# Patient Record
Sex: FEMALE | Race: WHITE | NOT HISPANIC OR LATINO | Employment: OTHER | URBAN - METROPOLITAN AREA
[De-identification: names, ages, dates, MRNs, and addresses within clinical notes are randomized per-mention and may not be internally consistent; named-entity substitution may affect disease eponyms.]

---

## 2017-01-11 ENCOUNTER — TELEPHONE (OUTPATIENT)
Dept: SLEEP MEDICINE | Facility: CLINIC | Age: 52
End: 2017-01-11

## 2017-01-12 ENCOUNTER — OFFICE VISIT (OUTPATIENT)
Dept: SLEEP MEDICINE | Facility: CLINIC | Age: 52
End: 2017-01-12
Payer: COMMERCIAL

## 2017-01-12 VITALS
WEIGHT: 135.38 LBS | DIASTOLIC BLOOD PRESSURE: 60 MMHG | HEIGHT: 69 IN | BODY MASS INDEX: 20.05 KG/M2 | OXYGEN SATURATION: 97 % | SYSTOLIC BLOOD PRESSURE: 102 MMHG | HEART RATE: 73 BPM

## 2017-01-12 DIAGNOSIS — G43.719 CHRONIC MIGRAINE WITHOUT AURA, WITH INTRACTABLE MIGRAINE, SO STATED, WITHOUT MENTION OF STATUS MIGRAINOSUS: ICD-10-CM

## 2017-01-12 DIAGNOSIS — G43.009 MIGRAINE WITHOUT AURA AND WITHOUT STATUS MIGRAINOSUS, NOT INTRACTABLE: ICD-10-CM

## 2017-01-12 PROCEDURE — 1159F MED LIST DOCD IN RCRD: CPT | Mod: S$GLB,,, | Performed by: NURSE PRACTITIONER

## 2017-01-12 PROCEDURE — 99213 OFFICE O/P EST LOW 20 MIN: CPT | Mod: S$GLB,,, | Performed by: NURSE PRACTITIONER

## 2017-01-12 PROCEDURE — 99999 PR PBB SHADOW E&M-EST. PATIENT-LVL III: CPT | Mod: PBBFAC,,, | Performed by: NURSE PRACTITIONER

## 2017-01-12 RX ORDER — SUMATRIPTAN SUCCINATE 100 MG/1
100 TABLET ORAL
Qty: 9 TABLET | Refills: 3 | Status: SHIPPED | OUTPATIENT
Start: 2017-01-12 | End: 2017-02-11

## 2017-01-12 RX ORDER — SUMATRIPTAN AND NAPROXEN SODIUM 85; 500 MG/1; MG/1
TABLET, FILM COATED ORAL
Qty: 9 TABLET | Refills: 5 | Status: SHIPPED | OUTPATIENT
Start: 2017-01-12 | End: 2018-12-04

## 2017-01-12 RX ORDER — BUTALBITAL, ACETAMINOPHEN AND CAFFEINE 300; 40; 50 MG/1; MG/1; MG/1
CAPSULE ORAL
Refills: 4 | COMMUNITY
Start: 2017-01-04 | End: 2017-01-12 | Stop reason: SDUPTHER

## 2017-01-12 RX ORDER — PROMETHAZINE HYDROCHLORIDE 12.5 MG/1
12.5-25 TABLET ORAL EVERY 4 HOURS PRN
Qty: 30 TABLET | Refills: 3 | Status: ON HOLD | OUTPATIENT
Start: 2017-01-12 | End: 2017-07-06 | Stop reason: HOSPADM

## 2017-01-12 NOTE — MR AVS SNAPSHOT
[facility]  [address]  [facility] [address]  Phone: [phone]                  Carolin Lind   2017 10:00 AM   Office Visit    Description:  Female : 1965   Provider:  Sabra Carpenter NP   Department:  [facility]           Reason for Visit     Headache           Diagnoses this Visit        Comments    Migraine without aura and without status migrainosus, not intractable         Chronic migraine without aura, with intractable migraine, so stated, without mention of status migrainosus                To Do List           Future Appointments        Provider Department Dept Phone    2017 10:00 AM Danette Alvares MD [facility] [address] [phone]      Goals (5 Years of Data)     None      Follow-Up and Disposition     Return in about 6 months (around 2017).       These Medications        Disp Refills Start End    sumatriptan-naproxen (TREXIMET)  mg Tab 9 tablet 5 2017     TAKE 1 TABLET BY MOUTH EVERY 2 HOURS AS NEEDED    Pharmacy: [address] Ph #: [phone]       promethazine (PHENERGAN) 12.5 MG Tab 30 tablet 3 2017     Take 1-2 tablets (12.5-25 mg total) by mouth every 4 (four) hours as needed. 1 - 2 Tablet Oral Every 4-6 hours - Oral    Pharmacy: [id number] - [facility] [address] AT [facility] Ph #: [phone]       sumatriptan succinate (ZEMBRACE SYMTOUCH) 3 mg/0.5 mL PnIj 12 Syringe 3 2017     Inject 3 mg into the skin every 2 (two) hours as needed. - Subcutaneous    Pharmacy: [facility] - [facility]  [address] Ph #: [phone]       sumatriptan (IMITREX) 100 MG tablet 9 tablet 3 2017    Take 1 tablet (100 mg total) by mouth every 2 (two) hours as needed for Migraine. - Oral    Pharmacy: [facility] -  [facility] - [place] Ph #: [phone]         [facility] On Call     [facility] On Call Nurse [facility] Line - 24/7 Assistance  Registered nurses in the [facility] On Call Center provide clinical advisement, health education, appointment booking, and other advisory services.  Call for this free service at [phone].             Medications           Message regarding Medications     Verify the changes and/or additions to your medication regime listed below are the same as discussed with your clinician today.  If any of these changes or additions are incorrect, please notify your healthcare provider.        START taking these NEW medications        Refills    sumatriptan succinate (ZEMBRACE SYMTOUCH) 3 mg/0.5 mL PnIj 3    Sig: Inject 3 mg into the skin every 2 (two) hours as needed.    Class: Normal    Route: Subcutaneous    sumatriptan (IMITREX) 100 MG tablet 3    Sig: Take 1 tablet (100 mg total) by mouth every 2 (two) hours as needed for Migraine.    Class: Normal    Route: Oral      CHANGE how you are taking these medications     Start Taking Instead of    sumatriptan-naproxen (TREXIMET)  mg Tab TREXIMET  mg Tab    Dosage:  TAKE 1 TABLET BY MOUTH EVERY 2 HOURS AS NEEDED Dosage:  TAKE 1 TABLET BY MOUTH EVERY 2 HOURS AS NEEDED    Reason for Change:  Reorder       STOP taking these medications     butalbital-acetaminophen-caff -40 mg Cap TK ONE C PO  Q 4 TO 6 H PRF HA           Verify that the below list of medications is an accurate representation of the medications you are currently taking.  If none reported, the list may be blank. If incorrect, please contact your healthcare provider. Carry this list with you in case of emergency.           Current Medications     butalbital-acetaminophen-caffeine -40 mg (FIORICET, ESGIC) -40 mg per tablet     clonazepam (KLONOPIN) 1 MG tablet Take 1 mg by mouth every evening.    fluoxetine (PROZAC) 20 MG capsule Take 3 capsules (60 mg total)  "by mouth once daily.    promethazine (PHENERGAN) 12.5 MG Tab Take 1-2 tablets (12.5-25 mg total) by mouth every 4 (four) hours as needed. 1 - 2 Tablet Oral Every 4-6 hours    propranolol (INDERAL LA) 80 MG 24 hr capsule Take 80 mg by mouth once daily. 1 Capsule,Extended Release 24 hr Oral Every day    propranolol (INDERAL LA) 80 MG 24 hr capsule TAKE 2 CAPSULES BY MOUTH DAILY    sumatriptan succinate (SUMAVEL DOSEPRO) 6 mg/0.5 mL NfIj Inject 6 mg into the skin every 2 (two) hours as needed. 1 Needle-Free Injector Subcutaneous Every 2 hours    venlafaxine (EFFEXOR-XR) 75 MG 24 hr capsule TAKE ONE CAPSULE BY MOUTH ONCE DAILY    sumatriptan (IMITREX) 100 MG tablet Take 1 tablet (100 mg total) by mouth every 2 (two) hours as needed for Migraine.    sumatriptan succinate (ZEMBRACE SYMTOUCH) 3 mg/0.5 mL PnIj Inject 3 mg into the skin every 2 (two) hours as needed.    sumatriptan-naproxen (TREXIMET)  mg Tab TAKE 1 TABLET BY MOUTH EVERY 2 HOURS AS NEEDED    TREXIMET  mg Tab            Clinical Reference Information           Vital Signs - Last Recorded  Most recent update: 1/12/2017 10:30 AM by Zane Angel MA    BP Pulse Ht Wt SpO2 BMI    102/60 73 5' 9" (1.753 m) 61.4 kg (135 lb 5.8 oz) 97% 19.99 kg/m2      Blood Pressure          Most Recent Value    BP  102/60      Allergies as of 1/12/2017     No Known Allergies      Immunizations Administered on Date of Encounter - 1/12/2017     None      [facility] Sign-Up     Activating your [facility] account is as easy as 1-2-3!     1) Visit [email or web address], select Sign Up Now, enter this activation code and your date of birth, then select Next.  [email or web address]  Expires: 2/26/2017 10:56 AM      2) Create a username and password to use when you visit [facility] in the future and select a security question in case you lose your password and select Next.    3) Enter your e-mail address and click Sign Up!    Additional Information  If you have questions, please " e-mail [email or web address] or call [phone] to talk to our [facility] staff. Remember, [facility] is NOT to be used for urgent needs. For medical emergencies, dial 911.

## 2017-01-12 NOTE — PROGRESS NOTES
"Since last visit 6/5/15,  she reports her headaches continue to occur daily ,mild mainly, occasional exacerbations. Sumavel (uses 1x month) and Treximet remain effective. Sometimes aleve or excedrin migraine. Takes Aleve for menses headaches,always begins ~ 4d prior to cycle then lessens when starts. She has been out of Treximet for a long time. Lot of stress/dad and brother dying 2016/mom dementia [place]/traveling back and forth. She continues to see psychiatrist. HIT=6 score= 63. Headaches remains same location/nature. HA's remain bilateral R>L retro-orbital which may dissipate on its own with distraction, associated with photo/phonophobia and nausea.  May originate vertex/posterior gel, mushy, sore sensation to front area. She remains on Prozac and effexor for mood. Stress and weather changes are two biggest known triggers. She remains on Inderal LA 80mg qd for migraine prophylaxis, without sedation, faintness, dizziness. Did not trial 2 tabs. Phenergan helps nausea/out of also.      Hx trialed botox once here which was not helpful (has needle phobia) and once by Dr. Gill ([place]) which helped (HA became less intense) but caused increased bruising. Helpful is Fioricet.  Sumavel injection causes sleepiness. She feels tired and  fatigued in mornings.        Tried unsuccussfully in past (Effexor, Inderal, Topamax, Zoloft, Lexapro, relpax, maxalt, zomig ns, frova likes imitrex best/treximet)    ROS: +snoring, 5# wgt gain, stress. Otherwise a balance review of 10-systems is negative.       PHYSICAL EXAM:   General: W/D, W/N     Visit Vitals    /60    Pulse 73    Ht 5' 9" (1.753 m)    Wt 61.4 kg (135 lb 5.8 oz)    SpO2 97%    BMI 19.99 kg/m2         IMPRESSION:   Chronic migraine without aura, intractable 346.73   Mood disturbance, sees psychiatry      PLAN:   Preventative therapy:Continue Inderal LA 80mg qd, trial 2 tabs in am for more therapeutic, if intolerable consider 120mg LA dose    Continue " Prozac 60mg qd for mood/HA prophylaxis and SNRI mood/HA, recommend continued f/u appt with psychiatry for expert mgt of stress/mood.     Continue Aleve 220mg 1-2 tabs for menses prophylaxis +- mild HA, or sparing use Excedrin migraine  Continue Magnesium 250mg bid and vitamin B2 100mg bid     Abortive therapy: Continue Sumavel dose pro 6mg/0.5ml SC q2hr prn, max 2/24hr and/or Trial Zembrace 3mg q2hr prn ([facility]care)    Continue Treximet 85/500mg 1tab q2hr prn, max 2tab/24hr (procare), otherwise consider resuming imitrex 100mg (ritecare)    Continue Phenergan 12.5-25 mg PO or 25mg CO q4-6h prn for HA/nausea     Encouaged her to continue to regulate sleep, mealtimes, try to reduce stress and to avoid known headache triggers/aggravating factors, stress. Discuss with pcp re: ASA given family hx heart disease    Encouraged to limit acute/abortive medications to not more than 2-3 days/week to prevent further progression of headache. Do not delay treatment.     RTC 6-mos, sooner if needed

## 2017-01-16 RX ORDER — VENLAFAXINE HYDROCHLORIDE 75 MG/1
CAPSULE, EXTENDED RELEASE ORAL
Qty: 30 CAPSULE | Refills: 0 | Status: SHIPPED | OUTPATIENT
Start: 2017-01-16 | End: 2017-02-21 | Stop reason: SDUPTHER

## 2017-01-30 ENCOUNTER — OFFICE VISIT (OUTPATIENT)
Dept: OBSTETRICS AND GYNECOLOGY | Facility: CLINIC | Age: 52
End: 2017-01-30
Payer: COMMERCIAL

## 2017-01-30 ENCOUNTER — LAB VISIT (OUTPATIENT)
Dept: LAB | Facility: OTHER | Age: 52
End: 2017-01-30
Attending: OBSTETRICS & GYNECOLOGY
Payer: COMMERCIAL

## 2017-01-30 VITALS
SYSTOLIC BLOOD PRESSURE: 110 MMHG | HEIGHT: 69 IN | WEIGHT: 133.19 LBS | DIASTOLIC BLOOD PRESSURE: 70 MMHG | BODY MASS INDEX: 19.73 KG/M2

## 2017-01-30 DIAGNOSIS — Z01.419 ENCOUNTER FOR GYNECOLOGICAL EXAMINATION WITHOUT ABNORMAL FINDING: Primary | ICD-10-CM

## 2017-01-30 DIAGNOSIS — Z12.39 BREAST CANCER SCREENING: ICD-10-CM

## 2017-01-30 DIAGNOSIS — N95.1 PERIMENOPAUSAL: ICD-10-CM

## 2017-01-30 LAB — FSH SERPL-ACNC: 65 MIU/ML

## 2017-01-30 PROCEDURE — 36415 COLL VENOUS BLD VENIPUNCTURE: CPT

## 2017-01-30 PROCEDURE — 87624 HPV HI-RISK TYP POOLED RSLT: CPT

## 2017-01-30 PROCEDURE — 99999 PR PBB SHADOW E&M-EST. PATIENT-LVL III: CPT | Mod: PBBFAC,,, | Performed by: OBSTETRICS & GYNECOLOGY

## 2017-01-30 PROCEDURE — 83001 ASSAY OF GONADOTROPIN (FSH): CPT

## 2017-01-30 PROCEDURE — 88142 CYTOPATH C/V THIN LAYER: CPT | Performed by: PATHOLOGY

## 2017-01-30 PROCEDURE — 88141 CYTOPATH C/V INTERPRET: CPT | Mod: ,,, | Performed by: PATHOLOGY

## 2017-01-30 PROCEDURE — 99396 PREV VISIT EST AGE 40-64: CPT | Mod: S$GLB,,, | Performed by: OBSTETRICS & GYNECOLOGY

## 2017-01-30 RX ORDER — BUTALBITAL, ACETAMINOPHEN, CAFFEINE AND CODEINE PHOSPHATE 50; 325; 40; 30 MG/1; MG/1; MG/1; MG/1
CAPSULE ORAL
Refills: 0 | Status: ON HOLD | COMMUNITY
Start: 2017-01-13 | End: 2017-07-06 | Stop reason: HOSPADM

## 2017-01-30 RX ORDER — BUTALBITAL, ACETAMINOPHEN AND CAFFEINE 300; 40; 50 MG/1; MG/1; MG/1
CAPSULE ORAL
Refills: 0 | Status: ON HOLD | COMMUNITY
Start: 2017-01-24 | End: 2017-07-06 | Stop reason: HOSPADM

## 2017-01-30 NOTE — MR AVS SNAPSHOT
[facility] [address]  [address]  [facility] [address]  Phone: [phone]  Fax: [phone]                  Carolin Lind   2017 9:15 AM   Office Visit    Description:  Female : 1965   Provider:  Danette Alvares MD   Department:  [facility] [address]           Reason for Visit     Well Woman                To Do List           Goals (5 Years of Data)     None      [facility] On Call     [facility] On Call Nurse [facility] Line -  Assistance  Registered nurses in the [facility] On Call Center provide clinical advisement, health education, appointment booking, and other advisory services.  Call for this free service at [phone].             Medications           Message regarding Medications     Verify the changes and/or additions to your medication regime listed below are the same as discussed with your clinician today.  If any of these changes or additions are incorrect, please notify your healthcare provider.             Verify that the below list of medications is an accurate representation of the medications you are currently taking.  If none reported, the list may be blank. If incorrect, please contact your healthcare provider. Carry this list with you in case of emergency.           Current Medications     butalbital-acetaminop-caf-cod -26-30 mg Cap take 2 capsules by mouth twice a day if needed    butalbital-acetaminophen-caff -40 mg Cap TK ONE C PO  Q 4 TO 6 H PRF HA    butalbital-acetaminophen-caffeine -40 mg (FIORICET, ESGIC) -40 mg per tablet     clonazepam (KLONOPIN) 1 MG tablet Take 1 mg by mouth every evening.    fluoxetine (PROZAC) 20 MG capsule Take 3 capsules (60 mg total) by mouth once daily.    promethazine (PHENERGAN) 12.5 MG Tab Take 1-2 tablets (12.5-25 mg total) by mouth every 4 (four) hours as needed. 1 - 2 Tablet Oral Every 4-6 hours    propranolol (INDERAL LA) 80 MG 24 hr capsule Take 80 mg by mouth once daily. 1  "Capsule,Extended Release 24 hr Oral Every day    propranolol (INDERAL LA) 80 MG 24 hr capsule TAKE 2 CAPSULES BY MOUTH DAILY    sumatriptan (IMITREX) 100 MG tablet Take 1 tablet (100 mg total) by mouth every 2 (two) hours as needed for Migraine.    sumatriptan succinate (SUMAVEL DOSEPRO) 6 mg/0.5 mL NfIj Inject 6 mg into the skin every 2 (two) hours as needed. 1 Needle-Free Injector Subcutaneous Every 2 hours    sumatriptan succinate (ZEMBRACE SYMTOUCH) 3 mg/0.5 mL PnIj Inject 3 mg into the skin every 2 (two) hours as needed.    sumatriptan-naproxen (TREXIMET)  mg Tab TAKE 1 TABLET BY MOUTH EVERY 2 HOURS AS NEEDED    TREXIMET  mg Tab     venlafaxine (EFFEXOR-XR) 75 MG 24 hr capsule TAKE ONE CAPSULE BY MOUTH ONCE DAILY    venlafaxine (EFFEXOR-XR) 75 MG 24 hr capsule TAKE 1 CAPSULE(75 MG) BY MOUTH EVERY DAY           Clinical Reference Information           Vital Signs - Last Recorded  Most recent update: 1/30/2017  9:30 AM by Jef Lim MA    BP Ht Wt LMP BMI    110/70 5' 9" (1.753 m) 60.4 kg (133 lb 2.5 oz) 01/03/2017 (Exact Date) 19.66 kg/m2      Blood Pressure          Most Recent Value    BP  110/70      Allergies as of 1/30/2017     No Known Allergies      Immunizations Administered on Date of Encounter - 1/30/2017     None      [facility] Sign-Up     Activating your [facility] account is as easy as 1-2-3!     1) Visit [email or web address], select Sign Up Now, enter this activation code and your date of birth, then select Next.  [email or web address]  Expires: 2/26/2017 10:56 AM      2) Create a username and password to use when you visit [facility] in the future and select a security question in case you lose your password and select Next.    3) Enter your e-mail address and click Sign Up!    Additional Information  If you have questions, please e-mail [email or web address] or call [phone] to talk to our [facility] staff. Remember, [facility] is NOT to be used for urgent needs. For medical emergencies, " dial 911.

## 2017-01-30 NOTE — PROGRESS NOTES
"CC: Well woman exam    Carolin Lind is a 51 y.o. female  presents for a well woman exam.  LMP: Patient's last menstrual period was 2017 (exact date)..  She has   Cycles but has elevated FSH.  Wants to recheck      Past Medical History   Diagnosis Date    Anxiety     Depression     Heart murmur     History of migraine headaches     Menarche      Age of onset 13     Past Surgical History   Procedure Laterality Date    Breast surgery       Social History     Social History    Marital status:      Spouse name: N/A    Number of children: N/A    Years of education: N/A     Social History Main Topics    Smoking status: Never Smoker    Smokeless tobacco: Never Used    Alcohol use No    Drug use: No    Sexual activity: Yes     Partners: Male     Birth control/ protection: None     Other Topics Concern    None     Social History Narrative     Family History   Problem Relation Age of Onset    Cancer Mother     Migraines Daughter     Migraines Maternal Aunt     Breast cancer Neg Hx     Colon cancer Neg Hx     Ovarian cancer Neg Hx      OB History      Para Term  AB TAB SAB Ectopic Multiple Living    2 1   1               Visit Vitals    /70    Ht 5' 9" (1.753 m)    Wt 60.4 kg (133 lb 2.5 oz)    LMP 2017 (Exact Date)    BMI 19.66 kg/m2         ROS:    ROS:  GENERAL: Denies weight gain or weight loss. Feeling well overall.   SKIN: Denies rash or lesions.   HEAD: Denies head injury or headache.   NODES: Denies enlarged lymph nodes.   CHEST: Denies chest pain or shortness of breath.   CARDIOVASCULAR: Denies palpitations or left sided chest pain.   ABDOMEN: No abdominal pain, constipation, diarrhea, nausea, vomiting or rectal bleeding.   URINARY: No frequency, dysuria, hematuria, or burning on urination.  REPRODUCTIVE: See HPI.   BREASTS: The patient performs breast self-examination and denies pain, lumps, or nipple discharge.   HEMATOLOGIC: No easy " bruisability or excessive bleeding.   MUSCULOSKELETAL: Denies joint pain or swelling.   NEUROLOGIC: Denies syncope or weakness.   PSYCHIATRIC: Denies depression, anxiety or mood swings.    PHYSICAL EXAM:    APPEARANCE: Well nourished, well developed, in no acute distress.  AFFECT: WNL, alert and oriented x 3  SKIN: No acne or hirsutism  NECK: Neck symmetric without masses or thyromegaly  NODES: No inguinal, cervical, axillary, or femoral lymph node enlargement  CHEST: Good respiratory effect  ABDOMEN: Soft.  No tenderness or masses.  No hepatosplenomegaly.  No hernias.  BREASTS: Symmetrical, no skin changes or visible lesions.  No palpable masses, nipple discharge bilaterally.  PELVIC: Normal external genitalia without lesions.  Normal hair distribution.  Adequate perineal body, normal urethral meatus.  Vagina moist and well rugated without lesions or discharge.  Cervix pink, without lesions, discharge or tenderness.  No significant cystocele or rectocele.  Bimanual exam shows uterus to be normal size, regular, mobile and nontender.  Adnexa without masses or tenderness.    RECTAL: Rectovaginal exam confirms above with normal sphincter tone, no masses.  EXTREMITIES: No edema.    Diagnosis    ICD-10-CM ICD-9-CM    1. Encounter for gynecological examination without abnormal finding Z01.419 V72.31 Liquid-based pap smear, screening   2. Perimenopausal N95.1 627.2 Follicle stimulating hormone   3. Breast cancer screening Z12.39 V76.10 Mammo Digital Screening Bilat with Tomosynthesis_CAD         Patient was counseled today on A.C.S. Pap guidelines and recommendations for yearly pelvic exams, mammograms and monthly self breast exams; to see her PCP for other health maintenance.     F/U Annually

## 2017-01-31 DIAGNOSIS — G43.919 INTRACTABLE MIGRAINE WITHOUT STATUS MIGRAINOSUS, UNSPECIFIED MIGRAINE TYPE: ICD-10-CM

## 2017-01-31 RX ORDER — PROPRANOLOL HYDROCHLORIDE 80 MG/1
CAPSULE, EXTENDED RELEASE ORAL
Qty: 60 CAPSULE | Refills: 3 | Status: SHIPPED | OUTPATIENT
Start: 2017-01-31 | End: 2017-06-08 | Stop reason: SDUPTHER

## 2017-02-02 ENCOUNTER — HOSPITAL ENCOUNTER (OUTPATIENT)
Dept: RADIOLOGY | Facility: OTHER | Age: 52
Discharge: HOME | End: 2017-02-02
Attending: OBSTETRICS & GYNECOLOGY
Payer: COMMERCIAL

## 2017-02-02 DIAGNOSIS — Z12.31 VISIT FOR SCREENING MAMMOGRAM: ICD-10-CM

## 2017-02-02 DIAGNOSIS — Z12.39 BREAST CANCER SCREENING: ICD-10-CM

## 2017-02-02 PROCEDURE — 77067 SCR MAMMO BI INCL CAD: CPT | Mod: 26,,, | Performed by: RADIOLOGY

## 2017-02-02 PROCEDURE — 77067 SCR MAMMO BI INCL CAD: CPT | Mod: TC

## 2017-02-02 PROCEDURE — 77063 BREAST TOMOSYNTHESIS BI: CPT | Mod: 26,,, | Performed by: RADIOLOGY

## 2017-02-07 RX ORDER — FLUOXETINE HYDROCHLORIDE 20 MG/1
CAPSULE ORAL
Qty: 270 CAPSULE | Refills: 0 | Status: CANCELLED | OUTPATIENT
Start: 2017-02-07

## 2017-02-08 LAB — HUMAN PAPILLOMAVIRUS (HPV): DETECTED

## 2017-02-09 ENCOUNTER — TELEPHONE (OUTPATIENT)
Dept: PAIN MEDICINE | Facility: CLINIC | Age: 52
End: 2017-02-09

## 2017-02-09 RX ORDER — FLUOXETINE HYDROCHLORIDE 20 MG/1
60 CAPSULE ORAL DAILY
Qty: 270 CAPSULE | Refills: 1 | Status: SHIPPED | OUTPATIENT
Start: 2017-02-09 | End: 2017-08-17 | Stop reason: SDUPTHER

## 2017-02-15 ENCOUNTER — TELEPHONE (OUTPATIENT)
Dept: OBSTETRICS AND GYNECOLOGY | Facility: CLINIC | Age: 52
End: 2017-02-15

## 2017-02-15 NOTE — TELEPHONE ENCOUNTER
Atypical pap with + HPV.  Please schedule colpo in the office to visualize the cervix and take biopsies if needed                 Spoke with pt. Results given. Pt verbalized understanding. Pt offered/declined appt for tomorrow. Prefers a Monday. Appt offered/accepted for 2/20. Pt aware time/location will mail slip- AWARE ALL COLPO ARE PERFORMED AT [facility]

## 2017-02-20 ENCOUNTER — OFFICE VISIT (OUTPATIENT)
Dept: PAIN MEDICINE | Facility: CLINIC | Age: 52
End: 2017-02-20
Payer: COMMERCIAL

## 2017-02-20 VITALS
WEIGHT: 135.56 LBS | HEART RATE: 59 BPM | SYSTOLIC BLOOD PRESSURE: 124 MMHG | TEMPERATURE: 98 F | RESPIRATION RATE: 18 BRPM | HEIGHT: 69 IN | DIASTOLIC BLOOD PRESSURE: 78 MMHG | BODY MASS INDEX: 20.08 KG/M2

## 2017-02-20 DIAGNOSIS — F41.1 GAD (GENERALIZED ANXIETY DISORDER): Primary | ICD-10-CM

## 2017-02-20 PROCEDURE — 1160F RVW MEDS BY RX/DR IN RCRD: CPT | Mod: S$GLB,,, | Performed by: PSYCHIATRY & NEUROLOGY

## 2017-02-20 PROCEDURE — 99214 OFFICE O/P EST MOD 30 MIN: CPT | Mod: S$GLB,,, | Performed by: PSYCHIATRY & NEUROLOGY

## 2017-02-20 PROCEDURE — 99999 PR PBB SHADOW E&M-EST. PATIENT-LVL III: CPT | Mod: PBBFAC,,, | Performed by: PSYCHIATRY & NEUROLOGY

## 2017-02-20 NOTE — PROGRESS NOTES
"Outpatient Psychiatry Follow-Up Visit (MD/NP)    2/20/2017    Clinical Status of Patient:  Outpatient (Ambulatory)    Chief Complaint:  Carolin Lind is a 51 y.o. female who presents today for follow-up of depression and anxiety.  Met with patient.      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 That she has been feeling depressed with low motivation and energy and still very ambivalent about what to do to help herself with her family situation. That she is not able to get much support from her own family because it is shameful in her culture for a woman to be / . That her daughter is doing "OK", she trying to make her daughter more independent and self sufficient. Talked about coping skills and the benefits of individual psychotherapy.She has been complant with her medications.    Psychotherapy:  · Target symptoms: depression, anxiety   · Why chosen therapy is appropriate versus another modality: relevant to diagnosis, patient responds to this modality  · Outcome monitoring methods: self-report, observation  · Therapeutic intervention type: behavior modifying psychotherapy, supportive psychotherapy  · Topics discussed/themes: relationships difficulties, work stress, parenting issues, difficulty managing affect in interpersonal relationships, building skills sets for symptom management, symptom recognition  · The patient's response to the intervention is guarded. The patient's progress toward treatment goals is not progressing.   · Duration of intervention: 30 minutes.    Review of Systems   · PSYCHIATRIC: Pertinant items are noted in the narrative.    Past Medical, Family and Social History: The patient's past medical, family and social history have been reviewed and updated as appropriate within the electronic medical record - see encounter notes.    Compliance: " yes    Side effects: None    Risk Parameters:  Patient reports no suicidal ideation  Patient reports no homicidal ideation  Patient reports no self-injurious behavior  Patient reports no violent behavior    Exam (detailed: at least 9 elements; comprehensive: all 15 elements)   Constitutional  Vitals:  Most recent vital signs, dated less than 90 days prior to this appointment, were reviewed.   There were no vitals filed for this visit.     General:  age appropriate, casually dressed, neatly groomed     Musculoskeletal  Muscle Strength/Tone:  no tremor, no tic   Gait & Station:  non-ataxic     Psychiatric  Speech:  no latency; no press   Mood & Affect:  anxious  congruent and appropriate   Thought Process:  normal and logical, goal-directed   Associations:  intact   Thought Content:  normal, no suicidality, no homicidality, delusions, or paranoia   Insight:  intact   Judgement: behavior is adequate to circumstances   Orientation:  grossly intact   Memory: intact for content of interview   Language: grossly intact   Attention Span & Concentration:  able to focus   Fund of Knowledge:  intact and appropriate to age and level of education     Assessment and Diagnosis   Status/Progress: Based on the examination today, the patient's problem(s) is/are inadequately controlled.  New problems have been presented today.   Co-morbidities are complicating management of the primary condition.  There are no active rule-out diagnoses for this patient at this time.         Impression: Pt is a 52 Y/O woman with PMHx sig for Migraine Headaches with   Major Depressive Disorder, mod      Strengths and Liabilities: Strength: Patient accepts guidance/feedback, Strength: Patient is expressive/articulate., Strength: Patient is intelligent., Strength: Patient is motivated for change., Strength: Patient has reasonable judgment.           Treatment Goals: Specify outcomes written in observable, behavioral terms:   Anxiety: acquiring relapse  prevention skills, reducing negative automatic thoughts, reducing physical symptoms of anxiety and reducing time spent worrying (<30 minutes/day)   Depression: acquiring relapse prevention skills, increasing energy, increasing interest in usual activities, increasing motivation, increasing self-reward for positive behaviors (one/day), increasing self-reward for positive thoughts (one/day), increasing social contacts (three/week) and reducing fatigue       Treatment Plan/Recommendations:   · Medication Management: Continue current medications. The risks and benefits of medication were discussed with the patient.  · The treatment plan and follow up plan were reviewed with the patient.  Will continue the prozac 60 mg daily.   Will cont effexor XR 75 mg daily.   Pt encouraged to make an appointment for individual psychotherapy.   Discussed coping skills.   Provided supportive psychotherapy.    Will help pt make an appointment with a therapist.        Return to Clinic: 3 months

## 2017-02-20 NOTE — MR AVS SNAPSHOT
[facility]  [address]  [place] [address]  Phone: [phone]  Fax: [phone]                  Carolin Lind   2017 9:40 AM   Office Visit    Description:  Female : 1965   Provider:  Rhiannon Pandya MD   Department:  [facility]           Reason for Visit     Follow-up                To Do List           Future Appointments        Provider Department Dept Phone    3/20/2017 9:15 AM Danette Alvares MD [facility] [address] [phone]    2017 9:20 AM Rhiannon Pandya MD [facility] [phone]      Goals (5 Years of Data)     None      [facility]     [facility] On Call Nurse [facility] Line -  Assistance  Registered nurses in the [facility] On Call Center provide clinical advisement, health education, appointment booking, and other advisory services.  Call for this free service at [phone].             Medications           Message regarding Medications     Verify the changes and/or additions to your medication regime listed below are the same as discussed with your clinician today.  If any of these changes or additions are incorrect, please notify your healthcare provider.             Verify that the below list of medications is an accurate representation of the medications you are currently taking.  If none reported, the list may be blank. If incorrect, please contact your healthcare provider. Carry this list with you in case of emergency.           Current Medications     butalbital-acetaminop-caf-cod -33-30 mg Cap take 2 capsules by mouth twice a day if needed    butalbital-acetaminophen-caff -40 mg Cap TK ONE C PO  Q 4 TO 6 H PRF HA    butalbital-acetaminophen-caffeine -40 mg (FIORICET, ESGIC) -40 mg per tablet     clonazepam (KLONOPIN) 1 MG tablet Take 1 mg by mouth every evening.    fluoxetine (PROZAC) 20 MG capsule Take 3 capsules (60 mg total) by mouth once daily.    promethazine  "(PHENERGAN) 12.5 MG Tab Take 1-2 tablets (12.5-25 mg total) by mouth every 4 (four) hours as needed. 1 - 2 Tablet Oral Every 4-6 hours    propranolol (INDERAL LA) 80 MG 24 hr capsule Take 80 mg by mouth once daily. 1 Capsule,Extended Release 24 hr Oral Every day    propranolol (INDERAL LA) 80 MG 24 hr capsule TAKE 2 CAPSULES BY MOUTH DAILY    sumatriptan succinate (SUMAVEL DOSEPRO) 6 mg/0.5 mL NfIj Inject 6 mg into the skin every 2 (two) hours as needed. 1 Needle-Free Injector Subcutaneous Every 2 hours    sumatriptan succinate (ZEMBRACE SYMTOUCH) 3 mg/0.5 mL PnIj Inject 3 mg into the skin every 2 (two) hours as needed.    sumatriptan-naproxen (TREXIMET)  mg Tab TAKE 1 TABLET BY MOUTH EVERY 2 HOURS AS NEEDED    TREXIMET  mg Tab     venlafaxine (EFFEXOR-XR) 75 MG 24 hr capsule TAKE ONE CAPSULE BY MOUTH ONCE DAILY    venlafaxine (EFFEXOR-XR) 75 MG 24 hr capsule TAKE 1 CAPSULE(75 MG) BY MOUTH EVERY DAY           Clinical Reference Information           Your Vitals Were     BP Pulse Temp Resp Height Weight    124/78 59 98.2 °F (36.8 °C) (Oral) 18 5' 9" (1.753 m) 61.5 kg (135 lb 9.3 oz)    Last Period BMI             01/03/2017 (Exact Date) 20.02 kg/m2         Blood Pressure          Most Recent Value    BP  124/78      Allergies as of 2/20/2017     No Known Allergies      Immunizations Administered on Date of Encounter - 2/20/2017     None      [facility] Sign-Up     Activating your [facility] account is as easy as 1-2-3!     1) Visit [email or web address], select Sign Up Now, enter this activation code and your date of birth, then select Next.  [email or web address]  Expires: 2/26/2017 10:56 AM      2) Create a username and password to use when you visit [facility] in the future and select a security question in case you lose your password and select Next.    3) Enter your e-mail address and click Sign Up!    Additional Information  If you have questions, please e-mail [email or web address] or call [phone] to talk " to our [facility] staff. Remember, [facility] is NOT to be used for urgent needs. For medical emergencies, dial 911.         Language Assistance Services     ATTENTION: Language assistance services are available, free of charge. Please call [phone].      ATENCIÓN: Si habla niyah, tiene a barksdale disposición servicios gratuitos de asistencia lingüística. Llame al [phone].     CHÚ Ý: N?u b?n nói Ti?ng Vi?t, có các d?ch v? h? tr? ngôn ng? mi?n phí dành cho b?n. G?i s? [phone].         [facility] complies with applicable Federal civil rights laws and does not discriminate on the basis of race, color, national origin, age, disability, or sex.

## 2017-02-21 RX ORDER — VENLAFAXINE HYDROCHLORIDE 75 MG/1
CAPSULE, EXTENDED RELEASE ORAL
Qty: 30 CAPSULE | Refills: 2 | Status: SHIPPED | OUTPATIENT
Start: 2017-02-21 | End: 2017-05-21 | Stop reason: SDUPTHER

## 2017-03-20 ENCOUNTER — PROCEDURE VISIT (OUTPATIENT)
Dept: OBSTETRICS AND GYNECOLOGY | Facility: CLINIC | Age: 52
End: 2017-03-20
Payer: COMMERCIAL

## 2017-03-20 VITALS
SYSTOLIC BLOOD PRESSURE: 110 MMHG | HEIGHT: 69 IN | DIASTOLIC BLOOD PRESSURE: 70 MMHG | WEIGHT: 130.94 LBS | BODY MASS INDEX: 19.39 KG/M2

## 2017-03-20 DIAGNOSIS — R87.810 ATYPICAL SQUAMOUS CELL CHANGES OF UNDETERMINED SIGNIFICANCE (ASCUS) ON CERVICAL CYTOLOGY WITH POSITIVE HIGH RISK HUMAN PAPILLOMA VIRUS (HPV): Primary | ICD-10-CM

## 2017-03-20 DIAGNOSIS — R87.610 ATYPICAL SQUAMOUS CELL CHANGES OF UNDETERMINED SIGNIFICANCE (ASCUS) ON CERVICAL CYTOLOGY WITH POSITIVE HIGH RISK HUMAN PAPILLOMA VIRUS (HPV): Primary | ICD-10-CM

## 2017-03-20 LAB
B-HCG UR QL: NEGATIVE
CTP QC/QA: YES

## 2017-03-20 PROCEDURE — 81025 URINE PREGNANCY TEST: CPT | Mod: S$GLB,,, | Performed by: OBSTETRICS & GYNECOLOGY

## 2017-03-20 PROCEDURE — 88305 TISSUE EXAM BY PATHOLOGIST: CPT

## 2017-03-20 PROCEDURE — 57454 BX/CURETT OF CERVIX W/SCOPE: CPT | Mod: S$GLB,,, | Performed by: OBSTETRICS & GYNECOLOGY

## 2017-03-20 PROCEDURE — 88305 TISSUE EXAM BY PATHOLOGIST: CPT | Mod: 26,,,

## 2017-03-20 NOTE — MR AVS SNAPSHOT
[facility] [address]  [address]  [facility] [address]  Phone: [phone]  Fax: [phone]                  Carolin Lind   3/20/2017 9:15 AM   Procedure visit    Description:  Female : 1965   Provider:  Danette Alvares MD   Department:  [facility] [address]           Reason for Visit     Colposcopy                To Do List           Future Appointments        Provider Department Dept Phone    2017 9:20 AM Rhiannon Pandya MD [facility] [phone]      Goals (5 Years of Data)     None      [facility]     [facility] On Call Nurse Care Line -  Assistance  Registered nurses in the [facility] On Call Center provide clinical advisement, health education, appointment booking, and other advisory services.  Call for this free service at [phone].             Medications           Message regarding Medications     Verify the changes and/or additions to your medication regime listed below are the same as discussed with your clinician today.  If any of these changes or additions are incorrect, please notify your healthcare provider.             Verify that the below list of medications is an accurate representation of the medications you are currently taking.  If none reported, the list may be blank. If incorrect, please contact your healthcare provider. Carry this list with you in case of emergency.           Current Medications     butalbital-acetaminop-caf-cod -38-30 mg Cap take 2 capsules by mouth twice a day if needed    butalbital-acetaminophen-caff -40 mg Cap TK ONE C PO  Q 4 TO 6 H PRF HA    butalbital-acetaminophen-caffeine -40 mg (FIORICET, ESGIC) -40 mg per tablet     clonazepam (KLONOPIN) 1 MG tablet Take 1 mg by mouth every evening.    fluoxetine (PROZAC) 20 MG capsule Take 3 capsules (60 mg total) by mouth once daily.    promethazine (PHENERGAN) 12.5 MG Tab Take 1-2 tablets (12.5-25 mg total) by mouth  "every 4 (four) hours as needed. 1 - 2 Tablet Oral Every 4-6 hours    propranolol (INDERAL LA) 80 MG 24 hr capsule Take 80 mg by mouth once daily. 1 Capsule,Extended Release 24 hr Oral Every day    propranolol (INDERAL LA) 80 MG 24 hr capsule TAKE 2 CAPSULES BY MOUTH DAILY    sumatriptan succinate (SUMAVEL DOSEPRO) 6 mg/0.5 mL NfIj Inject 6 mg into the skin every 2 (two) hours as needed. 1 Needle-Free Injector Subcutaneous Every 2 hours    sumatriptan succinate (ZEMBRACE SYMTOUCH) 3 mg/0.5 mL PnIj Inject 3 mg into the skin every 2 (two) hours as needed.    sumatriptan-naproxen (TREXIMET)  mg Tab TAKE 1 TABLET BY MOUTH EVERY 2 HOURS AS NEEDED    TREXIMET  mg Tab     venlafaxine (EFFEXOR-XR) 75 MG 24 hr capsule TAKE ONE CAPSULE BY MOUTH ONCE DAILY    venlafaxine (EFFEXOR-XR) 75 MG 24 hr capsule TAKE 1 CAPSULE(75 MG) BY MOUTH EVERY DAY           Clinical Reference Information           Your Vitals Were     BP Height Weight Last Period BMI    110/70 5' 9" (1.753 m) 59.4 kg (130 lb 15.3 oz) 03/17/2017 19.34 kg/m2      Blood Pressure          Most Recent Value    BP  110/70      Allergies as of 3/20/2017     No Known Allergies      Immunizations Administered on Date of Encounter - 3/20/2017     None      [facility] Sign-Up     Activating your [facility] account is as easy as 1-2-3!     1) Visit [email or web address], select Sign Up Now, enter this activation code and your date of birth, then select Next.  [email or web address]  Expires: 5/4/2017  9:24 AM      2) Create a username and password to use when you visit [facility] in the future and select a security question in case you lose your password and select Next.    3) Enter your e-mail address and click Sign Up!    Additional Information  If you have questions, please e-mail [email or web address] or call [phone] to talk to our [facility] staff. Remember, [facility] is NOT to be used for urgent needs. For medical emergencies, dial 911.         Language Assistance " Services     ATTENTION: Language assistance services are available, free of charge. Please call [phone].      ATENCIÓN: Si habla niyah, tiene a barksdale disposición servicios gratuitos de asistencia lingüística. Llame al [phone].     CHÚ Ý: N?u b?n nói Ti?ng Vi?t, có các d?ch v? h? tr? ngôn ng? mi?n phí dành cho b?n. G?i s? [phone].         [facility] [address] complies with applicable Federal civil rights laws and does not discriminate on the basis of race, color, national origin, age, disability, or sex.

## 2017-03-20 NOTE — PROCEDURES
Colposcopy W/BIOPSY AND ECC- Today  Date/Time: 3/20/2017 9:58 AM  Performed by: VONNIE SCHMID  Authorized by: VONNIE SCHMID   Preparation: Patient was prepped and draped in the usual sterile fashion.  Local anesthesia used: no    Anesthesia:  Local anesthesia used: no  Sedation:  Patient sedated: no        COLPOSCOPY:    Carolin Lind is a 52 y.o. female   presents for colposcopy.  Patient's last menstrual period was 2017..  Her most recent pap smear shows ASCUS with POSITIVE high risk HPV.      The abnormal test findings were discussed, as well as HPV infection, need for colposcopy and possible biopsies to determine the plan of care, treatments available, the minimal risk of bleeding and infection with colposcopy, and alternatives to colposcopy and she agrees to proceed.      UPT is negative    COLPOSCOPY EXAM:   TIME OUT PERFORMED.     acetowhite lesion(s) noted at 7/11 o'clock    Biopsy was taken at 7/ 11  o'clock.  ECC was performed    Hemostasis was adequate with application of Monsel's solution.  The speculum was removed.  The patient did tolerate the procedure well.    All collected specimens sent to pathology for histologic analysis.    Post-colposcopy counseling:  The patient was instructed to manage post-colposcopy cramping with NSAIDs or Tylenol, or with a prescription per the medication card.  Avoid intercourse, douching, or tampons in the vagina for at least 2-3 days.  Expect a clumpy blackish discharge due to Monsel's solution application for several days.  Report heavy bleeding, worsening pain or pain that does not respond to above medications, or foul-smelling vaginal discharge. HPV vaccine recommended according to FDA age guidelines.  Importance of follow-up stressed.      Follow up based on colposcopy results.

## 2017-05-23 ENCOUNTER — TELEPHONE (OUTPATIENT)
Dept: PAIN MEDICINE | Facility: CLINIC | Age: 52
End: 2017-05-23

## 2017-05-23 RX ORDER — VENLAFAXINE HYDROCHLORIDE 75 MG/1
CAPSULE, EXTENDED RELEASE ORAL
Qty: 30 CAPSULE | Refills: 0 | Status: SHIPPED | OUTPATIENT
Start: 2017-05-23 | End: 2017-07-12 | Stop reason: SDUPTHER

## 2017-05-23 NOTE — TELEPHONE ENCOUNTER
----- Message from Martinez Duarte sent at 5/23/2017  2:18 PM CDT -----  Contact: LAURA FAIR [[id number]]  X_  1st Request  _  2nd Request  _  3rd Request        Who: LAURA FAIR [[id number]]    Why: Patient calling in regards to medication. Please call back to follow up.    What Number to Call Back: [phone]    When to Expect a call back: (Before the end of the day)   -- if the call is after 12:00, the call back will be tomorrow.

## 2017-06-08 DIAGNOSIS — G43.919 INTRACTABLE MIGRAINE WITHOUT STATUS MIGRAINOSUS, UNSPECIFIED MIGRAINE TYPE: ICD-10-CM

## 2017-06-08 RX ORDER — PROPRANOLOL HYDROCHLORIDE 80 MG/1
CAPSULE, EXTENDED RELEASE ORAL
Qty: 60 CAPSULE | Refills: 0 | Status: SHIPPED | OUTPATIENT
Start: 2017-06-08 | End: 2018-04-02

## 2017-07-03 ENCOUNTER — HOSPITAL ENCOUNTER (EMERGENCY)
Facility: HOSPITAL | Age: 52
End: 2017-07-04
Attending: EMERGENCY MEDICINE
Payer: COMMERCIAL

## 2017-07-03 DIAGNOSIS — T14.8XXA FRACTURE: ICD-10-CM

## 2017-07-03 DIAGNOSIS — Z01.810 PREOP CARDIOVASCULAR EXAM: ICD-10-CM
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
## 2017-07-03 DIAGNOSIS — S82.892A CLOSED LEFT ANKLE FRACTURE, INITIAL ENCOUNTER: Primary | ICD-10-CM

## 2017-07-03 PROCEDURE — 99285 EMERGENCY DEPT VISIT HI MDM: CPT | Mod: 25

## 2017-07-03 PROCEDURE — 96376 TX/PRO/DX INJ SAME DRUG ADON: CPT

## 2017-07-03 PROCEDURE — 96361 HYDRATE IV INFUSION ADD-ON: CPT

## 2017-07-03 PROCEDURE — 96375 TX/PRO/DX INJ NEW DRUG ADDON: CPT

## 2017-07-03 PROCEDURE — 96374 THER/PROPH/DIAG INJ IV PUSH: CPT

## 2017-07-04 ENCOUNTER — ANESTHESIA (OUTPATIENT)
Dept: ANESTHESIOLOGY | Facility: HOSPITAL | Age: 52
End: 2017-07-04
Payer: COMMERCIAL

## 2017-07-04 ENCOUNTER — ANESTHESIA EVENT (OUTPATIENT)
Dept: ANESTHESIOLOGY | Facility: HOSPITAL | Age: 52
End: 2017-07-04
Payer: COMMERCIAL

## 2017-07-04 VITALS
RESPIRATION RATE: 16 BRPM | DIASTOLIC BLOOD PRESSURE: 58 MMHG | OXYGEN SATURATION: 99 % | TEMPERATURE: 99 F | WEIGHT: 125 LBS | HEART RATE: 70 BPM | BODY MASS INDEX: 18.51 KG/M2 | SYSTOLIC BLOOD PRESSURE: 127 MMHG | HEIGHT: 69 IN

## 2017-07-04 LAB
ABO + RH BLD: NORMAL
ALBUMIN SERPL BCP-MCNC: 3.5 G/DL
ALP SERPL-CCNC: 67 U/L
ALT SERPL W/O P-5'-P-CCNC: 24 U/L
ANION GAP SERPL CALC-SCNC: 8 MMOL/L
APTT BLDCRRT: 24.5 SEC
AST SERPL-CCNC: 21 U/L
BASOPHILS # BLD AUTO: 0.01 K/UL
BASOPHILS NFR BLD: 0.2 %
BILIRUB SERPL-MCNC: 0.2 MG/DL
BLD GP AB SCN CELLS X3 SERPL QL: NORMAL
BUN SERPL-MCNC: 20 MG/DL
CALCIUM SERPL-MCNC: 9 MG/DL
CHLORIDE SERPL-SCNC: 102 MMOL/L
CO2 SERPL-SCNC: 28 MMOL/L
CREAT SERPL-MCNC: 0.9 MG/DL
DIFFERENTIAL METHOD: ABNORMAL
EOSINOPHIL # BLD AUTO: 0.2 K/UL
EOSINOPHIL NFR BLD: 2.5 %
ERYTHROCYTE [DISTWIDTH] IN BLOOD BY AUTOMATED COUNT: 13.6 %
EST. GFR  (AFRICAN AMERICAN): >60 ML/MIN/1.73 M^2
EST. GFR  (NON AFRICAN AMERICAN): >60 ML/MIN/1.73 M^2
GLUCOSE SERPL-MCNC: 104 MG/DL
HCT VFR BLD AUTO: 38.6 %
HGB BLD-MCNC: 13.3 G/DL
INR PPP: 1
LYMPHOCYTES # BLD AUTO: 1.5 K/UL
LYMPHOCYTES NFR BLD: 24.6 %
MCH RBC QN AUTO: 32 PG
MCHC RBC AUTO-ENTMCNC: 34.5 %
MCV RBC AUTO: 93 FL
MONOCYTES # BLD AUTO: 0.6 K/UL
MONOCYTES NFR BLD: 9.3 %
NEUTROPHILS # BLD AUTO: 3.8 K/UL
NEUTROPHILS NFR BLD: 63.4 %
PLATELET # BLD AUTO: 246 K/UL
PMV BLD AUTO: 9.7 FL
POTASSIUM SERPL-SCNC: 3.7 MMOL/L
PROT SERPL-MCNC: 6.6 G/DL
PROTHROMBIN TIME: 10.4 SEC
RBC # BLD AUTO: 4.16 M/UL
RH BLD: NORMAL
SODIUM SERPL-SCNC: 138 MMOL/L
WBC # BLD AUTO: 5.93 K/UL

## 2017-07-04 PROCEDURE — 37000008 HC ANESTHESIA 1ST 15 MINUTES

## 2017-07-04 PROCEDURE — 86900 BLOOD TYPING SEROLOGIC ABO: CPT

## 2017-07-04 PROCEDURE — 37000009 HC ANESTHESIA EA ADD 15 MINS

## 2017-07-04 PROCEDURE — 80053 COMPREHEN METABOLIC PANEL: CPT

## 2017-07-04 PROCEDURE — 25000003 PHARM REV CODE 250: Performed by: EMERGENCY MEDICINE

## 2017-07-04 PROCEDURE — 93005 ELECTROCARDIOGRAM TRACING: CPT

## 2017-07-04 PROCEDURE — 85730 THROMBOPLASTIN TIME PARTIAL: CPT

## 2017-07-04 PROCEDURE — 25605 CLTX DST RDL FX/EPHYS SEP W/: CPT

## 2017-07-04 PROCEDURE — 86850 RBC ANTIBODY SCREEN: CPT

## 2017-07-04 PROCEDURE — D9220A PRA ANESTHESIA: Mod: ,,, | Performed by: ANESTHESIOLOGY

## 2017-07-04 PROCEDURE — 85025 COMPLETE CBC W/AUTO DIFF WBC: CPT

## 2017-07-04 PROCEDURE — 86901 BLOOD TYPING SEROLOGIC RH(D): CPT | Mod: 91

## 2017-07-04 PROCEDURE — 63600175 PHARM REV CODE 636 W HCPCS: Performed by: EMERGENCY MEDICINE

## 2017-07-04 PROCEDURE — 25000003 PHARM REV CODE 250: Performed by: ANESTHESIOLOGY

## 2017-07-04 PROCEDURE — 63600175 PHARM REV CODE 636 W HCPCS: Performed by: ANESTHESIOLOGY

## 2017-07-04 PROCEDURE — 85610 PROTHROMBIN TIME: CPT

## 2017-07-04 RX ORDER — ONDANSETRON 2 MG/ML
4 INJECTION INTRAMUSCULAR; INTRAVENOUS
Status: COMPLETED | OUTPATIENT
Start: 2017-07-04 | End: 2017-07-04

## 2017-07-04 RX ORDER — HYDROCODONE BITARTRATE AND ACETAMINOPHEN 5; 325 MG/1; MG/1
2 TABLET ORAL
Status: COMPLETED | OUTPATIENT
Start: 2017-07-04 | End: 2017-07-04

## 2017-07-04 RX ORDER — PROPOFOL 10 MG/ML
VIAL (ML) INTRAVENOUS
Status: DISCONTINUED | OUTPATIENT
Start: 2017-07-04 | End: 2017-07-04

## 2017-07-04 RX ORDER — MORPHINE SULFATE 10 MG/ML
6 INJECTION INTRAMUSCULAR; INTRAVENOUS; SUBCUTANEOUS
Status: COMPLETED | OUTPATIENT
Start: 2017-07-04 | End: 2017-07-04

## 2017-07-04 RX ORDER — HYDROCODONE BITARTRATE AND ACETAMINOPHEN 5; 325 MG/1; MG/1
2 TABLET ORAL EVERY 6 HOURS PRN
Qty: 20 TABLET | Refills: 0 | Status: ON HOLD | OUTPATIENT
Start: 2017-07-04 | End: 2017-07-06 | Stop reason: HOSPADM

## 2017-07-04 RX ORDER — LIDOCAINE HCL/PF 100 MG/5ML
SYRINGE (ML) INTRAVENOUS
Status: DISCONTINUED | OUTPATIENT
Start: 2017-07-04 | End: 2017-07-04

## 2017-07-04 RX ORDER — DOCUSATE SODIUM 100 MG/1
100 CAPSULE, LIQUID FILLED ORAL 2 TIMES DAILY
Qty: 40 CAPSULE | Refills: 0 | Status: SHIPPED | OUTPATIENT
Start: 2017-07-04 | End: 2017-07-12 | Stop reason: SDUPTHER

## 2017-07-04 RX ADMIN — PROPOFOL 40 MG: 10 INJECTION, EMULSION INTRAVENOUS at 02:07

## 2017-07-04 RX ADMIN — LIDOCAINE HYDROCHLORIDE 5 ML: 20 INJECTION, SOLUTION INTRAVENOUS at 02:07

## 2017-07-04 RX ADMIN — SODIUM CHLORIDE 1000 ML: 0.9 INJECTION, SOLUTION INTRAVENOUS at 01:07

## 2017-07-04 RX ADMIN — ONDANSETRON 4 MG: 2 INJECTION INTRAMUSCULAR; INTRAVENOUS at 01:07

## 2017-07-04 RX ADMIN — PROPOFOL 50 MG: 10 INJECTION, EMULSION INTRAVENOUS at 02:07

## 2017-07-04 RX ADMIN — PROPOFOL 30 MG: 10 INJECTION, EMULSION INTRAVENOUS at 02:07

## 2017-07-04 RX ADMIN — PROPOFOL 20 MG: 10 INJECTION, EMULSION INTRAVENOUS at 02:07

## 2017-07-04 RX ADMIN — HYDROCODONE BITARTRATE AND ACETAMINOPHEN 2 TABLET: 5; 325 TABLET ORAL at 03:07

## 2017-07-04 RX ADMIN — MORPHINE SULFATE 6 MG: 10 INJECTION INTRAVENOUS at 12:07

## 2017-07-04 RX ADMIN — MORPHINE SULFATE 6 MG: 10 INJECTION INTRAVENOUS at 01:07

## 2017-07-04 RX ADMIN — PROPOFOL 100 MG: 10 INJECTION, EMULSION INTRAVENOUS at 02:07

## 2017-07-04 RX ADMIN — ONDANSETRON 4 MG: 2 INJECTION INTRAMUSCULAR; INTRAVENOUS at 12:07

## 2017-07-04 NOTE — TRANSFER OF CARE
"Anesthesia Transfer of Care Note    Patient: Carolin Lind    Procedure(s) Performed: Procedure(s) (LRB):  REDUCTION-CLOSED (Left)    Patient location: Emergency Department    Anesthesia Type: general    Transport from OR: Transported from OR on 2-3 L/min O2 by NC with adequate spontaneous ventilation    Post pain: adequate analgesia    Post assessment: no apparent anesthetic complications    Post vital signs: stable    Level of consciousness: awake, alert and oriented    Nausea/Vomiting: no nausea/vomiting    Complications: none    Transfer of care protocol was followedComments: Pt sedated in ED and once she was awake care was transferred back to the ED staff      Last vitals:   Visit Vitals  BP (!) 143/69   Pulse 70   Temp 36.9 °C (98.5 °F) (Oral)   Resp 18   Ht 5' 9" (1.753 m)   Wt 56.7 kg (125 lb)   SpO2 99%   BMI 18.46 kg/m²     "

## 2017-07-04 NOTE — ANESTHESIA PREPROCEDURE EVALUATION
07/04/2017  Carolin Lind is a 52 y.o., female.    Anesthesia Evaluation    I have reviewed the Patient Summary Reports.    I have reviewed the Nursing Notes.      Review of Systems  Anesthesia Hx:  Hx of Anesthetic complications (MARINA)    Social:  Non-Smoker    Cardiovascular:   Exercise tolerance: good Denies Pacemaker.  Denies Hypertension. Valvular problems/Murmurs, MVP  Denies MI.  Denies CAD.    Denies CABG/stent.  Denies Dysrhythmias.   Denies Angina.             denies PVD no hyperlipidemia        Pulmonary:  Pulmonary Normal    Renal/:  Renal/ Normal     Hepatic/GI:  Hepatic/GI Normal    Neurological:   Denies CVA. Headaches Denies Seizures.    Endocrine:  Endocrine Normal    Psych:   Psychiatric History          Physical Exam  General:  Well nourished    Airway/Jaw/Neck:  AIRWAY FINDINGS: Normal           Mental Status:  Mental Status Findings: Normal        Anesthesia Plan  Type of Anesthesia, risks & benefits discussed:  Anesthesia Type:  general  Patient's Preference:   Intra-op Monitoring Plan:   Intra-op Monitoring Plan Comments:   Post Op Pain Control Plan:   Post Op Pain Control Plan Comments:   Induction:   IV  Beta Blocker:  Patient is on a Beta-Blocker and has received one dose within the past 24 hours (No further documentation required).       Informed Consent: Patient understands risks and agrees with Anesthesia plan.  Questions answered. Anesthesia consent signed with patient.  ASA Score: 2  emergent   Day of Surgery Review of History & Physical:    H&P update referred to the provider.     Anesthesia Plan Notes: Pt last ate around 8pm        Ready For Surgery From Anesthesia Perspective.

## 2017-07-04 NOTE — ANESTHESIA POSTPROCEDURE EVALUATION
"Anesthesia Post Evaluation    Patient: Carolin Lind    Procedure(s) Performed: Procedure(s) (LRB):  REDUCTION-CLOSED (Left)    Final Anesthesia Type: general  Patient location during evaluation: ED  Patient participation: Yes- Able to Participate  Level of consciousness: awake and alert and oriented  Post-procedure vital signs: reviewed and stable  Pain management: adequate  Airway patency: patent  PONV status at discharge: No PONV  Anesthetic complications: no      Cardiovascular status: blood pressure returned to baseline and hemodynamically stable  Respiratory status: unassisted and room air  Hydration status: euvolemic  Follow-up not needed.        Visit Vitals  BP (!) 143/69   Pulse 70   Temp 36.9 °C (98.5 °F) (Oral)   Resp 18   Ht 5' 9" (1.753 m)   Wt 56.7 kg (125 lb)   SpO2 99%   BMI 18.46 kg/m²       Pain/Fabioal Score: Pain Rating Prior to Med Admin: 10 (7/4/2017  1:17 AM)      "

## 2017-07-04 NOTE — ED PROVIDER NOTES
Encounter Date: 7/3/2017       History     Chief Complaint   Patient presents with    Ankle Pain     EMS reports trip and fall PTA.  Reports obvious deformity to L ankle. EMS gave 25 mcg Fentanyl.      This is an emergent evaluation of a 52-year-old female who presents with acute severe nonradiating left ankle pain.  [place]  She had immediate onset of severe pain to her left ankle.  She has been unable to ambulate since that time.  She denies loss of consciousness.  She received fentanyl 25 µg in route by EMS with minimal relief.    Past medical history of anxiety, depression, migraines.  Takes daily fluoxetine (Prozac) and propranolol.  Takes Fioricet, sumatriptan as needed for headache.  Past surgical history of breast augmentation.          Review of patient's allergies indicates:  No Known Allergies  Past Medical History:   Diagnosis Date    Abnormal Pap smear of cervix 01/30/2017    Atypical pap with + HPV    Anxiety     Depression     Heart murmur     History of migraine headaches     Menarche     Age of onset 13     Past Surgical History:   Procedure Laterality Date    BREAST SURGERY       Family History   Problem Relation Age of Onset    Cancer Mother     Migraines Daughter     Migraines Maternal Aunt     Breast cancer Neg Hx     Colon cancer Neg Hx     Ovarian cancer Neg Hx      Social History   Substance Use Topics    Smoking status: Never Smoker    Smokeless tobacco: Never Used    Alcohol use No     Review of Systems   Constitutional: Negative for fever.   HENT: Negative for nosebleeds.    Eyes: Negative for visual disturbance.   Respiratory: Negative for shortness of breath.    Cardiovascular: Negative for chest pain.   Gastrointestinal: Negative for abdominal pain.   Genitourinary: Negative for dysuria and flank pain.   Musculoskeletal: Positive for arthralgias (left ankle, acute severe). Negative for back pain and neck stiffness.    Skin: Negative for rash.   Neurological: Negative for weakness.       Physical Exam     Initial Vitals [07/04/17 0002]   BP Pulse Resp Temp SpO2   (!) 140/90 70 18 98.5 °F (36.9 °C) 99 %      MAP       106.67         Physical Exam    Nursing note and vitals reviewed.  Constitutional: She appears well-developed and well-nourished. She is not diaphoretic.   Uncomfortable awake/alert adult female.   HENT:   Head: Normocephalic and atraumatic.   Mouth/Throat: Oropharynx is clear and moist.   Eyes: Conjunctivae and EOM are normal. Pupils are equal, round, and reactive to light.   Neck: Normal range of motion. Neck supple.   Cardiovascular: Normal rate, regular rhythm, normal heart sounds and intact distal pulses.   Pulmonary/Chest: Breath sounds normal. No respiratory distress. She has no wheezes. She has no rhonchi. She has no rales.   Abdominal: Soft. Bowel sounds are normal. She exhibits no distension. There is no tenderness. There is no rebound and no guarding.   Musculoskeletal: She exhibits tenderness. She exhibits no edema.   Patient arrives with EMS splint in place to left lower extremity.  Has obvious deformity to left inferior lower leg.  Foot is everted.  Limited range of motion in ankle.  Patient is able to move her toes.  Toes are warm.  Cap refill less than 3 seconds.  2+ DP pulse.  No distinct tenderness to left knee.   Lymphadenopathy:     She has no cervical adenopathy.   Neurological: She is alert and oriented to person, place, and time. She has normal strength.   Skin: Skin is warm and dry. Capillary refill takes less than 2 seconds.   Abrasions bilateral knees.   Psychiatric: She has a normal mood and affect.         ED Course   Pre-Assessment for Procedural Sedation  Date/Time: 7/4/2017 2:00 AM  Performed by: WARREN HANSON  Authorized by: WARREN HANSON   ASA Class: Class 2 - Mild Illness without functional impairment.   Mallampati Score: Class 1 - Visualization of the soft palate, fauces,  uvula, and anterior/posterior pillars.     Orthopedic Injury  Date/Time: 7/4/2017 2:00 AM  Authorized by: WARREN HANSON   Performed by: WARREN HANSON  Injury location: ankle  Location details: left ankle  Injury type: fracture-dislocation  Pre-procedure distal perfusion: normal  Pre-procedure neurological function: normal  Pre-procedure neurovascular assessment: neurovascularly intact  Pre-procedure range of motion: reduced  Local anesthesia used: no    Anesthesia:  Local anesthesia used: no    Sedation:  Patient sedated: yes  Sedation type: moderate (conscious) sedation  Sedatives: propofol and see MAR for details    Manipulation performed: yes  Reduction successful: yes  X-ray confirmed reduction: yes  Immobilization: splint  Splint type: short leg with stirrup.  Supplies used: Ortho-Glass  Complications: No  Post-procedure neurovascular assessment: post-procedure neurovascularly intact  Post-procedure distal perfusion: normal  Post-procedure neurological function: normal  Post-procedure range of motion: unchanged  Patient tolerance: Patient tolerated the procedure well with no immediate complications        Labs Reviewed   COMPREHENSIVE METABOLIC PANEL   CBC W/ AUTO DIFFERENTIAL   APTT   PROTIME-INR   TYPE & SCREEN     EKG Readings: (Independently Interpreted)   01:06: NSR, HR 68. Normal axis and intervals. No STEMI.       X-Rays:   Independently Interpreted Readings:   Other Readings:  L ankle: comminuted fx/dislocation of tibia and fibula    Medical Decision Making:   History:   Old Medical Records: I decided to obtain old medical records.  Old Records Summarized: records from previous admission(s).  Initial Assessment:   This is an emergent evaluation of a 52 y.o. Female s/p mechanical fall with acute severe L ankle pain and deformity.  Differential Diagnosis:   Ddx includes fracture, dislocation, vascular compromise, other.  Independently Interpreted Test(s):   I have ordered and independently  interpreted X-rays - see prior notes.  I have ordered and independently interpreted EKG Reading(s) - see prior notes  Clinical Tests:   Lab Tests: Ordered and Reviewed  Radiological Study: Ordered and Reviewed  Medical Tests: Ordered and Reviewed  ED Management:  Xray reveals comminuted unstable fx of L ankle. L knee without acute pathology.    Preop screening exam (EKG, CXR, labs) reassuring.    Due to patient's degree of pain (still significant s/p fentanyl and morphine), I discussed her case with Dr. Pinto of anesthesia who agreed to provide moderate sedation per hospital protocol.    Patient was given propofol by Dr. Pinto and I manipulated ankle. Post-reduction XRs show much improved alignment of bone fragments. Patient placed in short leg splint with posterior stirrup.    Discussed case with Dr. Bullock of ortho who offered patient option of d/c home with clinic f/u for scheduled OR (next week) versus admission for OR today. Patient stated she would prefer to go home with pain control and f/u in clinic. I have written her percocet for pain (advised not to drink EtOH, not to drive, to take limited quantity, to use with stool softener). She knows the importance of ortho f/u and states she will do so. She knows to use crutches and not put any weight on LLE.    Patient d/c'ed home in improved condition.  Other:   I have discussed this case with another health care provider.                   ED Course     Clinical Impression:   The primary encounter diagnosis was Closed left ankle fracture, initial encounter. Diagnoses of Fall, Preop cardiovascular exam, and Fracture were also pertinent to this visit.                           Danelle Espinosa MD  07/04/17 0753

## 2017-07-05 ENCOUNTER — HOSPITAL ENCOUNTER (OUTPATIENT)
Dept: RADIOLOGY | Facility: HOSPITAL | Age: 52
Discharge: HOME | End: 2017-07-05
Attending: ORTHOPAEDIC SURGERY
Payer: COMMERCIAL

## 2017-07-05 ENCOUNTER — ANESTHESIA EVENT (OUTPATIENT)
Dept: SURGERY | Facility: HOSPITAL | Age: 52
End: 2017-07-05
Payer: COMMERCIAL

## 2017-07-05 ENCOUNTER — OFFICE VISIT (OUTPATIENT)
Dept: ORTHOPEDICS | Facility: CLINIC | Age: 52
End: 2017-07-05
Payer: COMMERCIAL

## 2017-07-05 ENCOUNTER — TELEPHONE (OUTPATIENT)
Dept: ORTHOPEDICS | Facility: CLINIC | Age: 52
End: 2017-07-05

## 2017-07-05 ENCOUNTER — HOSPITAL ENCOUNTER (OUTPATIENT)
Facility: HOSPITAL | Age: 52
Discharge: HOME | End: 2017-07-08
Attending: EMERGENCY MEDICINE | Admitting: ORTHOPAEDIC SURGERY
Payer: COMMERCIAL

## 2017-07-05 DIAGNOSIS — S82.892A CLOSED LEFT ANKLE FRACTURE, INITIAL ENCOUNTER: Primary | ICD-10-CM

## 2017-07-05 DIAGNOSIS — T14.8XXA FRACTURE: ICD-10-CM

## 2017-07-05 DIAGNOSIS — S82.852P: ICD-10-CM

## 2017-07-05 DIAGNOSIS — M25.572 ACUTE LEFT ANKLE PAIN: ICD-10-CM

## 2017-07-05 DIAGNOSIS — M25.572 ACUTE LEFT ANKLE PAIN: Primary | ICD-10-CM

## 2017-07-05 DIAGNOSIS — S82.852A CLOSED DISPLACED TRIMALLEOLAR FRACTURE OF LEFT ANKLE, INITIAL ENCOUNTER: ICD-10-CM

## 2017-07-05 PROBLEM — S82.853A TRIMALLEOLAR FRACTURE: Status: ACTIVE | Noted: 2017-07-05

## 2017-07-05 LAB
BACTERIA #/AREA URNS AUTO: NORMAL /HPF
BILIRUB UR QL STRIP: NEGATIVE
CLARITY UR REFRACT.AUTO: ABNORMAL
COLOR UR AUTO: YELLOW
GLUCOSE UR QL STRIP: NEGATIVE
HGB UR QL STRIP: ABNORMAL
KETONES UR QL STRIP: NEGATIVE
LEUKOCYTE ESTERASE UR QL STRIP: NEGATIVE
MICROSCOPIC COMMENT: NORMAL
NITRITE UR QL STRIP: NEGATIVE
PH UR STRIP: 5 [PH] (ref 5–8)
PREALB SERPL-MCNC: 25 MG/DL
PROCALCITONIN SERPL IA-MCNC: 0.02 NG/ML
PROT UR QL STRIP: NEGATIVE
RBC #/AREA URNS AUTO: 2 /HPF (ref 0–4)
SP GR UR STRIP: 1.01 (ref 1–1.03)
SQUAMOUS #/AREA URNS AUTO: 12 /HPF
TRANSFERRIN SERPL-MCNC: 254 MG/DL
URN SPEC COLLECT METH UR: ABNORMAL
UROBILINOGEN UR STRIP-ACNC: NEGATIVE EU/DL
WBC #/AREA URNS AUTO: 1 /HPF (ref 0–5)

## 2017-07-05 PROCEDURE — 99499 UNLISTED E&M SERVICE: CPT | Mod: S$GLB,,, | Performed by: PHYSICIAN ASSISTANT

## 2017-07-05 PROCEDURE — 63600175 PHARM REV CODE 636 W HCPCS: Performed by: STUDENT IN AN ORGANIZED HEALTH CARE EDUCATION/TRAINING PROGRAM

## 2017-07-05 PROCEDURE — 99156 MOD SED OTH PHYS/QHP 5/>YRS: CPT | Mod: ,,, | Performed by: ORTHOPAEDIC SURGERY

## 2017-07-05 PROCEDURE — G0378 HOSPITAL OBSERVATION PER HR: HCPCS

## 2017-07-05 PROCEDURE — 96374 THER/PROPH/DIAG INJ IV PUSH: CPT

## 2017-07-05 PROCEDURE — 73610 X-RAY EXAM OF ANKLE: CPT | Mod: 26,LT,, | Performed by: RADIOLOGY

## 2017-07-05 PROCEDURE — 99152 MOD SED SAME PHYS/QHP 5/>YRS: CPT

## 2017-07-05 PROCEDURE — 81001 URINALYSIS AUTO W/SCOPE: CPT

## 2017-07-05 PROCEDURE — 99153 MOD SED SAME PHYS/QHP EA: CPT

## 2017-07-05 PROCEDURE — 87086 URINE CULTURE/COLONY COUNT: CPT

## 2017-07-05 PROCEDURE — 84134 ASSAY OF PREALBUMIN: CPT

## 2017-07-05 PROCEDURE — 99285 EMERGENCY DEPT VISIT HI MDM: CPT

## 2017-07-05 PROCEDURE — 84466 ASSAY OF TRANSFERRIN: CPT

## 2017-07-05 PROCEDURE — 99999 PR PBB SHADOW E&M-EST. PATIENT-LVL I: CPT | Mod: PBBFAC,,, | Performed by: PHYSICIAN ASSISTANT

## 2017-07-05 PROCEDURE — 84145 PROCALCITONIN (PCT): CPT

## 2017-07-05 PROCEDURE — 25000003 PHARM REV CODE 250: Performed by: STUDENT IN AN ORGANIZED HEALTH CARE EDUCATION/TRAINING PROGRAM

## 2017-07-05 PROCEDURE — 96376 TX/PRO/DX INJ SAME DRUG ADON: CPT

## 2017-07-05 PROCEDURE — 27818 TREATMENT OF ANKLE FRACTURE: CPT | Mod: LT

## 2017-07-05 PROCEDURE — 81025 URINE PREGNANCY TEST: CPT

## 2017-07-05 PROCEDURE — 99291 CRITICAL CARE FIRST HOUR: CPT | Mod: 25,,, | Performed by: EMERGENCY MEDICINE

## 2017-07-05 PROCEDURE — 73610 X-RAY EXAM OF ANKLE: CPT | Mod: TC,LT

## 2017-07-05 PROCEDURE — 96375 TX/PRO/DX INJ NEW DRUG ADDON: CPT

## 2017-07-05 PROCEDURE — 63600175 PHARM REV CODE 636 W HCPCS: Performed by: EMERGENCY MEDICINE

## 2017-07-05 RX ORDER — LIDOCAINE HYDROCHLORIDE 10 MG/ML
20 INJECTION INFILTRATION; PERINEURAL ONCE
Status: COMPLETED | OUTPATIENT
Start: 2017-07-05 | End: 2017-07-05

## 2017-07-05 RX ORDER — PROPOFOL 10 MG/ML
200 VIAL (ML) INTRAVENOUS
Status: COMPLETED | OUTPATIENT
Start: 2017-07-05 | End: 2017-07-05

## 2017-07-05 RX ORDER — ONDANSETRON 8 MG/1
8 TABLET, ORALLY DISINTEGRATING ORAL EVERY 8 HOURS PRN
Status: DISCONTINUED | OUTPATIENT
Start: 2017-07-05 | End: 2017-07-08 | Stop reason: HOSPADM

## 2017-07-05 RX ORDER — PROPOFOL 10 MG/ML
INJECTION, EMULSION INTRAVENOUS
Status: DISPENSED
Start: 2017-07-05 | End: 2017-07-06

## 2017-07-05 RX ORDER — OXYCODONE HYDROCHLORIDE 5 MG/1
10 TABLET ORAL EVERY 4 HOURS PRN
Status: DISCONTINUED | OUTPATIENT
Start: 2017-07-05 | End: 2017-07-08 | Stop reason: HOSPADM

## 2017-07-05 RX ORDER — HYDROMORPHONE HYDROCHLORIDE 1 MG/ML
1 INJECTION, SOLUTION INTRAMUSCULAR; INTRAVENOUS; SUBCUTANEOUS
Status: COMPLETED | OUTPATIENT
Start: 2017-07-05 | End: 2017-07-05

## 2017-07-05 RX ORDER — SODIUM CHLORIDE 9 MG/ML
INJECTION, SOLUTION INTRAVENOUS CONTINUOUS
Status: DISCONTINUED | OUTPATIENT
Start: 2017-07-05 | End: 2017-07-06

## 2017-07-05 RX ORDER — SODIUM CHLORIDE 9 MG/ML
INJECTION, SOLUTION INTRAVENOUS CONTINUOUS
Status: DISCONTINUED | OUTPATIENT
Start: 2017-07-06 | End: 2017-07-06

## 2017-07-05 RX ORDER — HYDROMORPHONE HYDROCHLORIDE 1 MG/ML
0.5 INJECTION, SOLUTION INTRAMUSCULAR; INTRAVENOUS; SUBCUTANEOUS
Status: COMPLETED | OUTPATIENT
Start: 2017-07-05 | End: 2017-07-05

## 2017-07-05 RX ORDER — PROMETHAZINE HYDROCHLORIDE 12.5 MG/1
25 TABLET ORAL EVERY 6 HOURS PRN
Status: DISCONTINUED | OUTPATIENT
Start: 2017-07-05 | End: 2017-07-08 | Stop reason: HOSPADM

## 2017-07-05 RX ORDER — DIPHENHYDRAMINE HYDROCHLORIDE 50 MG/ML
25 INJECTION INTRAMUSCULAR; INTRAVENOUS EVERY 4 HOURS PRN
Status: DISCONTINUED | OUTPATIENT
Start: 2017-07-05 | End: 2017-07-08 | Stop reason: HOSPADM

## 2017-07-05 RX ORDER — OXYCODONE HYDROCHLORIDE 5 MG/1
15 TABLET ORAL EVERY 4 HOURS PRN
Status: DISCONTINUED | OUTPATIENT
Start: 2017-07-05 | End: 2017-07-08 | Stop reason: HOSPADM

## 2017-07-05 RX ORDER — MORPHINE SULFATE 2 MG/ML
4 INJECTION, SOLUTION INTRAMUSCULAR; INTRAVENOUS
Status: COMPLETED | OUTPATIENT
Start: 2017-07-05 | End: 2017-07-05

## 2017-07-05 RX ORDER — OXYCODONE HYDROCHLORIDE 5 MG/1
5 TABLET ORAL EVERY 4 HOURS PRN
Status: DISCONTINUED | OUTPATIENT
Start: 2017-07-05 | End: 2017-07-08 | Stop reason: HOSPADM

## 2017-07-05 RX ORDER — MUPIROCIN 20 MG/G
1 OINTMENT TOPICAL
Status: DISCONTINUED | OUTPATIENT
Start: 2017-07-05 | End: 2017-07-06

## 2017-07-05 RX ORDER — PROPOFOL 10 MG/ML
VIAL (ML) INTRAVENOUS CODE/TRAUMA/SEDATION MEDICATION
Status: COMPLETED | OUTPATIENT
Start: 2017-07-05 | End: 2017-07-05

## 2017-07-05 RX ORDER — ONDANSETRON 2 MG/ML
4 INJECTION INTRAMUSCULAR; INTRAVENOUS ONCE
Status: COMPLETED | OUTPATIENT
Start: 2017-07-05 | End: 2017-07-05

## 2017-07-05 RX ORDER — ONDANSETRON 2 MG/ML
8 INJECTION INTRAMUSCULAR; INTRAVENOUS
Status: COMPLETED | OUTPATIENT
Start: 2017-07-05 | End: 2017-07-05

## 2017-07-05 RX ORDER — SODIUM CHLORIDE 0.9 % (FLUSH) 0.9 %
3 SYRINGE (ML) INJECTION EVERY 8 HOURS
Status: DISCONTINUED | OUTPATIENT
Start: 2017-07-05 | End: 2017-07-08 | Stop reason: HOSPADM

## 2017-07-05 RX ORDER — HYDROMORPHONE HYDROCHLORIDE 1 MG/ML
1 INJECTION, SOLUTION INTRAMUSCULAR; INTRAVENOUS; SUBCUTANEOUS EVERY 6 HOURS PRN
Status: DISCONTINUED | OUTPATIENT
Start: 2017-07-05 | End: 2017-07-07

## 2017-07-05 RX ORDER — ACETAMINOPHEN 10 MG/ML
1000 INJECTION, SOLUTION INTRAVENOUS EVERY 8 HOURS
Status: COMPLETED | OUTPATIENT
Start: 2017-07-05 | End: 2017-07-06

## 2017-07-05 RX ADMIN — ONDANSETRON 4 MG: 2 INJECTION INTRAMUSCULAR; INTRAVENOUS at 04:07

## 2017-07-05 RX ADMIN — PROPOFOL 10 MG: 10 INJECTION, EMULSION INTRAVENOUS at 06:07

## 2017-07-05 RX ADMIN — MORPHINE SULFATE 4 MG: 2 INJECTION, SOLUTION INTRAMUSCULAR; INTRAVENOUS at 04:07

## 2017-07-05 RX ADMIN — LIDOCAINE HYDROCHLORIDE 20 ML: 10 INJECTION, SOLUTION INFILTRATION; PERINEURAL at 04:07

## 2017-07-05 RX ADMIN — PROPOFOL 20 MG: 10 INJECTION, EMULSION INTRAVENOUS at 06:07

## 2017-07-05 RX ADMIN — HYDROMORPHONE HYDROCHLORIDE 1 MG: 1 INJECTION, SOLUTION INTRAMUSCULAR; INTRAVENOUS; SUBCUTANEOUS at 08:07

## 2017-07-05 RX ADMIN — HYDROMORPHONE HYDROCHLORIDE 1 MG: 1 INJECTION, SOLUTION INTRAMUSCULAR; INTRAVENOUS; SUBCUTANEOUS at 05:07

## 2017-07-05 RX ADMIN — PROPOFOL 200 MG: 10 INJECTION, EMULSION INTRAVENOUS at 03:07

## 2017-07-05 RX ADMIN — PROPOFOL 40 MG: 10 INJECTION, EMULSION INTRAVENOUS at 06:07

## 2017-07-05 RX ADMIN — OXYCODONE HYDROCHLORIDE 15 MG: 5 TABLET ORAL at 09:07

## 2017-07-05 RX ADMIN — PROPOFOL 30 MG: 10 INJECTION, EMULSION INTRAVENOUS at 06:07

## 2017-07-05 RX ADMIN — SODIUM CHLORIDE: 0.9 INJECTION, SOLUTION INTRAVENOUS at 11:07

## 2017-07-05 RX ADMIN — ONDANSETRON 8 MG: 2 INJECTION INTRAMUSCULAR; INTRAVENOUS at 05:07

## 2017-07-05 RX ADMIN — HYDROMORPHONE HYDROCHLORIDE 1 MG: 1 INJECTION, SOLUTION INTRAMUSCULAR; INTRAVENOUS; SUBCUTANEOUS at 10:07

## 2017-07-05 RX ADMIN — ACETAMINOPHEN 1000 MG: 10 INJECTION, SOLUTION INTRAVENOUS at 10:07

## 2017-07-05 RX ADMIN — HYDROMORPHONE HYDROCHLORIDE 0.5 MG: 1 INJECTION, SOLUTION INTRAMUSCULAR; INTRAVENOUS; SUBCUTANEOUS at 04:07

## 2017-07-05 RX ADMIN — ONDANSETRON 8 MG: 8 TABLET, ORALLY DISINTEGRATING ORAL at 10:07

## 2017-07-05 NOTE — ED TRIAGE NOTES
"Pt states she was walking and slipped on a manhole cover. Pt went to ortho clinic and was advised of a broken bone in the leg. Pt was told to come to the ED to have it "set". Pt denies any other symptoms.  "

## 2017-07-05 NOTE — ED NOTES
Patient identifiers verified and correct for Carolin Lind.   LOC: The patient is awake, alert and aware of environment with an appropriate affect, the patient is oriented x 3 and speaking appropriately.   APPEARANCE: Patient appears uncomfortable but in no acute distress, patient is clean and well groomed.  SKIN: The skin is warm and dry, color consistent with ethnicity, patient has normal skin turgor and moist mucus membranes, skin intact, no breakdown or bruising noted.   MUSCULOSKELETAL: Patient moving all extremities spontaneously, swelling noted to the left ankle.   RESPIRATORY: Airway is open and patent, respirations are spontaneous, patient has a normal effort and rate, no accessory muscle use noted, pt placed on continuous pulse ox with O2 sats noted at 97% on room air.  CARDIAC: Pt placed on cardiac monitor. Patient has a normal rate and regular rhythm, no edema noted, capillary refill < 3 seconds.   GASTRO: Soft and non tender to palpation, no distention noted, normoactive bowel sounds present in all four quadrants. Pt states bowel movements have been regular.  : Pt denies any pain or frequency with urination.  NEURO: Pt opens eyes spontaneously, behavior appropriate to situation, follows commands, facial expression symmetrical, bilateral hand grasp equal and even, purposeful motor response noted, normal sensation in all extremities when touched with a finger.

## 2017-07-05 NOTE — SUBJECTIVE & OBJECTIVE
Past Medical History:   Diagnosis Date    Abnormal Pap smear of cervix 01/30/2017    Atypical pap with + HPV    Anxiety     Depression     Heart murmur     History of migraine headaches     Menarche     Age of onset 13       Past Surgical History:   Procedure Laterality Date    BREAST SURGERY         Review of patient's allergies indicates:  No Known Allergies    Current Facility-Administered Medications   Medication    HYDROmorphone injection 1 mg    propofol (DIPRIVAN) 10 mg/mL IVP     Current Outpatient Prescriptions   Medication Sig    fluoxetine (PROZAC) 20 MG capsule Take 3 capsules (60 mg total) by mouth once daily.    propranolol (INDERAL LA) 80 MG 24 hr capsule TAKE 2 CAPSULES BY MOUTH DAILY    butalbital-acetaminop-caf-cod -37-30 mg Cap take 2 capsules by mouth twice a day if needed    butalbital-acetaminophen-caff -40 mg Cap TK ONE C PO  Q 4 TO 6 H PRF HA    butalbital-acetaminophen-caffeine -40 mg (FIORICET, ESGIC) -40 mg per tablet     clonazepam (KLONOPIN) 1 MG tablet Take 1 mg by mouth every evening.    docusate sodium (COLACE) 100 MG capsule Take 1 capsule (100 mg total) by mouth 2 (two) times daily. Use while taking hydrocodone to prevent constipation. Drink plenty of water with this medication.    hydrocodone-acetaminophen 5-325mg (NORCO) 5-325 mg per tablet Take 2 tablets by mouth every 6 (six) hours as needed for Pain. 1-2 tablets every 4-6 hours. Do not exceed 10 tablets in 24 hours.    promethazine (PHENERGAN) 12.5 MG Tab Take 1-2 tablets (12.5-25 mg total) by mouth every 4 (four) hours as needed. 1 - 2 Tablet Oral Every 4-6 hours    propranolol (INDERAL LA) 80 MG 24 hr capsule Take 80 mg by mouth once daily. 1 Capsule,Extended Release 24 hr Oral Every day    sumatriptan succinate (SUMAVEL DOSEPRO) 6 mg/0.5 mL NfIj Inject 6 mg into the skin every 2 (two) hours as needed. 1 Needle-Free Injector Subcutaneous Every 2 hours    sumatriptan succinate  "(ZEMBRACE SYMTOUCH) 3 mg/0.5 mL PnIj Inject 3 mg into the skin every 2 (two) hours as needed.    sumatriptan-naproxen (TREXIMET)  mg Tab TAKE 1 TABLET BY MOUTH EVERY 2 HOURS AS NEEDED    TREXIMET  mg Tab     venlafaxine (EFFEXOR-XR) 75 MG 24 hr capsule TAKE ONE CAPSULE BY MOUTH ONCE DAILY    venlafaxine (EFFEXOR-XR) 75 MG 24 hr capsule TAKE 1 CAPSULE(75 MG) BY MOUTH EVERY DAY     Family History     Problem Relation (Age of Onset)    Cancer Mother    Migraines Daughter, Maternal Aunt        Social History Main Topics    Smoking status: Never Smoker    Smokeless tobacco: Never Used    Alcohol use No    Drug use: No    Sexual activity: Yes     Partners: Male     Birth control/ protection: None     ROS   Constitutional: negative for fevers  Eyes: no visual changes  ENT: negative for hearing loss  Respiratory: negative for dyspnea  Cardiovascular: negative for chest pain  Gastrointestinal: negative for abdominal pain  Genitourinary: negative for dysuria  Neurological: negative for headaches  Behavioral/Psych: negative for hallucinations  Endocrine: negative for temperature intolerance    Objective:     Vital Signs (Most Recent):  Temp: 98.4 °F (36.9 °C) (07/05/17 1432)  Pulse: 69 (07/05/17 1432)  Resp: 16 (07/05/17 1432)  BP: 137/69 (07/05/17 1432)  SpO2: 98 % (07/05/17 1432) Vital Signs (24h Range):  Temp:  [98.4 °F (36.9 °C)] 98.4 °F (36.9 °C)  Pulse:  [69] 69  Resp:  [16] 16  SpO2:  [98 %] 98 %  BP: (137)/(69) 137/69     Weight: 59.9 kg (132 lb)  Height: 5' 9" (175.3 cm)  Body mass index is 19.49 kg/m².    No intake or output data in the 24 hours ending 07/05/17 1714    Ortho/SPM Exam   Vitals: Afebrile.  Vital signs stable.  General: No acute distress.  HEENT: Normocephalic. Atraumatic. Sclera anicteric. No tracheal deviation.  Cardio: Regular rate and rhythm.  Chest: No increased work of breathing.  Abdominal: Nondistended.  Extremities: No cyanosis.  No clubbing.  No edema.  Palpable " pulses.  Skin: No generalized rash.  Neuro: Awake. Alert. Oriented to person, place, time, and situation.  Psych: Normal appearance. Cooperative.  Appropriate mood.  Appropriate affect.      MSK:  LLE:   <1cm scab over medial mal  significant ecchymoses over medial and lateral ankle  Significant ankle swelling  TTP around ankle diffusely  Compartments soft and compressible  No ROM 2/2 pain  SILT and motor intact T/SP/DP  Brisk cap refill  Warm well perfused extremities  DP palpable      Significant Labs: All pertinent labs within the past 24 hours have been reviewed.    Significant Imaging: I have reviewed all pertinent imaging results/findings.     XR L ankle shows malreduction of L trimalleolar fx/dx.    Procedure note:  After time out was performed and patient ID, side, and site were verified, the patient was consciously sedated with the assistance of the ED staff.  After adequate sedation was achieved, the fracture was closed reduced under C-arm guidance.  Ankle was unstable.  Talus was escaping posterior.  A short leg splint was applied and then post-reduction films were obtained which showed malreduction  The patient tolerated the procedure well with no complications.

## 2017-07-05 NOTE — TELEPHONE ENCOUNTER
Spoke with pt.   Per Dr Winchester's instructions, scheduled pt for clinic today at 1pm with Ana Laura Greer PA-C.   Pt states in a lot of pain.  Pt reports taking half of Norco 5/325 mg every 2 hours.   Pt was prescribed Norco 5/325 mg 1-2 tablets every 6 hours.  Pt reports she was afraid she would run out of pain medication.   Advised pt to continue elevation of ankle and to take medication as prescribed to improve pain control.   Pt inquired about going to sx today.  Explained the process of swelling and how it effects sx.  Pt verbalized understanding.  Per Ana Laura Greer PA-C, pt not expected to go to OR today.   Pt verbalized understanding.

## 2017-07-05 NOTE — ED PROVIDER NOTES
"Encounter Date: 7/5/2017       History     Chief Complaint   Patient presents with    Leg Injury     Patient went to ortho clinic for broken leg, was sent down here to have it "reset"      52-year-old female who presents w/ recent Tib-Fib fracture. [place] She initially presented to [facility] 7/4 where she had the closed reduction and splinting w/ plans to move to the OR later in the week. The pt presented to the Ortho clinic today for further evaluation where the fracture was found to be in need of further reduction, and as such was sent to the ED for this reduction. Discussed w/ ortho resident who is on the way for evaluation now.            Review of patient's allergies indicates:  No Known Allergies  Past Medical History:   Diagnosis Date    Abnormal Pap smear of cervix 01/30/2017    Atypical pap with + HPV    Anxiety     Depression     Heart murmur     History of migraine headaches     Menarche     Age of onset 13     Past Surgical History:   Procedure Laterality Date    BREAST SURGERY       Family History   Problem Relation Age of Onset    Cancer Mother     Migraines Daughter     Migraines Maternal Aunt     Breast cancer Neg Hx     Colon cancer Neg Hx     Ovarian cancer Neg Hx      Social History   Substance Use Topics    Smoking status: Never Smoker    Smokeless tobacco: Never Used    Alcohol use No     Review of Systems   Constitutional: Negative for activity change, appetite change, fever and unexpected weight change.   HENT: Negative for congestion, dental problem and drooling.    Eyes: Negative for photophobia, pain and discharge.   Respiratory: Negative for cough, chest tightness and stridor.    Cardiovascular: Positive for leg swelling. Negative for chest pain.   Gastrointestinal: Negative for abdominal distention, abdominal pain, anal bleeding, blood in stool and constipation.   Endocrine: Negative for heat intolerance and polyuria. "   Genitourinary: Negative for difficulty urinating, flank pain and urgency.   Musculoskeletal: Positive for arthralgias, gait problem and joint swelling. Negative for back pain.   Allergic/Immunologic: Negative for environmental allergies.   Neurological: Positive for weakness. Negative for speech difficulty.   Psychiatric/Behavioral: Negative for confusion and dysphoric mood.       Physical Exam     Initial Vitals [07/05/17 1432]   BP Pulse Resp Temp SpO2   137/69 69 16 98.4 °F (36.9 °C) 98 %      MAP       91.67         Physical Exam    Constitutional: She appears well-developed and well-nourished. She is not diaphoretic. No distress.   HENT:   Head: Normocephalic and atraumatic.   Right Ear: External ear normal.   Left Ear: External ear normal.   Eyes: EOM are normal. Pupils are equal, round, and reactive to light. Right eye exhibits no discharge. Left eye exhibits no discharge.   Neck: Normal range of motion. Neck supple. No JVD present.   Cardiovascular: Normal rate, regular rhythm and normal heart sounds. Exam reveals no friction rub.    No murmur heard.  Pulmonary/Chest: Breath sounds normal. No stridor. No respiratory distress. She has no wheezes.   Abdominal: Soft. Bowel sounds are normal. There is no tenderness. There is no guarding.   Neurological: She is alert and oriented to person, place, and time. She has normal reflexes.   Psychiatric: She has a normal mood and affect. Her behavior is normal. Thought content normal.         ED Course   Procedural Sedation  Date/Time: 7/5/2017 8:15 PM  Performed by: GENO GUERRERO  Authorized by: ARON LEDESMA   Consent Done: Yes  Patient understanding: patient states understanding of the procedure being performed  Patient consent: the patient's understanding of the procedure matches consent given  Procedure consent: procedure consent matches procedure scheduled  Relevant documents: relevant documents present and verified  Test results: test results  "available and properly labeled  Site marked: the operative site was marked  Imaging studies: imaging studies available  Patient identity confirmed: , MRN, name and provided demographic data  Time out: Immediately prior to procedure a "time out" was called to verify the correct patient, procedure, equipment, support staff and site/side marked as required.  ASA Class: Class 1 - Heathy patient. No medical history.   Mallampati Score: Class 1 - Visualization of the soft palate, fauces, uvula, and anterior/posterior pillars.   Equipment: on cardiac monitor., suction available., on BP monitor., airway equipment available., on CO2 monitor. and on supplemental oxygen.   Sedatives: propofol  Sedation start date/time: 2017 6:14 PM  Sedation end date/time: 2017 6:39 PM  Vitals: Vital signs were monitored during sedation.  Complications: No complications.         Labs Reviewed   CULTURE, URINE   TRANSFERRIN   URINALYSIS   PROCALCITONIN   PREALBUMIN             Medical Decision Making:   Initial Assessment:   51yo F w/ recent trimalleolar fracture  s/p reduction and fixation. Presents today from ortho clinic in need of further reduction prior to scheduled OR procedure later this week.   Differential Diagnosis:   Trimalleolar fracture        APC / Resident Notes:   Discussed w/ ortho resident who is at bedside.   Decision made to perform conscious sedation at this time and proceed with reduction.   CINTHYA Smith MD  2017  4:18 PM    Pts ankle re-imaged following reduction. Appears that she could still benefit from further manipulation. Discussed w/ ortho. Pt will be admitted to ortho service as obs for further interventions.    CLAUDIA Smith MD  2017  8:26 PM           Attending Attestation:             Attending ED Notes:   Patient is a 52-year-old female who apparently sustained a trimalleolar ankle fracture on 2017 when [place] was seen at another institution and placed in a splint " she was seen in the orthopedic clinic and they felt that she needed to have a re-stabilization of her ankle and hopefully better reduction and was sent to the ED for conscious sedation and reduction.  Patient was evaluated in the ED x-rays were reviewed on procedural sedation was explained to the patient patient underwent this without any complications reduction of the ankle was performed and the patient placed in a splint however the images show that they're needed further reduction and the patient was subsequently placed in the observation to the orthopedic call service there may be surgery planned for this and must mention that this fracture was closed fracture and biliary1.          ED Course     Clinical Impression:   The primary encounter diagnosis was Closed left ankle fracture, initial encounter. A diagnosis of Trimalleolar fracture, left, closed, with malunion, subsequent encounter was also pertinent to this visit.    Disposition:   Disposition: Placed in Observation  Condition: Serious                        Priyank Sun MD  07/18/17 0866

## 2017-07-05 NOTE — HPI
52 YOF presents to ER from clinic with malreduced L ankle fracture.    Patient reports pronation ankle injury 7/3/17.  She was seen at [facility] ED and placed in a splint.  XR today in clinic showed malreduction of L ankle fx.  Patient in significant pain.  Denies numbness or tingling.

## 2017-07-05 NOTE — ASSESSMENT & PLAN NOTE
- Adequate reduction unable to be obtained, so patient will be admitted to observation for operative management tomorrow.  Will assess stability and proceed with definitive fixation if soft tissues amenable.  Otherwise will place ex fix.  I have explained the risks, benefits, and alternatives of the procedure in detail. The patient voices understanding and all questions have been answered. The patient agrees to proceed as planned. Pre-op workup and consents signed in ED.  - NWB to LLE  - ice and elevation

## 2017-07-06 ENCOUNTER — ANESTHESIA (OUTPATIENT)
Dept: SURGERY | Facility: HOSPITAL | Age: 52
End: 2017-07-06
Payer: COMMERCIAL

## 2017-07-06 DIAGNOSIS — S82.852D CLOSED LEFT TRIMALLEOLAR FRACTURE, WITH ROUTINE HEALING, SUBSEQUENT ENCOUNTER: Primary | ICD-10-CM

## 2017-07-06 LAB — B-HCG UR QL: NEGATIVE

## 2017-07-06 PROCEDURE — 37000009 HC ANESTHESIA EA ADD 15 MINS: Performed by: ORTHOPAEDIC SURGERY

## 2017-07-06 PROCEDURE — 25000003 PHARM REV CODE 250: Performed by: STUDENT IN AN ORGANIZED HEALTH CARE EDUCATION/TRAINING PROGRAM

## 2017-07-06 PROCEDURE — D9220A PRA ANESTHESIA: Mod: ANES,,, | Performed by: ANESTHESIOLOGY

## 2017-07-06 PROCEDURE — D9220A PRA ANESTHESIA: Mod: CRNA,,, | Performed by: NURSE ANESTHETIST, CERTIFIED REGISTERED

## 2017-07-06 PROCEDURE — 36000709 HC OR TIME LEV III EA ADD 15 MIN: Performed by: ORTHOPAEDIC SURGERY

## 2017-07-06 PROCEDURE — 63600175 PHARM REV CODE 636 W HCPCS: Performed by: ANESTHESIOLOGY

## 2017-07-06 PROCEDURE — 25000003 PHARM REV CODE 250: Performed by: NURSE ANESTHETIST, CERTIFIED REGISTERED

## 2017-07-06 PROCEDURE — S0028 INJECTION, FAMOTIDINE, 20 MG: HCPCS | Performed by: NURSE ANESTHETIST, CERTIFIED REGISTERED

## 2017-07-06 PROCEDURE — 27201423 OPTIME MED/SURG SUP & DEVICES STERILE SUPPLY: Performed by: ORTHOPAEDIC SURGERY

## 2017-07-06 PROCEDURE — 20690 APPL UNIPLN UNI EXT FIXJ SYS: CPT | Mod: LT,,, | Performed by: ORTHOPAEDIC SURGERY

## 2017-07-06 PROCEDURE — G0378 HOSPITAL OBSERVATION PER HR: HCPCS

## 2017-07-06 PROCEDURE — 27818 TREATMENT OF ANKLE FRACTURE: CPT | Mod: 51,LT,, | Performed by: ORTHOPAEDIC SURGERY

## 2017-07-06 PROCEDURE — 64445 NJX AA&/STRD SCIATIC NRV IMG: CPT | Mod: 59,LT,, | Performed by: ANESTHESIOLOGY

## 2017-07-06 PROCEDURE — 63600175 PHARM REV CODE 636 W HCPCS: Performed by: NURSE ANESTHETIST, CERTIFIED REGISTERED

## 2017-07-06 PROCEDURE — A4216 STERILE WATER/SALINE, 10 ML: HCPCS | Performed by: STUDENT IN AN ORGANIZED HEALTH CARE EDUCATION/TRAINING PROGRAM

## 2017-07-06 PROCEDURE — 71000039 HC RECOVERY, EACH ADD'L HOUR: Performed by: ORTHOPAEDIC SURGERY

## 2017-07-06 PROCEDURE — 76942 ECHO GUIDE FOR BIOPSY: CPT | Performed by: ANESTHESIOLOGY

## 2017-07-06 PROCEDURE — 36000708 HC OR TIME LEV III 1ST 15 MIN: Performed by: ORTHOPAEDIC SURGERY

## 2017-07-06 PROCEDURE — 63600175 PHARM REV CODE 636 W HCPCS: Performed by: STUDENT IN AN ORGANIZED HEALTH CARE EDUCATION/TRAINING PROGRAM

## 2017-07-06 PROCEDURE — 71000033 HC RECOVERY, INTIAL HOUR: Performed by: ORTHOPAEDIC SURGERY

## 2017-07-06 PROCEDURE — 64447 NJX AA&/STRD FEMORAL NRV IMG: CPT | Mod: 59,LT,, | Performed by: ANESTHESIOLOGY

## 2017-07-06 PROCEDURE — 27000221 HC OXYGEN, UP TO 24 HOURS

## 2017-07-06 PROCEDURE — C1713 ANCHOR/SCREW BN/BN,TIS/BN: HCPCS | Performed by: ORTHOPAEDIC SURGERY

## 2017-07-06 PROCEDURE — 37000008 HC ANESTHESIA 1ST 15 MINUTES: Performed by: ORTHOPAEDIC SURGERY

## 2017-07-06 DEVICE — ROD CARBON FIBER 11MM X 300MM: Type: IMPLANTABLE DEVICE | Site: LEG | Status: FUNCTIONAL

## 2017-07-06 DEVICE — CLAMP LG 4 POSITION MULTI-PIN: Type: IMPLANTABLE DEVICE | Site: LEG | Status: FUNCTIONAL

## 2017-07-06 DEVICE — IMPLANTABLE DEVICE: Type: IMPLANTABLE DEVICE | Site: LEG | Status: FUNCTIONAL

## 2017-07-06 DEVICE — PIN TRANFX EXFIX 6X225: Type: IMPLANTABLE DEVICE | Site: LEG | Status: FUNCTIONAL

## 2017-07-06 DEVICE — SCREW SCHANZ BONE 5X150 EX FIX: Type: IMPLANTABLE DEVICE | Site: LEG | Status: FUNCTIONAL

## 2017-07-06 RX ORDER — VENLAFAXINE HYDROCHLORIDE 75 MG/1
75 CAPSULE, EXTENDED RELEASE ORAL DAILY
Status: DISCONTINUED | OUTPATIENT
Start: 2017-07-06 | End: 2017-07-08 | Stop reason: HOSPADM

## 2017-07-06 RX ORDER — HYDROMORPHONE HYDROCHLORIDE 1 MG/ML
0.2 INJECTION, SOLUTION INTRAMUSCULAR; INTRAVENOUS; SUBCUTANEOUS EVERY 5 MIN PRN
Status: DISCONTINUED | OUTPATIENT
Start: 2017-07-06 | End: 2017-07-06 | Stop reason: HOSPADM

## 2017-07-06 RX ORDER — PROPOFOL 10 MG/ML
VIAL (ML) INTRAVENOUS
Status: DISCONTINUED | OUTPATIENT
Start: 2017-07-06 | End: 2017-07-06

## 2017-07-06 RX ORDER — ONDANSETRON 2 MG/ML
4 INJECTION INTRAMUSCULAR; INTRAVENOUS ONCE AS NEEDED
Status: DISCONTINUED | OUTPATIENT
Start: 2017-07-06 | End: 2017-07-06 | Stop reason: HOSPADM

## 2017-07-06 RX ORDER — FLUOXETINE HYDROCHLORIDE 20 MG/1
60 CAPSULE ORAL DAILY
Status: DISCONTINUED | OUTPATIENT
Start: 2017-07-06 | End: 2017-07-08 | Stop reason: HOSPADM

## 2017-07-06 RX ORDER — ONDANSETRON 8 MG/1
8 TABLET, ORALLY DISINTEGRATING ORAL EVERY 6 HOURS PRN
Qty: 30 TABLET | Refills: 0 | Status: ON HOLD | OUTPATIENT
Start: 2017-07-06 | End: 2017-07-13 | Stop reason: HOSPADM

## 2017-07-06 RX ORDER — FENTANYL CITRATE 50 UG/ML
25 INJECTION, SOLUTION INTRAMUSCULAR; INTRAVENOUS EVERY 5 MIN PRN
Status: DISCONTINUED | OUTPATIENT
Start: 2017-07-06 | End: 2017-07-06

## 2017-07-06 RX ORDER — FENTANYL CITRATE 50 UG/ML
INJECTION, SOLUTION INTRAMUSCULAR; INTRAVENOUS
Status: DISCONTINUED | OUTPATIENT
Start: 2017-07-06 | End: 2017-07-06

## 2017-07-06 RX ORDER — MIDAZOLAM HYDROCHLORIDE 1 MG/ML
4 INJECTION INTRAMUSCULAR; INTRAVENOUS ONCE
Status: COMPLETED | OUTPATIENT
Start: 2017-07-06 | End: 2017-07-06

## 2017-07-06 RX ORDER — ENOXAPARIN SODIUM 100 MG/ML
40 INJECTION SUBCUTANEOUS DAILY
Qty: 7 SYRINGE | Refills: 0 | Status: ON HOLD | OUTPATIENT
Start: 2017-07-06 | End: 2017-07-13 | Stop reason: HOSPADM

## 2017-07-06 RX ORDER — PROPRANOLOL HYDROCHLORIDE 80 MG/1
160 CAPSULE, EXTENDED RELEASE ORAL DAILY
Status: DISCONTINUED | OUTPATIENT
Start: 2017-07-06 | End: 2017-07-06

## 2017-07-06 RX ORDER — LIDOCAINE HCL/PF 100 MG/5ML
SYRINGE (ML) INTRAVENOUS
Status: DISCONTINUED | OUTPATIENT
Start: 2017-07-06 | End: 2017-07-06

## 2017-07-06 RX ORDER — DEXAMETHASONE SODIUM PHOSPHATE 4 MG/ML
INJECTION, SOLUTION INTRA-ARTICULAR; INTRALESIONAL; INTRAMUSCULAR; INTRAVENOUS; SOFT TISSUE
Status: DISCONTINUED | OUTPATIENT
Start: 2017-07-06 | End: 2017-07-06

## 2017-07-06 RX ORDER — DOCUSATE SODIUM 100 MG/1
100 CAPSULE, LIQUID FILLED ORAL 2 TIMES DAILY PRN
Qty: 60 CAPSULE | Refills: 1 | Status: ON HOLD | OUTPATIENT
Start: 2017-07-06 | End: 2017-07-13 | Stop reason: HOSPADM

## 2017-07-06 RX ORDER — SODIUM CHLORIDE 9 MG/ML
INJECTION, SOLUTION INTRAVENOUS CONTINUOUS
Status: ACTIVE | OUTPATIENT
Start: 2017-07-06 | End: 2017-07-06

## 2017-07-06 RX ORDER — OXYCODONE AND ACETAMINOPHEN 10; 325 MG/1; MG/1
1 TABLET ORAL EVERY 4 HOURS PRN
Qty: 60 TABLET | Refills: 0 | Status: ON HOLD | OUTPATIENT
Start: 2017-07-06 | End: 2017-07-13 | Stop reason: HOSPADM

## 2017-07-06 RX ORDER — FAMOTIDINE 10 MG/ML
INJECTION INTRAVENOUS
Status: DISCONTINUED | OUTPATIENT
Start: 2017-07-06 | End: 2017-07-06

## 2017-07-06 RX ORDER — CLONAZEPAM 1 MG/1
1 TABLET ORAL NIGHTLY PRN
Status: DISCONTINUED | OUTPATIENT
Start: 2017-07-06 | End: 2017-07-08 | Stop reason: HOSPADM

## 2017-07-06 RX ORDER — MIDAZOLAM HYDROCHLORIDE 1 MG/ML
INJECTION, SOLUTION INTRAMUSCULAR; INTRAVENOUS
Status: DISCONTINUED | OUTPATIENT
Start: 2017-07-06 | End: 2017-07-06

## 2017-07-06 RX ORDER — PROPRANOLOL HYDROCHLORIDE 80 MG/1
80 CAPSULE, EXTENDED RELEASE ORAL DAILY
Status: DISCONTINUED | OUTPATIENT
Start: 2017-07-06 | End: 2017-07-08 | Stop reason: HOSPADM

## 2017-07-06 RX ORDER — ONDANSETRON 2 MG/ML
INJECTION INTRAMUSCULAR; INTRAVENOUS
Status: DISCONTINUED | OUTPATIENT
Start: 2017-07-06 | End: 2017-07-06

## 2017-07-06 RX ORDER — KETAMINE HYDROCHLORIDE 100 MG/ML
INJECTION, SOLUTION INTRAMUSCULAR; INTRAVENOUS
Status: DISCONTINUED | OUTPATIENT
Start: 2017-07-06 | End: 2017-07-06

## 2017-07-06 RX ORDER — PROPOFOL 10 MG/ML
VIAL (ML) INTRAVENOUS CONTINUOUS PRN
Status: DISCONTINUED | OUTPATIENT
Start: 2017-07-06 | End: 2017-07-06

## 2017-07-06 RX ORDER — SODIUM CHLORIDE 0.9 % (FLUSH) 0.9 %
3 SYRINGE (ML) INJECTION
Status: DISCONTINUED | OUTPATIENT
Start: 2017-07-06 | End: 2017-07-06 | Stop reason: HOSPADM

## 2017-07-06 RX ADMIN — PROPOFOL 10 MG: 10 INJECTION, EMULSION INTRAVENOUS at 10:07

## 2017-07-06 RX ADMIN — PROMETHAZINE HYDROCHLORIDE 25 MG: 12.5 TABLET ORAL at 04:07

## 2017-07-06 RX ADMIN — OXYCODONE HYDROCHLORIDE 15 MG: 5 TABLET ORAL at 01:07

## 2017-07-06 RX ADMIN — FENTANYL CITRATE 100 MCG: 50 INJECTION, SOLUTION INTRAMUSCULAR; INTRAVENOUS at 10:07

## 2017-07-06 RX ADMIN — MIDAZOLAM HYDROCHLORIDE 4 MG: 1 INJECTION, SOLUTION INTRAMUSCULAR; INTRAVENOUS at 08:07

## 2017-07-06 RX ADMIN — PROPRANOLOL HYDROCHLORIDE 80 MG: 80 CAPSULE, EXTENDED RELEASE ORAL at 12:07

## 2017-07-06 RX ADMIN — OXYCODONE HYDROCHLORIDE 10 MG: 5 TABLET ORAL at 12:07

## 2017-07-06 RX ADMIN — DEXAMETHASONE SODIUM PHOSPHATE 8 MG: 4 INJECTION, SOLUTION INTRAMUSCULAR; INTRAVENOUS at 10:07

## 2017-07-06 RX ADMIN — VENLAFAXINE HYDROCHLORIDE 75 MG: 75 CAPSULE, EXTENDED RELEASE ORAL at 12:07

## 2017-07-06 RX ADMIN — FAMOTIDINE 20 MG: 10 INJECTION, SOLUTION INTRAVENOUS at 10:07

## 2017-07-06 RX ADMIN — Medication 2 G: at 10:07

## 2017-07-06 RX ADMIN — ONDANSETRON 4 MG: 2 INJECTION INTRAMUSCULAR; INTRAVENOUS at 10:07

## 2017-07-06 RX ADMIN — KETAMINE HYDROCHLORIDE 20 MG: 100 INJECTION, SOLUTION, CONCENTRATE INTRAMUSCULAR; INTRAVENOUS at 10:07

## 2017-07-06 RX ADMIN — ACETAMINOPHEN 1000 MG: 10 INJECTION, SOLUTION INTRAVENOUS at 02:07

## 2017-07-06 RX ADMIN — ACETAMINOPHEN 1000 MG: 10 INJECTION, SOLUTION INTRAVENOUS at 06:07

## 2017-07-06 RX ADMIN — PROPOFOL 150 MCG/KG/MIN: 10 INJECTION, EMULSION INTRAVENOUS at 10:07

## 2017-07-06 RX ADMIN — SODIUM CHLORIDE, PRESERVATIVE FREE 3 ML: 5 INJECTION INTRAVENOUS at 10:07

## 2017-07-06 RX ADMIN — FLUOXETINE HYDROCHLORIDE 60 MG: 20 CAPSULE ORAL at 12:07

## 2017-07-06 RX ADMIN — MIDAZOLAM HYDROCHLORIDE 2 MG: 1 INJECTION, SOLUTION INTRAMUSCULAR; INTRAVENOUS at 10:07

## 2017-07-06 RX ADMIN — LIDOCAINE HYDROCHLORIDE 50 MG: 20 INJECTION, SOLUTION INTRAVENOUS at 10:07

## 2017-07-06 RX ADMIN — SODIUM CHLORIDE: 0.9 INJECTION, SOLUTION INTRAVENOUS at 11:07

## 2017-07-06 RX ADMIN — SODIUM CHLORIDE: 0.9 INJECTION, SOLUTION INTRAVENOUS at 09:07

## 2017-07-06 NOTE — PLAN OF CARE
Problem: Patient Care Overview  Goal: Plan of Care Review  Outcome: Ongoing (interventions implemented as appropriate)  Safety maintained this shift. VSS on RA. No complaints of pain or discomfort. Pin site care provided. LLE remains elevated and iced. No needs at the present time. Bed in lowest locked position and call light within reach. Will continue to monitor.

## 2017-07-06 NOTE — PROGRESS NOTES
Pt back to floor from PACU. Denies pain or discomfort. Respirations even and unlabored. Hardware noted to the left leg with some bloody drainage.  Ice to site and leg elevated. VSS.

## 2017-07-06 NOTE — SUBJECTIVE & OBJECTIVE
"Principal Problem:<principal problem not specified>    Principal Orthopedic Problem: L trimalleolar ankle fx    Interval History: Patient seen and examined at bedside.  No acute events overnight.  Pain controlled.  Some nausea and a migraine this AM.  Does not wish for any treatment.    Review of patient's allergies indicates:  No Known Allergies    Current Facility-Administered Medications   Medication    0.9%  NaCl infusion    0.9%  NaCl infusion    acetaminophen (10 mg/mL) injection 1,000 mg    CEFAZOLIN 2G/20 ML SYRINGE (PYXIS) IV syringe 2 g 20 mL    diphenhydrAMINE injection 25 mg    HYDROmorphone injection 1 mg    mupirocin 2 % ointment 1 g    ondansetron disintegrating tablet 8 mg    oxycodone immediate release tablet 10 mg    oxycodone immediate release tablet 15 mg    oxycodone immediate release tablet 5 mg    promethazine tablet 25 mg    sodium chloride 0.9% flush 3 mL     Objective:     Vital Signs (Most Recent):  Temp: 98.5 °F (36.9 °C) (07/06/17 0400)  Pulse: 79 (07/06/17 0400)  Resp: 18 (07/06/17 0400)  BP: (!) 125/59 (07/06/17 0400)  SpO2: 95 % (07/06/17 0400) Vital Signs (24h Range):  Temp:  [98.2 °F (36.8 °C)-98.5 °F (36.9 °C)] 98.5 °F (36.9 °C)  Pulse:  [59-87] 79  Resp:  [10-27] 18  SpO2:  [87 %-100 %] 95 %  BP: (101-152)/(57-85) 125/59     Weight: 59.9 kg (132 lb)  Height: 5' 9" (175.3 cm)  Body mass index is 19.49 kg/m².    No intake or output data in the 24 hours ending 07/06/17 0537    Ortho/SPM Exam  AAOx4  NAD  RRR  No increased WOB  Splint c/d/i  SILT and motor intact T/SP/DP  WWP extremities      Significant Labs: All pertinent labs within the past 24 hours have been reviewed.    Significant Imaging: I have reviewed all pertinent imaging results/findings.  "

## 2017-07-06 NOTE — CONSULTS
"[facility]  Orthopedics  Consult Note    Patient Name: Carolin Lind  MRN: [id number]  Admission Date: 7/5/2017  Hospital Length of Stay: 0 days  Attending Provider: Priyank Sun, *  Primary Care Provider: Danette Alvares MD    Patient information was obtained from patient, relative(s), past medical records and ER records.     Consults  Subjective:     Principal Problem:<principal problem not specified>    Chief Complaint:   Chief Complaint   Patient presents with    Leg Injury     Patient went to ortho clinic for broken leg, was sent down here to have it "reset"        HPI: 52 YOF presents to ER from clinic with malreduced L ankle fracture.    Patient reports pronation ankle injury 7/3/17.  She was seen at [facility] ED and placed in a splint.  XR today in clinic showed malreduction of L ankle fx.  Patient in significant pain.  Denies numbness or tingling.    Past Medical History:   Diagnosis Date    Abnormal Pap smear of cervix 01/30/2017    Atypical pap with + HPV    Anxiety     Depression     Heart murmur     History of migraine headaches     Menarche     Age of onset 13       Past Surgical History:   Procedure Laterality Date    BREAST SURGERY         Review of patient's allergies indicates:  No Known Allergies    Current Facility-Administered Medications   Medication    HYDROmorphone injection 1 mg    propofol (DIPRIVAN) 10 mg/mL IVP     Current Outpatient Prescriptions   Medication Sig    fluoxetine (PROZAC) 20 MG capsule Take 3 capsules (60 mg total) by mouth once daily.    propranolol (INDERAL LA) 80 MG 24 hr capsule TAKE 2 CAPSULES BY MOUTH DAILY    butalbital-acetaminop-caf-cod -11-30 mg Cap take 2 capsules by mouth twice a day if needed    butalbital-acetaminophen-caff -40 mg Cap TK ONE C PO  Q 4 TO 6 H PRF HA    butalbital-acetaminophen-caffeine -40 mg (FIORICET, ESGIC) -40 mg per tablet     clonazepam (KLONOPIN) 1 MG tablet Take 1 mg " by mouth every evening.    docusate sodium (COLACE) 100 MG capsule Take 1 capsule (100 mg total) by mouth 2 (two) times daily. Use while taking hydrocodone to prevent constipation. Drink plenty of water with this medication.    hydrocodone-acetaminophen 5-325mg (NORCO) 5-325 mg per tablet Take 2 tablets by mouth every 6 (six) hours as needed for Pain. 1-2 tablets every 4-6 hours. Do not exceed 10 tablets in 24 hours.    promethazine (PHENERGAN) 12.5 MG Tab Take 1-2 tablets (12.5-25 mg total) by mouth every 4 (four) hours as needed. 1 - 2 Tablet Oral Every 4-6 hours    propranolol (INDERAL LA) 80 MG 24 hr capsule Take 80 mg by mouth once daily. 1 Capsule,Extended Release 24 hr Oral Every day    sumatriptan succinate (SUMAVEL DOSEPRO) 6 mg/0.5 mL NfIj Inject 6 mg into the skin every 2 (two) hours as needed. 1 Needle-Free Injector Subcutaneous Every 2 hours    sumatriptan succinate (ZEMBRACE SYMTOUCH) 3 mg/0.5 mL PnIj Inject 3 mg into the skin every 2 (two) hours as needed.    sumatriptan-naproxen (TREXIMET)  mg Tab TAKE 1 TABLET BY MOUTH EVERY 2 HOURS AS NEEDED    TREXIMET  mg Tab     venlafaxine (EFFEXOR-XR) 75 MG 24 hr capsule TAKE ONE CAPSULE BY MOUTH ONCE DAILY    venlafaxine (EFFEXOR-XR) 75 MG 24 hr capsule TAKE 1 CAPSULE(75 MG) BY MOUTH EVERY DAY     Family History     Problem Relation (Age of Onset)    Cancer Mother    Migraines Daughter, Maternal Aunt        Social History Main Topics    Smoking status: Never Smoker    Smokeless tobacco: Never Used    Alcohol use No    Drug use: No    Sexual activity: Yes     Partners: Male     Birth control/ protection: None     ROS   Constitutional: negative for fevers  Eyes: no visual changes  ENT: negative for hearing loss  Respiratory: negative for dyspnea  Cardiovascular: negative for chest pain  Gastrointestinal: negative for abdominal pain  Genitourinary: negative for dysuria  Neurological: negative for headaches  Behavioral/Psych: negative  "for hallucinations  Endocrine: negative for temperature intolerance    Objective:     Vital Signs (Most Recent):  Temp: 98.4 °F (36.9 °C) (07/05/17 1432)  Pulse: 69 (07/05/17 1432)  Resp: 16 (07/05/17 1432)  BP: 137/69 (07/05/17 1432)  SpO2: 98 % (07/05/17 1432) Vital Signs (24h Range):  Temp:  [98.4 °F (36.9 °C)] 98.4 °F (36.9 °C)  Pulse:  [69] 69  Resp:  [16] 16  SpO2:  [98 %] 98 %  BP: (137)/(69) 137/69     Weight: 59.9 kg (132 lb)  Height: 5' 9" (175.3 cm)  Body mass index is 19.49 kg/m².    No intake or output data in the 24 hours ending 07/05/17 1714    Ortho/SPM Exam   Vitals: Afebrile.  Vital signs stable.  General: No acute distress.  HEENT: Normocephalic. Atraumatic. Sclera anicteric. No tracheal deviation.  Cardio: Regular rate and rhythm.  Chest: No increased work of breathing.  Abdominal: Nondistended.  Extremities: No cyanosis.  No clubbing.  No edema.  Palpable pulses.  Skin: No generalized rash.  Neuro: Awake. Alert. Oriented to person, place, time, and situation.  Psych: Normal appearance. Cooperative.  Appropriate mood.  Appropriate affect.      MSK:  LLE:   <1cm scab over medial mal  significant ecchymoses over medial and lateral ankle  Significant ankle swelling  TTP around ankle diffusely  Compartments soft and compressible  No ROM 2/2 pain  SILT and motor intact T/SP/DP  Brisk cap refill  Warm well perfused extremities  DP palpable      Significant Labs: All pertinent labs within the past 24 hours have been reviewed.    Significant Imaging: I have reviewed all pertinent imaging results/findings.     XR L ankle shows malreduction of L trimalleolar fx/dx.    Procedure note:  After time out was performed and patient ID, side, and site were verified, the patient was consciously sedated with the assistance of the ED staff.  After adequate sedation was achieved, the fracture was closed reduced under C-arm guidance.  Ankle was unstable.  Talus was escaping posterior.  A short leg splint was applied " and then post-reduction films were obtained which showed malreduction  The patient tolerated the procedure well with no complications.       Assessment/Plan:     Closed left ankle fracture    - Adequate reduction unable to be obtained, so patient will be admitted to observation for operative management tomorrow.  Will assess stability and proceed with definitive fixation if soft tissues amenable.  Otherwise will place ex fix.  I have explained the risks, benefits, and alternatives of the procedure in detail. The patient voices understanding and all questions have been answered. The patient agrees to proceed as planned. Pre-op workup and consents signed in ED.  - NWB to LLE  - ice and elevation            Thank you for your consult. I will follow-up with patient. Please contact us if you have any additional questions.    Benedict Humphrey MD  Orthopedics  [facility]    Attg Note:  Patient seen and examined.  I agree with the above assessment and plan.  Ankle will not hold position.  Too swollen for definitive fixation.  To OR for spanning external fixation.  The risks, benefits and alternatives to surgery were discussed with the patient at great length.  These include bleeding, infection, vessel/nerve damage, pain, numbness, tingling, complex regional pain syndrome, hardware/surgical failure, need for further surgery, malunion, nonunion, pin tract infection,  DVT, PE, arthritis and death.  Patient states an understanding and wishes to proceed with surgery.   All questions were answered.  No guarantees were implied or stated.  Informed consent was obtained.      Jair Winchester MD

## 2017-07-06 NOTE — PROGRESS NOTES
Subjective:      Patient ID: Carolin Lind is a 52 y.o. female.    Chief Complaint: No chief complaint on file.    HPI    Patient is a 52 year old female who presents to clinic for follow up from the ED for closed left trimalleolar ankle fracture that occurred on 07/03/2017 secondary to falling from a standing height while at [facility]. Patient was seen in [facility] ED after initial injury and fracture was reduced and splinted. She presents to clinic today for evaluation and due to increased pain. She has taken 1/2 1 Norco 5 which has not relieved her pain. Patient reports constant lateral ankle pain and shooting pain.     Review of Systems   Constitution: Negative for chills and fever.   Cardiovascular: Negative for chest pain.   Respiratory: Negative for cough and shortness of breath.    Skin: Negative for color change, dry skin, itching, nail changes, poor wound healing and rash.   Musculoskeletal: Positive for falls.        Left ankle pain   Neurological: Negative for dizziness.   Psychiatric/Behavioral: Negative for altered mental status. The patient is not nervous/anxious.    All other systems reviewed and are negative.        Objective:            General    Constitutional: She is oriented to person, place, and time. She appears well-developed and well-nourished. No distress.   HENT:   Head: Atraumatic.   Eyes: Conjunctivae are normal.   Cardiovascular: Normal rate.    Pulmonary/Chest: Effort normal.   Neurological: She is alert and oriented to person, place, and time.   Psychiatric: She has a normal mood and affect. Her behavior is normal.         Left Ankle/Foot Exam     Comments:  Presents to clinic in wheelchair,   Splint in place.   Patient is visibly in pain, she does not want me to touch her ankle.         RADS: malreduction of L trimalleolar fx/dx.        Assessment:       Encounter Diagnoses   Name Primary?    Acute left ankle pain Yes    Fracture     Closed displaced trimalleolar fracture  of left ankle, initial encounter           Plan:       Discussed with patient that fracture was no longer reduced. Patient sent directly to ED for reduction vs. Fixation evaluation.

## 2017-07-06 NOTE — ANESTHESIA POSTPROCEDURE EVALUATION
"Anesthesia Post Evaluation    Patient: Carolin Lind    Procedure(s) Performed: Procedure(s) (LRB):  APPLICATION-EXTERNAL FIXATION DEVICE (Left)    Final Anesthesia Type: general  Patient location during evaluation: PACU  Patient participation: Yes- Able to Participate  Level of consciousness: awake and alert and oriented  Post-procedure vital signs: reviewed and stable  Pain management: adequate  Airway patency: patent  PONV status at discharge: No PONV  Anesthetic complications: no      Cardiovascular status: blood pressure returned to baseline and hemodynamically stable  Respiratory status: unassisted, spontaneous ventilation and room air  Hydration status: euvolemic  Follow-up not needed.        Visit Vitals  BP (!) 144/78   Pulse 72   Temp 36.7 °C (98.1 °F) (Oral)   Resp 18   Ht 5' 9" (1.753 m)   Wt 59.9 kg (132 lb)   LMP 07/05/2017   SpO2 97%   Breastfeeding? No   BMI 19.49 kg/m²       Pain/Fabiola Score: Pain Assessment Performed: Yes (7/6/2017 11:30 AM)  Presence of Pain: denies (7/6/2017 11:30 AM)  Pain Rating Prior to Med Admin: 4 (7/6/2017 12:09 PM)  Fabiola Score: 9 (7/6/2017 11:30 AM)      "

## 2017-07-06 NOTE — ANESTHESIA PREPROCEDURE EVALUATION
07/05/2017  Carolin Lind is a 52 y.o., female with PMH significant for migraine headaches, anxiety, depression, mitral valve prolapse (asymptomatic) and recent ankle injury is now scheduled for the below procedure.     Pre-operative evaluation for Procedure(s) (LRB):  OPEN REDUCTION INTERNAL FIXATION-ANKLE- left-synthes (Left)    LDAs:   Right AC 20 G PIV    Previous intubation: none on file     Carolin Lind is a 52 y.o. female     Patient Active Problem List   Diagnosis    Migraine without aura, without mention of intractable migraine without mention of status migrainosus    Closed left ankle fracture    Trimalleolar fracture       Review of patient's allergies indicates:  No Known Allergies    No current facility-administered medications on file prior to encounter.      Current Outpatient Prescriptions on File Prior to Encounter   Medication Sig Dispense Refill    butalbital-acetaminop-caf-cod -78-30 mg Cap take 2 capsules by mouth twice a day if needed  0    butalbital-acetaminophen-caff -40 mg Cap TK ONE C PO  Q 4 TO 6 H PRF HA  0    clonazepam (KLONOPIN) 1 MG tablet Take 1 mg by mouth every evening.  2    docusate sodium (COLACE) 100 MG capsule Take 1 capsule (100 mg total) by mouth 2 (two) times daily. Use while taking hydrocodone to prevent constipation. Drink plenty of water with this medication. 40 capsule 0    fluoxetine (PROZAC) 20 MG capsule Take 3 capsules (60 mg total) by mouth once daily. 270 capsule 1    hydrocodone-acetaminophen 5-325mg (NORCO) 5-325 mg per tablet Take 2 tablets by mouth every 6 (six) hours as needed for Pain. 1-2 tablets every 4-6 hours. Do not exceed 10 tablets in 24 hours. 20 tablet 0    promethazine (PHENERGAN) 12.5 MG Tab Take 1-2 tablets (12.5-25 mg total) by mouth every 4 (four) hours as needed. 1 - 2 Tablet Oral Every 4-6 hours 30 tablet 3     propranolol (INDERAL LA) 80 MG 24 hr capsule Take 80 mg by mouth once daily. 1 Capsule,Extended Release 24 hr Oral Every day      propranolol (INDERAL LA) 80 MG 24 hr capsule TAKE 2 CAPSULES BY MOUTH DAILY 60 capsule 0    sumatriptan succinate (SUMAVEL DOSEPRO) 6 mg/0.5 mL NfIj Inject 6 mg into the skin every 2 (two) hours as needed. 1 Needle-Free Injector Subcutaneous Every 2 hours 6 Device 3    sumatriptan succinate (ZEMBRACE SYMTOUCH) 3 mg/0.5 mL PnIj Inject 3 mg into the skin every 2 (two) hours as needed. 12 Syringe 3    sumatriptan-naproxen (TREXIMET)  mg Tab TAKE 1 TABLET BY MOUTH EVERY 2 HOURS AS NEEDED 9 tablet 5    TREXIMET  mg Tab   1    venlafaxine (EFFEXOR-XR) 75 MG 24 hr capsule TAKE ONE CAPSULE BY MOUTH ONCE DAILY 30 capsule 0    venlafaxine (EFFEXOR-XR) 75 MG 24 hr capsule TAKE 1 CAPSULE(75 MG) BY MOUTH EVERY DAY 30 capsule 0    butalbital-acetaminophen-caffeine -40 mg (FIORICET, ESGIC) -40 mg per tablet   2       Past Surgical History:   Procedure Laterality Date    BREAST SURGERY         Social History     Social History    Marital status:      Spouse name: N/A    Number of children: N/A    Years of education: N/A     Occupational History    Not on file.     Social History Main Topics    Smoking status: Never Smoker    Smokeless tobacco: Never Used    Alcohol use No    Drug use: No    Sexual activity: Yes     Partners: Male     Birth control/ protection: None     Other Topics Concern    Not on file     Social History Narrative    No narrative on file         Vital Signs Range (Last 24H):  Temp:  [36.8 °C (98.2 °F)-36.9 °C (98.4 °F)]   Pulse:  [59-87]   Resp:  [10-27]   BP: (101-152)/(57-85)   SpO2:  [87 %-100 %]       CBC:   Recent Labs      07/04/17   0055   WBC  5.93   RBC  4.16   HGB  13.3   HCT  38.6   PLT  246   MCV  93   MCH  32.0*   MCHC  34.5       CMP:   Recent Labs      07/04/17 0055   NA  138   K  3.7   CL  102   CO2  28   BUN  20    CREATININE  0.9   GLU  104   CALCIUM  9.0   ALBUMIN  3.5   PROT  6.6   ALKPHOS  67   ALT  24   AST  21   BILITOT  0.2       INR  Recent Labs      07/04/17   0055   INR  1.0   APTT  24.5           Diagnostic Studies:  EKG (7/4/2017):  Normal sinus rhythm  Normal ECG  When compared with ECG of 23-APR-1998 11:02,  Significant changes have occurred    2D Echo: none on file     Anesthesia Evaluation    I have reviewed the Patient Summary Reports.    I have reviewed the Nursing Notes.      Review of Systems  Anesthesia Hx:  Hx of Anesthetic complications (PONV)  History of prior surgery of interest to airway management or planning: Personal Hx of Anesthesia complications, Post-Operative Nausea/Vomiting (relieved with Zofran)   Social:  Non-Smoker    Cardiovascular:   Exercise tolerance: good Denies Pacemaker.  Denies Hypertension. Valvular problems/Murmurs, MVP  Denies MI.  Denies CAD.    Denies CABG/stent.  Denies Dysrhythmias.   Denies Angina.             denies PVD no hyperlipidemia        Pulmonary:  Pulmonary Normal    Renal/:  Renal/ Normal     Hepatic/GI:  Hepatic/GI Normal    Musculoskeletal:   Ankle injury   Neurological:   Denies CVA. Headaches Denies Seizures.    Endocrine:  Endocrine Normal    Psych:   Psychiatric History anxiety depression          Physical Exam  General:  Well nourished    Airway/Jaw/Neck:  Airway Findings: Mouth Opening: Normal Tongue: Normal  General Airway Assessment: Adult  Mallampati: I  Jaw/Neck Findings:  Neck ROM: Normal ROM  Neck Findings: Normal    Eyes/Ears/Nose:  EYES/EARS/NOSE FINDINGS: Normal   Dental:  Dental Findings: In tact    Chest/Lungs:  Chest/Lungs Findings: Clear to auscultation, Normal Respiratory Rate     Heart/Vascular:  Heart Findings: Rate: Normal  Rhythm: Regular Rhythm  Sounds: Normal        Mental Status:  Mental Status Findings:  Cooperative, Alert and Oriented         Anesthesia Plan  Type of Anesthesia, risks & benefits discussed:  Anesthesia Type:   general, regional, MAC  Patient's Preference:   Intra-op Monitoring Plan: standard ASA monitors  Intra-op Monitoring Plan Comments:   Post Op Pain Control Plan:   Post Op Pain Control Plan Comments:   Induction:   IV  Beta Blocker:  Patient is not currently on a Beta-Blocker (No further documentation required).       Informed Consent: Patient understands risks and agrees with Anesthesia plan.  Questions answered. Anesthesia consent signed with patient.  ASA Score: 2     Day of Surgery Review of History & Physical:            Ready For Surgery From Anesthesia Perspective.

## 2017-07-06 NOTE — PROGRESS NOTES
ORTHO PRE-OP NOTE:    Carolin Lind is a 52 y.o. female admitted on 7/5/2017  Hospital Day: 0      The patient was seen and examined this morning at the bedside. No acute issues overnight and adequate control of pain on current regimen.    NPO since midnight in preparation for OR today    PHYSICAL EXAM:  Awake/alert/oriented x 3  No acute distress  AFVSS  Good inspiratory effort with unlabored breathing; stable on RA    LLE : splint c/d/i. +swelling of toes. Grossly NVI distally    Vitals:    07/05/17 2001 07/05/17 2101 07/05/17 2226 07/06/17 0400   BP: (!) 152/70 (!) 142/69 131/70 (!) 125/59   BP Location:    Right arm   Patient Position:    Lying   BP Method:    Automatic   Pulse: 67 68 71 79   Resp: 10 12 16 18   Temp:   98.2 °F (36.8 °C) 98.5 °F (36.9 °C)   TempSrc:   Oral Oral   SpO2: (!) 93% (!) 93% (!) 92% 95%   Weight:       Height:         No intake/output data recorded.    Recent Labs  Lab 07/04/17  0055   CALCIUM 9.0   PROT 6.6      K 3.7   CO2 28      BUN 20   CREATININE 0.9       Recent Labs  Lab 07/04/17  0055   WBC 5.93   RBC 4.16   HGB 13.3   HCT 38.6          Recent Labs  Lab 07/04/17  0055   INR 1.0   APTT 24.5       A/P: 52 y.o. female with closed left trimalleolar ankle fracture  -- Pain control  -- NPO/IVF's  -- Pre-op assessment:       - H/H: 13.3/38.6 on 7/4       - Anticoagulation: held         - Other notable labs: n/a    -- Dispo: OR today for external fixation    Mike Casale, M.D. PGY-4  Department of Orthopedic Surgery

## 2017-07-06 NOTE — PROGRESS NOTES
VSS on RA. AAO x4. IV in RFA in place. Saline locked. Pt left unit in bed. With nurse from s. NAD noted.

## 2017-07-06 NOTE — NURSING TRANSFER
Nursing Transfer Note      7/6/2017     Transfer To: CT then to OBS08    Transfer via bed    Transfer with IVF    Transported by PCT    Medicines sent: phenergan PO     Chart send with patient: Yes    Notified: daughter    Spoke with CT, ok for patient to be transported to CT then bed assignment. OBS 08 Nurse Makenzie notified.

## 2017-07-06 NOTE — ANESTHESIA PROCEDURE NOTES
Popliteal Sciatic Single Shot Block    Patient location during procedure: pre-op   Block not for primary anesthetic.  Reason for block: at surgeon's request and post-op pain management   Post-op Pain Location: left ankle pain  Start time: 7/6/2017 8:00 AM  Timeout: 7/6/2017 7:59 AM   End time: 7/6/2017 8:14 AM  Staffing  Anesthesiologist: FAYE TOBIN  Resident/CRNA: DRE TOMLIN  Other anesthesia staff: AVTAR GARCÍA  Preanesthetic Checklist  Completed: patient identified, site marked, surgical consent, pre-op evaluation, timeout performed, IV checked, risks and benefits discussed and monitors and equipment checked  Peripheral Block  Patient position: supine  Prep: ChloraPrep  Patient monitoring: heart rate, cardiac monitor, continuous pulse ox, continuous capnometry and frequent blood pressure checks  Block type: popliteal  Laterality: left  Injection technique: single shot  Needle  Needle type: Stimuplex   Needle gauge: 21 G  Needle length: 4 in  Needle localization: anatomical landmarks and ultrasound guidance   -ultrasound image captured on disc.  Assessment  Injection assessment: negative aspiration, negative parasthesia and local visualized surrounding nerve  Paresthesia pain: none  Heart rate change: no  Slow fractionated injection: yes  Medications:  Bolus administered: 30 mL of 0.5 ropivacaine  Epinephrine added: 3.75 mcg/mL (1/300,000)  Additional Notes  VSS.  DOS[facility] RN monitoring vitals throughout procedure.  Patient tolerated procedure well.  1 mg of decadron and 40 mcg of clonidine added to local

## 2017-07-06 NOTE — PLAN OF CARE
Problem: Patient Care Overview  Goal: Plan of Care Review  Outcome: Ongoing (interventions implemented as appropriate)  Pt is AA&O x4. VSS. Pain managed with oral and IV pain medication. NPO status maintained after mn. Pt's L leg elevated. Safety maintained with bed in lowest position and call light within reach.

## 2017-07-06 NOTE — OP NOTE
OPERATIVE NOTE    DOS:  07/06/2017    Preop Dx: Left trimalleolar ankle fracture with recurrent dislocation    Postop Dx: Left trimalleolar ankle fracture with recurrent dislocation    Procedure: 1.  Spanning external fixation of left trimalleolar ankle fracture    2.  Closed treatment of left ankle fracture dislocation with manipulation under anesthesia    Surgeon: Jair Winchester M.D.    Asst:  Michael Casale, M.D    Anesthesia: MAC plus regional    EBL:  Minimal    IVF:  500cc crystalloid    Implants: synthes large ex fix    Specimens: None    Findings: Stable alignment    Dispo:  To PACU awake/stable    Indications for Procedure:    53 yo F twisted left ankle resulting in left trimalleolar ankle fracture dislocation, treated with splinting in an outside ED.  Patient came to clinic with subluxed/dislocated ankle.  Attempt was made in ED to reduce and splint ankle and it did not want to hold.  I admitted her for external fixation.    Procedure in Detail:    Patient was marked in the preoperative holding area.  Regional analgesia was perfromed.  Patient was wheeled into the operating room and placed on the OR table in supine position.  Monitored anesthesia care was induced and preoperative antibiotics were administered. The left lower extremity was prepped sterile.   A timeout was undertaken to confirm patient, side, site, surgery, surgeon and administration of preoperative antibiotics.  All agreed and we proceeded.    Through 2 small stab incisions, 2 drill holes were placed in the tibia proximal to the fractures.  2 half Andria pins were placed.  A small incision was made on the medial heel.  A centrally threaded Andria pin was placed in the calcaneus.  Position was confirmed under fluoroscopy.  The ankle joint/fractures were closed reduced under fluoro and a medial and lateral bar construct placed to form a Delta frame.  All clamps were tightened.  The pins were covered with Hibiclens soaked scrub sponges.       All instrument and sponge counts were reported correct at the end of the case.  The patient was awakened and taken to the recovery room in stable condition.  There were no complications.   I will take her back to the OR when her soft tissue swelling has improved for ORIF.    Jair Winchester MD

## 2017-07-06 NOTE — ANESTHESIA RELEASE NOTE
"Anesthesia Release from PACU Note    Patient: Carolin Lind    Procedure(s) Performed: Procedure(s) (LRB):  APPLICATION-EXTERNAL FIXATION DEVICE (Left)      Last Vitals:   Visit Vitals  BP (!) 144/78   Pulse 72   Temp 36.7 °C (98.1 °F) (Oral)   Resp 18   Ht 5' 9" (1.753 m)   Wt 59.9 kg (132 lb)   LMP 07/05/2017   SpO2 97%   Breastfeeding? No   BMI 19.49 kg/m²       Anesthesia type: general    Post pain: Adequate analgesia    Post assessment: no apparent anesthetic complications, tolerated procedure well and no evidence of recall    Post vital signs: stable    Level of consciousness: awake, alert  and oriented    Nausea/Vomiting: no nausea/no vomiting    Complications: none    Airway Patency: patent    Respiratory: unassisted, spontaneous ventilation, room air    Cardiovascular: stable and blood pressure at baseline    Hydration: euvolemic     "

## 2017-07-06 NOTE — NURSING
Received pt as a transfer from ED. Pt ambulated to bed from stretcher with crutches. VSS. Reports significant left leg/ankle pain. Dilaudid given. Pt reported nausea soon after prn medication. Zofran given. Tylenol given. Continuous NS @ 100 ml done. Pt verbalized understanding of NPO status post mn. Pt given pillow to elevate left leg. Pt reports inability to find a comfortable position. Oriented to room. Will continue to monitor.

## 2017-07-06 NOTE — PLAN OF CARE
VSS, patient reports pain as tolerable and denies nausea. Hardware noted to LLE, elevated and iced. Portable xray completed. Patient will be transported to CT then back to room assignment.

## 2017-07-06 NOTE — TRANSFER OF CARE
"Anesthesia Transfer of Care Note    Patient: Carolin Lind    Procedure(s) Performed: Procedure(s) (LRB):  APPLICATION-EXTERNAL FIXATION DEVICE (Left)    Patient location: PACU    Anesthesia Type: general    Transport from OR: Transported from OR on room air with adequate spontaneous ventilation    Post pain: adequate analgesia    Post assessment: no apparent anesthetic complications and tolerated procedure well    Post vital signs: stable    Level of consciousness: awake, alert and oriented    Complications: none    Transfer of care protocol was followed      Last vitals:   Visit Vitals  /62 (BP Location: Left arm, Patient Position: Lying, BP Method: Automatic)   Pulse 71   Temp 36.9 °C (98.4 °F) (Oral)   Resp 16   Ht 5' 9" (1.753 m)   Wt 59.9 kg (132 lb)   LMP 07/05/2017   SpO2 100%   Breastfeeding? No   BMI 19.49 kg/m²     "

## 2017-07-06 NOTE — PLAN OF CARE
[facility]    HOME HEALTH ORDERS  FACE TO FACE ENCOUNTER    Patient Name: Carolin Lind  YOB: 1965    PCP: Danette Alvares MD   PCP Address: [address] [place] [address] / [facility] [address]  PCP Phone Number: [phone]  PCP Fax: [phone]    Encounter Date: 07/06/2017    Admit to Home Health    Diagnoses:  Active Hospital Problems    Diagnosis  POA    *Closed left ankle fracture [S82.892A]  Yes    Trimalleolar fracture [S82.853A]  Yes      Resolved Hospital Problems    Diagnosis Date Resolved POA   No resolved problems to display.       Future Appointments  Date Time Provider Department Center   7/11/2017 8:15 AM Ana Laura Greer PA-C [facility]TH[facility]     Follow-up Information     Ana Laura Greer PA-C. Go on 7/18/2017.    Specialty:  Orthopedic Surgery  Why:  For wound re-check  Contact information:  [address]  [place] [id number] [phone]                     I have seen and examined this patient face to face today. My clinical findings that support the need for the home health skilled services and home bound status are the following:  Weakness/numbness causing balance and gait disturbance due to Fracture and Surgery making it taxing to leave home.  Requiring assistive device to leave home due to unsteady gait caused by Fracture and Surgery.    Allergies:Review of patient's allergies indicates:  No Known Allergies    Diet: regular diet    Activities: No weightbearing to operative extremity    Nursing:   SN to complete comprehensive assessment including routine vital signs. Instruct on disease process and s/s of complications to report to MD. Follow specific home health arthoplasty protocol. Review/verify medication list sent home with the patient at time of discharge  and instruct patient/caregiver as needed.   Notify MD if SBP > 160 or < 90; DBP > 90 or < 50; HR > 120 or < 50; Temp > 101    Home Medical Equipment:  Walker    WOUND CARE ORDERS  Daily pin  site care:  Clean pins with 50% peroxide / 50% sterile water solution.  Wrap pins with Kerlex gauze.      MISC:  Left leg to remain elevated and iced at all times      Medications: Review discharge medications with patient and family and provide education.      Current Discharge Medication List      START taking these medications    Details   !! docusate sodium (COLACE) 100 MG capsule Take 1 capsule (100 mg total) by mouth 2 (two) times daily as needed for Constipation.  Qty: 60 capsule, Refills: 1      enoxaparin (LOVENOX) 40 mg/0.4 mL Syrg Inject 0.4 mLs (40 mg total) into the skin once daily.  Qty: 7 Syringe, Refills: 0      ondansetron (ZOFRAN-ODT) 8 MG TbDL Take 1 tablet (8 mg total) by mouth every 6 (six) hours as needed.  Qty: 30 tablet, Refills: 0      oxycodone-acetaminophen (PERCOCET)  mg per tablet Take 1 tablet by mouth every 4 (four) hours as needed for Pain.  Qty: 60 tablet, Refills: 0       !! - Potential duplicate medications found. Please discuss with provider.      CONTINUE these medications which have NOT CHANGED    Details   clonazepam (KLONOPIN) 1 MG tablet Take 1 mg by mouth every evening.  Refills: 2      !! docusate sodium (COLACE) 100 MG capsule Take 1 capsule (100 mg total) by mouth 2 (two) times daily. Use while taking hydrocodone to prevent constipation. Drink plenty of water with this medication.  Qty: 40 capsule, Refills: 0      fluoxetine (PROZAC) 20 MG capsule Take 3 capsules (60 mg total) by mouth once daily.  Qty: 270 capsule, Refills: 1      !! propranolol (INDERAL LA) 80 MG 24 hr capsule Take 80 mg by mouth once daily. 1 Capsule,Extended Release 24 hr Oral Every day      !! propranolol (INDERAL LA) 80 MG 24 hr capsule TAKE 2 CAPSULES BY MOUTH DAILY  Qty: 60 capsule, Refills: 0    Associated Diagnoses: Intractable migraine without status migrainosus, unspecified migraine type      sumatriptan succinate (SUMAVEL DOSEPRO) 6 mg/0.5 mL NfIj Inject 6 mg into the skin every 2 (two)  hours as needed. 1 Needle-Free Injector Subcutaneous Every 2 hours  Qty: 6 Device, Refills: 3    Associated Diagnoses: Chronic migraine without aura, with intractable migraine, so stated, without mention of status migrainosus      sumatriptan succinate (ZEMBRACE SYMTOUCH) 3 mg/0.5 mL PnIj Inject 3 mg into the skin every 2 (two) hours as needed.  Qty: 12 Syringe, Refills: 3    Associated Diagnoses: Chronic migraine without aura, with intractable migraine, so stated, without mention of status migrainosus      !! sumatriptan-naproxen (TREXIMET)  mg Tab TAKE 1 TABLET BY MOUTH EVERY 2 HOURS AS NEEDED  Qty: 9 tablet, Refills: 5    Associated Diagnoses: Migraine without aura and without status migrainosus, not intractable      !! TREXIMET  mg Tab Refills: 1      !! venlafaxine (EFFEXOR-XR) 75 MG 24 hr capsule TAKE ONE CAPSULE BY MOUTH ONCE DAILY  Qty: 30 capsule, Refills: 0      !! venlafaxine (EFFEXOR-XR) 75 MG 24 hr capsule TAKE 1 CAPSULE(75 MG) BY MOUTH EVERY DAY  Qty: 30 capsule, Refills: 0       !! - Potential duplicate medications found. Please discuss with provider.      STOP taking these medications       butalbital-acetaminop-caf-cod -97-30 mg Cap Comments:   Reason for Stopping:         butalbital-acetaminophen-caff -40 mg Cap Comments:   Reason for Stopping:         hydrocodone-acetaminophen 5-325mg (NORCO) 5-325 mg per tablet Comments:   Reason for Stopping:         promethazine (PHENERGAN) 12.5 MG Tab Comments:   Reason for Stopping:         butalbital-acetaminophen-caffeine -40 mg (FIORICET, ESGIC) -40 mg per tablet Comments:   Reason for Stopping:               I certify that this patient is confined to her home and needs intermittent skilled nursing care.

## 2017-07-06 NOTE — ANESTHESIA PROCEDURE NOTES
Adductor Canal Single Shot Block    Patient location during procedure: pre-op   Block not for primary anesthetic.  Reason for block: at surgeon's request and post-op pain management   Post-op Pain Location: left ankle pain  Start time: 7/6/2017 8:15 AM  Timeout: 7/6/2017 7:59 AM   End time: 7/6/2017 8:18 AM  Staffing  Anesthesiologist: FAYE TOBIN  Resident/CRNA: DRE TOMLIN  Other anesthesia staff: AVTAR GARCÍA  Performed: resident/CRNA   Preanesthetic Checklist  Completed: patient identified, site marked, surgical consent, pre-op evaluation, timeout performed, IV checked, risks and benefits discussed and monitors and equipment checked  Peripheral Block  Patient position: supine  Prep: ChloraPrep  Patient monitoring: heart rate, cardiac monitor, continuous pulse ox, continuous capnometry and frequent blood pressure checks  Block type: adductor canal  Laterality: left  Injection technique: single shot  Needle  Needle type: Stimuplex   Needle gauge: 21 G  Needle length: 4 in  Needle localization: anatomical landmarks and ultrasound guidance   -ultrasound image captured on disc.  Assessment  Injection assessment: negative aspiration, negative parasthesia and local visualized surrounding nerve  Paresthesia pain: none  Heart rate change: no  Slow fractionated injection: yes  Medications:  Bolus administered: 10 mL of 0.5 ropivacaine  Epinephrine added: 3.75 mcg/mL (1/300,000)  Additional Notes  VSS.  [facility] RN monitoring vitals throughout procedure.  Patient tolerated procedure well.

## 2017-07-07 LAB — BACTERIA UR CULT: NO GROWTH

## 2017-07-07 PROCEDURE — 25000003 PHARM REV CODE 250: Performed by: STUDENT IN AN ORGANIZED HEALTH CARE EDUCATION/TRAINING PROGRAM

## 2017-07-07 PROCEDURE — 97530 THERAPEUTIC ACTIVITIES: CPT

## 2017-07-07 PROCEDURE — G8978 MOBILITY CURRENT STATUS: HCPCS | Mod: CI

## 2017-07-07 PROCEDURE — G8980 MOBILITY D/C STATUS: HCPCS | Mod: CI

## 2017-07-07 PROCEDURE — 97161 PT EVAL LOW COMPLEX 20 MIN: CPT

## 2017-07-07 PROCEDURE — 63600175 PHARM REV CODE 636 W HCPCS: Performed by: STUDENT IN AN ORGANIZED HEALTH CARE EDUCATION/TRAINING PROGRAM

## 2017-07-07 PROCEDURE — A4216 STERILE WATER/SALINE, 10 ML: HCPCS | Performed by: STUDENT IN AN ORGANIZED HEALTH CARE EDUCATION/TRAINING PROGRAM

## 2017-07-07 PROCEDURE — G8979 MOBILITY GOAL STATUS: HCPCS | Mod: CI

## 2017-07-07 PROCEDURE — 94761 N-INVAS EAR/PLS OXIMETRY MLT: CPT

## 2017-07-07 PROCEDURE — G0378 HOSPITAL OBSERVATION PER HR: HCPCS

## 2017-07-07 RX ORDER — HYDROMORPHONE HYDROCHLORIDE 1 MG/ML
1 INJECTION, SOLUTION INTRAMUSCULAR; INTRAVENOUS; SUBCUTANEOUS EVERY 4 HOURS PRN
Status: DISCONTINUED | OUTPATIENT
Start: 2017-07-07 | End: 2017-07-08 | Stop reason: HOSPADM

## 2017-07-07 RX ADMIN — OXYCODONE HYDROCHLORIDE 15 MG: 5 TABLET ORAL at 11:07

## 2017-07-07 RX ADMIN — OXYCODONE HYDROCHLORIDE 10 MG: 5 TABLET ORAL at 04:07

## 2017-07-07 RX ADMIN — OXYCODONE HYDROCHLORIDE 15 MG: 5 TABLET ORAL at 08:07

## 2017-07-07 RX ADMIN — HYDROMORPHONE HYDROCHLORIDE 1 MG: 1 INJECTION, SOLUTION INTRAMUSCULAR; INTRAVENOUS; SUBCUTANEOUS at 11:07

## 2017-07-07 RX ADMIN — PROPRANOLOL HYDROCHLORIDE 80 MG: 80 CAPSULE, EXTENDED RELEASE ORAL at 11:07

## 2017-07-07 RX ADMIN — HYDROMORPHONE HYDROCHLORIDE 1 MG: 1 INJECTION, SOLUTION INTRAMUSCULAR; INTRAVENOUS; SUBCUTANEOUS at 07:07

## 2017-07-07 RX ADMIN — VENLAFAXINE HYDROCHLORIDE 75 MG: 75 CAPSULE, EXTENDED RELEASE ORAL at 11:07

## 2017-07-07 RX ADMIN — HYDROMORPHONE HYDROCHLORIDE 1 MG: 1 INJECTION, SOLUTION INTRAMUSCULAR; INTRAVENOUS; SUBCUTANEOUS at 06:07

## 2017-07-07 RX ADMIN — SODIUM CHLORIDE, PRESERVATIVE FREE 3 ML: 5 INJECTION INTRAVENOUS at 07:07

## 2017-07-07 RX ADMIN — OXYCODONE HYDROCHLORIDE 15 MG: 5 TABLET ORAL at 04:07

## 2017-07-07 RX ADMIN — FLUOXETINE HYDROCHLORIDE 60 MG: 20 CAPSULE ORAL at 08:07

## 2017-07-07 RX ADMIN — HYDROMORPHONE HYDROCHLORIDE 1 MG: 1 INJECTION, SOLUTION INTRAMUSCULAR; INTRAVENOUS; SUBCUTANEOUS at 02:07

## 2017-07-07 RX ADMIN — SODIUM CHLORIDE, PRESERVATIVE FREE 3 ML: 5 INJECTION INTRAVENOUS at 06:07

## 2017-07-07 RX ADMIN — HYDROMORPHONE HYDROCHLORIDE 1 MG: 1 INJECTION, SOLUTION INTRAMUSCULAR; INTRAVENOUS; SUBCUTANEOUS at 10:07

## 2017-07-07 NOTE — PROGRESS NOTES
ORTHO PROGRESS NOTE:    Carolin Lind is a 52 y.o. female admitted on 7/5/2017  Hospital Day: 0      The patient was seen and examined this morning at the bedside. No acute issues overnight and adequate control of pain on current regimen.      PHYSICAL EXAM:  Awake/alert/oriented x 3  No acute distress  AFVSS  Good inspiratory effort with unlabored breathing; stable on RA    LLE : pin sites c/d/i with mild bloody drainage. Compartments soft. SILT and toe motion intact distally. 2+ DP pulse.    Vitals:    07/06/17 1700 07/06/17 2000 07/06/17 2342 07/07/17 0400   BP: (!) 140/63 126/73 139/66 132/69   BP Location: Left arm Left arm Right arm Right arm   Patient Position: Lying Lying Lying Lying   BP Method: Automatic Automatic Automatic Automatic   Pulse: 69 68 72 70   Resp: 16 18 18 18   Temp: 97.2 °F (36.2 °C) 98.5 °F (36.9 °C) 97.8 °F (36.6 °C) 98.5 °F (36.9 °C)   TempSrc: Oral Oral Oral Oral   SpO2: 100% 99% 98% 99%   Weight:       Height:         I/O last 3 completed shifts:  In: 1350 [I.V.:1350]  Out: 700 [Urine:700]  No results for input(s): GLUCOSE, CALCIUM, PROT, NA, K, CO2, CL, BUN, CREATININE in the last 72 hours.    Invalid input(s):  ALBUMIN  No results for input(s): WBC, RBC, HGB, HCT, PLT in the last 72 hours.  No results for input(s): INR, APTT in the last 72 hours.    Invalid input(s): PT    A/P: 52 y.o. female 1 Day Post-Op s/p ex-fix to left trimalleolar ankle fracture  -- Pain control  -- PT/OT: NWB to LLE  -- DVT prophylaxis: Lovenox  -- Ice/elevate at all times  -- Twice daily pin site care:  Clean pins with 50% peroxide / 50% sterile water solution.  Wrap pins with Kerlex gauze.    -- Dispo: home today with ; definitive fixation next week.    Mike Casale, M.D. PGY-4  Department of Orthopedic Surgery    Attg Note:  I agree with the above assessment and plan.    Jair Winchester MD

## 2017-07-07 NOTE — PLAN OF CARE
8:29 AM  SW received home health orders. Pt's preference is [facility]. Faxed orders to [facility].     Britney Acuña, NATALIIA    [phone]

## 2017-07-07 NOTE — PLAN OF CARE
Problem: Patient Care Overview  Goal: Plan of Care Review  Outcome: Ongoing (interventions implemented as appropriate)  Pt progressing towards goals.external fixator device intact to lle.lle remains elevated and iced.remains swollen,decreased sensation and movement noted secondary to block.afebrile.safety precautions maintained.call bell in reach.continue plan of care.

## 2017-07-07 NOTE — PROGRESS NOTES
Patient c/o pain 10/10 to LLE despite medication administration at 815. Spoke with Dr. Casale, okay to give dose right now.

## 2017-07-07 NOTE — PROGRESS NOTES
Patient c/o pain 9/10. Patient would like to stay one more night to obtain adequate pain control. Paged Casale, MD.

## 2017-07-07 NOTE — DISCHARGE SUMMARY
[facility]  Discharge Summary      Admit Date: 7/5/2017    Discharge Date and Time:  07/08/2017 9:51 AM    Attending Physician: Jair Winchester MD     Reason for Admission: s/p ex-fix of left ankle fracture    Procedures Performed: Procedure(s) (LRB):  APPLICATION-EXTERNAL FIXATION DEVICE (Left)    Hospital Course (synopsis of major diagnoses, care, treatment, and services provided during the course of the hospital stay): On 7/5/2017, the patient was admitted with an unstable left trimalleolar ankle fracture and underwent application of an external fixator on 7/6/17. Post-operatively, she has had no complications. She is tolerating PO without n/v. She is voiding spontaneously. Her pain is well-controlled on oral medications. She is safe for discharge to home.         Consults: none    Significant Diagnostic Studies: Labs: BMP: No results for input(s): GLU, NA, K, CL, CO2, BUN, CREATININE, CALCIUM, MG in the last 48 hours. and CBC No results for input(s): WBC, HGB, HCT, PLT in the last 48 hours.  Radiology: X-Ray: left ankle  CT scan: left ankle    Final Diagnoses:    Principal Problem: Closed left trimalleolar fracture   Secondary Diagnoses:   Active Hospital Problems    Diagnosis  POA    *Closed left trimalleolar fracture s/p ex-fix on 7/6/17 [S82.852A]  Yes    Trimalleolar fracture [S82.853A]  Yes      Resolved Hospital Problems    Diagnosis Date Resolved POA   No resolved problems to display.       Discharged Condition: good    Disposition: Home or Self Care    Follow Up/Patient Instructions:     Medications:  Reconciled Home Medications:   Current Discharge Medication List      START taking these medications    Details   !! docusate sodium (COLACE) 100 MG capsule Take 1 capsule (100 mg total) by mouth 2 (two) times daily as needed for Constipation.  Qty: 60 capsule, Refills: 1      enoxaparin (LOVENOX) 40 mg/0.4 mL Syrg Inject 0.4 mLs (40 mg total) into the skin once daily.  Qty: 7  Syringe, Refills: 0      ondansetron (ZOFRAN-ODT) 8 MG TbDL Take 1 tablet (8 mg total) by mouth every 6 (six) hours as needed.  Qty: 30 tablet, Refills: 0      oxycodone-acetaminophen (PERCOCET)  mg per tablet Take 1 tablet by mouth every 4 (four) hours as needed for Pain.  Qty: 60 tablet, Refills: 0       !! - Potential duplicate medications found. Please discuss with provider.      CONTINUE these medications which have NOT CHANGED    Details   clonazepam (KLONOPIN) 1 MG tablet Take 1 mg by mouth every evening.  Refills: 2      !! docusate sodium (COLACE) 100 MG capsule Take 1 capsule (100 mg total) by mouth 2 (two) times daily. Use while taking hydrocodone to prevent constipation. Drink plenty of water with this medication.  Qty: 40 capsule, Refills: 0      fluoxetine (PROZAC) 20 MG capsule Take 3 capsules (60 mg total) by mouth once daily.  Qty: 270 capsule, Refills: 1      !! propranolol (INDERAL LA) 80 MG 24 hr capsule Take 80 mg by mouth once daily. 1 Capsule,Extended Release 24 hr Oral Every day      !! propranolol (INDERAL LA) 80 MG 24 hr capsule TAKE 2 CAPSULES BY MOUTH DAILY  Qty: 60 capsule, Refills: 0    Associated Diagnoses: Intractable migraine without status migrainosus, unspecified migraine type      sumatriptan succinate (SUMAVEL DOSEPRO) 6 mg/0.5 mL NfIj Inject 6 mg into the skin every 2 (two) hours as needed. 1 Needle-Free Injector Subcutaneous Every 2 hours  Qty: 6 Device, Refills: 3    Associated Diagnoses: Chronic migraine without aura, with intractable migraine, so stated, without mention of status migrainosus      sumatriptan succinate (ZEMBRACE SYMTOUCH) 3 mg/0.5 mL PnIj Inject 3 mg into the skin every 2 (two) hours as needed.  Qty: 12 Syringe, Refills: 3    Associated Diagnoses: Chronic migraine without aura, with intractable migraine, so stated, without mention of status migrainosus      !! sumatriptan-naproxen (TREXIMET)  mg Tab TAKE 1 TABLET BY MOUTH EVERY 2 HOURS AS  "NEEDED  Qty: 9 tablet, Refills: 5    Associated Diagnoses: Migraine without aura and without status migrainosus, not intractable      !! TREXIMET  mg Tab Refills: 1      !! venlafaxine (EFFEXOR-XR) 75 MG 24 hr capsule TAKE ONE CAPSULE BY MOUTH ONCE DAILY  Qty: 30 capsule, Refills: 0      !! venlafaxine (EFFEXOR-XR) 75 MG 24 hr capsule TAKE 1 CAPSULE(75 MG) BY MOUTH EVERY DAY  Qty: 30 capsule, Refills: 0       !! - Potential duplicate medications found. Please discuss with provider.      STOP taking these medications       butalbital-acetaminop-caf-cod -83-30 mg Cap Comments:   Reason for Stopping:         butalbital-acetaminophen-caff -40 mg Cap Comments:   Reason for Stopping:         hydrocodone-acetaminophen 5-325mg (NORCO) 5-325 mg per tablet Comments:   Reason for Stopping:         promethazine (PHENERGAN) 12.5 MG Tab Comments:   Reason for Stopping:         butalbital-acetaminophen-caffeine -40 mg (FIORICET, ESGIC) -40 mg per tablet Comments:   Reason for Stopping:               Discharge Procedure Orders  WHEELCHAIR FOR HOME USE   Order Specific Question Answer Comments   Hours in W/C per day: 24    Type of Wheelchair: Standard    Size(Width): 18"(STD adult)    Leg Support: Elevating leg rests Left   Lap Belt: Velcro    Cushion: Basic    Height: 5' 9" (1.753 m)    Weight: 59.9 kg (132 lb)    Does patient have medical equipment at home? crutches    Length of need (1-99 months): 99    Please check all that apply: Caregiver is capable and willing to operate wheelchair safely.    Please check all that apply: Patient's upper body strength is sufficient for propulsion.    Please check all that apply: The patient has a cast, brace or muscloskeletal condition which prevents 90 degree flexion of the knee.    Please check all that apply: Patient mobility limitations cannot be sufficiently resolved by the use of other ambulatory therapies.      Diet general     Keep surgical extremity " elevated     Ice to affected area     Call MD for:  temperature >100.4     Call MD for:  persistent nausea and vomiting or diarrhea     Call MD for:  severe uncontrolled pain     Call MD for:  redness, tenderness, or signs of infection (pain, swelling, redness, odor or green/yellow discharge around incision site)     Call MD for:  difficulty breathing or increased cough     Call MD for:  severe persistent headache     Call MD for:  worsening rash     Call MD for:  persistent dizziness, light-headedness, or visual disturbances     Call MD for:  increased confusion or weakness     Weight bearing restrictions (specify)   Order Comments: No weightbearing to operative extremity     No dressing needed     Other restrictions (specify):   Order Comments: Daily pin site care:  Clean pins with 50% peroxide / 50% sterile water solution.  Wrap pins with Kerlex gauze.       Follow-up Information     Ana Laura Greer PA-C. Go on 7/18/2017.    Specialty:  Orthopedic Surgery  Why:  For wound re-check  Contact information:  [address]  [facility] [address]  [phone]

## 2017-07-07 NOTE — PLAN OF CARE
Problem: Patient Care Overview  Goal: Plan of Care Review  Outcome: Ongoing (interventions implemented as appropriate)  Patient maintained on fall precautions. Bed locked and lowest position. Call light within reach. Patient monitored for pain. Pain medication given as per MD order. Pin site care done. Plan of care updated.

## 2017-07-07 NOTE — PLAN OF CARE
Problem: Physical Therapy Goal  Goal: Physical Therapy Goal  Outcome: Outcome(s) achieved Date Met: 07/07/17  No goals set

## 2017-07-07 NOTE — PT/OT/SLP EVAL
"Physical Therapy  Evaluation/Discharge    Carolin Lind   MRN: [id number]   Admitting Diagnosis: Closed left trimalleolar fracture    PT Received On: 07/07/17  PT Start Time: 6803-9484; Pt requested to receive pain med prior to mobility portion; session picked up at 1445  PT Stop Time: 1459  PT Total Time (min): 25 min       Billable Minutes:  Evaluation 15 and Therapeutic Activity 10    Diagnosis: Closed left trimalleolar fracture    Past Medical History:   Diagnosis Date    Abnormal Pap smear of cervix 01/30/2017    Atypical pap with + HPV    Anxiety     Depression     Heart murmur     History of migraine headaches     Menarche     Age of onset 13      Past Surgical History:   Procedure Laterality Date    BREAST SURGERY         Referring physician: Michael Joseph Casale, MD  Date referred to PT: 7/6/17    General Precautions: Standard,    Orthopedic Precautions: LLE non weight bearing   Braces:         Do you have any cultural, spiritual, Taoist conflicts, given your current situation?: not noted    Patient History:  Lives With:  (18 year old daughter)  Living Arrangements: house  Home Layout: Able to live on 1st floor  Stair Railings at Home: none  Transportation Available:  (Pt's daughter is able to drive her when needed)  Living Environment Comment:  (Pt's daughter is home most of day. Pt stated there is another person nearby who is able to assist for pay. Pt reports independence in all activities prior to hospital admission.)  Equipment Currently Used at Home: none (Pt has recently recieved axillary crutches and wheelchair)  DME owned (not currently used): none    Previous Level of Function:  Ambulation Skills: independent  Transfer Skills: independent  ADL Skills: independent  Work/Leisure Activity: independent    Subjective:  Communicated with nursing prior to session.  Pt complained of "sharp/shooting" pain in L LE (10/10). Pt requested PT to assess mobility after administration of pain medication. " Pain reported at 7/10 upon PT return.  Chief Complaint: Pain in L LE  Patient goals: To decrease pain; Regain mobility; Return to work    Pain/Comfort  Pain Rating 1: 10/10  Location - Side 1: Left  Location 1: ankle  Pain Addressed 1: Pre-medicate for activity (7/10 after pain meds given)      Objective:   Patient found with: peripheral IV (epidural line, external fixator on ankle)  Pt found with HOB elevated and L LE propped on pillow.      Cognitive Exam:  Oriented to: Person, Place, Time and Situation    Follows Commands/attention: Follows multistep  commands  Communication: clear/fluent  Safety awareness/insight to disability: intact    Physical Exam:  Postural examination/scapula alignment: No postural abnormalities identified    Skin integrity: L foot observed to be slightly swollen and red  Edema: Mild in L foot    Sensation:   Intact    Upper Extremity Range of Motion:  Right Upper Extremity: WNL  Left Upper Extremity: WNL    Upper Extremity Strength:  Right Upper Extremity: WNL  Left Upper Extremity: WNL    Lower Extremity Range of Motion:  Right Lower Extremity: WNL  Left Lower Extremity: WFL except left ankle limited by external fixation and pain    Lower Extremity Strength:  Right Lower Extremity: WNL  Left Lower Extremity: WFL except L foot limiited by external fixation and pain     Fine motor coordination:  Intact    Gross motor coordination: WFL    Functional Mobility:  Bed Mobility: with HOB elevated  Rolling/Turning Right: Independent  Scooting/Bridging: Independent while scooting to EOB  Supine to Sit: Independent - Pt able to carry LLE over to EOB  Sit to Supine: Independent    Transfers:  Sit <> Stand Assistance: Modified Independent  Sit <> Stand Assistive Device: Axillary crutches (Required instruction on proper crutch placement prior to standing)  Bed <> Chair Technique: Stand Pivot  Bed <> Chair Assistance: Modified Independent  Bed <> Chair Assistive Device: Axillary Crutches    Gait:    Gait Distance: ~40'  Assistance 1: Modified Independent  Gait Assistive Device: Axillary crutches  Gait Pattern: swing-to gait   Pt able to maintain LLE non weight-bearing precautions without any difficulty. Pt ambulated with no LOB or noticeable signs of excruciating pain.     Balance:   Static Sit: GOOD: Takes MODERATE challenges from all directions  Dynamic Sit: GOOD: Maintains balance through MODERATE excursions of active trunk movement  Static Stand: GOOD: Takes MODERATE challenges from all directions  Dynamic stand: GOOD-: Needs SUPERVISION only during gait and able to self right with moderate     Therapeutic Activities and Exercises:  Educated pt on possible discharge setting and equipment options  Educated pt on proper use of axillary crutches  Educated pt on at-home safety scenarios while using axillary crutches    AM-[facility] 6 CLICK MOBILITY  How much help from another person does this patient currently need?   1 = Unable, Total/Dependent Assistance  2 = A lot, Maximum/Moderate Assistance  3 = A little, Minimum/Contact Guard/Supervision  4 = None, Modified [place]/Independent    Turning over in bed (including adjusting bedclothes, sheets and blankets)?: 4  Sitting down on and standing up from a chair with arms (e.g., wheelchair, bedside commode, etc.): 4  Moving from lying on back to sitting on the side of the bed?: 4  Moving to and from a bed to a chair (including a wheelchair)?: 4  Need to walk in hospital room?: 4  Climbing 3-5 steps with a railing?: 3 (May need assist guiding L foot over step)  Total Score: 23     AM-PAC Raw Score CMS G-Code Modifier Level of Impairment Assistance   6 % Total / Unable   7 - 9 CM 80 - 100% Maximal Assist   10 - 14 CL 60 - 80% Moderate Assist   15 - 19 CK 40 - 60% Moderate Assist   20 - 22 CJ 20 - 40% Minimal Assist   23 CI 1-20% SBA / CGA   24 CH 0% Independent/ Mod I     Patient left HOB elevated with call button in reach.    Assessment:   Carolin Lind is  a 52 y.o. female with a medical diagnosis of Closed left trimalleolar fracture and presents with pain in L LE and L LE non-weight bearing precautions. Pt tolerated session well and demonstrated safe bed mobility, transfers, and ambulation with axillary crutches. Pt required no assistance for any aspect of mobility and did not have difficulty with managing orthopedic precautions. Plan to discontinue acute PT. No further equipment or PT services needed upon discharge.    Rehab identified problem list/impairments: Rehab identified problem list/impairments: pain, orthopedic precautions (LLE non weight-bearing; edema in L ankle)    Rehab potential is good.    Activity tolerance: Good    Discharge recommendations: Discharge Facility/Level Of Care Needs: home     Barriers to discharge: Barriers to Discharge: None    Equipment recommendations: Equipment Needed After Discharge: none (Pt currently owns axillary crutches and wheelchair. Pt able to ambulate safely with axillary crutches)     GOALS:    Physical Therapy Goals     Not on file          Multidisciplinary Problems (Resolved)        Problem: Physical Therapy Goal    Goal Priority Disciplines Outcome Goal Variances Interventions   Physical Therapy Goal   (Resolved)     PT/OT, PT Outcome(s) achieved                     PLAN:    Plan to discontinue acute PT    Plan of Care reviewed with: patient          Carlos AMNA Cui  07/07/2017

## 2017-07-07 NOTE — PLAN OF CARE
POD 1 s/p ex-fix to left ankle fracture. Plans to discharge patient home today with no needs/self-care and DME. Wheelchair ordered for bedside delivery. Patient verbalized understanding of today's discharge plan. All questions and concerns addressed. SW and CM will continue to follow for any additional needs. Discharge and follow-up instructions to be completed by the bedside nurse.    Future Appointments  Date Time Provider Department Center   7/11/2017 8:15 AM Ana Laura Greer PA-C [facility] ORTHO [facility]      07/07/17 1045   Final Note   Assessment Type Final Discharge Note   Discharge Disposition Home   Discharge planning education complete? Yes   What phone number can be called within the next 1-3 days to see how you are doing after discharge? ([phone])   Hospital Follow Up  Appt(s) scheduled? Yes   Discharge plans and expectations educations in teach back method with documentation complete? Yes   Offered [facility] -- Bedside Delivery? Yes   Discharge/Hospital Encounter Summary to (non-[facility]) PCP n/a   Referral to Outpatient Case Management complete? n/a   Referral to / orders for Home Health Complete? n/a   30 day supply of medicines given at discharge, if documented non-compliance / non-adherence? n/a   Any social issues identified prior to discharge? No   Did you assess the readiness or willingness of the family or caregiver to support self management of care? Yes

## 2017-07-07 NOTE — PROGRESS NOTES
CM ordered a wheelchair with elevated leg rests from [facility] with [facility]. DME for bedside delivery.    Samia Barrera RN, CM  [facility]  [phone] -[phone]

## 2017-07-07 NOTE — PLAN OF CARE
Ssc received a call from Hortencia with dme @ 10:15 and delivered @ 10:35 a 18 in wheel chair with elevated leg rest cushion and seat belt        Graciela/jae

## 2017-07-07 NOTE — PLAN OF CARE
POD 1 s/p ex-fix to left ankle fracture. PT/OT ordered to eval and treat. PT/OT recs pending. Patient currently lives with her daughter but has her ex- for support. Patient has good family support. CM completed discharge assessment and planning with patient. Patient verbalized understanding. All questions and concerns addressed. SW and CM will continue to follow for any additional needs. Plan A to discharge home with family support as soon as medically stable. Plan B to discharge home with home health.    Plans to discharge patient home with ex-fix and plans for surgery next Wednesday.    PCP: Danette Alvares MD    Pharmacy:   [facility] [address] [facility] [address] [facility] & [place] [address] [address]  [facility] [address]  Phone: [phone] Fax: [phone]    [facility] [address] [facility] [address] [facility]  [address]  [facility] [address]  Phone: [phone] Fax: [phone]    [address] or [place] [id number]  Phone: [phone] Fax: [phone]    [facility] - [place], [place] - [address]  [address] [facility] [address]  Phone: [phone] Fax: [phone]    Payor: BLUE CROSS BLUE SHIELD / Plan: [facility] HMO / Product Type: HMO /      07/07/17 0800   Discharge Assessment   Assessment Type Discharge Planning Assessment   Confirmed/corrected address and phone number on facesheet? Yes   Assessment information obtained from? Patient;Medical Record   Expected Length of Stay (days) 2   Communicated expected length of stay with patient/caregiver yes   Prior to hospitilization cognitive status: Alert/Oriented   Prior to hospitalization functional status: Assistive Equipment   Current cognitive status: Alert/Oriented   Current Functional Status: Assistive Equipment    Arrived From home or self-care   Lives With child(nicholas), adult   Able to Return to Prior Arrangements yes   Is patient able to care for self after discharge? Yes   How many people do you have in your home that can help with your care after discharge? 1   Who are your caregiver(s) and their phone number(s)? ex- Vikram Lind [phone], [phone]   Patient's perception of discharge disposition home or selfcare   Readmission Within The Last 30 Days no previous admission in last 30 days   Patient currently being followed by outpatient case management? No   Patient currently receives home health services? No   Does the patient currently use HME? Yes   Patient currently receives private duty nursing? No   Patient currently receives any other outside agency services? No   Equipment Currently Used at Home crutches   Do you have any problems affording any of your prescribed medications? No   Is the patient taking medications as prescribed? yes   Do you have any financial concerns preventing you from receiving the healthcare you need? No   Does the patient have transportation to healthcare appointments? Yes   Transportation Available family or friend will provide   On Dialysis? No   Does the patient receive services at the Coumadin Clinic? No   Are there any open cases? No   Discharge Plan A Home with family   Discharge Plan B Home Health;Home with family   Patient/Family In Agreement With Plan yes

## 2017-07-08 VITALS
HEIGHT: 69 IN | OXYGEN SATURATION: 94 % | SYSTOLIC BLOOD PRESSURE: 142 MMHG | DIASTOLIC BLOOD PRESSURE: 78 MMHG | WEIGHT: 132 LBS | HEART RATE: 78 BPM | BODY MASS INDEX: 19.55 KG/M2 | RESPIRATION RATE: 18 BRPM | TEMPERATURE: 98 F

## 2017-07-08 PROCEDURE — G0378 HOSPITAL OBSERVATION PER HR: HCPCS

## 2017-07-08 PROCEDURE — 25000003 PHARM REV CODE 250: Performed by: STUDENT IN AN ORGANIZED HEALTH CARE EDUCATION/TRAINING PROGRAM

## 2017-07-08 PROCEDURE — 63600175 PHARM REV CODE 636 W HCPCS: Performed by: STUDENT IN AN ORGANIZED HEALTH CARE EDUCATION/TRAINING PROGRAM

## 2017-07-08 PROCEDURE — A4216 STERILE WATER/SALINE, 10 ML: HCPCS | Performed by: STUDENT IN AN ORGANIZED HEALTH CARE EDUCATION/TRAINING PROGRAM

## 2017-07-08 RX ADMIN — VENLAFAXINE HYDROCHLORIDE 75 MG: 75 CAPSULE, EXTENDED RELEASE ORAL at 08:07

## 2017-07-08 RX ADMIN — SODIUM CHLORIDE, PRESERVATIVE FREE 3 ML: 5 INJECTION INTRAVENOUS at 06:07

## 2017-07-08 RX ADMIN — FLUOXETINE HYDROCHLORIDE 60 MG: 20 CAPSULE ORAL at 08:07

## 2017-07-08 RX ADMIN — PROPRANOLOL HYDROCHLORIDE 80 MG: 80 CAPSULE, EXTENDED RELEASE ORAL at 08:07

## 2017-07-08 RX ADMIN — OXYCODONE HYDROCHLORIDE 10 MG: 5 TABLET ORAL at 02:07

## 2017-07-08 RX ADMIN — HYDROMORPHONE HYDROCHLORIDE 1 MG: 1 INJECTION, SOLUTION INTRAMUSCULAR; INTRAVENOUS; SUBCUTANEOUS at 05:07

## 2017-07-08 RX ADMIN — OXYCODONE HYDROCHLORIDE 15 MG: 5 TABLET ORAL at 08:07

## 2017-07-08 NOTE — PLAN OF CARE
Problem: Patient Care Overview  Goal: Plan of Care Review  Pt with no falls or injuries this hospital stay. Pt with no new skin breakdown. Pt report pain level 4-7/10. Pt afebrile. Surgical sites benign, serosang drainage. No s/s infection. Pt able to move in bed independently. Able to transfer bed to chair independently with LLE non weight bearing.

## 2017-07-08 NOTE — NURSING
Order to discharge home today. Saline lock removed. VSS. Discharge instructions given to pt. Pt verbalizes understanding. Pt discharged from OBS unit via w/c. Pt accompanied by daughter. All personal belongings taken home by pt. Equipment taken home by pt included w/c and crutches. Dressing change supplies given to pt for pin care.

## 2017-07-08 NOTE — PROGRESS NOTES
ORTHO PROGRESS NOTE:    Carolin Lind is a 52 y.o. female admitted on 7/5/2017  Hospital Day: 0      The patient was seen and examined this morning at the bedside. No acute issues overnight and adequate control of pain on current regimen.      PHYSICAL EXAM:  Awake/alert/oriented x 3  No acute distress  AFVSS  Good inspiratory effort with unlabored breathing; stable on RA    LLE : pin sites c/d/i with mild bloody drainage. Compartments soft. SILT and toe motion intact distally. 2+ DP pulse.    Vitals:    07/07/17 2051 07/07/17 2127 07/08/17 0004 07/08/17 0431   BP: (!) 142/84  (!) 173/85 (!) 165/92   BP Location: Left arm  Left arm Left arm   Patient Position: Lying  Lying Lying   BP Method: Manual  Automatic Automatic   Pulse:   79 78   Resp:   18 18   Temp:   99 °F (37.2 °C) 98.4 °F (36.9 °C)   TempSrc:   Oral Oral   SpO2:  96% (!) 94% (!) 90%   Weight:       Height:         I/O last 3 completed shifts:  In: 1590 [P.O.:240; I.V.:1350]  Out: 975 [Urine:975]  No results for input(s): GLUCOSE, CALCIUM, PROT, NA, K, CO2, CL, BUN, CREATININE in the last 72 hours.    Invalid input(s):  ALBUMIN  No results for input(s): WBC, RBC, HGB, HCT, PLT in the last 72 hours.  No results for input(s): INR, APTT in the last 72 hours.    Invalid input(s): PT    A/P: 52 y.o. female 2 Day Post-Op s/p ex-fix to left trimalleolar ankle fracture  -- Pain control  -- PT/OT: NWB to LLE  -- DVT prophylaxis: Lovenox  -- Ice/elevate at all times  -- Twice daily pin site care:  Clean pins with 50% peroxide / 50% sterile water solution.  Wrap pins with Kerlex gauze.    -- Dispo: home today with ; definitive fixation next week.    Makenzie Parry MD  Orthopedic Surgery

## 2017-07-08 NOTE — PLAN OF CARE
Problem: Patient Care Overview  Goal: Plan of Care Review  Outcome: Ongoing (interventions implemented as appropriate)  Pt progressing towards goals.lle remains elevated and iced.external fixator device intact.continues with pain requiring both percolone and dilaudid ivp for btp.safety precautions maintained.pt free of falls or injuries.plan for discharge in am.continue plan of care.

## 2017-07-08 NOTE — PLAN OF CARE
On-call  confirmed home  Health and equipment was ordered yesterday by .  Notified ohh of discharge today.

## 2017-07-11 ENCOUNTER — OFFICE VISIT (OUTPATIENT)
Dept: ORTHOPEDICS | Facility: CLINIC | Age: 52
End: 2017-07-11
Payer: COMMERCIAL

## 2017-07-11 DIAGNOSIS — S82.852A CLOSED LEFT TRIMALLEOLAR FRACTURE, INITIAL ENCOUNTER: Primary | ICD-10-CM

## 2017-07-11 PROCEDURE — 99024 POSTOP FOLLOW-UP VISIT: CPT | Mod: S$GLB,,, | Performed by: PHYSICIAN ASSISTANT

## 2017-07-11 NOTE — PROGRESS NOTES
Ms. Lind is 53 yo F twisted left ankle resulting in left trimalleolar ankle fracture dislocation, treated with splinting in an outside ED.  Patient came to clinic with subluxed/dislocated ankle.  Attempt was made in ED to reduce and splint ankle and it did not want to hold. Patient treated with external fixation device on 07/06/2017 by .    She is here today for a post-operative visit/ skin check after a  1.  Spanning external fixation of left trimalleolar ankle fracture  2.  Closed treatment of left ankle fracture dislocation with manipulation under anesthesia  by Dr. Winchester  on 07/06/2017.    she reports that she is doing ok.  Pain is controlled.  she is taking pain medication.  she denies fever, chills, and sweats since the time of the surgery.  Patient stated that she is getting shooting pain at night as well as as she goes to fall asleep she is getting a feeling of falling and is reliving the accident.     Physical exam:  Post op dressing taken down.  Incision/pin sites is c/d/i. Medial side has small abrasion, appropriate healing.     Assessment:  Post-op visit ( 4 days)    Plan:  - operative fixation discussed with patient. Patient agrees with plan.   Case request placed for 07/13/2017 previously    - continue pin site cleaning.   - rest ice elevate to reduce swelling  - non-weight bearing  - pain medication: no refill needed.     - consents signed,   - labs, chest x-ray and EKG previously done, she is currently on Lovenox and will stop today    Pre, missael, and post operative procedure and expectations were discussed.  Questions were answered. The patient has been educated and is ready to proceed with surgery.  Approximately 30 minutes was spent discussing surgical outcomes, plans, procedures, pre, missael, and post operative expectations and care. The risks, benefits and alternatives to surgery were discussed with the patient at great length.  These include bleeding, infection, vessel/nerve damage,  pain, numbness, tingling, complex regional pain syndrome, hardware/surgical failure, need for further surgery, malunion, nonunion, DVT, PE, arthritis and death.  Patient states an understanding and wishes to proceed with surgery.   All questions were answered.  No guarantees were implied or stated.  Informed consent was obtained  The patient will contact us if the have any questions, concerns, and changes in their medical condition prior to surgery.

## 2017-07-11 NOTE — H&P
Subjective:      Patient ID: Carolin Lind is a 52 y.o. female.    Chief Complaint: No chief complaint on file.    Ms. Lind is 51 yo F twisted left ankle resulting in left trimalleolar ankle fracture dislocation, treated with splinting in an outside ED.  Patient came to clinic with subluxed/dislocated ankle.  Attempt was made in ED to reduce and splint ankle and it did not want to hold. Patient treated with external fixation device on 07/06/2017 by . Presents for skin check for definitive fixation.      Past Medical History:   Diagnosis Date    Abnormal Pap smear of cervix 01/30/2017    Atypical pap with + HPV    Anxiety     Depression     Heart murmur     History of migraine headaches     Menarche     Age of onset 13     Past Surgical History:   Procedure Laterality Date    BREAST SURGERY       Social History     Occupational History    Not on file.     Social History Main Topics    Smoking status: Never Smoker    Smokeless tobacco: Never Used    Alcohol use No    Drug use: No    Sexual activity: Yes     Partners: Male     Birth control/ protection: None      ROS  Current Outpatient Prescriptions on File Prior to Visit   Medication Sig Dispense Refill    clonazepam (KLONOPIN) 1 MG tablet Take 1 mg by mouth every evening.  2    docusate sodium (COLACE) 100 MG capsule Take 1 capsule (100 mg total) by mouth 2 (two) times daily. Use while taking hydrocodone to prevent constipation. Drink plenty of water with this medication. 40 capsule 0    docusate sodium (COLACE) 100 MG capsule Take 1 capsule (100 mg total) by mouth 2 (two) times daily as needed for Constipation. 60 capsule 1    enoxaparin (LOVENOX) 40 mg/0.4 mL Syrg Inject 0.4 mLs (40 mg total) into the skin once daily. 7 Syringe 0    fluoxetine (PROZAC) 20 MG capsule Take 3 capsules (60 mg total) by mouth once daily. 270 capsule 1    ondansetron (ZOFRAN-ODT) 8 MG TbDL Take 1 tablet (8 mg total) by mouth every 6 (six) hours as needed.  30 tablet 0    oxycodone-acetaminophen (PERCOCET)  mg per tablet Take 1 tablet by mouth every 4 (four) hours as needed for Pain. 60 tablet 0    propranolol (INDERAL LA) 80 MG 24 hr capsule Take 80 mg by mouth once daily. 1 Capsule,Extended Release 24 hr Oral Every day      propranolol (INDERAL [place]) 80 MG 24 hr capsule TAKE 2 CAPSULES BY MOUTH DAILY 60 capsule 0    sumatriptan succinate (SUMAVEL DOSEPRO) 6 mg/0.5 mL NfIj Inject 6 mg into the skin every 2 (two) hours as needed. 1 Needle-Free Injector Subcutaneous Every 2 hours 6 Device 3    sumatriptan succinate (ZEMBRACE SYMTOUCH) 3 mg/0.5 mL PnIj Inject 3 mg into the skin every 2 (two) hours as needed. 12 Syringe 3    sumatriptan-naproxen (TREXIMET)  mg Tab TAKE 1 TABLET BY MOUTH EVERY 2 HOURS AS NEEDED 9 tablet 5    TREXIMET  mg Tab   1    venlafaxine (EFFEXOR-XR) 75 MG 24 hr capsule TAKE ONE CAPSULE BY MOUTH ONCE DAILY 30 capsule 0    venlafaxine (EFFEXOR-XR) 75 MG 24 hr capsule TAKE 1 CAPSULE(75 MG) BY MOUTH EVERY DAY 30 capsule 0     No current facility-administered medications on file prior to visit.      Review of patient's allergies indicates:  No Known Allergies      Objective:      There were no vitals filed for this visit.  Ortho Exam     Gen: WD, WN in NAD   HEENT: NC/AT   Heart: RR   Resp: unlabored breathing   Skin: intact, no lesions pertinent to the surgery site    Assessment:       1. Closed left trimalleolar fracture, s/p ex-fix 07/06/2017          Plan:       Surgical intervention per .

## 2017-07-12 ENCOUNTER — ANESTHESIA EVENT (OUTPATIENT)
Dept: SURGERY | Facility: HOSPITAL | Age: 52
End: 2017-07-12
Payer: COMMERCIAL

## 2017-07-12 NOTE — PRE-PROCEDURE INSTRUCTIONS
Spoke with Patient.  NPO, medication, and pre-op instructions reviewed.  Has had PONV with Anesthesia which was relieved with Zofran.  Verbalized understanding of instructions.

## 2017-07-13 ENCOUNTER — HOSPITAL ENCOUNTER (OUTPATIENT)
Facility: HOSPITAL | Age: 52
Discharge: HOME | End: 2017-07-15
Attending: ORTHOPAEDIC SURGERY | Admitting: ORTHOPAEDIC SURGERY
Payer: COMMERCIAL

## 2017-07-13 ENCOUNTER — SURGERY (OUTPATIENT)
Age: 52
End: 2017-07-13

## 2017-07-13 ENCOUNTER — ANESTHESIA (OUTPATIENT)
Dept: SURGERY | Facility: HOSPITAL | Age: 52
End: 2017-07-13
Payer: COMMERCIAL

## 2017-07-13 DIAGNOSIS — S82.852A CLOSED LEFT TRIMALLEOLAR FRACTURE, INITIAL ENCOUNTER: Primary | ICD-10-CM

## 2017-07-13 DIAGNOSIS — S82.852A: ICD-10-CM

## 2017-07-13 PROCEDURE — 27823 TREATMENT OF ANKLE FRACTURE: CPT | Mod: 58,LT,, | Performed by: ORTHOPAEDIC SURGERY

## 2017-07-13 PROCEDURE — 63600175 PHARM REV CODE 636 W HCPCS: Performed by: ANESTHESIOLOGY

## 2017-07-13 PROCEDURE — 25000003 PHARM REV CODE 250: Performed by: STUDENT IN AN ORGANIZED HEALTH CARE EDUCATION/TRAINING PROGRAM

## 2017-07-13 PROCEDURE — 63600175 PHARM REV CODE 636 W HCPCS: Performed by: STUDENT IN AN ORGANIZED HEALTH CARE EDUCATION/TRAINING PROGRAM

## 2017-07-13 PROCEDURE — 64446 NJX AA&/STRD SC NRV NFS IMG: CPT | Performed by: ANESTHESIOLOGY

## 2017-07-13 PROCEDURE — 37000008 HC ANESTHESIA 1ST 15 MINUTES: Performed by: ORTHOPAEDIC SURGERY

## 2017-07-13 PROCEDURE — 36000708 HC OR TIME LEV III 1ST 15 MIN: Performed by: ORTHOPAEDIC SURGERY

## 2017-07-13 PROCEDURE — 37000009 HC ANESTHESIA EA ADD 15 MINS: Performed by: ORTHOPAEDIC SURGERY

## 2017-07-13 PROCEDURE — 36000709 HC OR TIME LEV III EA ADD 15 MIN: Performed by: ORTHOPAEDIC SURGERY

## 2017-07-13 PROCEDURE — 63600175 PHARM REV CODE 636 W HCPCS

## 2017-07-13 PROCEDURE — 63600175 PHARM REV CODE 636 W HCPCS: Performed by: NURSE ANESTHETIST, CERTIFIED REGISTERED

## 2017-07-13 PROCEDURE — 25000003 PHARM REV CODE 250: Performed by: NURSE ANESTHETIST, CERTIFIED REGISTERED

## 2017-07-13 PROCEDURE — S0077 INJECTION, CLINDAMYCIN PHOSP: HCPCS | Performed by: STUDENT IN AN ORGANIZED HEALTH CARE EDUCATION/TRAINING PROGRAM

## 2017-07-13 PROCEDURE — 25000003 PHARM REV CODE 250: Performed by: ORTHOPAEDIC SURGERY

## 2017-07-13 PROCEDURE — 27800517 HC TRAY,EPIDURAL-CONTINUOUS: Performed by: ANESTHESIOLOGY

## 2017-07-13 PROCEDURE — 27200750 HC INSULATED NEEDLE/ STIMUPLEX: Performed by: STUDENT IN AN ORGANIZED HEALTH CARE EDUCATION/TRAINING PROGRAM

## 2017-07-13 PROCEDURE — 27201423 OPTIME MED/SURG SUP & DEVICES STERILE SUPPLY: Performed by: ORTHOPAEDIC SURGERY

## 2017-07-13 PROCEDURE — 20694 RMVL EXT FIXJ SYS UNDER ANES: CPT | Mod: 58,51,LT, | Performed by: ORTHOPAEDIC SURGERY

## 2017-07-13 PROCEDURE — C1769 GUIDE WIRE: HCPCS | Performed by: ORTHOPAEDIC SURGERY

## 2017-07-13 PROCEDURE — 94760 N-INVAS EAR/PLS OXIMETRY 1: CPT

## 2017-07-13 PROCEDURE — 71000039 HC RECOVERY, EACH ADD'L HOUR: Performed by: ORTHOPAEDIC SURGERY

## 2017-07-13 PROCEDURE — D9220A PRA ANESTHESIA: Mod: CRNA,,, | Performed by: NURSE ANESTHETIST, CERTIFIED REGISTERED

## 2017-07-13 PROCEDURE — 11000001 HC ACUTE MED/SURG PRIVATE ROOM

## 2017-07-13 PROCEDURE — 27200651 HC AIRWAY, LMA: Performed by: NURSE ANESTHETIST, CERTIFIED REGISTERED

## 2017-07-13 PROCEDURE — 64447 NJX AA&/STRD FEMORAL NRV IMG: CPT | Mod: 59,LT,, | Performed by: ANESTHESIOLOGY

## 2017-07-13 PROCEDURE — 64445 NJX AA&/STRD SCIATIC NRV IMG: CPT | Mod: 59,LT,, | Performed by: ANESTHESIOLOGY

## 2017-07-13 PROCEDURE — C1713 ANCHOR/SCREW BN/BN,TIS/BN: HCPCS | Performed by: ORTHOPAEDIC SURGERY

## 2017-07-13 PROCEDURE — 76942 ECHO GUIDE FOR BIOPSY: CPT | Performed by: ANESTHESIOLOGY

## 2017-07-13 PROCEDURE — 71000033 HC RECOVERY, INTIAL HOUR: Performed by: ORTHOPAEDIC SURGERY

## 2017-07-13 PROCEDURE — 27829 TREAT LOWER LEG JOINT: CPT | Mod: 58,51,LT, | Performed by: ORTHOPAEDIC SURGERY

## 2017-07-13 PROCEDURE — D9220A PRA ANESTHESIA: Mod: ANES,,, | Performed by: ANESTHESIOLOGY

## 2017-07-13 DEVICE — SCREW CRTX LOW PROFILE 3.5MM: Type: IMPLANTABLE DEVICE | Site: ANKLE | Status: FUNCTIONAL

## 2017-07-13 DEVICE — SCREW CRTX LOW PROFILE H 36MM: Type: IMPLANTABLE DEVICE | Site: ANKLE | Status: FUNCTIONAL

## 2017-07-13 DEVICE — SCREW 2.7X14MM: Type: IMPLANTABLE DEVICE | Site: ANKLE | Status: FUNCTIONAL

## 2017-07-13 DEVICE — SCREW CORTEX 3.5MM X 16MM: Type: IMPLANTABLE DEVICE | Site: ANKLE | Status: FUNCTIONAL

## 2017-07-13 DEVICE — SCREW STRDRV REC T8 2.7X12 SS: Type: IMPLANTABLE DEVICE | Site: ANKLE | Status: FUNCTIONAL

## 2017-07-13 DEVICE — SCREW 2.7X18MM: Type: IMPLANTABLE DEVICE | Site: ANKLE | Status: FUNCTIONAL

## 2017-07-13 DEVICE — SCREW CORTEX 3.5MM X 14MM.: Type: IMPLANTABLE DEVICE | Site: ANKLE | Status: FUNCTIONAL

## 2017-07-13 DEVICE — IMPLANTABLE DEVICE: Type: IMPLANTABLE DEVICE | Site: ANKLE | Status: FUNCTIONAL

## 2017-07-13 DEVICE — SCREW 2.7X16MM: Type: IMPLANTABLE DEVICE | Site: ANKLE | Status: FUNCTIONAL

## 2017-07-13 DEVICE — SCREW CORTEX 2.7 X 20.: Type: IMPLANTABLE DEVICE | Site: ANKLE | Status: FUNCTIONAL

## 2017-07-13 DEVICE — SCREW CRTX LOW PROFILE H 46MM: Type: IMPLANTABLE DEVICE | Site: ANKLE | Status: FUNCTIONAL

## 2017-07-13 DEVICE — SCREW CRTX LOW PROFILE H 50MM: Type: IMPLANTABLE DEVICE | Site: ANKLE | Status: FUNCTIONAL

## 2017-07-13 RX ORDER — SODIUM CHLORIDE 9 MG/ML
INJECTION, SOLUTION INTRAVENOUS CONTINUOUS
Status: DISCONTINUED | OUTPATIENT
Start: 2017-07-13 | End: 2017-07-13

## 2017-07-13 RX ORDER — DOCUSATE SODIUM 100 MG/1
100 CAPSULE, LIQUID FILLED ORAL 3 TIMES DAILY
Status: DISCONTINUED | OUTPATIENT
Start: 2017-07-13 | End: 2017-07-15 | Stop reason: HOSPADM

## 2017-07-13 RX ORDER — METOCLOPRAMIDE HYDROCHLORIDE 5 MG/ML
10 INJECTION INTRAMUSCULAR; INTRAVENOUS EVERY 10 MIN PRN
Status: DISCONTINUED | OUTPATIENT
Start: 2017-07-13 | End: 2017-07-13 | Stop reason: HOSPADM

## 2017-07-13 RX ORDER — PROPOFOL 10 MG/ML
VIAL (ML) INTRAVENOUS
Status: DISCONTINUED | OUTPATIENT
Start: 2017-07-13 | End: 2017-07-13

## 2017-07-13 RX ORDER — BACITRACIN ZINC 500 UNIT/G
OINTMENT (GRAM) TOPICAL
Status: DISCONTINUED | OUTPATIENT
Start: 2017-07-13 | End: 2017-07-13 | Stop reason: HOSPADM

## 2017-07-13 RX ORDER — OXYCODONE HYDROCHLORIDE 5 MG/1
5 TABLET ORAL EVERY 4 HOURS PRN
Status: DISCONTINUED | OUTPATIENT
Start: 2017-07-13 | End: 2017-07-14

## 2017-07-13 RX ORDER — SODIUM CHLORIDE 0.9 % (FLUSH) 0.9 %
3 SYRINGE (ML) INJECTION
Status: DISCONTINUED | OUTPATIENT
Start: 2017-07-13 | End: 2017-07-13 | Stop reason: HOSPADM

## 2017-07-13 RX ORDER — SODIUM CHLORIDE 0.9 % (FLUSH) 0.9 %
3 SYRINGE (ML) INJECTION EVERY 8 HOURS
Status: DISCONTINUED | OUTPATIENT
Start: 2017-07-13 | End: 2017-07-15 | Stop reason: HOSPADM

## 2017-07-13 RX ORDER — PREGABALIN 50 MG/1
150 CAPSULE ORAL NIGHTLY
Status: DISCONTINUED | OUTPATIENT
Start: 2017-07-13 | End: 2017-07-13

## 2017-07-13 RX ORDER — VENLAFAXINE HYDROCHLORIDE 75 MG/1
75 CAPSULE, EXTENDED RELEASE ORAL DAILY
Status: DISCONTINUED | OUTPATIENT
Start: 2017-07-14 | End: 2017-07-15 | Stop reason: HOSPADM

## 2017-07-13 RX ORDER — ONDANSETRON 2 MG/ML
4 INJECTION INTRAMUSCULAR; INTRAVENOUS EVERY 12 HOURS PRN
Status: DISCONTINUED | OUTPATIENT
Start: 2017-07-13 | End: 2017-07-14

## 2017-07-13 RX ORDER — CLONAZEPAM 1 MG/1
1 TABLET ORAL NIGHTLY PRN
Status: DISCONTINUED | OUTPATIENT
Start: 2017-07-13 | End: 2017-07-15 | Stop reason: HOSPADM

## 2017-07-13 RX ORDER — ACETAMINOPHEN 500 MG
1000 TABLET ORAL EVERY 6 HOURS
Status: DISCONTINUED | OUTPATIENT
Start: 2017-07-14 | End: 2017-07-15 | Stop reason: HOSPADM

## 2017-07-13 RX ORDER — ONDANSETRON 2 MG/ML
INJECTION INTRAMUSCULAR; INTRAVENOUS
Status: DISCONTINUED | OUTPATIENT
Start: 2017-07-13 | End: 2017-07-13

## 2017-07-13 RX ORDER — POLYETHYLENE GLYCOL 3350 17 G/17G
17 POWDER, FOR SOLUTION ORAL DAILY
Status: DISCONTINUED | OUTPATIENT
Start: 2017-07-13 | End: 2017-07-15 | Stop reason: HOSPADM

## 2017-07-13 RX ORDER — DIPHENHYDRAMINE HYDROCHLORIDE 50 MG/ML
25 INJECTION INTRAMUSCULAR; INTRAVENOUS EVERY 4 HOURS PRN
Status: DISCONTINUED | OUTPATIENT
Start: 2017-07-13 | End: 2017-07-15 | Stop reason: HOSPADM

## 2017-07-13 RX ORDER — PREGABALIN 75 MG/1
75 CAPSULE ORAL NIGHTLY
Status: DISCONTINUED | OUTPATIENT
Start: 2017-07-13 | End: 2017-07-15 | Stop reason: HOSPADM

## 2017-07-13 RX ORDER — DEXAMETHASONE SODIUM PHOSPHATE 4 MG/ML
INJECTION, SOLUTION INTRA-ARTICULAR; INTRALESIONAL; INTRAMUSCULAR; INTRAVENOUS; SOFT TISSUE
Status: DISCONTINUED | OUTPATIENT
Start: 2017-07-13 | End: 2017-07-13

## 2017-07-13 RX ORDER — PROPRANOLOL HYDROCHLORIDE 80 MG/1
160 CAPSULE, EXTENDED RELEASE ORAL DAILY
Status: DISCONTINUED | OUTPATIENT
Start: 2017-07-14 | End: 2017-07-15 | Stop reason: HOSPADM

## 2017-07-13 RX ORDER — ASPIRIN 325 MG
325 TABLET ORAL 2 TIMES DAILY
Qty: 60 TABLET | Refills: 0 | Status: SHIPPED | OUTPATIENT
Start: 2017-07-13 | End: 2018-07-13

## 2017-07-13 RX ORDER — HYDROMORPHONE HYDROCHLORIDE 1 MG/ML
1 INJECTION, SOLUTION INTRAMUSCULAR; INTRAVENOUS; SUBCUTANEOUS
Status: DISCONTINUED | OUTPATIENT
Start: 2017-07-13 | End: 2017-07-14

## 2017-07-13 RX ORDER — ACETAMINOPHEN 10 MG/ML
1000 INJECTION, SOLUTION INTRAVENOUS ONCE
Status: ACTIVE | OUTPATIENT
Start: 2017-07-13 | End: 2017-07-14

## 2017-07-13 RX ORDER — LIDOCAINE HCL/PF 100 MG/5ML
SYRINGE (ML) INTRAVENOUS
Status: DISCONTINUED | OUTPATIENT
Start: 2017-07-13 | End: 2017-07-13

## 2017-07-13 RX ORDER — OXYCODONE AND ACETAMINOPHEN 10; 325 MG/1; MG/1
1 TABLET ORAL EVERY 4 HOURS PRN
Qty: 97 TABLET | Refills: 0 | Status: SHIPPED | OUTPATIENT
Start: 2017-07-13 | End: 2017-07-27 | Stop reason: SDUPTHER

## 2017-07-13 RX ORDER — ROPIVACAINE HYDROCHLORIDE 2 MG/ML
INJECTION, SOLUTION EPIDURAL; INFILTRATION; PERINEURAL
Status: COMPLETED
Start: 2017-07-13 | End: 2017-07-13

## 2017-07-13 RX ORDER — MIDAZOLAM HYDROCHLORIDE 1 MG/ML
INJECTION, SOLUTION INTRAMUSCULAR; INTRAVENOUS
Status: DISCONTINUED | OUTPATIENT
Start: 2017-07-13 | End: 2017-07-13

## 2017-07-13 RX ORDER — SCOLOPAMINE TRANSDERMAL SYSTEM 1 MG/1
PATCH, EXTENDED RELEASE TRANSDERMAL
Status: DISCONTINUED | OUTPATIENT
Start: 2017-07-13 | End: 2017-07-13

## 2017-07-13 RX ORDER — MEPERIDINE HYDROCHLORIDE 50 MG/ML
12.5 INJECTION INTRAMUSCULAR; INTRAVENOUS; SUBCUTANEOUS ONCE AS NEEDED
Status: DISCONTINUED | OUTPATIENT
Start: 2017-07-13 | End: 2017-07-13 | Stop reason: HOSPADM

## 2017-07-13 RX ORDER — FENTANYL CITRATE 50 UG/ML
25 INJECTION, SOLUTION INTRAMUSCULAR; INTRAVENOUS EVERY 5 MIN PRN
Status: DISCONTINUED | OUTPATIENT
Start: 2017-07-13 | End: 2017-07-13 | Stop reason: HOSPADM

## 2017-07-13 RX ORDER — PROMETHAZINE HYDROCHLORIDE 25 MG/1
25 TABLET ORAL EVERY 6 HOURS PRN
Status: DISCONTINUED | OUTPATIENT
Start: 2017-07-13 | End: 2017-07-15 | Stop reason: HOSPADM

## 2017-07-13 RX ORDER — HYDROMORPHONE HYDROCHLORIDE 1 MG/ML
0.2 INJECTION, SOLUTION INTRAMUSCULAR; INTRAVENOUS; SUBCUTANEOUS EVERY 5 MIN PRN
Status: DISCONTINUED | OUTPATIENT
Start: 2017-07-13 | End: 2017-07-13 | Stop reason: HOSPADM

## 2017-07-13 RX ORDER — CLINDAMYCIN PHOSPHATE 900 MG/50ML
900 INJECTION, SOLUTION INTRAVENOUS
Status: DISCONTINUED | OUTPATIENT
Start: 2017-07-13 | End: 2017-07-15 | Stop reason: HOSPADM

## 2017-07-13 RX ORDER — ONDANSETRON 8 MG/1
8 TABLET, ORALLY DISINTEGRATING ORAL EVERY 8 HOURS PRN
Qty: 42 TABLET | Refills: 0 | Status: SHIPPED | OUTPATIENT
Start: 2017-07-13 | End: 2019-03-15

## 2017-07-13 RX ORDER — FENTANYL CITRATE 50 UG/ML
INJECTION, SOLUTION INTRAMUSCULAR; INTRAVENOUS
Status: DISCONTINUED | OUTPATIENT
Start: 2017-07-13 | End: 2017-07-13

## 2017-07-13 RX ORDER — OXYCODONE HYDROCHLORIDE 5 MG/1
10 TABLET ORAL EVERY 4 HOURS PRN
Status: DISCONTINUED | OUTPATIENT
Start: 2017-07-13 | End: 2017-07-14

## 2017-07-13 RX ORDER — FLUOXETINE HYDROCHLORIDE 20 MG/1
60 CAPSULE ORAL DAILY
Status: DISCONTINUED | OUTPATIENT
Start: 2017-07-14 | End: 2017-07-15 | Stop reason: HOSPADM

## 2017-07-13 RX ORDER — FENTANYL CITRATE 50 UG/ML
50 INJECTION, SOLUTION INTRAMUSCULAR; INTRAVENOUS EVERY 5 MIN PRN
Status: DISCONTINUED | OUTPATIENT
Start: 2017-07-13 | End: 2017-07-13

## 2017-07-13 RX ORDER — DOCUSATE SODIUM 100 MG/1
100 CAPSULE, LIQUID FILLED ORAL 3 TIMES DAILY
Qty: 90 CAPSULE | Refills: 0 | Status: SHIPPED | OUTPATIENT
Start: 2017-07-13 | End: 2018-11-02

## 2017-07-13 RX ORDER — MIDAZOLAM HYDROCHLORIDE 1 MG/ML
1 INJECTION INTRAMUSCULAR; INTRAVENOUS EVERY 5 MIN PRN
Status: DISCONTINUED | OUTPATIENT
Start: 2017-07-13 | End: 2017-07-13

## 2017-07-13 RX ORDER — RAMELTEON 8 MG/1
8 TABLET ORAL NIGHTLY PRN
Status: DISCONTINUED | OUTPATIENT
Start: 2017-07-13 | End: 2017-07-15 | Stop reason: HOSPADM

## 2017-07-13 RX ORDER — ACETAMINOPHEN 10 MG/ML
1000 INJECTION, SOLUTION INTRAVENOUS EVERY 6 HOURS
Status: DISCONTINUED | OUTPATIENT
Start: 2017-07-13 | End: 2017-07-13

## 2017-07-13 RX ORDER — SODIUM CHLORIDE 9 MG/ML
INJECTION, SOLUTION INTRAVENOUS CONTINUOUS
Status: DISCONTINUED | OUTPATIENT
Start: 2017-07-13 | End: 2017-07-15 | Stop reason: HOSPADM

## 2017-07-13 RX ORDER — ROPIVACAINE HYDROCHLORIDE 2 MG/ML
8 INJECTION, SOLUTION EPIDURAL; INFILTRATION; PERINEURAL CONTINUOUS
Status: DISCONTINUED | OUTPATIENT
Start: 2017-07-13 | End: 2017-07-15 | Stop reason: HOSPADM

## 2017-07-13 RX ADMIN — FENTANYL CITRATE 25 MCG: 50 INJECTION, SOLUTION INTRAMUSCULAR; INTRAVENOUS at 07:07

## 2017-07-13 RX ADMIN — SODIUM CHLORIDE, SODIUM GLUCONATE, SODIUM ACETATE, POTASSIUM CHLORIDE, MAGNESIUM CHLORIDE, SODIUM PHOSPHATE, DIBASIC, AND POTASSIUM PHOSPHATE: .53; .5; .37; .037; .03; .012; .00082 INJECTION, SOLUTION INTRAVENOUS at 07:07

## 2017-07-13 RX ADMIN — PROPOFOL 100 MG: 10 INJECTION, EMULSION INTRAVENOUS at 07:07

## 2017-07-13 RX ADMIN — DEXAMETHASONE SODIUM PHOSPHATE 4 MG: 4 INJECTION, SOLUTION INTRAMUSCULAR; INTRAVENOUS at 07:07

## 2017-07-13 RX ADMIN — BACITRACIN ZINC 1 TUBE: 500 OINTMENT TOPICAL at 10:07

## 2017-07-13 RX ADMIN — SODIUM CHLORIDE: 0.9 INJECTION, SOLUTION INTRAVENOUS at 06:07

## 2017-07-13 RX ADMIN — FENTANYL CITRATE 50 MCG: 50 INJECTION INTRAMUSCULAR; INTRAVENOUS at 06:07

## 2017-07-13 RX ADMIN — PROPOFOL 50 MG: 10 INJECTION, EMULSION INTRAVENOUS at 07:07

## 2017-07-13 RX ADMIN — MIDAZOLAM HYDROCHLORIDE 2 MG: 1 INJECTION, SOLUTION INTRAMUSCULAR; INTRAVENOUS at 06:07

## 2017-07-13 RX ADMIN — FENTANYL CITRATE 25 MCG: 50 INJECTION, SOLUTION INTRAMUSCULAR; INTRAVENOUS at 09:07

## 2017-07-13 RX ADMIN — Medication 2 G: at 07:07

## 2017-07-13 RX ADMIN — FENTANYL CITRATE 25 MCG: 50 INJECTION, SOLUTION INTRAMUSCULAR; INTRAVENOUS at 10:07

## 2017-07-13 RX ADMIN — ROPIVACAINE HYDROCHLORIDE 8 ML/HR: 2 INJECTION, SOLUTION EPIDURAL; INFILTRATION at 01:07

## 2017-07-13 RX ADMIN — LIDOCAINE HYDROCHLORIDE 100 MG: 20 INJECTION, SOLUTION INTRAVENOUS at 07:07

## 2017-07-13 RX ADMIN — HYDROMORPHONE HYDROCHLORIDE 1 MG: 1 INJECTION, SOLUTION INTRAMUSCULAR; INTRAVENOUS; SUBCUTANEOUS at 09:07

## 2017-07-13 RX ADMIN — ACETAMINOPHEN 1000 MG: 10 INJECTION, SOLUTION INTRAVENOUS at 11:07

## 2017-07-13 RX ADMIN — OXYCODONE HYDROCHLORIDE 10 MG: 5 TABLET ORAL at 08:07

## 2017-07-13 RX ADMIN — MIDAZOLAM HYDROCHLORIDE 1 MG: 1 INJECTION, SOLUTION INTRAMUSCULAR; INTRAVENOUS at 06:07

## 2017-07-13 RX ADMIN — CLINDAMYCIN IN 5 PERCENT DEXTROSE 900 MG: 18 INJECTION, SOLUTION INTRAVENOUS at 11:07

## 2017-07-13 RX ADMIN — CLINDAMYCIN IN 5 PERCENT DEXTROSE 900 MG: 18 INJECTION, SOLUTION INTRAVENOUS at 09:07

## 2017-07-13 RX ADMIN — SCOPALAMINE 1 PATCH: 1 PATCH, EXTENDED RELEASE TRANSDERMAL at 06:07

## 2017-07-13 RX ADMIN — MIDAZOLAM HYDROCHLORIDE 2 MG: 1 INJECTION, SOLUTION INTRAMUSCULAR; INTRAVENOUS at 07:07

## 2017-07-13 RX ADMIN — SODIUM CHLORIDE: 0.9 INJECTION, SOLUTION INTRAVENOUS at 11:07

## 2017-07-13 RX ADMIN — ONDANSETRON 4 MG: 2 INJECTION INTRAMUSCULAR; INTRAVENOUS at 07:07

## 2017-07-13 RX ADMIN — PREGABALIN 75 MG: 75 CAPSULE ORAL at 08:07

## 2017-07-13 RX ADMIN — Medication 2 G: at 10:07

## 2017-07-13 RX ADMIN — FENTANYL CITRATE 25 MCG: 50 INJECTION, SOLUTION INTRAMUSCULAR; INTRAVENOUS at 08:07

## 2017-07-13 NOTE — ANESTHESIA PROCEDURE NOTES
Adductor Canal Single Injection Block    Patient location during procedure: pre-op   Block not for primary anesthetic.  Reason for block: at surgeon's request and post-op pain management   Post-op Pain Location: L ankle pain  Start time: 7/13/2017 6:41 AM  Timeout: 7/13/2017 6:40 AM   End time: 7/13/2017 6:50 AM  Staffing  Anesthesiologist: JEAN MARIE ROMERO  Resident/CRNA: JANET SMITH  Performed: resident/CRNA   Preanesthetic Checklist  Completed: patient identified, site marked, surgical consent, pre-op evaluation, timeout performed, IV checked, risks and benefits discussed and monitors and equipment checked  Peripheral Block  Patient position: supine  Prep: ChloraPrep  Patient monitoring: heart rate, cardiac monitor, continuous pulse ox, continuous capnometry and frequent blood pressure checks  Block type: adductor canal  Laterality: left  Injection technique: single shot  Needle  Needle type: Stimuplex   Needle gauge: 21 G  Needle length: 4 in  Needle localization: anatomical landmarks and ultrasound guidance   -ultrasound image captured on disc.  Assessment  Injection assessment: negative aspiration, negative parasthesia and local visualized surrounding nerve  Paresthesia pain: none  Heart rate change: no  Slow fractionated injection: yes  Medications:  Bolus administered: 10 mL of 0.25 and 0.5 ropivacaine  Epinephrine added: 3.75 mcg/mL (1/300,000)  Additional Notes  VSS.  [facility] RN monitoring vitals throughout procedure.  Patient tolerated procedure well.

## 2017-07-13 NOTE — NURSING TRANSFER
Nursing Transfer Note      7/13/2017     Transfer To: 504    Transfer via stretcher    Transfer with none    Transported by PCT    Medicines sent: fluids    Chart send with patient: No    Notified: pt will call daughter    Patient reassessed at: 7/13/17 see flowsheets

## 2017-07-13 NOTE — PROGRESS NOTES
Dr. Dickey with anesthesia notified that patient was unable to produce urine sample for UPT, however pt states that she was tested by a fertility MD and that she was diagnosed with being post menopausal even though she is still having menstrual cycles. MD stated it was okay to proceed with scheduled surgery without UPT.

## 2017-07-13 NOTE — BRIEF OP NOTE
BRIEF OP NOTE    Preop Dx: Left trimalleolar ankle fracture status post spanning external fixation    Postop Dx: Left trimalleolar ankle fracture status post spanning external fixation    Left ankle syndesmosis disruption    Procedure: 1.  Open treatment of left trimalleolar ankle fracture with internal fixation of medial, lateral and posterior malleoli - 27823    2.  Open treatment of left ankle syndesmosis disruption with internal fixation - 27829    3.  Removal, under anesthesia, of external fixation left leg - 20694    Surgeon: Jair Winchester M.D.    Asst:  Benedict Lees M.D    Anesthesia: GLMA plus regional    EBL:  10cc    IVF:  2000cc crystalloid    Implants: Synthes 9 hole fibular plate and small frag screws    Specimens: None    Findings: Stable fixation.  Lateral comminution.      Dispo:  To PACU awake/stable.    Dict#  [id number]

## 2017-07-13 NOTE — ANESTHESIA PREPROCEDURE EVALUATION
07/13/2017  Carolin Lind is a 52 y.o., female.    Anesthesia Evaluation      I have reviewed the Medications.     Review of Systems  Anesthesia Hx:  Hx of Anesthetic complications PONV   Hematology/Oncology:  Hematology Normal   Oncology Normal     EENT/Dental:EENT/Dental Normal   Cardiovascular:   Exercise tolerance: good Denies Angina.    Pulmonary:  Pulmonary Normal    Renal/:  Renal/ Normal     Hepatic/GI:   GERD, well controlled    Neurological:   Headaches    Endocrine:  Endocrine Normal        Physical Exam   Airway/Jaw/Neck:  Airway Findings: Mouth Opening: Normal Tongue: Normal  General Airway Assessment: Adult  Mallampati: II  Improves to I with phonation.  TM Distance: Normal, at least 6 cm  Jaw/Neck Findings:  Neck ROM: Normal ROM      Dental:  Dental Findings:   Chest/Lungs:  Chest/Lungs Findings: Clear to auscultation, Normal Respiratory Rate     Heart/Vascular:  Heart Findings: Rate: Normal  Rhythm: Regular Rhythm  Sounds: Normal             Anesthesia Plan  Type of Anesthesia, risks & benefits discussed:  Anesthesia Type:  general  Patient's Preference:   Intra-op Monitoring Plan: standard ASA monitors  Intra-op Monitoring Plan Comments:   Post Op Pain Control Plan: multimodal analgesia and peripheral nerve block  Post Op Pain Control Plan Comments:   Induction:    Beta Blocker:  Patient is not currently on a Beta-Blocker (No further documentation required).       Informed Consent: Patient understands risks and agrees with Anesthesia plan.  Questions answered. Anesthesia consent signed with patient.  ASA Score: 1     Day of Surgery Review of History & Physical:    H&P update referred to the surgeon.         Ready For Surgery From Anesthesia Perspective.

## 2017-07-13 NOTE — INTERVAL H&P NOTE
No significant change in H&P.  S/P ex fix unstable left ankle trimalleolar fracture dislocation.  To OR today for ORIF and ex fix removal.  The risks, benefits and alternatives to surgery were discussed with the patient at great length.  These include bleeding, infection, vessel/nerve damage, pain, numbness, tingling, complex regional pain syndrome, hardware/surgical failure, need for further surgery, malunion, nonunion, DVT, PE, arthritis and death.  Patient states an understanding and wishes to proceed with surgery.   All questions were answered.  No guarantees were implied or stated.  Informed consent was obtained.    Jair Winchester MD

## 2017-07-13 NOTE — TRANSFER OF CARE
"Anesthesia Transfer of Care Note    Patient: Carolin Lind    Procedure(s) Performed: Procedure(s) (LRB):  OPEN REDUCTION INTERNAL FIXATION-ANKLE (Left)  REMOVAL OF EXTERNAL FIXATION DEVICE (Left)  FIXATION-SYNDESMOSIS-ANKLE (Left)    Patient location: PACU    Anesthesia Type: general    Transport from OR: Transported from OR on 2-3 L/min O2 by NC with adequate spontaneous ventilation    Post pain: adequate analgesia    Post assessment: no apparent anesthetic complications and tolerated procedure well    Post vital signs: stable    Level of consciousness: awake, alert and oriented    Nausea/Vomiting: no nausea/vomiting    Complications: none    Transfer of care protocol was followed      Last vitals:   Visit Vitals  BP (!) 118/54 (BP Location: Right arm, Patient Position: Lying, BP Method: Automatic)   Pulse 74   Temp 36.9 °C (98.4 °F) (Oral)   Resp 14   Ht 5' 9" (1.753 m)   Wt 59.9 kg (132 lb)   LMP 07/05/2017   SpO2 100%   BMI 19.49 kg/m²     "

## 2017-07-13 NOTE — H&P (VIEW-ONLY)
Subjective:      Patient ID: Carolin Lind is a 52 y.o. female.    Chief Complaint: No chief complaint on file.    Ms. Lind is 53 yo F twisted left ankle resulting in left trimalleolar ankle fracture dislocation, treated with splinting in an outside ED.  Patient came to clinic with subluxed/dislocated ankle.  Attempt was made in ED to reduce and splint ankle and it did not want to hold. Patient treated with external fixation device on 07/06/2017 by . Presents for skin check for definitive fixation.      Past Medical History:   Diagnosis Date    Abnormal Pap smear of cervix 01/30/2017    Atypical pap with + HPV    Anxiety     Depression     Heart murmur     History of migraine headaches     Menarche     Age of onset 13     Past Surgical History:   Procedure Laterality Date    BREAST SURGERY       Social History     Occupational History    Not on file.     Social History Main Topics    Smoking status: Never Smoker    Smokeless tobacco: Never Used    Alcohol use No    Drug use: No    Sexual activity: Yes     Partners: Male     Birth control/ protection: None      ROS  Current Outpatient Prescriptions on File Prior to Visit   Medication Sig Dispense Refill    clonazepam (KLONOPIN) 1 MG tablet Take 1 mg by mouth every evening.  2    docusate sodium (COLACE) 100 MG capsule Take 1 capsule (100 mg total) by mouth 2 (two) times daily. Use while taking hydrocodone to prevent constipation. Drink plenty of water with this medication. 40 capsule 0    docusate sodium (COLACE) 100 MG capsule Take 1 capsule (100 mg total) by mouth 2 (two) times daily as needed for Constipation. 60 capsule 1    enoxaparin (LOVENOX) 40 mg/0.4 mL Syrg Inject 0.4 mLs (40 mg total) into the skin once daily. 7 Syringe 0    fluoxetine (PROZAC) 20 MG capsule Take 3 capsules (60 mg total) by mouth once daily. 270 capsule 1    ondansetron (ZOFRAN-ODT) 8 MG TbDL Take 1 tablet (8 mg total) by mouth every 6 (six) hours as needed.  30 tablet 0    oxycodone-acetaminophen (PERCOCET)  mg per tablet Take 1 tablet by mouth every 4 (four) hours as needed for Pain. 60 tablet 0    propranolol (INDERAL LA) 80 MG 24 hr capsule Take 80 mg by mouth once daily. 1 Capsule,Extended Release 24 hr Oral Every day      propranolol (INDERAL LA) 80 MG 24 hr capsule TAKE 2 CAPSULES BY MOUTH DAILY 60 capsule 0    sumatriptan succinate (SUMAVEL DOSEPRO) 6 mg/0.5 mL NfIj Inject 6 mg into the skin every 2 (two) hours as needed. 1 Needle-Free Injector Subcutaneous Every 2 hours 6 Device 3    sumatriptan succinate (ZEMBRACE SYMTOUCH) 3 mg/0.5 mL PnIj Inject 3 mg into the skin every 2 (two) hours as needed. 12 Syringe 3    sumatriptan-naproxen (TREXIMET)  mg Tab TAKE 1 TABLET BY MOUTH EVERY 2 HOURS AS NEEDED 9 tablet 5    TREXIMET  mg Tab   1    venlafaxine (EFFEXOR-XR) 75 MG 24 hr capsule TAKE ONE CAPSULE BY MOUTH ONCE DAILY 30 capsule 0    venlafaxine (EFFEXOR-XR) 75 MG 24 hr capsule TAKE 1 CAPSULE(75 MG) BY MOUTH EVERY DAY 30 capsule 0     No current facility-administered medications on file prior to visit.      Review of patient's allergies indicates:  No Known Allergies      Objective:      There were no vitals filed for this visit.  Ortho Exam     Gen: WD, WN in NAD   HEENT: NC/AT   Heart: RR   Resp: unlabored breathing   Skin: intact, no lesions pertinent to the surgery site    Assessment:       1. Closed left trimalleolar fracture, s/p ex-fix 07/06/2017          Plan:       Surgical intervention per .

## 2017-07-13 NOTE — ANESTHESIA PROCEDURE NOTES
Popliteal Sciatic Nerve Catheter    Patient location during procedure: pre-op   Block not for primary anesthetic.  Reason for block: at surgeon's request and post-op pain management   Post-op Pain Location: left ankle pain  Start time: 7/13/2017 12:50 PM  Timeout: 7/13/2017 12:43 PM   End time: 7/13/2017 1:10 PM  Staffing  Anesthesiologist: JEAN MARIE ROMERO  Other anesthesia staff: AVTAR GARCÍA  Performed: other anesthesia staff   Preanesthetic Checklist  Completed: patient identified, site marked, surgical consent, pre-op evaluation, timeout performed, IV checked, risks and benefits discussed and monitors and equipment checked  Peripheral Block  Patient position: supine  Prep: ChloraPrep and site prepped and draped  Patient monitoring: heart rate, cardiac monitor, continuous pulse ox, continuous capnometry and frequent blood pressure checks  Block type: popliteal  Laterality: left  Injection technique: continuous  Needle  Needle type: Tuohy   Needle gauge: 17 G  Needle length: 3.5 in  Needle localization: anatomical landmarks and ultrasound guidance  Catheter type: spring wound  Catheter size: 19 G  Test dose: lidocaine 1.5% with Epi 1-to-200,000 and negative   -ultrasound image captured on disc.  Assessment  Injection assessment: negative aspiration, negative parasthesia and local visualized surrounding nerve  Paresthesia pain: none  Heart rate change: no  Slow fractionated injection: yes  Additional Notes  Discussed with Dr. Winchester -- will place catheter now to prolong block for pain control.  Single injection block working well now, catheter placed with saline.  VSS.  PACU RN monitoring vitals throughout procedure.  Patient tolerated procedure well.  Used saline for placement.  Test dose negative.

## 2017-07-14 PROCEDURE — G0378 HOSPITAL OBSERVATION PER HR: HCPCS

## 2017-07-14 PROCEDURE — S0077 INJECTION, CLINDAMYCIN PHOSP: HCPCS | Performed by: STUDENT IN AN ORGANIZED HEALTH CARE EDUCATION/TRAINING PROGRAM

## 2017-07-14 PROCEDURE — 97165 OT EVAL LOW COMPLEX 30 MIN: CPT

## 2017-07-14 PROCEDURE — 63600175 PHARM REV CODE 636 W HCPCS: Performed by: ANESTHESIOLOGY

## 2017-07-14 PROCEDURE — 97535 SELF CARE MNGMENT TRAINING: CPT

## 2017-07-14 PROCEDURE — 25000003 PHARM REV CODE 250: Performed by: ANESTHESIOLOGY

## 2017-07-14 PROCEDURE — A4216 STERILE WATER/SALINE, 10 ML: HCPCS | Performed by: STUDENT IN AN ORGANIZED HEALTH CARE EDUCATION/TRAINING PROGRAM

## 2017-07-14 PROCEDURE — 97161 PT EVAL LOW COMPLEX 20 MIN: CPT

## 2017-07-14 PROCEDURE — 25000003 PHARM REV CODE 250: Performed by: STUDENT IN AN ORGANIZED HEALTH CARE EDUCATION/TRAINING PROGRAM

## 2017-07-14 PROCEDURE — 63600175 PHARM REV CODE 636 W HCPCS: Performed by: STUDENT IN AN ORGANIZED HEALTH CARE EDUCATION/TRAINING PROGRAM

## 2017-07-14 PROCEDURE — 97110 THERAPEUTIC EXERCISES: CPT

## 2017-07-14 PROCEDURE — 97116 GAIT TRAINING THERAPY: CPT

## 2017-07-14 PROCEDURE — 99202 OFFICE O/P NEW SF 15 MIN: CPT | Mod: ICN,,, | Performed by: ANESTHESIOLOGY

## 2017-07-14 RX ORDER — OXYCODONE HYDROCHLORIDE 5 MG/1
15 TABLET ORAL
Status: DISCONTINUED | OUTPATIENT
Start: 2017-07-14 | End: 2017-07-15 | Stop reason: HOSPADM

## 2017-07-14 RX ORDER — OXYCODONE HYDROCHLORIDE 5 MG/1
5 TABLET ORAL
Status: DISCONTINUED | OUTPATIENT
Start: 2017-07-14 | End: 2017-07-15 | Stop reason: HOSPADM

## 2017-07-14 RX ORDER — OXYCODONE HYDROCHLORIDE 5 MG/1
15 TABLET ORAL EVERY 4 HOURS PRN
Status: DISCONTINUED | OUTPATIENT
Start: 2017-07-14 | End: 2017-07-14

## 2017-07-14 RX ORDER — OXYCODONE HYDROCHLORIDE 5 MG/1
10 TABLET ORAL
Status: DISCONTINUED | OUTPATIENT
Start: 2017-07-14 | End: 2017-07-15 | Stop reason: HOSPADM

## 2017-07-14 RX ORDER — HYDROMORPHONE HYDROCHLORIDE 1 MG/ML
0.5 INJECTION, SOLUTION INTRAMUSCULAR; INTRAVENOUS; SUBCUTANEOUS ONCE
Status: COMPLETED | OUTPATIENT
Start: 2017-07-14 | End: 2017-07-14

## 2017-07-14 RX ORDER — ONDANSETRON 2 MG/ML
4 INJECTION INTRAMUSCULAR; INTRAVENOUS EVERY 8 HOURS PRN
Status: DISCONTINUED | OUTPATIENT
Start: 2017-07-14 | End: 2017-07-15 | Stop reason: HOSPADM

## 2017-07-14 RX ADMIN — OXYCODONE HYDROCHLORIDE 10 MG: 5 TABLET ORAL at 01:07

## 2017-07-14 RX ADMIN — CLINDAMYCIN IN 5 PERCENT DEXTROSE 900 MG: 18 INJECTION, SOLUTION INTRAVENOUS at 08:07

## 2017-07-14 RX ADMIN — ACETAMINOPHEN 1000 MG: 500 TABLET ORAL at 01:07

## 2017-07-14 RX ADMIN — OXYCODONE HYDROCHLORIDE 15 MG: 5 TABLET ORAL at 07:07

## 2017-07-14 RX ADMIN — HYDROMORPHONE HYDROCHLORIDE 1 MG: 1 INJECTION, SOLUTION INTRAMUSCULAR; INTRAVENOUS; SUBCUTANEOUS at 02:07

## 2017-07-14 RX ADMIN — HYDROMORPHONE HYDROCHLORIDE 1 MG: 1 INJECTION, SOLUTION INTRAMUSCULAR; INTRAVENOUS; SUBCUTANEOUS at 10:07

## 2017-07-14 RX ADMIN — DOCUSATE SODIUM 100 MG: 100 CAPSULE, LIQUID FILLED ORAL at 01:07

## 2017-07-14 RX ADMIN — OXYCODONE HYDROCHLORIDE 15 MG: 5 TABLET ORAL at 08:07

## 2017-07-14 RX ADMIN — CLINDAMYCIN IN 5 PERCENT DEXTROSE 900 MG: 18 INJECTION, SOLUTION INTRAVENOUS at 11:07

## 2017-07-14 RX ADMIN — PROPRANOLOL HYDROCHLORIDE 160 MG: 80 CAPSULE, EXTENDED RELEASE ORAL at 10:07

## 2017-07-14 RX ADMIN — OXYCODONE HYDROCHLORIDE 15 MG: 5 TABLET ORAL at 11:07

## 2017-07-14 RX ADMIN — HYDROMORPHONE HYDROCHLORIDE 1 MG: 1 INJECTION, SOLUTION INTRAMUSCULAR; INTRAVENOUS; SUBCUTANEOUS at 06:07

## 2017-07-14 RX ADMIN — ACETAMINOPHEN 1000 MG: 500 TABLET ORAL at 05:07

## 2017-07-14 RX ADMIN — OXYCODONE HYDROCHLORIDE 15 MG: 5 TABLET ORAL at 05:07

## 2017-07-14 RX ADMIN — OXYCODONE HYDROCHLORIDE 15 MG: 5 TABLET ORAL at 01:07

## 2017-07-14 RX ADMIN — CLINDAMYCIN IN 5 PERCENT DEXTROSE 900 MG: 18 INJECTION, SOLUTION INTRAVENOUS at 06:07

## 2017-07-14 RX ADMIN — Medication 3 ML: at 02:07

## 2017-07-14 RX ADMIN — HYDROMORPHONE HYDROCHLORIDE 1 MG: 1 INJECTION, SOLUTION INTRAMUSCULAR; INTRAVENOUS; SUBCUTANEOUS at 03:07

## 2017-07-14 RX ADMIN — ROPIVACAINE HYDROCHLORIDE 8 ML/HR: 2 INJECTION, SOLUTION EPIDURAL; INFILTRATION at 10:07

## 2017-07-14 RX ADMIN — PREGABALIN 75 MG: 75 CAPSULE ORAL at 08:07

## 2017-07-14 RX ADMIN — ACETAMINOPHEN 1000 MG: 500 TABLET ORAL at 11:07

## 2017-07-14 RX ADMIN — DOCUSATE SODIUM 100 MG: 100 CAPSULE, LIQUID FILLED ORAL at 06:07

## 2017-07-14 RX ADMIN — Medication 3 ML: at 01:07

## 2017-07-14 RX ADMIN — OXYCODONE HYDROCHLORIDE 10 MG: 5 TABLET ORAL at 05:07

## 2017-07-14 RX ADMIN — VENLAFAXINE HYDROCHLORIDE 75 MG: 75 CAPSULE, EXTENDED RELEASE ORAL at 08:07

## 2017-07-14 RX ADMIN — FLUOXETINE 60 MG: 20 CAPSULE ORAL at 08:07

## 2017-07-14 NOTE — PLAN OF CARE
Problem: Patient Care Overview  Goal: Plan of Care Review  Outcome: Ongoing (interventions implemented as appropriate)  Pt in bed resting. C/o pain. In no acute distress. Activity promoted. ambulates with crutches. Regular diet tolerated well with no c/o n/v. No falls noted. Will continue to monitor. Call bell intact and in reach.

## 2017-07-14 NOTE — PLAN OF CARE
POD 1 s/p left ORIF, removal ex-fix, and left ankle fixation. PT/OT ordered to eval and treat. PT/OT recs pending. Patient currently lives with daughter but also has the support of her ex-. CM completed discharge assessment and planning with patient. Patient verbalized understanding. All questions and concerns addressed. SW and CM will continue to follow for any additional needs. Plan A to discharge home with home health as soon as medically stable. Plan B to discharge home with family support and plans for outpatient rehab. Patient owns crutches and a wheelchair.    PCP: Danette Alvares MD    Pharmacy:   [facility] [address] [address]  Phone: [phone] Fax: [phone]    [facility] - [place], [place] - [address]  [address] [facility] [address]  Phone: [phone] Fax: [phone]    Payor: BLUE CROSS BLUE SHIELD / Plan: [facility] HMO / Product Type: HMO /      07/14/17 0928   Discharge Assessment   Assessment Type Discharge Planning Assessment   Confirmed/corrected address and phone number on facesheet? Yes   Assessment information obtained from? Patient;Medical Record   Expected Length of Stay (days) 2   Communicated expected length of stay with patient/caregiver yes   Prior to hospitilization cognitive status: Alert/Oriented   Prior to hospitalization functional status: Assistive Equipment   Current cognitive status: Alert/Oriented   Current Functional Status: Assistive Equipment   Arrived From home or self-care   Lives With other (see comments);child(nicholas), adult  (daughter; ex-)   Able to Return to Prior Arrangements yes   Is patient able to care for self after discharge? Yes   How many people do you have in your home that can help with your care after discharge? 1   Who are your caregiver(s) and their phone number(s)? Vikram Lind [phone], [phone]    Patient's perception of discharge disposition home health   Readmission Within The Last 30 Days no previous admission in last 30 days   Patient currently being followed by outpatient case management? No   Patient currently receives home health services? No   Does the patient currently use HME? Yes   Patient currently receives private duty nursing? No   Patient currently receives any other outside agency services? No   Equipment Currently Used at Home wheelchair;crutches   Do you have any problems affording any of your prescribed medications? No   Is the patient taking medications as prescribed? yes   Do you have any financial concerns preventing you from receiving the healthcare you need? No   Does the patient have transportation to healthcare appointments? Yes   Transportation Available family or friend will provide   On Dialysis? No   Does the patient receive services at the Coumadin Clinic? No   Are there any open cases? No   Discharge Plan A Home Health;Home with family   Discharge Plan B Home with family;Other  (outpatient rehab)   Patient/Family In Agreement With Plan yes

## 2017-07-14 NOTE — PLAN OF CARE
Problem: Occupational Therapy Goal  Goal: Occupational Therapy Goal  Goals to be met by: 7-21-17     Patient will increase functional independence with ADLs by performing:    UE Dressing with Lutz.  LE Dressing with Lutz.  Grooming while standing at sink with Lutz.  Toileting from toilet with Lutz for hygiene and clothing management.   Stand pivot transfers with Modified Lutz.  Toilet transfer to toilet with Modified Lutz.    Outcome: Ongoing (interventions implemented as appropriate)  OT eval performed, goals and POC set.    IWONA Bunn  7/14/2017

## 2017-07-14 NOTE — ANESTHESIA POSTPROCEDURE EVALUATION
"Anesthesia Post Evaluation    Patient: Carolin Lind    Procedure(s) Performed: Procedure(s) (LRB):  OPEN REDUCTION INTERNAL FIXATION-ANKLE (Left)  REMOVAL OF EXTERNAL FIXATION DEVICE (Left)  FIXATION-SYNDESMOSIS-ANKLE (Left)    Final Anesthesia Type: general  Patient location during evaluation: med/surg floor  Patient participation: Yes- Able to Participate  Level of consciousness: awake and alert  Post-procedure vital signs: reviewed and stable  Pain management: adequate  Airway patency: patent  PONV status at discharge: No PONV  Anesthetic complications: no      Cardiovascular status: blood pressure returned to baseline  Respiratory status: unassisted  Hydration status: euvolemic  Follow-up not needed.        Visit Vitals  BP (!) 142/81 (BP Location: Right arm, Patient Position: Lying, BP Method: Automatic)   Pulse 76   Temp 37.3 °C (99.2 °F) (Oral)   Resp 17   Ht 5' 9" (1.753 m)   Wt 59.9 kg (132 lb)   LMP 07/05/2017   SpO2 95%   Breastfeeding? No   BMI 19.49 kg/m²       Pain/Fabiola Score: Pain Assessment Performed: Yes (7/14/2017  4:00 PM)  Presence of Pain: complains of pain/discomfort (7/14/2017  4:00 PM)  Pain Rating Prior to Med Admin: 8 (7/14/2017  2:47 PM)  Pain Rating Post Med Admin: 5 (7/14/2017  9:15 AM)  Fabiola Score: 10 (7/13/2017  1:58 PM)      "

## 2017-07-14 NOTE — PROGRESS NOTES
[facility]  Orthopedics  Progress Note    Patient Name: Carolin Lind  MRN: [id number]  Admission Date: 7/13/2017  Hospital Length of Stay: 1 days  Attending Provider: Jair Winchester MD  Primary Care Provider: Danette Alvares MD  Follow-up For: Procedure(s) (LRB):  OPEN REDUCTION INTERNAL FIXATION-ANKLE (Left)  REMOVAL OF EXTERNAL FIXATION DEVICE (Left)  FIXATION-SYNDESMOSIS-ANKLE (Left)    Post-Operative Day: 1 Day Post-Op  Subjective:     Principal Problem:Closed left trimalleolar fracture    Principal Orthopedic Problem: s/p ORIF L ankle    Interval History: Patient seen and examined at bedside.  No acute events overnight.  Pain uncontrolled on eval but pt states pain medicine improves pain.    Review of patient's allergies indicates:  No Known Allergies    Current Facility-Administered Medications   Medication    0.9%  NaCl infusion    acetaminophen (10 mg/mL) injection 1,000 mg    Followed by    acetaminophen tablet 1,000 mg    clindamycin 900 MG/50 ML D5W 900 mg/50 mL IVPB 900 mg    clonazePAM tablet 1 mg    diphenhydrAMINE injection 25 mg    docusate sodium capsule 100 mg    fluoxetine capsule 60 mg    HYDROmorphone injection 1 mg    ondansetron injection 4 mg    ondansetron injection 4 mg    oxycodone immediate release tablet 10 mg    oxycodone immediate release tablet 5 mg    polyethylene glycol packet 17 g    pregabalin capsule 75 mg    promethazine tablet 25 mg    propranolol 24 hr capsule 160 mg    ramelteon tablet 8 mg    ropivacaine (PF) 2 mg/ml (0.2%) infusion    sodium chloride 0.9% flush 3 mL    venlafaxine 24 hr capsule 75 mg     Objective:     Vital Signs (Most Recent):  Temp: 97.9 °F (36.6 °C) (07/14/17 0358)  Pulse: 72 (07/14/17 0358)  Resp: 16 (07/14/17 0358)  BP: (!) 144/77 (07/14/17 0358)  SpO2: 97 % (07/14/17 0358) Vital Signs (24h Range):  Temp:  [97.9 °F (36.6 °C)-100.4 °F (38 °C)] 97.9 °F (36.6 °C)  Pulse:  [66-83] 72  Resp:  [8-20] 16  SpO2:   "[97 %-100 %] 97 %  BP: (108-152)/(54-77) 144/77     Weight: 59.9 kg (132 lb)  Height: 5' 9" (175.3 cm)  Body mass index is 19.49 kg/m².      Intake/Output Summary (Last 24 hours) at 07/14/17 0600  Last data filed at 07/14/17 0200   Gross per 24 hour   Intake          3280.94 ml   Output              100 ml   Net          3180.94 ml       Ortho/SPM Exam   AAOx4  NAD  RRR  No increased WOB  splintc/d/i  Ice bags in place with leg elevated  Able to feel and move toes  WWP extremities  FCDs in place and functioning    Significant Labs: All pertinent labs within the past 24 hours have been reviewed.    Significant Imaging: I have reviewed all pertinent imaging results/findings.    Assessment/Plan:     * Closed left trimalleolar fracture s/p ex-fix on 7/6/17    52 y.o. female POD1 s/p ORIF L ankle    Pain control: Anesthesia notified to bolus catheter.  Will reassess medications  PT/OT: NWB LLE  Abx: postop clinda    Dispo: home today after pain control                Benedict Humphrey MD  Orthopedics  [facility]    Attg Note:  I agree with the above assessment and plan.    Jair Winchester MD    "

## 2017-07-14 NOTE — ASSESSMENT & PLAN NOTE
52 y.o. female POD1 s/p ORIF L ankle    Pain control: Anesthesia notified to bolus catheter.  Will reassess medications  PT/OT: ROQUE ENGEL  Abx: postop clinda    Dispo: home today after pain control

## 2017-07-14 NOTE — PT/OT/SLP EVAL
Occupational Therapy  Evaluation    Carolin Lind   MRN: [id number]   Admitting Diagnosis: Closed left trimalleolar fracture    OT Date of Treatment: 07/14/17   OT Start Time: 0947  OT Stop Time: 1025  OT Total Time (min): 38 min    Billable Minutes:  Evaluation 20  Self Care/Home Management 18    Diagnosis: Closed left trimalleolar fracture     Past Medical History:   Diagnosis Date    Abnormal Pap smear of cervix 01/30/2017    Atypical pap with + HPV    Anxiety     Depression     Heart murmur     History of migraine headaches     Menarche     Age of onset 13    Mitral valve prolapse     PONV (postoperative nausea and vomiting)       Past Surgical History:   Procedure Laterality Date    BREAST SURGERY      EXTERNAL FIXATOR APPLICATION         Referring physician: Benedict Humphrey MD  Date referred to OT: 7-13-17    General Precautions: Standard, fall  Orthopedic Precautions: LLE non weight bearing  Braces: N/A    Do you have any cultural, spiritual, Caodaism conflicts, given your current situation?: none stated     Patient History:  Living Environment  Lives With: child(nicholas), adult  Living Arrangements: house  Home Accessibility: stairs to enter home  Home Layout: Able to live on 1st floor  Number of Stairs to Enter Home: 1 (threshold step)  Stair Railings at Home: none  Transportation Available: family or friend will provide  Living Environment Comment: Pt lives with her 18 y.o daughter in a H with 1 threshold step to enter. PTA pt was (I) with ADLs, IADLs, working (pt owns a furniture consignment store), and driving. Pt states her daughter can help her at home upon D/C.   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  "I used to be a  and never broke anything... And this, this is miserable."   Chief Complaint: pain  Patient/Family stated goals: get better and return to home    Pain/Comfort  Pain Rating 1: 8/10  Location - Side 1: Left  Location - Orientation 1: generalized  Location 1: ankle  Pain Addressed 1: Reposition, Distraction, Cessation of Activity, Nurse notified  Pain Rating Post-Intervention 1:  (did not quantify but pt was in tears from the pain)    Objective:  Patient found with: perineural catheter, peripheral IV    Cognitive Exam:  Oriented to: Person, Place, Time and Situation  Follows Commands/attention: Follows multistep  commands  Communication: clear/fluent  Memory:  No Deficits noted  Safety awareness/insight to disability: intact  Coping skills/emotional control: Appropriate to situation    Visual/perceptual:  Intact    Physical Exam:  Postural examination/scapula alignment: No postural abnormalities identified  Skin integrity: Visible skin intact  Edema: None noted     Sensation:   Intact, but pt stated since the surgery she has experienced some mind tingling in L hand thenar emminence    Upper Extremity Range of Motion:  Right Upper Extremity: WNL  Left Upper Extremity: WNL    Upper Extremity Strength:  Right Upper Extremity: WNL  Left Upper Extremity: WNL   Strength: WNL    Fine motor coordination:   Intact    Gross motor coordination: WFL    Functional Mobility:  Bed Mobility:  Scooting/Bridging: Modified Independent  Supine to Sit: Modified Independent    Transfers:  Sit <> Stand Assistance: Stand By Assistance  Sit <> Stand Assistive Device: Axillary crutches  Bed <> Chair Technique: Stand " Pivot  Bed <> Chair Transfer Assistance: Stand By Assistance  Bed <> Chair Assistive Device: Axillary Crutches  Chair <>Mat Technique: Stand Pivot  Chair <> Mat Assistance: Stand By Assistance  Chair<> Mat Assistive Device: Axillary Crutches  Toilet Transfer Technique: Stand Pivot  Toilet Transfer Assistance: Contact Guard Assistance  Toilet Transfer Assistive Device: Axillary Crutches    Functional Ambulation: Pt ambulated ~100 ft with SBA and axillary crutches.     Activities of Daily Living:  Feeding Level of Assistance: Activity did not occur  UE Dressing Level of Assistance: Modified independent ([facility] gowns and donning bra and dress)  LE Dressing Level of Assistance: Modified independent (donning underwear)  Grooming Position: Standing at sink (washing hands)  Grooming Level of Assistance: Contact guard assistance  Toileting Where Assessed: Toilet  Toileting Level of Assistance: Modified independent    Balance:   Static Sit: GOOD+: Takes MAXIMAL challenges from all directions.    Dynamic Sit: GOOD+: Maintains balance through MAXIMAL excursions of active trunk motion  Static Stand: GOOD: Takes MODERATE challenges from all directions  Dynamic stand: GOOD-: Needs SUPERVISION only during gait and able to self right with moderate     Therapeutic Activities and Exercises:  OT evaluation performed.  Pt educated on role of OT, safety with functional mobility and ADLs    AM-PAC 6 CLICK ADL  How much help from another person does this patient currently need?  1 = Unable, Total/Dependent Assistance  2 = A lot, Maximum/Moderate Assistance  3 = A little, Minimum/Contact Guard/Supervision  4 = None, Modified [place]/Independent    Putting on and taking off regular lower body clothing? : 4  Bathing (including washing, rinsing, drying)?: 3  Toileting, which includes using toilet, bedpan, or urinal? : 4  Putting on and taking off regular upper body clothing?: 4  Taking care of personal grooming such as brushing  "teeth?: 4  Eating meals?: 4  Total Score: 23    AM-PAC Raw Score CMS "G-Code Modifier Level of Impairment Assistance   6 % Total / Unable   7 - 9 CM 80 - 100% Maximal Assist   10-14 CL 60 - 80% Moderate Assist   15 - 19 CK 40 - 60% Moderate Assist   20 - 22 CJ 20 - 40% Minimal Assist   23 CI 1-20% SBA / CGA   24 CH 0% Independent/ Mod I       Patient left up in chair with all lines intact, call button in reach and RN notified    Assessment:  Carolin Lind is a 52 y.o. female with a medical diagnosis of Closed left trimalleolar fracture and presents with impaired functional mobility and pain which impact pt's (I) with functional tasks performance. Pt with good participation and tolerance of OT/PT eval despite increased amounts of pain in L LE. Pt able to dress self (mod I), perform toilet transfer (CGA), chair<>mat transfer and bed<>chair transfer (with SBA) on this date. Pt would continue to benefit from skilled OT services in order to maximize safety and (I) with self-care and ADLs in order to decrease fall risks upon return to home.     Pt presented with a low complexity OT evaluation.  Pt required a brief an expanded review of medical history and occupational profile.  Pt demod 1-3 performance deficits (physical, cognitive, or psychosocial) resulting in limitations and engagement restrictions.  Clinical decision making required low analytical complexity with limited treatment options.  Pt with no cormorbidities and required no modification of task/assistance with assessment.      Physical- skills refer to impairments of body structure or functions, balance, mobility; strength, endurance, FMC, GMC, sensation, dexterity, and posture.  Cognitive- skills refer to ability to attend, communicate, perceive, think, understand, problem solve, mentally sequence, learn, and remember resulting in ability to organize occupational performance in timely and safe manner.    Psychosocial- skills refer to interpersonal " interactions, habits, routines, and behaviors, active use of coping strategies, and environmental adaptations to appropriately participate in everyday tasks and situations.        Rehab identified problem list/impairments: Rehab identified problem list/impairments: impaired functional mobilty, gait instability, impaired balance, pain, decreased lower extremity function    Rehab potential is excellent.    Activity tolerance: Good    Discharge recommendations: Discharge Facility/Level Of Care Needs: home health OT     Barriers to discharge: Barriers to Discharge: None    Equipment recommendations: none     GOALS:    Occupational Therapy Goals        Problem: Occupational Therapy Goal    Goal Priority Disciplines Outcome Interventions   Occupational Therapy Goal     OT, PT/OT Ongoing (interventions implemented as appropriate)    Description:  Goals to be met by: 7-21-17     Patient will increase functional independence with ADLs by performing:    UE Dressing with [place].  LE Dressing with [place].  Grooming while standing at sink with [place].  Toileting from toilet with [place] for hygiene and clothing management.   Stand pivot transfers with Modified [place].  Toilet transfer to toilet with Modified [place].                      PLAN:  Patient to be seen 6 x/week to address the above listed problems via self-care/home management, therapeutic activities, therapeutic exercises  Plan of Care expires: 08/13/17  Plan of Care reviewed with: patient         IWONA Bunn  07/14/2017

## 2017-07-14 NOTE — ANESTHESIA POST-OP PAIN MANAGEMENT
Acute Pain Service Progress Note    Carolin Lind is a 52 y.o., female, [id number].    Surgery:  OPEN REDUCTION INTERNAL FIXATION-ANKLE (Left)  REMOVAL OF EXTERNAL FIXATION DEVICE (Left)  FIXATION-SYNDESMOSIS-ANKLE (Left)    Post Op Day #: 1    Catheter type: perineural  popliteal    Infusion type: Ropivacaine 0.2%  8 basal    Problem List:    Active Hospital Problems    Diagnosis  POA    *Closed left trimalleolar fracture s/p ex-fix on 7/6/17 [S82.852A]  Yes      Resolved Hospital Problems    Diagnosis Date Resolved POA   No resolved problems to display.       Subjective:     General appearance of alert, oriented, no complaints   Pain with rest: 6    Numbers   Pain with movement: 8    Numbers   Side Effects    1. Pruritis No    2. Nausea No    3. Motor Blockade No, 0=Ability to raise lower extremities off bed    4. Sedation No, 1=awake and alert    Objective:       Catheter site clean, dry, intact        Vitals   Vitals:    07/14/17 0358   BP: (!) 144/77   Pulse: 72   Resp: 16   Temp: 36.6 °C (97.9 °F)        Labs    No visits with results within 2 Day(s) from this visit.   Latest known visit with results is:   Admission on 07/05/2017, Discharged on 07/08/2017   Component Date Value Ref Range Status    Transferrin 07/05/2017 254  200 - 375 mg/dL Final    Specimen UA 07/05/2017 Urine, Clean Catch   Final    Color, UA 07/05/2017 Yellow  Yellow, Straw, [place] Final    Appearance, UA 07/05/2017 Hazy* Clear Final    pH, UA 07/05/2017 5.0  5.0 - 8.0 Final    Specific Gravity, UA 07/05/2017 1.010  1.005 - 1.030 Final    Protein, UA 07/05/2017 Negative  Negative Final    Glucose, UA 07/05/2017 Negative  Negative Final    Ketones, UA 07/05/2017 Negative  Negative Final    Bilirubin (UA) 07/05/2017 Negative  Negative Final    Occult Blood UA 07/05/2017 2+* Negative Final    Nitrite, UA 07/05/2017 Negative  Negative Final    Urobilinogen, UA 07/05/2017 Negative  <2.0 EU/dL Final    Leukocytes, UA 07/05/2017  Negative  Negative Final    Urine Culture, Routine 07/07/2017 No growth   Final    Procalcitonin 07/05/2017 0.02  <0.25 ng/mL Final    Prealbumin 07/05/2017 25  20 - 43 mg/dL Final    RBC, UA 07/05/2017 2  0 - 4 /hpf Final    WBC, UA 07/05/2017 1  0 - 5 /hpf Final    Bacteria, UA 07/05/2017 Rare  None-Occ /hpf Final    Squam Epithel, UA 07/05/2017 12  /hpf Final    Microscopic Comment 07/05/2017 SEE COMMENT   Final    Preg Test, Ur 07/06/2017 Negative   Final        Meds   Current Facility-Administered Medications   Medication Dose Route Frequency Provider Last Rate Last Dose    0.9%  NaCl infusion   Intravenous Continuous Benedict Humphrey  mL/hr at 07/13/17 1132      acetaminophen (10 mg/mL) injection 1,000 mg  1,000 mg Intravenous Once Roxana Rebolledo MD        Followed by    acetaminophen tablet 1,000 mg  1,000 mg Oral Q6H Roxana Rebolledo MD   1,000 mg at 07/14/17 0519    clindamycin 900 MG/50 ML D5W 900 mg/50 mL IVPB 900 mg  900 mg Intravenous Q8H Benedict Humphrey MD 50 mL/hr at 07/14/17 0605 900 mg at 07/14/17 0605    clonazePAM tablet 1 mg  1 mg Oral Nightly PRN Benedict Humphrey MD        diphenhydrAMINE injection 25 mg  25 mg Intravenous Q4H PRN Benedict Humphrey MD        docusate sodium capsule 100 mg  100 mg Oral TID Benedict Humphrey MD   100 mg at 07/14/17 0605    fluoxetine capsule 60 mg  60 mg Oral Daily Benedict Humphrey MD        HYDROmorphone injection 1 mg  1 mg Intravenous Q3H PRN Benedict Humphrey MD   1 mg at 07/14/17 0645    ondansetron injection 4 mg  4 mg Intravenous Q12H PRN Benedict Humphrey MD        ondansetron injection 4 mg  4 mg Intravenous Q12H PRN Roxana Rebolledo MD        oxycodone immediate release tablet 10 mg  10 mg Oral Q4H PRN Benedict Humphrey MD   10 mg at 07/14/17 0510    oxycodone immediate release tablet 5 mg  5 mg Oral Q4H PRN Benedict Humphrey MD        polyethylene glycol packet 17 g  17 g Oral Daily Benedict Humphrey,  MD        pregabalin capsule 75 mg  75 mg Oral QHS Benedict Humphrey MD   75 mg at 07/13/17 2016    promethazine tablet 25 mg  25 mg Oral Q6H PRN Benedict Humphrey MD        propranolol 24 hr capsule 160 mg  160 mg Oral Daily Benedict Humphrey MD        ramelteon tablet 8 mg  8 mg Oral Nightly PRN Benedict Humphrey MD        ropivacaine (PF) 2 mg/ml (0.2%) infusion  8 mL/hr Perineural Continuous Roxana Rebolledo MD 8 mL/hr at 07/13/17 1328 8 mL/hr at 07/13/17 1328    sodium chloride 0.9% flush 3 mL  3 mL Intravenous Q8H Benedict Humphrey MD   3 mL at 07/14/17 0122    venlafaxine 24 hr capsule 75 mg  75 mg Oral Daily Benedict Humphrey MD             Assessment:     Pain control adequate    Plan:     Patient doing well, continue present treatment. Continue  Multimodals. Bolused catheter. Patient poor candidate for on-q as has no one who can stay with her at home.     Evaluator Jodie Ramírez      Pt seen and examined.  Dr. Ramírez's note reviewed.  Agree with assessment and plan.

## 2017-07-14 NOTE — SUBJECTIVE & OBJECTIVE
"Principal Problem:Closed left trimalleolar fracture    Principal Orthopedic Problem: s/p ORIF L ankle    Interval History: Patient seen and examined at bedside.  No acute events overnight.  Pain uncontrolled on eval but pt states pain medicine improves pain.    Review of patient's allergies indicates:  No Known Allergies    Current Facility-Administered Medications   Medication    0.9%  NaCl infusion    acetaminophen (10 mg/mL) injection 1,000 mg    Followed by    acetaminophen tablet 1,000 mg    clindamycin 900 MG/50 ML D5W 900 mg/50 mL IVPB 900 mg    clonazePAM tablet 1 mg    diphenhydrAMINE injection 25 mg    docusate sodium capsule 100 mg    fluoxetine capsule 60 mg    HYDROmorphone injection 1 mg    ondansetron injection 4 mg    ondansetron injection 4 mg    oxycodone immediate release tablet 10 mg    oxycodone immediate release tablet 5 mg    polyethylene glycol packet 17 g    pregabalin capsule 75 mg    promethazine tablet 25 mg    propranolol 24 hr capsule 160 mg    ramelteon tablet 8 mg    ropivacaine (PF) 2 mg/ml (0.2%) infusion    sodium chloride 0.9% flush 3 mL    venlafaxine 24 hr capsule 75 mg     Objective:     Vital Signs (Most Recent):  Temp: 97.9 °F (36.6 °C) (07/14/17 0358)  Pulse: 72 (07/14/17 0358)  Resp: 16 (07/14/17 0358)  BP: (!) 144/77 (07/14/17 0358)  SpO2: 97 % (07/14/17 0358) Vital Signs (24h Range):  Temp:  [97.9 °F (36.6 °C)-100.4 °F (38 °C)] 97.9 °F (36.6 °C)  Pulse:  [66-83] 72  Resp:  [8-20] 16  SpO2:  [97 %-100 %] 97 %  BP: (108-152)/(54-77) 144/77     Weight: 59.9 kg (132 lb)  Height: 5' 9" (175.3 cm)  Body mass index is 19.49 kg/m².      Intake/Output Summary (Last 24 hours) at 07/14/17 0600  Last data filed at 07/14/17 0200   Gross per 24 hour   Intake          3280.94 ml   Output              100 ml   Net          3180.94 ml       Ortho/SPM Exam   AAOx4  NAD  RRR  No increased WOB  splintc/d/i  Ice bags in place with leg elevated  Able to feel and move " toes  WWP extremities  FCDs in place and functioning    Significant Labs: All pertinent labs within the past 24 hours have been reviewed.    Significant Imaging: I have reviewed all pertinent imaging results/findings.

## 2017-07-14 NOTE — PLAN OF CARE
Problem: Physical Therapy Goal  Goal: Physical Therapy Goal  Goals to be met by: 17     Patient will increase functional independence with mobility by performin. Sit to stand transfer with Supervision  2. Bed to chair transfer with Supervision using Crutches  3. Gait  x 100 feet with Stand-by Assistance using Crutches.   4. Ascend/Descend 7 inch curb step with Stand-by Assistance using Crutches.  5. Lower extremity exercise program x20-30 reps per handout, with independence    Outcome: Ongoing (interventions implemented as appropriate)  PT eval complete, POC initiated. Pt tolerated session well with minimal complications from pain. Pt with no LOB during ambulation and t/f's but experienced an increase in pain with supine therex. Pt will cont to benefit from skilled therapy services and is appropriate for OPPT upon d/c.

## 2017-07-14 NOTE — PLAN OF CARE
"Problem: Patient Care Overview  Goal: Plan of Care Review  Outcome: Ongoing (interventions implemented as appropriate)  Pt able to verbalize understanding of POC.  Pt. With reports  Previous experience with poor pain control and remains fearful of pain not being managed, pt also afraid to take "too much medication".  Pt. Pain is moderately managed with current regimen, pt. Maintained RLE elvated with ice in place.  Pt. VSS, will continue to monitor.       "

## 2017-07-14 NOTE — PT/OT/SLP EVAL
Physical Therapy  Evaluation/Treatment    Carolin Lind   MRN: [id number]   Admitting Diagnosis: Closed left trimalleolar fracture    PT Received On: 07/14/17  PT Start Time: 0946     PT Stop Time: 1025    PT Total Time (min): 39 min       Billable Minutes:  Evaluation 10, Gait [id number] and Therapeutic Exercise 15    Diagnosis: Closed left trimalleolar fracture      Past Medical History:   Diagnosis Date    Abnormal Pap smear of cervix 01/30/2017    Atypical pap with + HPV    Anxiety     Depression     Heart murmur     History of migraine headaches     Menarche     Age of onset 13    Mitral valve prolapse     PONV (postoperative nausea and vomiting)       Past Surgical History:   Procedure Laterality Date    BREAST SURGERY      EXTERNAL FIXATOR APPLICATION         Referring physician: Annabella  Date referred to PT: 7/14/2017      General Precautions: Standard, fall  Orthopedic Precautions: LLE non weight bearing   Braces: N/A       Do you have any cultural, spiritual, [employer]ism conflicts, given your current situation?: none stated    Patient History:  Lives With: child(nicholas), adult  Living Arrangements: house  Home Accessibility: stairs to enter home  Home Layout: Able to live on 1st floor  Number of Stairs to Enter Home: 1 (threshold)  Stair Railings at Home: none  Transportation Available: family or friend will provide  Living Environment Comment: Pt lives with her dtr in a H with 1 threshold step to enter. Pt has 24/7 (A) at home from dtr.   Equipment Currently Used at Home: shower chair, wheelchair, crutches, axillary  DME owned (not currently used):     Previous Level of Function:  Ambulation Skills: independent  Transfer Skills: independent  ADL Skills: independent  Work/Leisure Activity: independent    Subjective:  Communicated with nsg prior to session.  -Pt agreeable to PT session    Chief Complaint: impaired functional mobility and (L) LE weakness  Patient goals: return home to  [facility]    Pain/Comfort  Pain Rating 1: 8/10  Location - Side 1: Left  Location - Orientation 1: generalized  Location 1: ankle  Pain Addressed 1: Pre-medicate for activity, Reposition, Cessation of Activity, Nurse notified  Pain Rating Post-Intervention 1: 8/10      Objective:   Patient found with: perineural catheter, peripheral IV     Cognitive Exam:  Oriented to: Person, Place, Time and Situation    Follows Commands/attention: Follows multistep  commands  Communication: clear/fluent  Safety awareness/insight to disability: intact    Physical Exam:  Postural examination/scapula alignment: No postural abnormalities identified    Skin integrity: Visible skin intact  Edema: None noted     Sensation:   Intact  light/touch (B) LE    Lower Extremity Range of Motion:  Right Lower Extremity: WFL  Left Lower Extremity: WFL except ankle in cast/splint    Lower Extremity Strength:  Right Lower Extremity: WFL  Left Lower Extremity: Deficits: 4/5 at knee    Functional Mobility:  Bed Mobility:  Scooting/Bridging: Stand by Assistance  Supine to Sit: Stand by Assistance  Sit to Supine: Stand by Assistance    Transfers:  Sit <> Stand Assistance: Contact Guard Assistance  Sit <> Stand Assistive Device: Axillary crutches  Bed <> Chair Technique: Stand Pivot  Bed <> Chair Assistance: Contact Guard Assistance  Bed <> Chair Assistive Device: Axillary Crutches    Gait:   Gait Distance: 75' x1 trial  Assistance 1: Contact Guard Assistance  Gait Assistive Device: Axillary crutches  Gait Pattern: swing-to gait  Gait Deviation(s): decreased camelia, decreased velocity of limb motion, decreased step length, decreased stride length, decreased weight-shifting ability, decreased toe-to-floor clearance, forward lean    Balance:   Static Sit: FAIR+: Able to take MINIMAL challenges from all directions  Dynamic Sit: FAIR+: Maintains balance through MINIMAL excursions of active trunk motion  Static Stand: FAIR+: Takes MINIMAL challenges from all  directions  Dynamic stand: FAIR: Needs CONTACT GUARD during gait    Therapeutic Activities and Exercises:  -Pt performed supine exercises x15 reps of:  A. AP  B. GS  C.QS  D. SAQ  E. Heel slides    -Pt educated on:  A. PT POC and role of PT  B. NWB status of (L) LE  C. DME mgmt and gait sequencing  D. Importance of OOB activity to improve functional outcomes after surgery    AM-PAC 6 CLICK MOBILITY  How much help from another person does this patient currently need?   1 = Unable, Total/Dependent Assistance  2 = A lot, Maximum/Moderate Assistance  3 = A little, Minimum/Contact Guard/Supervision  4 = None, Modified [place]/Independent    Turning over in bed (including adjusting bedclothes, sheets and blankets)?: 4  Sitting down on and standing up from a chair with arms (e.g., wheelchair, bedside commode, etc.): 4  Moving from lying on back to sitting on the side of the bed?: 4  Moving to and from a bed to a chair (including a wheelchair)?: 3  Need to walk in hospital room?: 3  Climbing 3-5 steps with a railing?: 3  Total Score: 21     AM-PAC Raw Score CMS G-Code Modifier Level of Impairment Assistance   6 % Total / Unable   7 - 9 CM 80 - 100% Maximal Assist   10 - 14 CL 60 - 80% Moderate Assist   15 - 19 CK 40 - 60% Moderate Assist   20 - 22 CJ 20 - 40% Minimal Assist   23 CI 1-20% SBA / CGA   24 CH 0% Independent/ Mod I     Patient left up in chair with all lines intact, call button in reach and nsg notified.    Assessment:   Carolin Lind is a 52 y.o. female with a medical diagnosis of Closed left trimalleolar fracture and presents with impaired functional mobility. PT eval complete, POC initiated. Pt tolerated session well with minimal complications from pain. Pt with no LOB during ambulation and t/f's but experienced an increase in pain with supine therex. Pt will cont to benefit from skilled therapy services and is appropriate for OPPT upon d/c.    Rehab identified problem list/impairments: Rehab  identified problem list/impairments: weakness, impaired endurance, impaired self care skills, impaired balance, gait instability, impaired functional mobilty, pain, decreased safety awareness, decreased lower extremity function, orthopedic precautions, impaired joint extensibility, impaired skin    Rehab potential is good.    Activity tolerance: Good    Discharge recommendations: Discharge Facility/Level Of Care Needs: outpatient PT     Barriers to discharge: Barriers to Discharge: None    Equipment recommendations: Equipment Needed After Discharge: none     GOALS:    Physical Therapy Goals        Problem: Physical Therapy Goal    Goal Priority Disciplines Outcome Goal Variances Interventions   Physical Therapy Goal     PT/OT, PT Ongoing (interventions implemented as appropriate)     Description:  Goals to be met by: 17     Patient will increase functional independence with mobility by performin. Sit to stand transfer with Supervision  2. Bed to chair transfer with Supervision using Crutches  3. Gait  x 100 feet with Stand-by Assistance using Crutches.   4. Ascend/Descend 7 inch curb step with Stand-by Assistance using Crutches.  5. Lower extremity exercise program x20-30 reps per handout, with independence                      PLAN:    Patient to be seen daily to address the above listed problems via gait training, therapeutic activities, therapeutic exercises, neuromuscular re-education  Plan of Care expires: 17  Plan of Care reviewed with: patient          Sandoval Zhu, PT  2017

## 2017-07-15 VITALS
WEIGHT: 132 LBS | SYSTOLIC BLOOD PRESSURE: 125 MMHG | RESPIRATION RATE: 18 BRPM | BODY MASS INDEX: 19.55 KG/M2 | TEMPERATURE: 98 F | OXYGEN SATURATION: 96 % | DIASTOLIC BLOOD PRESSURE: 75 MMHG | HEART RATE: 62 BPM | HEIGHT: 69 IN

## 2017-07-15 PROCEDURE — S0077 INJECTION, CLINDAMYCIN PHOSP: HCPCS | Performed by: STUDENT IN AN ORGANIZED HEALTH CARE EDUCATION/TRAINING PROGRAM

## 2017-07-15 PROCEDURE — 25000003 PHARM REV CODE 250: Performed by: STUDENT IN AN ORGANIZED HEALTH CARE EDUCATION/TRAINING PROGRAM

## 2017-07-15 PROCEDURE — 25000003 PHARM REV CODE 250: Performed by: ANESTHESIOLOGY

## 2017-07-15 PROCEDURE — 99224 PR SUBSEQUENT OBSERVATION CARE,LEVEL I: CPT | Mod: ICN,,, | Performed by: ANESTHESIOLOGY

## 2017-07-15 PROCEDURE — G0378 HOSPITAL OBSERVATION PER HR: HCPCS

## 2017-07-15 PROCEDURE — A4216 STERILE WATER/SALINE, 10 ML: HCPCS | Performed by: STUDENT IN AN ORGANIZED HEALTH CARE EDUCATION/TRAINING PROGRAM

## 2017-07-15 PROCEDURE — 63600175 PHARM REV CODE 636 W HCPCS: Performed by: ANESTHESIOLOGY

## 2017-07-15 RX ADMIN — PROPRANOLOL HYDROCHLORIDE 160 MG: 80 CAPSULE, EXTENDED RELEASE ORAL at 07:07

## 2017-07-15 RX ADMIN — VENLAFAXINE HYDROCHLORIDE 75 MG: 75 CAPSULE, EXTENDED RELEASE ORAL at 07:07

## 2017-07-15 RX ADMIN — Medication 3 ML: at 05:07

## 2017-07-15 RX ADMIN — Medication: at 10:07

## 2017-07-15 RX ADMIN — OXYCODONE HYDROCHLORIDE 15 MG: 5 TABLET ORAL at 02:07

## 2017-07-15 RX ADMIN — OXYCODONE HYDROCHLORIDE 15 MG: 5 TABLET ORAL at 10:07

## 2017-07-15 RX ADMIN — CLINDAMYCIN IN 5 PERCENT DEXTROSE 900 MG: 18 INJECTION, SOLUTION INTRAVENOUS at 05:07

## 2017-07-15 RX ADMIN — OXYCODONE HYDROCHLORIDE 15 MG: 5 TABLET ORAL at 05:07

## 2017-07-15 RX ADMIN — OXYCODONE HYDROCHLORIDE 15 MG: 5 TABLET ORAL at 07:07

## 2017-07-15 RX ADMIN — FLUOXETINE 60 MG: 20 CAPSULE ORAL at 07:07

## 2017-07-15 RX ADMIN — POLYETHYLENE GLYCOL 3350 17 G: 17 POWDER, FOR SOLUTION ORAL at 07:07

## 2017-07-15 RX ADMIN — ACETAMINOPHEN 1000 MG: 500 TABLET ORAL at 05:07

## 2017-07-15 NOTE — ANESTHESIA POST-OP PAIN MANAGEMENT
Acute Pain Service Progress Note    Carolin Lind is a 52 y.o., female, [id number].    Surgery:  OPEN REDUCTION INTERNAL FIXATION-ANKLE (Left)  REMOVAL OF EXTERNAL FIXATION DEVICE (Left)  FIXATION-SYNDESMOSIS-ANKLE (Left)    Post Op Day #: 2    Catheter type: perineural  popliteal     Infusion type: Ropivacaine 0.2%  8 basal    Problem List:    Active Hospital Problems    Diagnosis  POA    *Closed left trimalleolar fracture s/p ex-fix on 7/6/17 [S82.852A]  Yes      Resolved Hospital Problems    Diagnosis Date Resolved POA   No resolved problems to display.       Subjective:                          General appearance of alert, oriented, no complaints                        Pain with rest: 6    Numbers                        Pain with movement: 8    Numbers                        Side Effects                                              1. Pruritis No                                              2. Nausea No                                              3. Motor Blockade No, 0=Ability to raise lower extremities off bed                                              4. Sedation No, 1=awake and alert    Objective:                          Catheter site clean, dry, intact      Vitals   Vitals:    07/15/17 0738   BP: 125/75   Pulse: 62   Resp:    Temp: 36.8 °C (98.2 °F)        Labs    No visits with results within 2 Day(s) from this visit.   Latest known visit with results is:   Admission on 07/05/2017, Discharged on 07/08/2017   Component Date Value Ref Range Status    Transferrin 07/05/2017 254  200 - 375 mg/dL Final    Specimen UA 07/05/2017 Urine, Clean Catch   Final    Color, UA 07/05/2017 Yellow  Yellow, Straw, Ruth Ann Final    Appearance, UA 07/05/2017 Hazy* Clear Final    pH, UA 07/05/2017 5.0  5.0 - 8.0 Final    Specific Gravity, UA 07/05/2017 1.010  1.005 - 1.030 Final    Protein, UA 07/05/2017 Negative  Negative Final    Glucose, UA 07/05/2017 Negative  Negative Final    Ketones, UA 07/05/2017 Negative   Negative Final    Bilirubin (UA) 07/05/2017 Negative  Negative Final    Occult Blood UA 07/05/2017 2+* Negative Final    Nitrite, UA 07/05/2017 Negative  Negative Final    Urobilinogen, UA 07/05/2017 Negative  <2.0 EU/dL Final    Leukocytes, UA 07/05/2017 Negative  Negative Final    Urine Culture, Routine 07/07/2017 No growth   Final    Procalcitonin 07/05/2017 0.02  <0.25 ng/mL Final    Prealbumin 07/05/2017 25  20 - 43 mg/dL Final    RBC, UA 07/05/2017 2  0 - 4 /hpf Final    WBC, UA 07/05/2017 1  0 - 5 /hpf Final    Bacteria, UA 07/05/2017 Rare  None-Occ /hpf Final    Squam Epithel, UA 07/05/2017 12  /hpf Final    Microscopic Comment 07/05/2017 SEE COMMENT   Final    Preg Test, Ur 07/06/2017 Negative   Final        Meds   Current Facility-Administered Medications   Medication Dose Route Frequency Provider Last Rate Last Dose    0.9%  NaCl infusion   Intravenous Continuous Benedict Humphrey  mL/hr at 07/13/17 1132      acetaminophen tablet 1,000 mg  1,000 mg Oral Q6H Roxana Rebolledo MD   1,000 mg at 07/15/17 0509    clindamycin 900 MG/50 ML D5W 900 mg/50 mL IVPB 900 mg  900 mg Intravenous Q8H Benedict Humphrey MD 50 mL/hr at 07/15/17 0510 900 mg at 07/15/17 0510    clonazePAM tablet 1 mg  1 mg Oral Nightly PRN Benedict Humphrey MD        diphenhydrAMINE injection 25 mg  25 mg Intravenous Q4H PRN Benedict Humphrey MD        docusate sodium capsule 100 mg  100 mg Oral TID Benedict Humphrey MD   100 mg at 07/14/17 1327    fluoxetine capsule 60 mg  60 mg Oral Daily Benedict Humphrey MD   60 mg at 07/15/17 0739    ondansetron injection 4 mg  4 mg Intravenous Q8H PRN Jodie Ramírez MD        oxycodone immediate release tablet 10 mg  10 mg Oral Q3H PRN Jodie Ramírez MD        oxycodone immediate release tablet 15 mg  15 mg Oral Q3H PRN Jodie Ramírez MD   15 mg at 07/15/17 0739    oxycodone immediate release tablet 5 mg  5 mg Oral Q3H PRN Jodie Ramírez MD        polyethylene  glycol packet 17 g  17 g Oral Daily Benedict Humphrey MD   17 g at 07/15/17 0740    pregabalin capsule 75 mg  75 mg Oral QHS Benedict Humphrey MD   75 mg at 07/14/17 2026    promethazine tablet 25 mg  25 mg Oral Q6H PRN Benedict Humphrey MD        propranolol 24 hr capsule 160 mg  160 mg Oral Daily Benedict Humphrey MD   160 mg at 07/15/17 0740    ramelteon tablet 8 mg  8 mg Oral Nightly PRN Benedict Humphrey MD        ropivacaine (PF) 2 mg/ml (0.2%) infusion  8 mL/hr Perineural Continuous Roxana Rebolledo MD 8 mL/hr at 07/14/17 1046 8 mL/hr at 07/14/17 1046    sodium chloride 0.9% flush 3 mL  3 mL Intravenous Q8H Benedict Humphrey MD   3 mL at 07/15/17 0517    venlafaxine 24 hr capsule 75 mg  75 mg Oral Daily Benedict Humphrey MD   75 mg at 07/15/17 0740     Assessment:                           Pain control adequate     Plan:                           Patient doing well, continue present treatment. Continue  Multimodals. Plan to discharge with On-Q today.     Evaluator Hang Ryan            Patient is POD #2 following ORIF ankle with Dr. Winchester.  Patient seen with resident and I agree with the plan as noted above.  Pain well controlled and patient is happy with the plan.  Patient's daughter will be with her so she can be an on-Q candidate.  On-Q teaching performed by resident and all materials given to the patient.    Roxana Rebolledo MD

## 2017-07-15 NOTE — DISCHARGE SUMMARY
[facility]  Orthopedics  Discharge Summary      Patient Name: Carolin Lind  MRN: [id number]  Admission Date: 7/13/2017  Hospital Length of Stay: 0 days  Discharge Date and Time:  07/15/2017   Attending Physician: Jair Winchester MD   Discharging Provider: Patrice Blum MD  Primary Care Provider: Danette Alvares MD    HPI:   Ms. Lind is 53 yo F twisted left ankle resulting in left trimalleolar ankle fracture dislocation, treated with splinting in an outside ED.  Patient came to clinic with subluxed/dislocated ankle.  Attempt was made in ED to reduce and splint ankle and it did not want to hold. Patient treated with external fixation device on 07/06/2017 by . Presents for skin check for definitive fixation.        Procedure(s) (LRB):  OPEN REDUCTION INTERNAL FIXATION-ANKLE (Left)  REMOVAL OF EXTERNAL FIXATION DEVICE (Left)  FIXATION-SYNDESMOSIS-ANKLE (Left)      Hospital Course:  On 7/13/2017, the patient arrived to the [facility] for proper pre-operative management.  Upon completion of pre-operative preparation, the patient was taken back to the operative theatre.  A left ankle ORIF was performed without complication and the patient was transported to the post anesthesia care unit in stable condition.  After appropriate recovery from the anaesthetic agents used during the surgery, the patient was then transported to the hospital inpatient floor.  The interim of the hospital stay from arrival on the floor up to discharge has been uncomplicated. The patient has experienced minimal electrolyte imbalances that have been repleated accordingly.  The patient's diet has progressed to a regular diet with no nausea or vomiting.  The patient's pain has been controlled using a multimodal approach with the help of the anesthesia pain service. Currently, the patient's pain is well controlled on an oral regimen.  The patient has been voiding without difficulty ever since  surgery. The patient began participation in physical therapy on post-operative day one and has progressed throughout the entire hospital stay.  Currently the patient is ambulating with moderate assistance and the physical therapy team feels that the patient's progress is sufficient to allow the patient to be discharged home safely.  The patient agrees with this assessment and desires a discharge to home today.              Significant Diagnostic Studies: Labs: BMP: No results for input(s): GLU, NA, K, CL, CO2, BUN, CREATININE, CALCIUM, MG in the last 48 hours. and CBC No results for input(s): WBC, HGB, HCT, PLT in the last 48 hours.  Radiology: X-Ray: left ankle    Pending Diagnostic Studies:     None        Final Active Diagnoses:    Diagnosis Date Noted POA    PRINCIPAL PROBLEM:  Closed left trimalleolar fracture s/p ex-fix on 7/6/17 [S82.852A]  Yes      Problems Resolved During this Admission:    Diagnosis Date Noted Date Resolved POA      Discharged Condition: stable    Disposition: Home or Self Care    Follow Up:  Follow-up Information     Follow up In 2 weeks.           [facility] In 1 day.    Specialty:  Home Health Services  Why:  Home Health  Contact information:  [address]  [facility] [address]  [phone]             Ana Laura Greer PA-C. Schedule an appointment as soon as possible for a visit in 2 weeks.    Specialty:  Orthopedic Surgery  Why:  For wound re-check  Contact information:  [address]  [facility] [address]  [phone]                 Patient Instructions:     Diet general     Diet general     Call MD for:  temperature >100.4     Call MD for:  persistent nausea and vomiting or diarrhea     Call MD for:  severe uncontrolled pain     Call MD for:  redness, tenderness, or signs of infection (pain, swelling, redness, odor or green/yellow discharge around incision site)     Call MD for:  difficulty breathing or increased cough     Call MD for:  severe  persistent headache     Call MD for:  worsening rash     Call MD for:  persistent dizziness, light-headedness, or visual disturbances     Call MD for:  increased confusion or weakness     Weight bearing restrictions (specify)   Order Comments: NWB LLE     Sponge bath only until clinic visit     Ice to affected area     Keep surgical extremity elevated     Leave dressing on - Keep it clean, dry, and intact until clinic visit     Keep surgical extremity elevated     Ice to affected area     Weight bearing restrictions (specify)   Scheduling Instructions: Nonweight bearing left lower extremity     Call MD for:  temperature >100.4     Call MD for:  persistent nausea and vomiting or diarrhea     Call MD for:  redness, tenderness, or signs of infection (pain, swelling, redness, odor or green/yellow discharge around incision site)     Call MD for:  difficulty breathing or increased cough     Call MD for:  severe persistent headache     Call MD for:  worsening rash     Call MD for:  persistent dizziness, light-headedness, or visual disturbances     Call MD for:  increased confusion or weakness     Leave dressing on - Keep it clean, dry, and intact until clinic visit       Medications:  Reconciled Home Medications:   Discharge Medication List as of 7/15/2017  9:30 AM      START taking these medications    Details   aspirin 325 MG tablet Take 1 tablet (325 mg total) by mouth 2 (two) times daily., Starting Thu 7/13/2017, Until Fri 7/13/2018, Print         CONTINUE these medications which have CHANGED    Details   docusate sodium (COLACE) 100 MG capsule Take 1 capsule (100 mg total) by mouth 3 (three) times daily., Starting Thu 7/13/2017, Print      ondansetron (ZOFRAN-ODT) 8 MG TbDL Take 1 tablet (8 mg total) by mouth every 8 (eight) hours as needed., Starting Thu 7/13/2017, Print      oxycodone-acetaminophen (PERCOCET)  mg per tablet Take 1 tablet by mouth every 4 (four) hours as needed., Starting Thu 7/13/2017, Print          CONTINUE these medications which have NOT CHANGED    Details   clonazepam (KLONOPIN) 1 MG tablet Take 1 mg by mouth nightly as needed. , Starting Fri 4/25/2014, Historical Med      fluoxetine (PROZAC) 20 MG capsule Take 3 capsules (60 mg total) by mouth once daily., Starting 2/9/2017, Until Discontinued, Normal      propranolol (INDERAL LA) 80 MG 24 hr capsule TAKE 2 CAPSULES BY MOUTH DAILY, Normal      sumatriptan succinate (SUMAVEL DOSEPRO) 6 mg/0.5 mL NfIj Inject 6 mg into the skin every 2 (two) hours as needed. 1 Needle-Free Injector Subcutaneous Every 2 hours, Starting 6/5/2015, Until Discontinued, Normal      sumatriptan succinate (ZEMBRACE SYMTOUCH) 3 mg/0.5 mL PnIj Inject 3 mg into the skin every 2 (two) hours as needed., Starting 1/12/2017, Until Discontinued, Normal      sumatriptan-naproxen (TREXIMET)  mg Tab TAKE 1 TABLET BY MOUTH EVERY 2 HOURS AS NEEDED, Normal      venlafaxine (EFFEXOR-XR) 75 MG 24 hr capsule TAKE ONE CAPSULE BY MOUTH ONCE DAILY, Normal         STOP taking these medications       enoxaparin (LOVENOX) 40 mg/0.4 mL Syrg Comments:   Reason for Stopping:               Patrice Blum MD  Orthopedics  [facility]

## 2017-07-15 NOTE — PROGRESS NOTES
"[facility]  Orthopedics  Progress Note    Patient Name: Carolin Lind  MRN: [id number]  Admission Date: 7/13/2017  Hospital Length of Stay: 0 days  Attending Provider: Jair Winchester MD  Primary Care Provider: Danette Alvares MD  Follow-up For: Procedure(s) (LRB):  OPEN REDUCTION INTERNAL FIXATION-ANKLE (Left)  REMOVAL OF EXTERNAL FIXATION DEVICE (Left)  FIXATION-SYNDESMOSIS-ANKLE (Left)    Post-Operative Day: 2 Days Post-Op  Subjective:     Principal Problem:Closed left trimalleolar fracture    Principal Orthopedic Problem: same    Interval History: Patient seen and examined at bedside.  No acute events overnight.  Pain controlled.  Walked 75 feet with PT and crutches yesterday.      Review of patient's allergies indicates:  No Known Allergies    Current Facility-Administered Medications   Medication    0.9%  NaCl infusion    acetaminophen tablet 1,000 mg    clindamycin 900 MG/50 ML D5W 900 mg/50 mL IVPB 900 mg    clonazePAM tablet 1 mg    diphenhydrAMINE injection 25 mg    docusate sodium capsule 100 mg    fluoxetine capsule 60 mg    ondansetron injection 4 mg    oxycodone immediate release tablet 10 mg    oxycodone immediate release tablet 15 mg    oxycodone immediate release tablet 5 mg    polyethylene glycol packet 17 g    pregabalin capsule 75 mg    promethazine tablet 25 mg    propranolol 24 hr capsule 160 mg    ramelteon tablet 8 mg    ropivacaine (PF) 2 mg/ml (0.2%) infusion    sodium chloride 0.9% flush 3 mL    venlafaxine 24 hr capsule 75 mg     Objective:     Vital Signs (Most Recent):  Temp: 98.2 °F (36.8 °C) (07/15/17 0738)  Pulse: 62 (07/15/17 0738)  Resp: 17 (07/15/17 0432)  BP: 125/75 (07/15/17 0738)  SpO2: 96 % (07/15/17 0738) Vital Signs (24h Range):  Temp:  [98.2 °F (36.8 °C)-99.8 °F (37.7 °C)] 98.2 °F (36.8 °C)  Pulse:  [62-79] 62  Resp:  [14-17] 17  SpO2:  [94 %-98 %] 96 %  BP: (125-161)/(74-86) 125/75     Weight: 59.9 kg (132 lb)  Height: 5' 9" (175.3 " cm)  Body mass index is 19.49 kg/m².    No intake or output data in the 24 hours ending 07/15/17 0744    Ortho/SPM Exam   AAOx4  NAD  RRR  No increased WOB  Splint with small amount of dried blood/d/i  EHL FHL intact  SILT toes  WWP extremities        Significant Labs: All pertinent labs within the past 24 hours have been reviewed.    Significant Imaging: None    Assessment/Plan:     * Closed left trimalleolar fracture s/p ex-fix on 7/6/17    52 y.o. female POD2 s/p ORIF L ankle    Pain control: multimodal  PT/OT: NWB LLE  Abx: postop clinda    Dispo: home today              Patrice Blum MD  Orthopedics  [facility]

## 2017-07-15 NOTE — SUBJECTIVE & OBJECTIVE
"Principal Problem:Closed left trimalleolar fracture    Principal Orthopedic Problem: same    Interval History: Patient seen and examined at bedside.  No acute events overnight.  Pain controlled.  Walked 75 feet with PT and crutches yesterday.      Review of patient's allergies indicates:  No Known Allergies    Current Facility-Administered Medications   Medication    0.9%  NaCl infusion    acetaminophen tablet 1,000 mg    clindamycin 900 MG/50 ML D5W 900 mg/50 mL IVPB 900 mg    clonazePAM tablet 1 mg    diphenhydrAMINE injection 25 mg    docusate sodium capsule 100 mg    fluoxetine capsule 60 mg    ondansetron injection 4 mg    oxycodone immediate release tablet 10 mg    oxycodone immediate release tablet 15 mg    oxycodone immediate release tablet 5 mg    polyethylene glycol packet 17 g    pregabalin capsule 75 mg    promethazine tablet 25 mg    propranolol 24 hr capsule 160 mg    ramelteon tablet 8 mg    ropivacaine (PF) 2 mg/ml (0.2%) infusion    sodium chloride 0.9% flush 3 mL    venlafaxine 24 hr capsule 75 mg     Objective:     Vital Signs (Most Recent):  Temp: 98.2 °F (36.8 °C) (07/15/17 0738)  Pulse: 62 (07/15/17 0738)  Resp: 17 (07/15/17 0432)  BP: 125/75 (07/15/17 0738)  SpO2: 96 % (07/15/17 0738) Vital Signs (24h Range):  Temp:  [98.2 °F (36.8 °C)-99.8 °F (37.7 °C)] 98.2 °F (36.8 °C)  Pulse:  [62-79] 62  Resp:  [14-17] 17  SpO2:  [94 %-98 %] 96 %  BP: (125-161)/(74-86) 125/75     Weight: 59.9 kg (132 lb)  Height: 5' 9" (175.3 cm)  Body mass index is 19.49 kg/m².    No intake or output data in the 24 hours ending 07/15/17 0744    Ortho/SPM Exam   AAOx4  NAD  RRR  No increased WOB  Splint with small amount of dried blood/d/i  EHL FHL intact  SILT toes  WWP extremities        Significant Labs: All pertinent labs within the past 24 hours have been reviewed.    Significant Imaging: None  "

## 2017-07-15 NOTE — ASSESSMENT & PLAN NOTE
52 y.o. female POD2 s/p ORIF L ankle    Pain control: multimodal  PT/OT: ROQUE ENGEL  Abx: postop clinda    Dispo: home today

## 2017-07-15 NOTE — HPI
Ms. Lind is 51 yo F twisted left ankle resulting in left trimalleolar ankle fracture dislocation, treated with splinting in an outside ED.  Patient came to clinic with subluxed/dislocated ankle.  Attempt was made in ED to reduce and splint ankle and it did not want to hold. Patient treated with external fixation device on 07/06/2017 by . Presents for skin check for definitive fixation.

## 2017-07-15 NOTE — ADDENDUM NOTE
Addendum  created 07/15/17 1056 by Roxana Rebolledo MD    Charge Capture section accepted, Sign clinical note

## 2017-07-15 NOTE — HOSPITAL COURSE
On 7/13/2017, the patient arrived to the [facility] for proper pre-operative management.  Upon completion of pre-operative preparation, the patient was taken back to the operative theatre.  A left ankle ORIF was performed without complication and the patient was transported to the post anesthesia care unit in stable condition.  After appropriate recovery from the anaesthetic agents used during the surgery, the patient was then transported to the hospital inpatient floor.  The interim of the hospital stay from arrival on the floor up to discharge has been uncomplicated. The patient has experienced minimal electrolyte imbalances that have been repleated accordingly.  The patient's diet has progressed to a regular diet with no nausea or vomiting.  The patient's pain has been controlled using a multimodal approach with the help of the anesthesia pain service. Currently, the patient's pain is well controlled on an oral regimen.  The patient has been voiding without difficulty ever since surgery. The patient began participation in physical therapy on post-operative day one and has progressed throughout the entire hospital stay.  Currently the patient is ambulating with moderate assistance and the physical therapy team feels that the patient's progress is sufficient to allow the patient to be discharged home safely.  The patient agrees with this assessment and desires a discharge to home today.

## 2017-07-15 NOTE — OP NOTE
DATE OF PROCEDURE:  07/13/2017    PREOPERATIVE DIAGNOSIS:  Left trimalleolar ankle fracture dislocation, status   post spanning external fixation.    POSTOPERATIVE DIAGNOSES:  1.  Left trimalleolar ankle fracture dislocation, status post spanning external   fixation.  2.  Left ankle syndesmosis disruption.    PROCEDURES PERFORMED:  1.  Open treatment of left trimalleolar ankle fracture with internal fixation of   medial, lateral and posterior malleoli, 66042.  2.  Open treatment of left ankle syndesmosis disruption with internal fixation,   45568.  3.  Removal under anesthesia of external fixation, left leg, 55443.    SURGEON:  Jair Winchester M.D.    ASSISTANT:  Benedict Humphrey M.D. (RES)    ANESTHESIA:  General laryngeal mask plus regional.    ESTIMATED BLOOD LOSS:  10 mL.    IV FLUIDS:  2000 mL crystalloid.    IMPLANTS:  Synthes fibular plate and small fragment screws.    INDICATIONS FOR PROCEDURE:  The patient is a 52-year-old female who had   significant twisting injury from a fall resulting in left trimalleolar ankle   fracture dislocation.  She was treated initially with closed reduction, which   did not hold.  She returned to my clinic.  At that point, she was sent to the ER   and had a relatively unstable ankle and I took her to the Operating Room for   spanning external fixation and closed reduction.  She is now returning for   definitive fixation with a soft tissue status amenable to surgery.  The risks,   benefits and alternatives to surgery were discussed with the patient prior to   going to the Operating Room.  Informed consent was obtained.    PROCEDURE IN DETAIL:  The patient was identified in the preoperative holding   area and the site was marked.  Regional anesthesia was performed.  The patient   was wheeled into the Operating Room and placed on the operating table in supine   position.  General laryngeal mask anesthesia was induced and preoperative   antibiotics were administered.  Left lower  extremity was placed in a nonsterile   tourniquet and then a timeout was undertaken to confirm patient, site, surgery,   surgeon and administration of preoperative antibiotics.  All agreed and we   proceeded.    Prior to prepping and draping, I removed the bars and clamps from the external   fixator leaving only the pins in the tibia and the calcaneus.  The left lower   extremity was then prepped and draped in sterile fashion.  The leg was   exsanguinated and tourniquet was raised.  I started with a straight lateral   incision quite long on the lateral side given her severely comminuted complex   fracture.  I dissected down to the level of the fracture site.  I incised the   fascia overlying the peroneal tendons posterior to the superficial peroneal   nerve in order to protect this.  I dissected down to the level of the fracture.    At this point, I dissected around the posterior aspect of the fibula to the   posterior tibia and gained access to the posterior malleolar fracture.    At this point, I made a separate anterolateral incision approximately 1 to 1.5   cm, also protecting the superficial peroneal nerve.  It is dissected through the   small portion of the extensor retinaculum and down to the level of the bone.  I   then placed a Alexander clamp through this anterior incision and then around the   back of the fibula, reduced the posterior malleolar fracture and held this with   a clamp.  I then placed two screws going from anterior to posterior lagging the   posterior malleolar fracture with good position.  I confirmed this under   fluoroscopy.  I then turned my attention back to the fibula.    At this point, I placed several clamps to reduce the fibula.  The patient had   comminution right around the area of the incisura detached from the syndesmotic   ligaments.  I removed some of these pieces of comminution back into their normal   place and into the larger posterior fragment.  I clamped this and put a lag    screw from anterior to posterior.  I then clamped the distal fragments and took   a 9-hole Synthes with locking fibular plate, placed it in the appropriate   position and affixed it with a single cortical screw distally in order to suck   it down to bone.  I then ensured that the fibula is lined up well overall and   placed proximal screw into the shaft of the bone.  I placed two more screws into   the shaft and then placed locking screws distally and replaced a cortical screw   with a locking screw.  At this point, I knew that the syndesmosis was not   stable given all the comminution where the ligaments attached and I reduced the   fibula to the incisura and placed a single syndesmosis screw just at the top of   the incisura.  After checking, my talus was reduced in mortise and lateral   x-rays.  I then turned my attention to the medial side.    I made an anterior medial incision to avoid a scab, which she had from her   initial abrasion.  I dissected down the level of the medial malleolus fracture,   which was largely anterior colliculus.  This piece was not large enough for two   screws, so I reduced this and placed a small K-wire anteriorly and placed a   single bicortical screw into the fracture fragment holding this in very good   position.    I then turned my attention back laterally and placed a second syndesmosis screw.    It is my thought that she will probably end up with a synostosis given the   amount of comminution she has right through the incisura, so I will not   automatically plan on taking out her syndesmosis screws.    At this point, entire construct was visualized under fluoroscopy with good   reduction of the bones and the mortise.  The tourniquet was let down and   hemostasis was obtained with electrocautery.  Both wounds and deep fascia was   closed with 0 Vicryl suture protecting superficial peroneal nerve on the lateral   side.  All the wounds were then closed with 3-0 Vicryl suture in  the   subcutaneous tissue and then the medial and anterolateral incisions were closed   with 3-0 nylon suture in vertical mattress fashion and the lateral wound in   running fashion.  Sterile dressings were applied followed by short-leg posterior   splint with stirrups.    All instrument and sponge counts were reported correct at the end of the case.    There were no complications.  The patient was awakened and taken to the Recovery   Room in stable condition.    PLAN FOR THE PATIENT:  She will be nonweightbearing for a total of 10 weeks   pending fracture healing.  She will come out of her splint in about two weeks.    At that point, if her medial wound is completely healed, we will go into a cast.    If not, I will just keep her in a plantar fasciitis boot to keep an eye on   this.      KAYLA  dd: 07/13/2017 10:35:40 (CDT)  td: 07/15/2017 06:44:48 (CDT)  Doc ID   #[id number]  Job ID #[id number]    CC:

## 2017-07-15 NOTE — PLAN OF CARE
CM advised pt is discharging home today. Pt already owns all necessary DME, and OP PT is recommending, which pt can arrange on her own. Pt is clear to d/c from a CM standpoint.

## 2017-07-16 NOTE — PROGRESS NOTES
Called patient at . Patient called home health RN regarding On-Q removal earlier. Stated her pain is well controlled and would like to remove PNC today. Instructions given over the phone for PNC removal and tip confirmed intact by patient over the phone.

## 2017-07-16 NOTE — PROGRESS NOTES
Patient instructed to removed catheter in error at 24hrs instead of the planned 48hrs. Discussed with [facility] staff. Called patient at . Discussed with the patient. Patient remains comfortable. Recommended patient use PRN oral medicines for pain as instructed by primary regularly to stay ahead of pain control. Patient stated understand. Instructed patient to call back with any issues regarding pain control.

## 2017-07-19 ENCOUNTER — TELEPHONE (OUTPATIENT)
Dept: ORTHOPEDICS | Facility: CLINIC | Age: 52
End: 2017-07-19

## 2017-07-19 NOTE — TELEPHONE ENCOUNTER
Spoke with Allison.   States pt has refused home health services.   [facility] requesting PT orders.   Advised Allison and pt that it is too soon for PT orders.   PT orders will be given at a later time.  Pt verbalized understanding.

## 2017-07-19 NOTE — TELEPHONE ENCOUNTER
----- Message from Phyllis Li sent at 7/19/2017 11:36 AM CDT -----  Contact: [facility] [place] [phone]  Allison \[facility]\"" need orders for home health physical therapy. Fax

## 2017-07-27 ENCOUNTER — OFFICE VISIT (OUTPATIENT)
Dept: ORTHOPEDICS | Facility: CLINIC | Age: 52
End: 2017-07-27
Payer: COMMERCIAL

## 2017-07-27 DIAGNOSIS — S82.852A CLOSED LEFT TRIMALLEOLAR FRACTURE, INITIAL ENCOUNTER: Primary | ICD-10-CM

## 2017-07-27 PROCEDURE — 29405 APPL SHORT LEG CAST: CPT | Mod: S$GLB,,, | Performed by: PHYSICIAN ASSISTANT

## 2017-07-27 PROCEDURE — 99999 PR PBB SHADOW E&M-EST. PATIENT-LVL II: CPT | Mod: PBBFAC,,, | Performed by: PHYSICIAN ASSISTANT

## 2017-07-27 PROCEDURE — 99024 POSTOP FOLLOW-UP VISIT: CPT | Mod: S$GLB,,, | Performed by: PHYSICIAN ASSISTANT

## 2017-07-27 RX ORDER — GABAPENTIN 300 MG/1
300 CAPSULE ORAL 3 TIMES DAILY
Qty: 90 CAPSULE | Refills: 11 | Status: SHIPPED | OUTPATIENT
Start: 2017-07-27 | End: 2018-10-18 | Stop reason: SDUPTHER

## 2017-07-27 RX ORDER — OXYCODONE HYDROCHLORIDE 5 MG/1
5 TABLET ORAL EVERY 8 HOURS PRN
Qty: 20 TABLET | Refills: 0 | Status: SHIPPED | OUTPATIENT
Start: 2017-07-27 | End: 2017-08-10

## 2017-07-27 RX ORDER — OXYCODONE AND ACETAMINOPHEN 10; 325 MG/1; MG/1
1 TABLET ORAL EVERY 4 HOURS PRN
Qty: 90 TABLET | Refills: 0 | Status: SHIPPED | OUTPATIENT
Start: 2017-07-27 | End: 2017-08-24

## 2017-07-27 NOTE — PROGRESS NOTES
Ms. Lind is 51 yo F twisted left ankle resulting in left trimalleolar ankle fracture dislocation, treated with splinting in an outside ED.  Patient came to clinic with subluxed/dislocated ankle.  Attempt was made in ED to reduce and splint ankle and it did not want to hold. Patient treated with external fixation device on 07/06/2017 by  followed by ORIF on 07/13/2017 by .     She is here today for a post-operative visit/ skin check after a  1.  Open treatment of left trimalleolar ankle fracture with internal fixation of  medial, lateral and posterior malleoli, 05374.  2.  Open treatment of left ankle syndesmosis disruption with internal fixation, 28532.  3.  Removal under anesthesia of external fixation, left leg, 56427.  by Dr. Winchester  on 07/13/2017.    she reports that she is doing ok.  Pain is controlled.  she is taking pain medication, 1 1/2 percocet 10 po q 4 hours. Patient continues to complain of intense shooting pain.   she denies fever, chills, and sweats since the time of the surgery.    Physical exam:  Post op dressing taken down.  Incision healing well sutures removed. Steri strips placed, serious fluid from lateral pin site, no signs of infection,     Assessment:  Post-op visit ( 2 weeks)    Plan:  - SLC by Cecilio NEVAREZ 10 weeks  - pain medication refilled: Percocet 10 1 po q 4hours with oxycodone for breakthrough pain to avoid increased tylenol patient was taking. Script for Gabapentin started she will start with one pill over first 3-4 days then increase to 2 if symptoms still continue.   - return to clinic in 2 weeks for wound check at time place into SLC and have return to clinic in 2 weeks for x-ray of ankle OOC

## 2017-08-07 RX ORDER — VENLAFAXINE HYDROCHLORIDE 75 MG/1
CAPSULE, EXTENDED RELEASE ORAL
Qty: 30 CAPSULE | Refills: 0 | Status: SHIPPED | OUTPATIENT
Start: 2017-08-07 | End: 2017-08-17 | Stop reason: SDUPTHER

## 2017-08-10 ENCOUNTER — OFFICE VISIT (OUTPATIENT)
Dept: ORTHOPEDICS | Facility: CLINIC | Age: 52
End: 2017-08-10
Payer: COMMERCIAL

## 2017-08-10 DIAGNOSIS — S82.852A CLOSED LEFT TRIMALLEOLAR FRACTURE, INITIAL ENCOUNTER: Primary | ICD-10-CM

## 2017-08-10 PROCEDURE — 99024 POSTOP FOLLOW-UP VISIT: CPT | Mod: S$GLB,,, | Performed by: PHYSICIAN ASSISTANT

## 2017-08-10 PROCEDURE — 99999 PR PBB SHADOW E&M-EST. PATIENT-LVL III: CPT | Mod: PBBFAC,,, | Performed by: PHYSICIAN ASSISTANT

## 2017-08-10 RX ORDER — OXYCODONE HYDROCHLORIDE 10 MG/1
10 TABLET ORAL EVERY 4 HOURS PRN
Qty: 90 TABLET | Refills: 0 | Status: SHIPPED | OUTPATIENT
Start: 2017-08-10 | End: 2017-08-24

## 2017-08-10 NOTE — PROGRESS NOTES
Ms. Lind is 53 yo F twisted left ankle resulting in left trimalleolar ankle fracture dislocation, treated with splinting in an outside ED.  Patient came to clinic with subluxed/dislocated ankle.  Attempt was made in ED to reduce and splint ankle and it did not want to hold. Patient treated with external fixation device on 07/06/2017 by  followed by ORIF on 07/13/2017 by .      She is here today for a post-operative visit/ skin check after a  1.  Open treatment of left trimalleolar ankle fracture with internal fixation of  medial, lateral and posterior malleoli, 21608.  2.  Open treatment of left ankle syndesmosis disruption with internal fixation, 27961.  3.  Removal under anesthesia of external fixation, left leg, 05700.  by Dr. Winchester  on 07/13/2017.    she reports that she is doing ok.  Pain is controlled.  she is taking pain medication, 1 percocet 10 po q 4 hours. She takes gabapentin TID. The roxicodone 5 mg did not help for break through pain so she d/c that. Patient continues to complain of diffuse pain. She tried to d/c the percocet and use naproxen, but it did not help and she could not catch up with pain relief.    she denies fever, chills, and sweats since the time of the surgery. She remains NWB.      Physical exam:  SLC taken down.  Incisions healing well. No warmth or erythema. Minimal serous fluid drainage from lateral pin site, no signs of infection.      Assessment:  Post-op visit ( 4 weeks)     Plan:   SLC by Cecilio  Remain NWB  Oxycodone 10 mg prescribed for every 4 hours. She may take tylenol arthritis every 4-6 hours. Do not exceed 3,000 mg/day.   Continue gabapentin  RTC in 2 weeks for ankle x-rays out of cast with Ana Laura.

## 2017-08-16 ENCOUNTER — TELEPHONE (OUTPATIENT)
Dept: PAIN MEDICINE | Facility: CLINIC | Age: 52
End: 2017-08-16

## 2017-08-16 NOTE — TELEPHONE ENCOUNTER
----- Message from Bladimir Pollock MA sent at 8/15/2017  5:30 PM CDT -----  Message   Received: Today      Pt Advice   Message Contents   Feli Jurado Staff  Caller: Unspecified (Today,  3:30 PM)         x_  1st Request   _  2nd Request   _  3rd Request         Who: sara     Why: pt. Would like to speak with bladimir regarding appt.. Please call to discuss     What Number to Call Back:[phone]     When to Expect a call back: (With in 24 hours)

## 2017-08-17 RX ORDER — FLUOXETINE HYDROCHLORIDE 20 MG/1
60 CAPSULE ORAL DAILY
Qty: 270 CAPSULE | Refills: 1 | Status: SHIPPED | OUTPATIENT
Start: 2017-08-17 | End: 2018-04-02

## 2017-08-17 RX ORDER — VENLAFAXINE HYDROCHLORIDE 75 MG/1
CAPSULE, EXTENDED RELEASE ORAL
Qty: 30 CAPSULE | Refills: 1 | Status: SHIPPED | OUTPATIENT
Start: 2017-08-17 | End: 2018-04-02

## 2017-08-24 ENCOUNTER — HOSPITAL ENCOUNTER (OUTPATIENT)
Dept: RADIOLOGY | Facility: HOSPITAL | Age: 52
Discharge: HOME | End: 2017-08-24
Attending: ORTHOPAEDIC SURGERY
Payer: COMMERCIAL

## 2017-08-24 ENCOUNTER — OFFICE VISIT (OUTPATIENT)
Dept: ORTHOPEDICS | Facility: CLINIC | Age: 52
End: 2017-08-24
Payer: COMMERCIAL

## 2017-08-24 DIAGNOSIS — Z98.890 STATUS POST SURGERY: ICD-10-CM

## 2017-08-24 DIAGNOSIS — Z98.890 STATUS POST SURGERY: Primary | ICD-10-CM

## 2017-08-24 DIAGNOSIS — S82.852D CLOSED LEFT TRIMALLEOLAR FRACTURE, WITH ROUTINE HEALING, SUBSEQUENT ENCOUNTER: ICD-10-CM

## 2017-08-24 PROCEDURE — 99999 PR PBB SHADOW E&M-EST. PATIENT-LVL III: CPT | Mod: PBBFAC,,, | Performed by: PHYSICIAN ASSISTANT

## 2017-08-24 PROCEDURE — 99024 POSTOP FOLLOW-UP VISIT: CPT | Mod: S$GLB,,, | Performed by: PHYSICIAN ASSISTANT

## 2017-08-24 PROCEDURE — 73610 X-RAY EXAM OF ANKLE: CPT | Mod: TC,LT

## 2017-08-24 PROCEDURE — 73610 X-RAY EXAM OF ANKLE: CPT | Mod: 26,LT,, | Performed by: RADIOLOGY

## 2017-08-24 RX ORDER — OXYCODONE AND ACETAMINOPHEN 10; 325 MG/1; MG/1
1 TABLET ORAL EVERY 6 HOURS PRN
Qty: 60 TABLET | Refills: 0 | Status: SHIPPED | OUTPATIENT
Start: 2017-08-24 | End: 2017-09-21

## 2017-08-24 NOTE — PROGRESS NOTES
Ms. Lind is 51 yo F twisted left ankle resulting in left trimalleolar ankle fracture dislocation, treated with splinting in an outside ED.  Patient came to clinic with subluxed/dislocated ankle.  Attempt was made in ED to reduce and splint ankle and it did not want to hold. Patient treated with external fixation device on 07/06/2017 by  followed by ORIF on 07/13/2017 by .     She is here today for a post-operative visit/ skin check after a  1.  Open treatment of left trimalleolar ankle fracture with internal fixation of  medial, lateral and posterior malleoli, 14244.  2.  Open treatment of left ankle syndesmosis disruption with internal fixation, 72088.  3.  Removal under anesthesia of external fixation, left leg, 93431.  by Dr. Winchester  on 07/13/2017.    she reports that she is doing ok.  Pain is controlled.  she is taking pain medication, 1 percocet 10 po q 6 hours. Reports that gabapentin is helping dull the shooting pain.   she denies fever, chills, and sweats since the time of the surgery.    Physical exam: dressing taken down.  Incision healing well no signs of infection, decreased range of motion in all planes, mild TTP, decreased sensation under fifth metatarsal head.      RADS: hardware in good position , no failure noted.     Assessment:  Post-op visit ( 6 weeks)    Plan:  - The patient was advised to keep the incision clean and dry for the next 24 hours after which he may wash the area with antibacterial soap in the shower. Will not submerge until the incision is completely healed  - plantar fascitis boot, remove for ROM  - NWB 10 weeks pending healing.   - pain medication refilled: Percocet 10 1 po q 6hours, she will start be cutting in half to aid in decreasing her intake  - return to clinic in 4 weeks  x-ray of ankle needed at time consider order for PT and advance to weight bearing as tolerated.

## 2017-09-11 ENCOUNTER — TELEPHONE (OUTPATIENT)
Dept: PAIN MEDICINE | Facility: CLINIC | Age: 52
End: 2017-09-11

## 2017-09-11 NOTE — TELEPHONE ENCOUNTER
----- Message from Martinez Duarte sent at 9/11/2017  8:21 AM CDT -----  Contact: Carolin Lind  X_  1st Request  _  2nd Request  _  3rd Request        Who: Carolin Lind    Why: Patient needs to cancel her appt. Patient is sick and will not be able to come to appt. Please call back to follow up    What Number to Call Back: [phone]    When to Expect a call back: (With in 24 hours)

## 2017-09-21 ENCOUNTER — HOSPITAL ENCOUNTER (OUTPATIENT)
Dept: RADIOLOGY | Facility: HOSPITAL | Age: 52
Discharge: HOME | End: 2017-09-21
Attending: ORTHOPAEDIC SURGERY
Payer: COMMERCIAL

## 2017-09-21 ENCOUNTER — OFFICE VISIT (OUTPATIENT)
Dept: ORTHOPEDICS | Facility: CLINIC | Age: 52
End: 2017-09-21
Payer: COMMERCIAL

## 2017-09-21 ENCOUNTER — DOCUMENTATION ONLY (OUTPATIENT)
Dept: ORTHOPEDICS | Facility: CLINIC | Age: 52
End: 2017-09-21

## 2017-09-21 DIAGNOSIS — Z98.890 STATUS POST SURGERY: ICD-10-CM

## 2017-09-21 DIAGNOSIS — S82.852D CLOSED LEFT TRIMALLEOLAR FRACTURE, WITH ROUTINE HEALING, SUBSEQUENT ENCOUNTER: ICD-10-CM

## 2017-09-21 DIAGNOSIS — Z98.890 STATUS POST SURGERY: Primary | ICD-10-CM

## 2017-09-21 PROCEDURE — 99999 PR PBB SHADOW E&M-EST. PATIENT-LVL III: CPT | Mod: PBBFAC,,, | Performed by: PHYSICIAN ASSISTANT

## 2017-09-21 PROCEDURE — 73610 X-RAY EXAM OF ANKLE: CPT | Mod: TC,LT

## 2017-09-21 PROCEDURE — 99024 POSTOP FOLLOW-UP VISIT: CPT | Mod: S$GLB,,, | Performed by: PHYSICIAN ASSISTANT

## 2017-09-21 PROCEDURE — 73610 X-RAY EXAM OF ANKLE: CPT | Mod: 26,LT,, | Performed by: RADIOLOGY

## 2017-09-21 RX ORDER — OXYCODONE AND ACETAMINOPHEN 5; 325 MG/1; MG/1
1 TABLET ORAL EVERY 12 HOURS PRN
Qty: 60 TABLET | Refills: 0 | Status: SHIPPED | OUTPATIENT
Start: 2017-09-21 | End: 2018-04-02

## 2017-09-21 NOTE — PROGRESS NOTES
Ms. Lind is 51 yo F twisted left ankle resulting in left trimalleolar ankle fracture dislocation, treated with splinting in an outside ED.  Patient came to clinic with subluxed/dislocated ankle.  Attempt was made in ED to reduce and splint ankle and it did not want to hold. Patient treated with external fixation device on 07/06/2017 by  followed by ORIF on 07/13/2017 by .     She is here today for a post-operative visit/ skin check after a  1.  Open treatment of left trimalleolar ankle fracture with internal fixation of  medial, lateral and posterior malleoli, 76990.  2.  Open treatment of left ankle syndesmosis disruption with internal fixation, 65062.  3.  Removal under anesthesia of external fixation, left leg, 58163.  by Dr. Winchester  on 07/13/2017.    she reports that she is doing ok.  Pain is controlled.  she is taking pain medication, 1/2 percocet 10 po q 12 hours. Reports that gabapentin is helping dull the shooting pain. Reports numbness in toes and hypersensitivity to dorsal foot.   she denies fever, chills, and sweats since the time of the surgery.    Physical exam: dressing taken down.  Incision healing well no signs of infection, decreased range of motion in all planes, mild TTP, decreased sensation dorsal toes.      RADS: hardware in good position , no failure noted.     Assessment:  Post-op visit ( 10 weeks)    Plan:  - she may wash the area with antibacterial soap in the shower. Will not submerge until the incision is completely healed  - Ben HI, Patient is to make appointment herself,    - CAM boot, remove for ROM   - WBAT   - pain medication refilled: Percocet 5 1 po q 12hours,   - return to clinic in 6 weeks  x-ray of ankle needed

## 2017-10-17 DIAGNOSIS — M25.572 ACUTE LEFT ANKLE PAIN: Primary | ICD-10-CM

## 2017-10-18 ENCOUNTER — OFFICE VISIT (OUTPATIENT)
Dept: ORTHOPEDICS | Facility: CLINIC | Age: 52
End: 2017-10-18
Payer: COMMERCIAL

## 2017-10-18 ENCOUNTER — HOSPITAL ENCOUNTER (OUTPATIENT)
Dept: RADIOLOGY | Facility: HOSPITAL | Age: 52
Discharge: HOME | End: 2017-10-18
Attending: PHYSICIAN ASSISTANT
Payer: COMMERCIAL

## 2017-10-18 DIAGNOSIS — M25.572 ACUTE LEFT ANKLE PAIN: ICD-10-CM

## 2017-10-18 DIAGNOSIS — S82.852D CLOSED LEFT TRIMALLEOLAR FRACTURE, WITH ROUTINE HEALING, SUBSEQUENT ENCOUNTER: ICD-10-CM

## 2017-10-18 DIAGNOSIS — M25.572 ACUTE LEFT ANKLE PAIN: Primary | ICD-10-CM

## 2017-10-18 PROCEDURE — 99999 PR PBB SHADOW E&M-EST. PATIENT-LVL III: CPT | Mod: PBBFAC,,, | Performed by: PHYSICIAN ASSISTANT

## 2017-10-18 PROCEDURE — 73610 X-RAY EXAM OF ANKLE: CPT | Mod: TC,LT

## 2017-10-18 PROCEDURE — 99024 POSTOP FOLLOW-UP VISIT: CPT | Mod: S$GLB,,, | Performed by: PHYSICIAN ASSISTANT

## 2017-10-18 PROCEDURE — 73610 X-RAY EXAM OF ANKLE: CPT | Mod: 26,LT,, | Performed by: RADIOLOGY

## 2017-12-18 ENCOUNTER — HOSPITAL ENCOUNTER (OUTPATIENT)
Dept: RADIOLOGY | Facility: HOSPITAL | Age: 52
Discharge: HOME | End: 2017-12-18
Attending: PHYSICIAN ASSISTANT
Payer: COMMERCIAL

## 2017-12-18 ENCOUNTER — OFFICE VISIT (OUTPATIENT)
Dept: ORTHOPEDICS | Facility: CLINIC | Age: 52
End: 2017-12-18
Payer: COMMERCIAL

## 2017-12-18 DIAGNOSIS — M25.572 ACUTE LEFT ANKLE PAIN: ICD-10-CM

## 2017-12-18 DIAGNOSIS — S82.852D CLOSED LEFT TRIMALLEOLAR FRACTURE, WITH ROUTINE HEALING, SUBSEQUENT ENCOUNTER: Primary | ICD-10-CM

## 2017-12-18 PROCEDURE — 99213 OFFICE O/P EST LOW 20 MIN: CPT | Mod: S$GLB,,, | Performed by: PHYSICIAN ASSISTANT

## 2017-12-18 PROCEDURE — 73610 X-RAY EXAM OF ANKLE: CPT | Mod: 26,LT,, | Performed by: RADIOLOGY

## 2017-12-18 PROCEDURE — 73610 X-RAY EXAM OF ANKLE: CPT | Mod: TC,LT

## 2017-12-18 PROCEDURE — 99999 PR PBB SHADOW E&M-EST. PATIENT-LVL III: CPT | Mod: PBBFAC,,, | Performed by: PHYSICIAN ASSISTANT

## 2017-12-18 NOTE — PROGRESS NOTES
"Ms. Lind is 53 yo F twisted left ankle resulting in left trimalleolar ankle fracture dislocation, treated with splinting in an outside ED.  Patient came to clinic with subluxed/dislocated ankle.  Attempt was made in ED to reduce and splint ankle and it did not want to hold. Patient treated with external fixation device on 07/06/2017 by  followed by ORIF on 07/13/2017 by .     She is here today for post operative visit  She is s/p   1.  Open treatment of left trimalleolar ankle fracture with internal fixation of  medial, lateral and posterior malleoli, 11337.  2.  Open treatment of left ankle syndesmosis disruption with internal fixation, 51619.  3.  Removal under anesthesia of external fixation, left leg, 54710.  by Dr. Winchester  on 07/13/2017.   She stated that she was doing very well.She stated that she has continued stiffness after a period of rest. '" it dose not feel like the other one." She also reports decreased sensation to dorsal foot.   she denies fever, chills, and sweats since the time of the surgery.    Physical exam: Walked into clinic unassisted.  Incision healing well no signs of infection, decreased range of motion in all planes, no TTP, decreased sensation dorsal toes.      RADS: No new fractures/dislocations.  Hardware in place with callous formation.  No signs of hardware loosening/failure    Assessment:  Post-op visit     Plan:  - she may wash the area with antibacterial soap in the shower. Will not submerge until the incision is completely healed  - Ben HI, Patient is to make appointment herself,    - range of motion as tolerated   - WBAT   - pain medication: no refill needed   - return to clinic in March with  x-ray of ankle needed , at time please discuss removal of syndesmosis screws vs. All hardware removal  in this very active female.   "

## 2018-02-15 ENCOUNTER — TELEPHONE (OUTPATIENT)
Dept: ORTHOPEDICS | Facility: CLINIC | Age: 53
End: 2018-02-15

## 2018-02-15 ENCOUNTER — DOCUMENTATION ONLY (OUTPATIENT)
Dept: ORTHOPEDICS | Facility: CLINIC | Age: 53
End: 2018-02-15

## 2018-02-15 NOTE — TELEPHONE ENCOUNTER
----- Message from Janice Land sent at 2/15/2018 10:41 AM CST -----  Contact: self  Pt called requesting an appt to see TATYANA Greer. Received a scheduled appt for 02/22@ 7:15am but would really like to be seen sooner than that. She experiencing pain/lack of mobility/left ankle-foot and is unable to wait that long. Pt would like an immediate call back and could be reached [phone]

## 2018-02-19 ENCOUNTER — OFFICE VISIT (OUTPATIENT)
Dept: ORTHOPEDICS | Facility: CLINIC | Age: 53
End: 2018-02-19
Payer: COMMERCIAL

## 2018-02-19 ENCOUNTER — HOSPITAL ENCOUNTER (OUTPATIENT)
Dept: RADIOLOGY | Facility: HOSPITAL | Age: 53
Discharge: HOME | End: 2018-02-19
Attending: PHYSICIAN ASSISTANT
Payer: COMMERCIAL

## 2018-02-19 DIAGNOSIS — M25.572 ACUTE LEFT ANKLE PAIN: ICD-10-CM

## 2018-02-19 DIAGNOSIS — M25.572 ACUTE LEFT ANKLE PAIN: Primary | ICD-10-CM

## 2018-02-19 DIAGNOSIS — S82.852D CLOSED LEFT TRIMALLEOLAR FRACTURE, WITH ROUTINE HEALING, SUBSEQUENT ENCOUNTER: ICD-10-CM

## 2018-02-19 PROCEDURE — 99999 PR PBB SHADOW E&M-EST. PATIENT-LVL III: CPT | Mod: PBBFAC,,, | Performed by: PHYSICIAN ASSISTANT

## 2018-02-19 PROCEDURE — 73610 X-RAY EXAM OF ANKLE: CPT | Mod: 26,LT,, | Performed by: RADIOLOGY

## 2018-02-19 PROCEDURE — 99213 OFFICE O/P EST LOW 20 MIN: CPT | Mod: S$GLB,,, | Performed by: PHYSICIAN ASSISTANT

## 2018-02-19 PROCEDURE — 73610 X-RAY EXAM OF ANKLE: CPT | Mod: TC,LT

## 2018-02-19 NOTE — PROGRESS NOTES
Ms. Lind is 53 yo F twisted left ankle resulting in left trimalleolar ankle fracture dislocation, treated with splinting in an outside ED.  Patient came to clinic with subluxed/dislocated ankle.  Attempt was made in ED to reduce and splint ankle and it did not want to hold. Patient treated with external fixation device on 07/06/2017 by  followed by ORIF on 07/13/2017 by .     She is here today for post operative visit  She is s/p   1.  Open treatment of left trimalleolar ankle fracture with internal fixation of  medial, lateral and posterior malleoli, 58828.  2.  Open treatment of left ankle syndesmosis disruption with internal fixation, 70476.  3.  Removal under anesthesia of external fixation, left leg, 92714.  by Dr. Winchester  on 07/13/2017.   She stated that she was doing ok, but has had an increase in pain and hurt over the past month.She stated that it fees stiff and like something is stopping her movement. She is also having pain at the medial malleolus.  Patient is interested in having all of her hardware removed.   she denies fever, chills, and sweats since the time of the surgery.    Physical exam: Walked into clinic unassisted.  Incision healing well no signs of infection, dfull range of motion in all planes, mild TTP to medial mallelous,     RADS: No new fractures/dislocations.  Hardware in place with callous formation.  No signs of hardware loosening/failure    Assessment:  Post-op visit     Plan:  - PT, continue exercises as taught to her    - range of motion as tolerated   - WBAT   - pain medication: no refill needed   -  discussed removal of syndesmosis screws vs. All hardware removal  in this very active female. Patient will think about her options at this time. She will notify the clinic if she would like to have her syndesmosis screws removed or wait until 9 month to 1 year for removal of all hardware. She understands that her syndesmosis screws may break.

## 2018-02-27 ENCOUNTER — TELEPHONE (OUTPATIENT)
Dept: ORTHOPEDICS | Facility: CLINIC | Age: 53
End: 2018-02-27

## 2018-02-27 NOTE — TELEPHONE ENCOUNTER
Pt was on a list that I was given to schedule an appointment with Dr Winchester.    Per recent office notes of Ana Laura Greer PA-C, pt has been advised of all her options for hardware and screw removal.  Pt will contact our office when she is ready to have hardware removed.

## 2018-04-02 ENCOUNTER — OFFICE VISIT (OUTPATIENT)
Dept: PSYCHIATRY | Facility: CLINIC | Age: 53
End: 2018-04-02
Payer: COMMERCIAL

## 2018-04-02 VITALS
BODY MASS INDEX: 21.94 KG/M2 | HEART RATE: 76 BPM | SYSTOLIC BLOOD PRESSURE: 133 MMHG | WEIGHT: 148.13 LBS | HEIGHT: 69 IN | DIASTOLIC BLOOD PRESSURE: 71 MMHG

## 2018-04-02 DIAGNOSIS — F41.1 GENERALIZED ANXIETY DISORDER: ICD-10-CM

## 2018-04-02 DIAGNOSIS — F33.1 MODERATE EPISODE OF RECURRENT MAJOR DEPRESSIVE DISORDER: Primary | ICD-10-CM

## 2018-04-02 PROBLEM — F33.9 RECURRENT MAJOR DEPRESSIVE DISORDER: Status: ACTIVE | Noted: 2018-04-02

## 2018-04-02 PROCEDURE — 90833 PSYTX W PT W E/M 30 MIN: CPT | Mod: S$GLB,,, | Performed by: PSYCHIATRY & NEUROLOGY

## 2018-04-02 PROCEDURE — 99213 OFFICE O/P EST LOW 20 MIN: CPT | Mod: S$GLB,,, | Performed by: PSYCHIATRY & NEUROLOGY

## 2018-04-02 PROCEDURE — 99999 PR PBB SHADOW E&M-EST. PATIENT-LVL III: CPT | Mod: PBBFAC,,, | Performed by: PSYCHIATRY & NEUROLOGY

## 2018-04-02 RX ORDER — VENLAFAXINE HYDROCHLORIDE 75 MG/1
75 CAPSULE, EXTENDED RELEASE ORAL DAILY
Qty: 30 CAPSULE | Refills: 3 | Status: SHIPPED | OUTPATIENT
Start: 2018-04-02 | End: 2018-06-26 | Stop reason: SDUPTHER

## 2018-04-02 RX ORDER — BUTALBITAL, ACETAMINOPHEN, CAFFEINE AND CODEINE PHOSPHATE 50; 325; 40; 30 MG/1; MG/1; MG/1; MG/1
CAPSULE ORAL
Refills: 2 | COMMUNITY
Start: 2018-03-22 | End: 2019-04-15 | Stop reason: SDUPTHER

## 2018-04-02 RX ORDER — FLUOXETINE HYDROCHLORIDE 20 MG/1
20 CAPSULE ORAL DAILY
Qty: 30 CAPSULE | Refills: 1 | Status: SHIPPED | OUTPATIENT
Start: 2018-04-02 | End: 2018-06-26 | Stop reason: SDUPTHER

## 2018-04-02 NOTE — PROGRESS NOTES
Outpatient Psychiatry Follow-Up Visit (MD/NP)    4/2/2018    Clinical Status of Patient:  Outpatient (Ambulatory)    Chief Complaint:  Carolin Lind is a 53 y.o. female who presents today for follow-up of depression and anxiety.  Met with patient.      Interval History and Content of Current Session:  Interim Events/Subjective Report/Content of Current Session:  Ms. Lind is a 53 year old female with past psychiatric history of MDD and LEX and past medical history of migraines who presents to [facility] to establish care. She was previously seen by Dr. Pandya at [facility] and last saw her on 2/20/17 and was prescribed Effexor XR 75 mg daily and Prozac 60 mg daily.      On my initial evaluation, she reports that she was previously seen by Dr. Pandya at [facility] because that was where she was seeing her neurologist, but states that now that she left she needs to find a new psychiatrist. Reports that she had started seeing Dr. Pandya in March 2014 due to problems with anxiety from her relationship with her  and her daughter.  Patient had been dealing with stress because her  was drinking heavily and they ended up getting a divorce but she still has to see him regularly because they run a business together.  She reports that she suffered a left ankle fracture in July 2017 after a slip and fall. Reports that the recovery process has been very difficult for her. States that she was bed ridden for a few months after the procedure.  States that this was very hard because she was formerly a  and always stayed very active. States that prior to ankle fracture she would run 3-4 miles and then work out in her home gym every morning.  States that her daughter had just started at [facility] last fall but was constantly coming back to [place] to help take care of her and eventually ended up leaving [facility] and moving back in.   She reports that she had felt like the  medications that she was getting from Dr. Pandya were very helpful but states that she has been out of them for ~6 months. States that her mood has been sad and in a dark place for past 3-4 months.  States that she has been crying almost every day and was tearful throughout much of interview.  States that while she was taking the medications her stressors would not get to her as much but now she has been feeling easily overwhelmed and more easily frustrated. Reports stress causes muscle tension in her neck and shoulders.  Reports issues with sleep maintenance. Wakes up frequently throughout the night and has difficulty waking up in the morning. Previously waking up at 5 AM to work out now struggles to get herself out of bed at 8 AM because she feels tired and sluggish. States that her appetite has been decreased and she does not feel like eating, but has been gaining weight because of her decreased physical activity.  Denies feelings of helplessness/hopelessness. Denies SI.      Psychotherapy:  · Target symptoms: depression, anxiety   · Why chosen therapy is appropriate versus another modality: relevant to diagnosis, evidence based practice  · Outcome monitoring methods: self-report  · Therapeutic intervention type: supportive psychotherapy  · Topics discussed/themes: stress related to medical comorbidities, building skills sets for symptom management  · The patient's response to the intervention is accepting. The patient's progress toward treatment goals is good.   · Duration of intervention: 40 minutes.    Past Psych Hx  Dx- MDD, LEX  Med trials- Lexapro, Prozac, Effexor  Hosp- Denies  Outpatient psych- Dr. Pandya since 3/2014  Prev SA- Denies  Fam Psych Hx- Denies    Social Hx- born and raised in [place]. States that she was recruited to become  for the national volley ball team at age 14 and traveled [place] playing competitively.  States that she would miss a lot of school from travel  "but did very well still. Eventually was recruited from [place] to play volleyball at [employer] and moved to [facility]  Ed- [employer]YAH from [employer].   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 Daughter left [employer] [place] and moved back home recently  Marital-  ~5 years ago   Children- 1 daughter    Denies alcohol, tobacco, or illicits drug use        Review of Systems   · PSYCHIATRIC: Pertinant items are noted in the narrative.  · CONSTITUTIONAL: Positive for weight gain.   · MUSCULOSKELETAL: Positive for joint stiffness.  · NEUROLOGIC: No weakness, sensory changes, seizures, confusion, memory loss, tremor or other abnormal movements.  · RESPIRATORY: No shortness of breath.  · CARDIOVASCULAR: No tachycardia or chest pain.  · GASTROINTESTINAL: No nausea, vomiting, pain, constipation or diarrhea.    Past Medical, Family and Social History: The patient's past medical, family and social history have been reviewed and updated as appropriate within the electronic medical record - see encounter notes.    Compliance: no, has been out of medications for past 6 months. Last saw Dr. Pandya 2/2017    Side effects: None    Risk Parameters:  Patient reports no suicidal ideation  Patient reports no homicidal ideation  Patient reports no self-injurious behavior  Patient reports no violent behavior    Exam (detailed: at least 9 elements; comprehensive: all 15 elements)   Constitutional  Vitals:  Most recent vital signs, dated less than 90 days prior to this appointment, were reviewed.   Vitals:    04/02/18 1309   BP: 133/71   Pulse: 76   Weight: 67.2 kg (148 lb 2.4 oz)   Height: 5' 9" (1.753 m)        General:  age appropriate, normal weight, well nourished, well dressed     Musculoskeletal  Muscle Strength/Tone:  no tremor, no tic   Gait & Station:  non-ataxic "     Psychiatric  Speech:  no latency; no press   Mood & Affect:  depressed, sad  tearful   Thought Process:  logical   Associations:  intact   Thought Content:  normal, no suicidality, no homicidality, delusions, or paranoia   Insight:  intact   Judgement: behavior is adequate to circumstances   Orientation:  person, place, situation, month of year, year   Memory: intact for content of interview   Language: grossly intact   Attention Span & Concentration:  able to focus   Fund of Knowledge:  intact and appropriate to age and level of education, familiar with aspects of current personal life     Assessment and Diagnosis   Status/Progress: Based on the examination today, the patient's problem(s) is/are inadequately controlled.  New problems have not been presented today.   Co-morbidities are complicating management of the primary condition.  There are no active rule-out diagnoses for this patient at this time.     General Impression:       ICD-10-CM ICD-9-CM   1. Moderate episode of recurrent major depressive disorder F33.1 296.32   2. Generalized anxiety disorder F41.1 300.02       Intervention/Counseling/Treatment Plan   · Medication Management: The risks and benefits of medication were discussed with the patient.  · Counseling provided with patient as follows: importance of compliance with chosen treatment options was emphasized, risks and benefits of treatment options, including medications, were discussed with the patient, instructions for  management, treatment and follow-up were reviewed    - Restart Prozac at 20 mg daily for mood and anxiety.  Will continue to monitor closely and can work on titrating back up to 60 mg daily as tolerated  - Restart Effexor XR 75 mg daily for mood and anxiety    Discussed risks and benefits of treatment and reviewed potential side effects including nausea, headaches, sexual side effects, increased risk of SI, and risk for serotonin syndrome. Informed patient of potential drug -  drug interactions with Sumatriptan and SSRIs/SNRIs.  Patient states that she wished to be restarted on medications because she felt that they had been helpful previously.     Return to Clinic: 1 month     Jose Martinez MD  [facility]  PGY-3

## 2018-04-10 NOTE — PROGRESS NOTES
Supervising Psychiatry Staff:  I discussed Ms. Lind's presentation here with Dr Jose Martinez in regular caseload supervision. I agree with the above interval history, ROS, findings, and plan, which reflect my own.

## 2018-06-26 ENCOUNTER — OFFICE VISIT (OUTPATIENT)
Dept: PSYCHIATRY | Facility: CLINIC | Age: 53
End: 2018-06-26
Payer: COMMERCIAL

## 2018-06-26 VITALS
DIASTOLIC BLOOD PRESSURE: 65 MMHG | HEIGHT: 69 IN | HEART RATE: 86 BPM | BODY MASS INDEX: 20.78 KG/M2 | WEIGHT: 140.31 LBS | SYSTOLIC BLOOD PRESSURE: 128 MMHG

## 2018-06-26 DIAGNOSIS — F41.1 GENERALIZED ANXIETY DISORDER: ICD-10-CM

## 2018-06-26 DIAGNOSIS — F33.1 MODERATE EPISODE OF RECURRENT MAJOR DEPRESSIVE DISORDER: Primary | ICD-10-CM

## 2018-06-26 PROCEDURE — 99213 OFFICE O/P EST LOW 20 MIN: CPT | Mod: S$GLB,,, | Performed by: PSYCHIATRY & NEUROLOGY

## 2018-06-26 PROCEDURE — 99999 PR PBB SHADOW E&M-EST. PATIENT-LVL III: CPT | Mod: PBBFAC,,, | Performed by: PSYCHIATRY & NEUROLOGY

## 2018-06-26 PROCEDURE — 3008F BODY MASS INDEX DOCD: CPT | Mod: CPTII,S$GLB,, | Performed by: PSYCHIATRY & NEUROLOGY

## 2018-06-26 RX ORDER — VENLAFAXINE HYDROCHLORIDE 75 MG/1
75 CAPSULE, EXTENDED RELEASE ORAL DAILY
Qty: 30 CAPSULE | Refills: 5 | Status: SHIPPED | OUTPATIENT
Start: 2018-06-26 | End: 2018-09-18 | Stop reason: SDUPTHER

## 2018-06-26 RX ORDER — FLUOXETINE HYDROCHLORIDE 20 MG/1
20 CAPSULE ORAL DAILY
Qty: 14 CAPSULE | Refills: 0 | Status: SHIPPED | OUTPATIENT
Start: 2018-06-26 | End: 2018-09-18 | Stop reason: SDUPTHER

## 2018-06-26 RX ORDER — FLUOXETINE HYDROCHLORIDE 40 MG/1
40 CAPSULE ORAL DAILY
Qty: 30 CAPSULE | Refills: 5 | Status: SHIPPED | OUTPATIENT
Start: 2018-06-26 | End: 2018-09-18 | Stop reason: DRUGHIGH

## 2018-06-26 NOTE — PROGRESS NOTES
"Outpatient Psychiatry Follow-Up Visit (MD/NP)    6/26/2018    Clinical Status of Patient:  Outpatient (Ambulatory)    Chief Complaint:  Carolin Lind is a 53 y.o. female who presents today for follow-up of depression and anxiety.  Met with patient.      Interval History and Content of Current Session:  Interim Events/Subjective Report/Content of Current Session: Ms. Lind is a 53 year old female with MDD and LEX who presents to [facility] for follow up appointment. She presented on 4/2/18 for initial appointment as transfer from Dr. Pandya. She had previously been prescribed Effexor XR 75 mg daily and Prozac 60 mg daily from Dr. Pandya.  She was restarted on Effexor XR 75 mg daily and started on Prozac 20 mg daily with plan to titrate dose back up.  Patient was instructed to return to clinic in 1 month but did not schedule until this time.    On my interview this morning, she reports feeling very anxious. States that she had difficulty making it in to appointment because the parking garage was very congested.  States that she ran out of her Prozac about a month ago and has only been taking Effexor. She reports continuing to struggle with feeling easily frustrated. States that functional limitations from her left ankle fracture last year have also been difficult.  States that she had been used to being so active and being able to work out every morning and that it makes her feel weak that she is no longer able to do that.  Patient tearful throughout much of interview. States that she feels like she is "mentally weak" because she has not been able to accept her limitations and move on.  States that it is hard for her to let other people know that something is wrong.  States that she does not talk about stress and depression with her daughter because she wants to be a strong role model for her daughter. She reports difficulty sleeping through the night. States that she will wake up multiple times " "throughout the night.  States that she has still been going to work because she feels like she must. 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  States that once she gets back home she does not feel like doing anything.  She reports adhering to diet of mostly fruits and some protein shakes and states that she has been able to lose 8 pounds since last visit.  She denies SI.        Review of Systems   · PSYCHIATRIC: Pertinant items are noted in the narrative.  · CONSTITUTIONAL: Positive for intentional weight loss.   · MUSCULOSKELETAL: Positive for joint stiffness, decreased range of motion and left ankle pain.  · NEUROLOGIC: + left foot numbness.  No weakness, seizures, confusion, memory loss, tremor or other abnormal movements.  · CARDIOVASCULAR: No tachycardia or chest pain.  · GASTROINTESTINAL: No nausea, vomiting, pain, constipation or diarrhea.  · GENITOURINARY: No frequency, dysuria or sexual dysfunction.    Past Medical, Family and Social History: The patient's past medical, family and social history have been reviewed and updated as appropriate within the electronic medical record - see encounter notes.    Compliance: yes    Side effects: None    Risk Parameters:  Patient reports no suicidal ideation  Patient reports no homicidal ideation  Patient reports no self-injurious behavior  Patient reports no violent behavior    Exam (detailed: at least 9 elements; comprehensive: all 15 elements)   Constitutional  Vitals:  Most recent vital signs, dated less than 90 days prior to this appointment, were reviewed.   Vitals:    06/26/18 1136   BP: 128/65   Pulse: 86   Weight: 63.6 kg (140 lb 5.2 oz)   Height: 5' 9" (1.753 m)        General:  age appropriate, normal weight, casually dressed, fit     Musculoskeletal  Muscle Strength/Tone:  no tremor, no tic   Gait & Station:  non-ataxic     Psychiatric  Speech:  no latency; no press   Mood & Affect:  depressed, " frustrated  tearful   Thought Process:  goal-directed   Associations:  intact   Thought Content:  Denies SI/HI or AH/VH. No paranoia or delusions   Insight:  intact   Judgement: behavior is adequate to circumstances   Orientation:  grossly intact   Memory: intact for content of interview   Language: grossly intact   Attention Span & Concentration:  able to focus   Fund of Knowledge:  intact and appropriate to age and level of education     Assessment and Diagnosis   Status/Progress: Based on the examination today, the patient's problem(s) is/are inadequately controlled.  New problems have not been presented today.   Co-morbidities are complicating management of the primary condition.  There are no active rule-out diagnoses for this patient at this time.     General Impression:       ICD-10-CM ICD-9-CM   1. Moderate episode of recurrent major depressive disorder F33.1 296.32   2. Generalized anxiety disorder F41.1 300.02       Intervention/Counseling/Treatment Plan   · Medication Management: The risks and benefits of medication were discussed with the patient.  · Counseling provided with patient as follows: importance of compliance with chosen treatment options was emphasized, risks and benefits of treatment options, including medications, were discussed with the patient, instructions for  management, treatment and follow-up were reviewed    - Continue Effexor XR 75 mg daily for mood and anxiety.  Discussed increasing dose but patient was reluctant to go up on dose of Effexor. She stated that she had previously been on higher dose of Prozac and would prefer to get back onto higher dose of that  - Restart Prozac at 20 mg daily x 2 weeks then increase to 40 mg daily for mood and anxiety  - Discussed benefits of individual therapy for depression and anxiety to help patient cope with her functional limitations. Will refer for individual therapy.     Patient scheduled for follow up with Michael Hirsch as she stated that  she did not wish to continue switching between different residents    Return to Clinic: 3 months     Jose Martinez MD  [facility]  PGY-3

## 2018-07-23 NOTE — PROGRESS NOTES
Supervising Psychiatry Staff:  I discussed Ms. Lind's progress with Dr Jose Martinez in regular caseload supervision. I agree with the above interval history, ROS, findings, and plan, which reflect my own.

## 2018-08-23 ENCOUNTER — OFFICE VISIT (OUTPATIENT)
Dept: PSYCHIATRY | Facility: CLINIC | Age: 53
End: 2018-08-23
Payer: COMMERCIAL

## 2018-08-23 DIAGNOSIS — F33.1 MAJOR DEPRESSIVE DISORDER, RECURRENT EPISODE, MODERATE: Primary | ICD-10-CM

## 2018-08-23 DIAGNOSIS — F41.1 GENERALIZED ANXIETY DISORDER: ICD-10-CM

## 2018-08-23 PROCEDURE — 90791 PSYCH DIAGNOSTIC EVALUATION: CPT | Mod: S$GLB,,, | Performed by: SOCIAL WORKER

## 2018-08-23 NOTE — PROGRESS NOTES
"Psychiatry Initial Visit (PhD/LCSW)  Diagnostic Interview - CPT 72006    Date: 8/23/2018    Site: [facility]    Referral source: Dr. Martinez    Clinical status of patient: Outpatient    Carolin Lind, a 53 y.o. female, for initial evaluation visit.  Met with patient.    Chief complaint/reason for encounter: depression and anxiety    History of present illness: Patient presents today at the recommendations of Dr. Martinez.  She initiated treatment with the resident approximately 5 months ago, after being in treatment with Dr. Velásquez for the past 1-2 years.  Patient notes she has been diagnosed with depression and anxiety.  She elaborates she experienced a fall last year, outside of a business, and was bedridden for several months, requiring surgeries on left foot.  She still needs additional surgeries.  She explains she was very active previously - was a  since 13 years old - traveled all over [place] and came to [facility] on scholarship (volleyball).  She states, "I was always super fit and super athletic prior to the accident, and then my whole routine was turned upside down - used to run 4 miles and then lifted weights in the mornings, prior to going to the work."  This injury also impacted her ability to work - owns furniture consignment store with ex-.  Patient describes feeling "ugly and out of shape," when she used to keep herself "good looking."  Also, workout provided stress relief she is unable to achieve now. Patient says, "I feel I didn't deserve an accident like this, when I follow the rules."  She is tearful during today's session and explains she feels "punished," with what's happened.  Patient says she is now oversensitive and easily overwhelmed in situations, she wouldn't have normally been previously.  She describes excessive worries "about everything - both things in my control and not in my control."  She notes worries interfere with sleep.  She feels depression and " "anxiety sx worsened after her fall.  She does recall having some occasional episodes of situational depression, such as when she was going through her separation 6 years ago, but was able to seek counseling and sx resolved.  She describes being very outgoing, socializing with friends frequently, but now isolates - "I just don't feel like it.  It's not enjoyable."  Patient feels she can benefit from counseling - wants to gain coping skills so she can have motivation and ambition again.      Pain: 5    Symptoms:   · Mood: depressed mood, diminished interest, insomnia, fatigue, poor concentration, tearfulness and social isolation  · Anxiety: decreased memory, excessive anxiety/worry, restlessness/keyed up, irritability, muscle tension; denies hx panic attacks  · Substance abuse: denied  · Cognitive functioning: denied  · Health behaviors: hx left foot surgeries; migraines; pain     Psychiatric history: has participated in counseling/psychotherapy on an outpatient basis in the past and currently under psychiatric care with Dr. Martinez, but will see Dr. Hirsch soon, due to resident transition; has had previous counseling when going through separation 6 years ago -  was unfaithful and had issues with alcoholism;  Denies hx psych hospitalizations; denies hx SA and SI - past and present; denies hx self harm behaviors such as cutting; denies hx eating disorders; denies hx tera; denies hx psychosis; denies hx OCD sx       Medical history: hx left foot surgeries and migraines; pain     Family history of psychiatric illness: none    Social history (marriage, employment, etc.): Currently lives with 19 year old daughter.  Daughter attends [employer].  Patient is  from  for the past 6 years.  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  Patient explains she and  are not "officially" .  They get along well, and are able to amicably run the business together.  " "Patient first came to [place] in 1985, due to school scholarships.  She has been in [place] for the past 28 years.  She has a bachelors degree in finance and marketing, and a masters degree in statistics.  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  Patient was born in [place], [place].  Mother remains in [place] - father passed away a couple years ago.  Patient has an older brother.  Family is supportive, even though they live abroad.  Patient enjoys learning about other cultures and watching documentaries.       Substance use:   Alcohol: denied - "I'm really boring."    Drugs: denied   Tobacco: denied   Caffeine: drinks 1 cup coffee per day     Current medications and drug reactions (include OTC, herbal): see medication list; prozac and effexor     Strengths and liabilities: Strength: Patient accepts guidance/feedback, Strength: Patient is expressive/articulate., Strength: Patient is intelligent., Strength: Patient is motivated for change., Strength: Patient is physically healthy., Strength: Patient has positive support network., Liability: Patient lacks coping skills.    Current Evaluation:     Mental Status Exam:  General Appearance:  age appropriate, well nourished, casually dressed, neatly groomed   Speech: normal tone, normal rate, normal pitch, normal volume      Level of Cooperation: cooperative      Thought Processes: normal and logical   Mood: anxious, depressed      Thought Content: normal, no suicidality, no homicidality, delusions, or paranoia   Affect: congruent and appropriate   Orientation: Oriented x3   Memory: recent >  intact, remote >  intact   Attention Span & Concentration: intact   Fund of General Knowledge: intact and appropriate to age and level of education   Abstract Reasoning: not assessed   Judgment & Insight: good     Language  intact     Diagnostic Impression - Plan:       ICD-10-CM ICD-9-CM   1. Major depressive disorder, recurrent episode, moderate F33.1 296.32 "   2. Generalized anxiety disorder F41.1 300.02       Plan:individual psychotherapy and medication management by physician    Return to Clinic: 3 weeks    Length of Service (minutes): 45

## 2018-09-18 ENCOUNTER — OFFICE VISIT (OUTPATIENT)
Dept: PSYCHIATRY | Facility: CLINIC | Age: 53
End: 2018-09-18
Payer: COMMERCIAL

## 2018-09-18 VITALS
WEIGHT: 137.25 LBS | HEIGHT: 69 IN | DIASTOLIC BLOOD PRESSURE: 81 MMHG | BODY MASS INDEX: 20.33 KG/M2 | HEART RATE: 81 BPM | SYSTOLIC BLOOD PRESSURE: 143 MMHG

## 2018-09-18 DIAGNOSIS — F41.1 GENERALIZED ANXIETY DISORDER: ICD-10-CM

## 2018-09-18 DIAGNOSIS — F33.1 MODERATE EPISODE OF RECURRENT MAJOR DEPRESSIVE DISORDER: ICD-10-CM

## 2018-09-18 PROCEDURE — 90833 PSYTX W PT W E/M 30 MIN: CPT | Mod: S$GLB,,, | Performed by: NURSE PRACTITIONER

## 2018-09-18 PROCEDURE — 3008F BODY MASS INDEX DOCD: CPT | Mod: CPTII,S$GLB,, | Performed by: NURSE PRACTITIONER

## 2018-09-18 PROCEDURE — 99213 OFFICE O/P EST LOW 20 MIN: CPT | Mod: S$GLB,,, | Performed by: NURSE PRACTITIONER

## 2018-09-18 PROCEDURE — 99999 PR PBB SHADOW E&M-EST. PATIENT-LVL III: CPT | Mod: PBBFAC,,, | Performed by: NURSE PRACTITIONER

## 2018-09-18 RX ORDER — FLUOXETINE HYDROCHLORIDE 20 MG/1
60 CAPSULE ORAL DAILY
Qty: 90 CAPSULE | Refills: 5 | Status: SHIPPED | OUTPATIENT
Start: 2018-09-18 | End: 2019-06-04 | Stop reason: SDUPTHER

## 2018-09-18 RX ORDER — VENLAFAXINE HYDROCHLORIDE 75 MG/1
75 CAPSULE, EXTENDED RELEASE ORAL DAILY
Qty: 30 CAPSULE | Refills: 5 | Status: SHIPPED | OUTPATIENT
Start: 2018-09-18 | End: 2019-03-11 | Stop reason: SDUPTHER

## 2018-09-18 NOTE — PROGRESS NOTES
"Outpatient Psychiatry Follow-Up Visit (MD/NP)    9/18/2018    Clinical Status of Patient:  Outpatient (Ambulatory)    Chief Complaint:  Carolin Lind is a 53 y.o. female who presents today for follow-up of depression and anxiety.  Met with patient.  Pt new to me.    Last visit was: 6/26/18 with [facility] resident. Chart reviewed.      Interval History and Content of Current Session:  Current Psychiatric Medications/changes  ·  Continue Effexor XR 75 mg daily for mood and anxiety.  Discussed increasing dose but patient was reluctant to go up on dose of Effexor. She stated that she had previously been on higher dose of Prozac and would prefer to get back onto higher dose of that  · Restart Prozac at 20 mg daily x 2 weeks then increase to 40 mg daily for mood and anxiety  · Discussed benefits of individual therapy for depression and anxiety to help patient cope with her functional limitations. Will refer for individual therapy.     Established therapeutic rapport and transition of care from previous provider.  Pt is a business owner. She and her  own [employer]. Pt started crying during interview when she spoke about her ankle injury and how it has impacted her life.  Pt was a dedicated athlete and is now unable to exercise.  She requires multiple surgeries and therapies to recover.  Pt discussed being afraid to have another surgery because she has a fear of anethesia "being put asleep and not waking up".  Coping skills include her new puppy.  Pt is compliant with medications and denies side effects.  Denies SI/HI/AVH.        Psychotherapy:  · Target symptoms: depression, adjustment, work stress  · Why chosen therapy is appropriate versus another modality: relevant to diagnosis  · Outcome monitoring methods: self-report  · Therapeutic intervention type: insight oriented psychotherapy  · Topics discussed/themes: work stress, stress related to medical comorbidities, difficulty managing affect in " "interpersonal relationships, building skills sets for symptom management, symptom recognition  · The patient's response to the intervention is accepting. The patient's progress toward treatment goals is fair.   · Duration of intervention: 19 minutes.    Review of Systems   · PSYCHIATRIC: Pertinant items are noted in the narrative.  · CONSTITUTIONAL: No weight gain or loss.   · MUSCULOSKELETAL: No pain or stiffness of the joints.  · NEUROLOGIC: No weakness, sensory changes, seizures, confusion, memory loss, tremor or other abnormal movements.  · ENDOCRINE: No polydipsia or polyuria.  · INTEGUMENTARY: No rashes or lacerations.  · EYES: No exophthalmos, jaundice or blindness.  · ENT: No dizziness, tinnitus or hearing loss.  · RESPIRATORY: No shortness of breath.  · CARDIOVASCULAR: No tachycardia or chest pain.  · GASTROINTESTINAL: No nausea, vomiting, pain, constipation or diarrhea.  · GENITOURINARY: No frequency, dysuria or sexual dysfunction.  · HEMATOLOGIC/LYMPHATIC: No excessive bleeding, prolonged or excessive bleeding after dental extraction/injury.  · ALLERGIC/IMMUNOLOGIC: No allergic response to materials, foods or animals at this time.    Past Medical, Family and Social History: The patient's past medical, family and social history have been reviewed and updated as appropriate within the electronic medical record - see encounter notes.    Compliance: yes    Side effects: None    Risk Parameters:  Patient reports no suicidal ideation  Patient reports no homicidal ideation  Patient reports no self-injurious behavior  Patient reports no violent behavior    Exam (detailed: at least 9 elements; comprehensive: all 15 elements)   Constitutional  Vitals:  Most recent vital signs, dated greater than 90 days prior to this appointment, were reviewed.   Vitals:    09/18/18 1346   BP: (!) 143/81   Pulse: 81   Weight: 62.2 kg (137 lb 3.8 oz)   Height: 5' 9" (1.753 m)        General:  unremarkable, age appropriate "     Musculoskeletal  Muscle Strength/Tone:  no tremor, no tic   Gait & Station:  non-ataxic     Psychiatric  Speech:  no latency; no press   Mood & Affect:  depressed  congruent and appropriate, guarded   Thought Process:  normal and logical   Associations:  intact   Thought Content:  normal, no suicidality, no homicidality, delusions, or paranoia   Insight:  intact   Judgement: behavior is adequate to circumstances   Orientation:  grossly intact   Memory: intact for content of interview   Language: grossly intact   Attention Span & Concentration:  able to focus   Fund of Knowledge:  intact and appropriate to age and level of education     Assessment and Diagnosis   Status/Progress: Based on the examination today, the patient's problem(s) is/are treatment resistant.  New problems have not been presented today.   Co-morbidities and Lack of compliance are not complicating management of the primary condition.  There are no active rule-out diagnoses for this patient at this time.     General Impression:       ICD-10-CM ICD-9-CM   1. Moderate episode of recurrent major depressive disorder F33.1 296.32   2. Generalized anxiety disorder F41.1 300.02       Intervention/Counseling/Treatment Plan   · Medication Management: Continue current medications. The risks and benefits of medication were discussed with the patient.   · Increase to Prozac 60 mg po daily  · Continue Effexor XR 75 mg po daily  · Recommend individual psychotherapy    Return to Clinic: 6 months

## 2018-10-22 RX ORDER — GABAPENTIN 300 MG/1
CAPSULE ORAL
Qty: 90 CAPSULE | Refills: 0 | Status: SHIPPED | OUTPATIENT
Start: 2018-10-22 | End: 2018-11-15 | Stop reason: SDUPTHER

## 2018-10-25 ENCOUNTER — TELEPHONE (OUTPATIENT)
Dept: ORTHOPEDICS | Facility: CLINIC | Age: 53
End: 2018-10-25

## 2018-10-25 NOTE — TELEPHONE ENCOUNTER
----- Message from Mora Martins sent at 10/25/2018  9:56 AM CDT -----  Contact: Pt  Pt would like to be called back regarding difficulty walking, shooting pains, numb foot.     Pt can be reached at [phone].

## 2018-10-25 NOTE — TELEPHONE ENCOUNTER
Spoke with pt.   States she is getting sharp burning sensations in her ankle.  Wants to discuss hardware removal.   Scheduled appointment as requested

## 2018-11-02 ENCOUNTER — HOSPITAL ENCOUNTER (OUTPATIENT)
Dept: RADIOLOGY | Facility: HOSPITAL | Age: 53
Discharge: HOME | End: 2018-11-02
Attending: PHYSICIAN ASSISTANT
Payer: COMMERCIAL

## 2018-11-02 ENCOUNTER — OFFICE VISIT (OUTPATIENT)
Dept: ORTHOPEDICS | Facility: CLINIC | Age: 53
End: 2018-11-02
Payer: COMMERCIAL

## 2018-11-02 VITALS
HEART RATE: 76 BPM | WEIGHT: 137.13 LBS | SYSTOLIC BLOOD PRESSURE: 135 MMHG | DIASTOLIC BLOOD PRESSURE: 77 MMHG | TEMPERATURE: 98 F | RESPIRATION RATE: 18 BRPM | HEIGHT: 69 IN | BODY MASS INDEX: 20.31 KG/M2

## 2018-11-02 DIAGNOSIS — S82.852D CLOSED LEFT TRIMALLEOLAR FRACTURE, WITH ROUTINE HEALING, SUBSEQUENT ENCOUNTER: ICD-10-CM

## 2018-11-02 DIAGNOSIS — Z98.890 S/P ORIF (OPEN REDUCTION INTERNAL FIXATION) FRACTURE: Primary | ICD-10-CM

## 2018-11-02 DIAGNOSIS — Z87.81 S/P ORIF (OPEN REDUCTION INTERNAL FIXATION) FRACTURE: Primary | ICD-10-CM

## 2018-11-02 DIAGNOSIS — Z87.81 S/P ORIF (OPEN REDUCTION INTERNAL FIXATION) FRACTURE: ICD-10-CM

## 2018-11-02 DIAGNOSIS — Z98.890 S/P ORIF (OPEN REDUCTION INTERNAL FIXATION) FRACTURE: ICD-10-CM

## 2018-11-02 PROCEDURE — 99999 PR PBB SHADOW E&M-EST. PATIENT-LVL IV: CPT | Mod: PBBFAC,,, | Performed by: PHYSICIAN ASSISTANT

## 2018-11-02 PROCEDURE — 73610 X-RAY EXAM OF ANKLE: CPT | Mod: TC,LT

## 2018-11-02 PROCEDURE — 73610 X-RAY EXAM OF ANKLE: CPT | Mod: 26,LT,, | Performed by: RADIOLOGY

## 2018-11-02 PROCEDURE — 99213 OFFICE O/P EST LOW 20 MIN: CPT | Mod: S$GLB,,, | Performed by: PHYSICIAN ASSISTANT

## 2018-11-02 PROCEDURE — 3008F BODY MASS INDEX DOCD: CPT | Mod: CPTII,S$GLB,, | Performed by: PHYSICIAN ASSISTANT

## 2018-11-02 RX ORDER — ASPIRIN 325 MG
325 TABLET ORAL DAILY
COMMUNITY
End: 2019-04-15

## 2018-11-02 RX ORDER — GABAPENTIN 100 MG/1
100 CAPSULE ORAL 2 TIMES DAILY
Qty: 60 CAPSULE | Refills: 11 | Status: SHIPPED | OUTPATIENT
Start: 2018-11-02 | End: 2019-04-15 | Stop reason: SDUPTHER

## 2018-11-02 NOTE — PROGRESS NOTES
Ms. Lind is 51 yo F twisted left ankle resulting in left trimalleolar ankle fracture dislocation, treated with splinting in an outside ED.  Patient came to clinic with subluxed/dislocated ankle.  Attempt was made in ED to reduce and splint ankle and it did not want to hold. Patient treated with external fixation device on 07/06/2017 by  followed by ORIF on 07/13/2017 by .     She is here today for evaluation of left ankle pain.   She is s/p   1.  Open treatment of left trimalleolar ankle fracture with internal fixation of  medial, lateral and posterior malleoli, 57551.  2.  Open treatment of left ankle syndesmosis disruption with internal fixation, 18130.  3.  Removal under anesthesia of external fixation, left leg, 63303.  by Dr. Winchester  on 07/13/2017. - uncomplicated recovery.   She stated that she was doing ok.  Much better than when it originally occurred,  but has constant migrating pain in her ankle. She gets a stabbing pain at times as well as a soreness to around the incisions but not at the incision site feels like a bruising feeling. She has Achilles pain and soreness at night. She has a hypersensitivity to her medial inicision extending to mid dorsal foot medially. She reports numbness to the plantar and dorsal surface of her foot form midfoot to her toes. She reports that because of this she has some trouble with walking and at times will trip. She also is taking gabapentin  300 mg. She was taking it 3 times daily which decreased her symptoms, but the day time dosage was making her sleepy and unable to function. She stopped the day time once and has only been taking 600 mg qhs. She stated that since that time she has had increased in pain and symptoms.  She has tightness with dorsiflexion. She stated that she cannot wear high heals due to pain or run.  She stated that her pain is interfering with her life and ability to function like she would like to.  Patient is interested in  having her syndesmosis screws removed.   she denies fever, chills, and sweats since the time of the surgery.    Physical exam: Walked into clinic unassisted.  Incision healing well no signs of infection, full range of motion in all planes, Achillis feels tight., increased pain with dorsi flexion , mild TTP to Achillis and PTT hypersensitivity to medial allelous, decreased sensation to dorsal and plantar midfoot extending into her toes.      RADS: S/p ORIF  trimalleolar left ankle fracture.  No acute fracture detected. Syndesmosis screws have fractured.    Assessment:  Post-op visit     S/p ORIF    Left ankle pain    Plan:    Discussed treatment options with patient. Operative vs non-operative treatment discussed with patient. -  Patient would like her syndesmosis hardware removed. Discussed that I do not feel like this will  all of her symptoms such as the pins and needle feeling or numbness. Patient voiced understanding. She would like for me to discuss with  if this may decreased her tight feeling she has with motion.      - PT, continue exercises as taught to her    - range of motion as tolerated   - WBAT   - pain medication: no refill needed, dis add gabapentin 100 mg at morning and lunch as long as this is not causing her side effects to help with nerve pain.      - I will discuss nest step with .

## 2018-11-07 ENCOUNTER — OFFICE VISIT (OUTPATIENT)
Dept: ORTHOPEDICS | Facility: CLINIC | Age: 53
End: 2018-11-07
Payer: COMMERCIAL

## 2018-11-07 VITALS
SYSTOLIC BLOOD PRESSURE: 135 MMHG | HEART RATE: 90 BPM | BODY MASS INDEX: 20.01 KG/M2 | HEIGHT: 69 IN | WEIGHT: 135.13 LBS | DIASTOLIC BLOOD PRESSURE: 78 MMHG

## 2018-11-07 DIAGNOSIS — S82.852D CLOSED TRIMALLEOLAR FRACTURE OF LEFT ANKLE WITH ROUTINE HEALING, SUBSEQUENT ENCOUNTER: Primary | ICD-10-CM

## 2018-11-07 DIAGNOSIS — M25.672 ANKLE STIFFNESS, LEFT: ICD-10-CM

## 2018-11-07 DIAGNOSIS — S93.432S ANKLE SYNDESMOSIS DISRUPTION, LEFT, SEQUELA: ICD-10-CM

## 2018-11-07 PROCEDURE — 3008F BODY MASS INDEX DOCD: CPT | Mod: CPTII,S$GLB,, | Performed by: ORTHOPAEDIC SURGERY

## 2018-11-07 PROCEDURE — 99999 PR PBB SHADOW E&M-EST. PATIENT-LVL III: CPT | Mod: PBBFAC,,, | Performed by: ORTHOPAEDIC SURGERY

## 2018-11-07 PROCEDURE — 99214 OFFICE O/P EST MOD 30 MIN: CPT | Mod: S$GLB,,, | Performed by: ORTHOPAEDIC SURGERY

## 2018-11-12 NOTE — PROGRESS NOTES
HPI: 53 very active female status post ORIF left trimalleolar ankle fracture with syndesmosis fixation by me on 7/13/17.  She did not want the syndesmosis screws removed last year.  They went on to break.  She feels she's never really achieved her preinjury level of fitness again and has some pain in the ankle, sort of globally with some numbness in multiple distributions.  Pain is 4/10 at its worst with activity, relieved by rest largely, but not entirely.  She's been on gabapentin which got rid of the pain but made her feel drowsy.  She underwent PT after the injury but did not feel it was aggressive enough.      ROS:  Patient denies constitutional symptoms, cardiac symptoms, respiratory symptoms, GI symptoms.  The remainder of the musculoskeletal ROS is included in the HPI.    PE:    AA&O x 4.  NAD  HEENT:  NCAT, sclera nonicteric  Lungs:  Respirations are equal and unlabored.  CV:  2+ bilateral upper and lower extremity pulses.  Skin:  Intact throughout.    MS:  Incisions well healed.  ROM neutral to 25PF.  Mild LTS decrease plantar and dorsal.  No shininess to skin.  Mild TTP globally but not increased with proximal compression.    Rads:  Healed trimalleolar fractures.  Syndesmosis screws broken, the proximal one at both junctions and the distal at the fibula.      A/P:  We has a long talk about her ankle today.  She doesn't really have a CRPS type picture, but some of her symptoms don't make sense in relation to nerve distribution.  She is very tight.  We discussed hwr removal, but I don't think that is the answer right now.  Her mortis looks good and her pain doesn't really match hardware pain.  I suggested going back to PT and possibly using gabapentin again while doing that.  She is very interested in PT but will stay off of the medication and use NSAIDs instead.  I will see her back in 3 months time with xrays of the left ankle.

## 2018-11-15 RX ORDER — GABAPENTIN 300 MG/1
CAPSULE ORAL
Qty: 90 CAPSULE | Refills: 0 | Status: SHIPPED | OUTPATIENT
Start: 2018-11-15 | End: 2018-12-09 | Stop reason: SDUPTHER

## 2018-12-04 ENCOUNTER — TELEPHONE (OUTPATIENT)
Dept: ORTHOPEDICS | Facility: CLINIC | Age: 53
End: 2018-12-04

## 2018-12-04 DIAGNOSIS — S82.852D CLOSED TRIMALLEOLAR FRACTURE OF LEFT ANKLE WITH ROUTINE HEALING, SUBSEQUENT ENCOUNTER: Primary | ICD-10-CM

## 2018-12-04 DIAGNOSIS — Z87.81 S/P ORIF (OPEN REDUCTION INTERNAL FIXATION) FRACTURE: ICD-10-CM

## 2018-12-04 DIAGNOSIS — M25.672 ANKLE STIFFNESS, LEFT: ICD-10-CM

## 2018-12-04 DIAGNOSIS — S93.432S ANKLE SYNDESMOSIS DISRUPTION, LEFT, SEQUELA: ICD-10-CM

## 2018-12-04 DIAGNOSIS — Z98.890 S/P ORIF (OPEN REDUCTION INTERNAL FIXATION) FRACTURE: ICD-10-CM

## 2018-12-04 RX ORDER — MELOXICAM 15 MG/1
15 TABLET ORAL DAILY
Qty: 30 TABLET | Refills: 0 | Status: SHIPPED | OUTPATIENT
Start: 2018-12-04 | End: 2018-12-28 | Stop reason: SDUPTHER

## 2018-12-05 ENCOUNTER — HOSPITAL ENCOUNTER (OUTPATIENT)
Dept: RADIOLOGY | Facility: HOSPITAL | Age: 53
Discharge: HOME | End: 2018-12-05
Attending: PHYSICIAN ASSISTANT
Payer: COMMERCIAL

## 2018-12-05 ENCOUNTER — OFFICE VISIT (OUTPATIENT)
Dept: ORTHOPEDICS | Facility: CLINIC | Age: 53
End: 2018-12-05
Payer: COMMERCIAL

## 2018-12-05 VITALS — BODY MASS INDEX: 20.01 KG/M2 | HEIGHT: 69 IN | WEIGHT: 135.13 LBS

## 2018-12-05 DIAGNOSIS — S93.432S ANKLE SYNDESMOSIS DISRUPTION, LEFT, SEQUELA: ICD-10-CM

## 2018-12-05 DIAGNOSIS — T14.90XA TRAUMA: ICD-10-CM

## 2018-12-05 DIAGNOSIS — Z87.81 S/P ORIF (OPEN REDUCTION INTERNAL FIXATION) FRACTURE: ICD-10-CM

## 2018-12-05 DIAGNOSIS — Z98.890 S/P ORIF (OPEN REDUCTION INTERNAL FIXATION) FRACTURE: ICD-10-CM

## 2018-12-05 DIAGNOSIS — Z09 S/P ORTHOPEDIC SURGERY, FOLLOW-UP EXAM: Primary | ICD-10-CM

## 2018-12-05 DIAGNOSIS — S82.852D CLOSED TRIMALLEOLAR FRACTURE OF LEFT ANKLE WITH ROUTINE HEALING, SUBSEQUENT ENCOUNTER: ICD-10-CM

## 2018-12-05 DIAGNOSIS — M25.672 ANKLE STIFFNESS, LEFT: ICD-10-CM

## 2018-12-05 PROCEDURE — 3008F BODY MASS INDEX DOCD: CPT | Mod: CPTII,S$GLB,, | Performed by: PHYSICIAN ASSISTANT

## 2018-12-05 PROCEDURE — 73610 X-RAY EXAM OF ANKLE: CPT | Mod: TC,LT

## 2018-12-05 PROCEDURE — 99213 OFFICE O/P EST LOW 20 MIN: CPT | Mod: S$GLB,,, | Performed by: PHYSICIAN ASSISTANT

## 2018-12-05 PROCEDURE — 73610 X-RAY EXAM OF ANKLE: CPT | Mod: 26,LT,, | Performed by: RADIOLOGY

## 2018-12-05 PROCEDURE — 99999 PR PBB SHADOW E&M-EST. PATIENT-LVL III: CPT | Mod: PBBFAC,,, | Performed by: PHYSICIAN ASSISTANT

## 2018-12-06 NOTE — PROGRESS NOTES
HPI: 53 very active female status post ORIF left trimalleolar ankle fracture with syndesmosis fixation by  on 7/13/17.  She did not want the syndesmosis screws removed last year.  They went on to break.  She  has some pain in the ankle, sort of globally with some numbness in multiple distributions.  Pain is 4/10 at its worst with activity, relieved by rest largely, but not entirely.  She's been on gabapentin which got rid of the pain but made her feel drowsy.  She underwent PT after the injury.  She was recently seen by  who recommended further therapy but she has not started yet. Patient comes into clinic today due to increased achy throbbing pain. She is taking 4 naproxen at a time without relief. She is concerned that she may move more hardware broken.        ROS:  Patient denies constitutional symptoms, cardiac symptoms, respiratory symptoms, GI symptoms.  The remainder of the musculoskeletal ROS is included in the HPI.    PE:    AA&O x 4.  NAD  HEENT:  NCAT, sclera nonicteric  Lungs:  Respirations are equal and unlabored.  CV:  2+ bilateral upper and lower extremity pulses.  Skin:  Intact throughout.    MS:  Incisions well healed.  ROM neutral to 25PF.  Mild LTS decrease plantar and dorsal.  No shininess to skin.  Mild TTP globally but not increased with proximal compression.    Rads:  Healed trimalleolar fractures.  Syndesmosis screws broken, the proximal one at both junctions and the distal at the fibula.  Similar to previous exams.     A/P:  At this time well will continue her treatment plan. Discussed that she may always have some pain, but well will work on management of her symptoms as well as increasing her function. She hs an appointment next Tuesday to start PT to help with stiffness. Prescription written for Mobic to help with achy throbbing pain. She will also elevate and ice as needed. She has follow up with .

## 2018-12-10 RX ORDER — GABAPENTIN 300 MG/1
CAPSULE ORAL
Qty: 90 CAPSULE | Refills: 0 | Status: SHIPPED | OUTPATIENT
Start: 2018-12-10 | End: 2019-01-06 | Stop reason: SDUPTHER

## 2018-12-28 RX ORDER — MELOXICAM 15 MG/1
TABLET ORAL
Qty: 30 TABLET | Refills: 0 | Status: SHIPPED | OUTPATIENT
Start: 2018-12-28 | End: 2019-01-26 | Stop reason: SDUPTHER

## 2019-01-07 RX ORDER — GABAPENTIN 300 MG/1
CAPSULE ORAL
Qty: 90 CAPSULE | Refills: 0 | Status: SHIPPED | OUTPATIENT
Start: 2019-01-07 | End: 2019-02-07 | Stop reason: SDUPTHER

## 2019-01-28 RX ORDER — MELOXICAM 15 MG/1
TABLET ORAL
Qty: 30 TABLET | Refills: 0 | Status: SHIPPED | OUTPATIENT
Start: 2019-01-28 | End: 2019-03-03 | Stop reason: SDUPTHER

## 2019-02-08 RX ORDER — GABAPENTIN 300 MG/1
CAPSULE ORAL
Qty: 90 CAPSULE | Refills: 0 | Status: SHIPPED | OUTPATIENT
Start: 2019-02-08 | End: 2019-03-11 | Stop reason: SDUPTHER

## 2019-02-25 ENCOUNTER — TELEPHONE (OUTPATIENT)
Dept: OBSTETRICS AND GYNECOLOGY | Facility: CLINIC | Age: 54
End: 2019-02-25

## 2019-02-25 NOTE — TELEPHONE ENCOUNTER
----- Message from Nikky Angel sent at 2/25/2019  1:55 PM CST -----  Contact: Pt  Name of Who is Calling: FAIRYOLILAURASE SOTELO [[id number]]      What is the request in detail: FAIRLAURASE SOTELO is calling in regards to  Getting a early then the on she as schedule on the 3/25/19. Pt is requesting to speak to clinical staff      Please contact to further discuss and advise.       Can the clinic reply by [facility]: N      What Number to [facility] Back [facility]: [phone]

## 2019-03-06 RX ORDER — MELOXICAM 15 MG/1
TABLET ORAL
Qty: 30 TABLET | Refills: 0 | Status: SHIPPED | OUTPATIENT
Start: 2019-03-06 | End: 2019-03-15

## 2019-03-11 DIAGNOSIS — F41.1 GENERALIZED ANXIETY DISORDER: ICD-10-CM

## 2019-03-11 DIAGNOSIS — F33.1 MODERATE EPISODE OF RECURRENT MAJOR DEPRESSIVE DISORDER: ICD-10-CM

## 2019-03-12 RX ORDER — GABAPENTIN 300 MG/1
CAPSULE ORAL
Qty: 90 CAPSULE | Refills: 0 | Status: SHIPPED | OUTPATIENT
Start: 2019-03-12 | End: 2019-04-13 | Stop reason: SDUPTHER

## 2019-03-13 RX ORDER — VENLAFAXINE HYDROCHLORIDE 75 MG/1
CAPSULE, EXTENDED RELEASE ORAL
Qty: 30 CAPSULE | Refills: 0 | Status: SHIPPED | OUTPATIENT
Start: 2019-03-13 | End: 2019-04-13 | Stop reason: SDUPTHER

## 2019-03-15 ENCOUNTER — OFFICE VISIT (OUTPATIENT)
Dept: OBSTETRICS AND GYNECOLOGY | Facility: CLINIC | Age: 54
End: 2019-03-15
Payer: COMMERCIAL

## 2019-03-15 VITALS
WEIGHT: 145.31 LBS | DIASTOLIC BLOOD PRESSURE: 80 MMHG | HEIGHT: 69 IN | BODY MASS INDEX: 21.52 KG/M2 | SYSTOLIC BLOOD PRESSURE: 140 MMHG

## 2019-03-15 DIAGNOSIS — Z12.31 SCREENING MAMMOGRAM, ENCOUNTER FOR: ICD-10-CM

## 2019-03-15 DIAGNOSIS — Z01.419 ENCOUNTER FOR WELL WOMAN EXAM WITH ROUTINE GYNECOLOGICAL EXAM: Primary | ICD-10-CM

## 2019-03-15 PROCEDURE — 99999 PR PBB SHADOW E&M-EST. PATIENT-LVL III: ICD-10-PCS | Mod: PBBFAC,,, | Performed by: OBSTETRICS & GYNECOLOGY

## 2019-03-15 PROCEDURE — 99396 PR PREVENTIVE VISIT,EST,40-64: ICD-10-PCS | Mod: S$GLB,,, | Performed by: OBSTETRICS & GYNECOLOGY

## 2019-03-15 PROCEDURE — 99396 PREV VISIT EST AGE 40-64: CPT | Mod: S$GLB,,, | Performed by: OBSTETRICS & GYNECOLOGY

## 2019-03-15 PROCEDURE — 88142 CYTOPATH C/V THIN LAYER: CPT

## 2019-03-15 PROCEDURE — 99999 PR PBB SHADOW E&M-EST. PATIENT-LVL III: CPT | Mod: PBBFAC,,, | Performed by: OBSTETRICS & GYNECOLOGY

## 2019-03-15 RX ORDER — BUTALBITAL, ACETAMINOPHEN AND CAFFEINE 50; 325; 40 MG/1; MG/1; MG/1
1 TABLET ORAL EVERY 4 HOURS PRN
COMMUNITY

## 2019-03-15 RX ORDER — CLONAZEPAM 1 MG/1
TABLET ORAL
Refills: 5 | COMMUNITY
Start: 2019-02-14 | End: 2019-04-09

## 2019-03-15 NOTE — PROGRESS NOTES
CC: Well woman exam    Carolin Lind is a 54 y.o. female  presents for a well woman exam.  LMP: Patient's last menstrual period was 2019..   Patient had many private concerns she wanted to discuss. Has been dealing with her foot injury, lack of exercise.       Past Medical History:   Diagnosis Date    Abnormal Pap smear of cervix 2017    Atypical pap with + HPV    Anxiety     Depression     Heart murmur     History of migraine headaches     Menarche     Age of onset 13    Mitral valve prolapse     PONV (postoperative nausea and vomiting)      Past Surgical History:   Procedure Laterality Date    APPLICATION-EXTERNAL FIXATION DEVICE Left 2017    Performed by Jair [facility]inchester MD at [facility] OR [address] [place] [address]    BREAST SURGERY      EXTERNAL FIXATOR APPLICATION      FIXATION-SYNDESMOSIS-ANKLE Left 2017    Performed by [facility]ster MD at [facility] OR [address] [place] [address]    OPEN REDUCTION INTERNAL FIXATION-ANKLE Left 2017    Performed by Jair Winchester MD at [facility] OR [address] [place] [address]    REDUCTION-CLOSED Left 2017    Performed by [facility] at [facility]    REMOVAL OF EXTERNAL FIXATION DEVICE Left 2017    Performed by Jair Winchester MD at [facility] OR [address] [place] [address]     Social History     Socioeconomic History    Marital status:      Spouse name: None    Number of children: None    Years of education: None    Highest education level: None   Social Needs    Financial resource strain: None    Food insecurity - worry: None    Food insecurity - inability: None    Transportation needs - medical: None    Transportation needs - non-medical: None   Occupational History    None   Tobacco Use    Smoking status: Never Smoker    Smokeless tobacco: Never Used   Substance and Sexual Activity    Alcohol use: No    Drug use: No    Sexual activity: Yes     Partners: Male     Birth control/protection: None   Other Topics Concern    None   Social History Narrative    None     Family History  "  Problem Relation Age of Onset    Cancer Mother     Migraines Daughter     Migraines Maternal Aunt     Breast cancer Neg Hx     Colon cancer Neg Hx     Ovarian cancer Neg Hx      OB History      Para Term  AB Living    2 1     1      SAB TAB Ectopic Multiple Live Births                       BP (!) 140/80   Ht 5' 9" (1.753 m)   Wt 65.9 kg (145 lb 4.5 oz)   LMP 2019   BMI 21.45 kg/m²       ROS:    ROS:  GENERAL: Denies weight gain or weight loss. Feeling well overall.   SKIN: Denies rash or lesions.   HEAD: Denies head injury or headache.   NODES: Denies enlarged lymph nodes.   CHEST: Denies chest pain or shortness of breath.   CARDIOVASCULAR: Denies palpitations or left sided chest pain.   ABDOMEN: No abdominal pain, constipation, diarrhea, nausea, vomiting or rectal bleeding.   URINARY: No frequency, dysuria, hematuria, or burning on urination.  REPRODUCTIVE: See HPI.   BREASTS: The patient performs breast self-examination and denies pain, lumps, or nipple discharge.   HEMATOLOGIC: No easy bruisability or excessive bleeding.   MUSCULOSKELETAL: Denies joint pain or swelling.   NEUROLOGIC: Denies syncope or weakness.   PSYCHIATRIC: Denies depression, anxiety or mood swings.    PHYSICAL EXAM:    APPEARANCE: Well nourished, well developed, in no acute distress.  AFFECT: WNL, alert and oriented x 3  SKIN: No acne or hirsutism  NECK: Neck symmetric without masses or thyromegaly  NODES: No inguinal, cervical, axillary, or femoral lymph node enlargement  CHEST: Good respiratory effect  ABDOMEN: Soft.  No tenderness or masses.  No hepatosplenomegaly.  No hernias.  BREASTS: Symmetrical, no skin changes or visible lesions.  No palpable masses, nipple discharge bilaterally.  PELVIC: Normal external genitalia without lesions.  Normal hair distribution.  Adequate perineal body, normal urethral meatus.  Vagina moist and well rugated without lesions or discharge.  Cervix pink, without lesions, " discharge or tenderness.  No significant cystocele or rectocele.  Bimanual exam shows uterus to be normal size, regular, mobile and nontender.  Adnexa without masses or tenderness.    RECTAL: Rectovaginal exam confirms above with normal sphincter tone, no masses.  EXTREMITIES: No edema.      ICD-10-CM ICD-9-CM    1. Encounter for well woman exam with routine gynecological exam Z01.419 V72.31 Liquid-based pap smear, screening      HPV High Risk Genotypes, PCR   2. Screening mammogram, encounter for Z12.31 V76.12 Mammo Digital Screening Bilat w/ Gee         Patient was counseled today on A.C.S. Pap guidelines and recommendations for yearly pelvic exams, mammograms and monthly self breast exams; to see her PCP for other health maintenance.     Follow-up in about 1 year (around 3/15/2020).

## 2019-03-18 ENCOUNTER — OFFICE VISIT (OUTPATIENT)
Dept: ORTHOPEDICS | Facility: CLINIC | Age: 54
End: 2019-03-18
Payer: COMMERCIAL

## 2019-03-18 ENCOUNTER — HOSPITAL ENCOUNTER (OUTPATIENT)
Dept: RADIOLOGY | Facility: HOSPITAL | Age: 54
Discharge: HOME | End: 2019-03-18
Attending: OBSTETRICS & GYNECOLOGY
Payer: COMMERCIAL

## 2019-03-18 ENCOUNTER — HOSPITAL ENCOUNTER (OUTPATIENT)
Dept: RADIOLOGY | Facility: HOSPITAL | Age: 54
Discharge: HOME | End: 2019-03-18
Attending: PHYSICIAN ASSISTANT
Payer: COMMERCIAL

## 2019-03-18 VITALS — BODY MASS INDEX: 21.24 KG/M2 | WEIGHT: 143.44 LBS | HEIGHT: 69 IN

## 2019-03-18 DIAGNOSIS — S93.432S ANKLE SYNDESMOSIS DISRUPTION, LEFT, SEQUELA: ICD-10-CM

## 2019-03-18 DIAGNOSIS — Z98.890 S/P ORIF (OPEN REDUCTION INTERNAL FIXATION) FRACTURE: ICD-10-CM

## 2019-03-18 DIAGNOSIS — S82.852D CLOSED TRIMALLEOLAR FRACTURE OF LEFT ANKLE WITH ROUTINE HEALING, SUBSEQUENT ENCOUNTER: ICD-10-CM

## 2019-03-18 DIAGNOSIS — Z87.81 S/P ORIF (OPEN REDUCTION INTERNAL FIXATION) FRACTURE: ICD-10-CM

## 2019-03-18 DIAGNOSIS — Z12.31 SCREENING MAMMOGRAM, ENCOUNTER FOR: ICD-10-CM

## 2019-03-18 DIAGNOSIS — M25.672 ANKLE STIFFNESS, LEFT: ICD-10-CM

## 2019-03-18 DIAGNOSIS — Z47.89 ORTHOPEDIC AFTERCARE: Primary | ICD-10-CM

## 2019-03-18 DIAGNOSIS — Z47.89 ORTHOPEDIC AFTERCARE: ICD-10-CM

## 2019-03-18 PROCEDURE — 73610 X-RAY EXAM OF ANKLE: CPT | Mod: TC,LT

## 2019-03-18 PROCEDURE — 99999 PR PBB SHADOW E&M-EST. PATIENT-LVL IV: ICD-10-PCS | Mod: PBBFAC,,, | Performed by: PHYSICIAN ASSISTANT

## 2019-03-18 PROCEDURE — 77067 SCR MAMMO BI INCL CAD: CPT | Mod: 26,,, | Performed by: RADIOLOGY

## 2019-03-18 PROCEDURE — 73610 XR ANKLE COMPLETE 3 VIEW LEFT: ICD-10-PCS | Mod: 26,LT,, | Performed by: RADIOLOGY

## 2019-03-18 PROCEDURE — 77067 MAMMO DIGITAL SCREENING BILAT WITH TOMOSYNTHESIS_CAD: ICD-10-PCS | Mod: 26,,, | Performed by: RADIOLOGY

## 2019-03-18 PROCEDURE — 77063 BREAST TOMOSYNTHESIS BI: CPT | Mod: 26,,, | Performed by: RADIOLOGY

## 2019-03-18 PROCEDURE — 73610 X-RAY EXAM OF ANKLE: CPT | Mod: 26,LT,, | Performed by: RADIOLOGY

## 2019-03-18 PROCEDURE — 99213 PR OFFICE/OUTPT VISIT, EST, LEVL III, 20-29 MIN: ICD-10-PCS | Mod: S$GLB,,, | Performed by: PHYSICIAN ASSISTANT

## 2019-03-18 PROCEDURE — 99999 PR PBB SHADOW E&M-EST. PATIENT-LVL IV: CPT | Mod: PBBFAC,,, | Performed by: PHYSICIAN ASSISTANT

## 2019-03-18 PROCEDURE — 77063 MAMMO DIGITAL SCREENING BILAT WITH TOMOSYNTHESIS_CAD: ICD-10-PCS | Mod: 26,,, | Performed by: RADIOLOGY

## 2019-03-18 PROCEDURE — 3008F BODY MASS INDEX DOCD: CPT | Mod: CPTII,S$GLB,, | Performed by: PHYSICIAN ASSISTANT

## 2019-03-18 PROCEDURE — 77067 SCR MAMMO BI INCL CAD: CPT | Mod: TC

## 2019-03-18 PROCEDURE — 99213 OFFICE O/P EST LOW 20 MIN: CPT | Mod: S$GLB,,, | Performed by: PHYSICIAN ASSISTANT

## 2019-03-18 PROCEDURE — 3008F PR BODY MASS INDEX (BMI) DOCUMENTED: ICD-10-PCS | Mod: CPTII,S$GLB,, | Performed by: PHYSICIAN ASSISTANT

## 2019-03-19 ENCOUNTER — PATIENT OUTREACH (OUTPATIENT)
Dept: ADMINISTRATIVE | Facility: HOSPITAL | Age: 54
End: 2019-03-19

## 2019-03-20 ENCOUNTER — CLINICAL SUPPORT (OUTPATIENT)
Dept: INTERNAL MEDICINE | Facility: CLINIC | Age: 54
End: 2019-03-20
Payer: COMMERCIAL

## 2019-03-20 ENCOUNTER — OFFICE VISIT (OUTPATIENT)
Dept: INTERNAL MEDICINE | Facility: CLINIC | Age: 54
End: 2019-03-20
Payer: COMMERCIAL

## 2019-03-20 VITALS
WEIGHT: 145.38 LBS | OXYGEN SATURATION: 98 % | HEIGHT: 69 IN | DIASTOLIC BLOOD PRESSURE: 82 MMHG | BODY MASS INDEX: 21.53 KG/M2 | SYSTOLIC BLOOD PRESSURE: 150 MMHG | TEMPERATURE: 97 F | HEART RATE: 82 BPM

## 2019-03-20 DIAGNOSIS — Z13.1 SCREENING FOR DIABETES MELLITUS: ICD-10-CM

## 2019-03-20 DIAGNOSIS — Z13.220 SCREENING FOR LIPID DISORDERS: ICD-10-CM

## 2019-03-20 DIAGNOSIS — Z23 NEED FOR DIPHTHERIA-TETANUS-PERTUSSIS (TDAP) VACCINE: ICD-10-CM

## 2019-03-20 DIAGNOSIS — I34.1 MITRAL VALVE PROLAPSE: ICD-10-CM

## 2019-03-20 DIAGNOSIS — E53.8 B12 DEFICIENCY: ICD-10-CM

## 2019-03-20 DIAGNOSIS — Z00.00 ANNUAL PHYSICAL EXAM: Primary | ICD-10-CM

## 2019-03-20 DIAGNOSIS — I10 BENIGN HYPERTENSION: ICD-10-CM

## 2019-03-20 DIAGNOSIS — Z00.00 ANNUAL PHYSICAL EXAM: ICD-10-CM

## 2019-03-20 DIAGNOSIS — Z12.11 SCREEN FOR COLON CANCER: ICD-10-CM

## 2019-03-20 LAB
ALBUMIN SERPL BCP-MCNC: 3.8 G/DL
ALP SERPL-CCNC: 89 U/L
ALT SERPL W/O P-5'-P-CCNC: 31 U/L
ANION GAP SERPL CALC-SCNC: 9 MMOL/L
AST SERPL-CCNC: 26 U/L
BASOPHILS # BLD AUTO: 0.05 K/UL
BASOPHILS NFR BLD: 0.5 %
BILIRUB SERPL-MCNC: 0.2 MG/DL
BUN SERPL-MCNC: 22 MG/DL
CALCIUM SERPL-MCNC: 9.1 MG/DL
CHLORIDE SERPL-SCNC: 103 MMOL/L
CHOLEST SERPL-MCNC: 215 MG/DL
CHOLEST/HDLC SERPL: 3 {RATIO}
CO2 SERPL-SCNC: 29 MMOL/L
CREAT SERPL-MCNC: 0.7 MG/DL
DIFFERENTIAL METHOD: ABNORMAL
EOSINOPHIL # BLD AUTO: 0.1 K/UL
EOSINOPHIL NFR BLD: 0.8 %
ERYTHROCYTE [DISTWIDTH] IN BLOOD BY AUTOMATED COUNT: 13.2 %
EST. GFR  (AFRICAN AMERICAN): >60 ML/MIN/1.73 M^2
EST. GFR  (NON AFRICAN AMERICAN): >60 ML/MIN/1.73 M^2
ESTIMATED AVG GLUCOSE: 94 MG/DL
GLUCOSE SERPL-MCNC: 91 MG/DL
HBA1C MFR BLD HPLC: 4.9 %
HCT VFR BLD AUTO: 41.6 %
HDLC SERPL-MCNC: 72 MG/DL
HDLC SERPL: 33.5 %
HGB BLD-MCNC: 13.6 G/DL
IMM GRANULOCYTES # BLD AUTO: 0.02 K/UL
IMM GRANULOCYTES NFR BLD AUTO: 0.2 %
LDLC SERPL CALC-MCNC: 120.6 MG/DL
LYMPHOCYTES # BLD AUTO: 1.3 K/UL
LYMPHOCYTES NFR BLD: 14.1 %
MCH RBC QN AUTO: 30.3 PG
MCHC RBC AUTO-ENTMCNC: 32.7 G/DL
MCV RBC AUTO: 93 FL
MONOCYTES # BLD AUTO: 0.6 K/UL
MONOCYTES NFR BLD: 7 %
NEUTROPHILS # BLD AUTO: 7.1 K/UL
NEUTROPHILS NFR BLD: 77.4 %
NONHDLC SERPL-MCNC: 143 MG/DL
NRBC BLD-RTO: 0 /100 WBC
PLATELET # BLD AUTO: 277 K/UL
PMV BLD AUTO: 9.8 FL
POTASSIUM SERPL-SCNC: 4.3 MMOL/L
PROT SERPL-MCNC: 6.9 G/DL
RBC # BLD AUTO: 4.49 M/UL
SODIUM SERPL-SCNC: 141 MMOL/L
TRIGL SERPL-MCNC: 112 MG/DL
TSH SERPL DL<=0.005 MIU/L-ACNC: 0.9 UIU/ML
WBC # BLD AUTO: 9.2 K/UL

## 2019-03-20 PROCEDURE — 84443 ASSAY THYROID STIM HORMONE: CPT

## 2019-03-20 PROCEDURE — 99386 PR PREVENTIVE VISIT,NEW,40-64: ICD-10-PCS | Mod: 25,S$GLB,, | Performed by: FAMILY MEDICINE

## 2019-03-20 PROCEDURE — 83036 HEMOGLOBIN GLYCOSYLATED A1C: CPT

## 2019-03-20 PROCEDURE — 90715 TDAP VACCINE GREATER THAN OR EQUAL TO 7YO IM: ICD-10-PCS | Mod: S$GLB,,, | Performed by: FAMILY MEDICINE

## 2019-03-20 PROCEDURE — 99999 PR PBB SHADOW E&M-EST. PATIENT-LVL IV: CPT | Mod: PBBFAC,,, | Performed by: FAMILY MEDICINE

## 2019-03-20 PROCEDURE — 90471 TDAP VACCINE GREATER THAN OR EQUAL TO 7YO IM: ICD-10-PCS | Mod: 59,S$GLB,, | Performed by: FAMILY MEDICINE

## 2019-03-20 PROCEDURE — 3079F DIAST BP 80-89 MM HG: CPT | Mod: CPTII,S$GLB,, | Performed by: FAMILY MEDICINE

## 2019-03-20 PROCEDURE — 90715 TDAP VACCINE 7 YRS/> IM: CPT | Mod: S$GLB,,, | Performed by: FAMILY MEDICINE

## 2019-03-20 PROCEDURE — 3077F SYST BP >= 140 MM HG: CPT | Mod: CPTII,S$GLB,, | Performed by: FAMILY MEDICINE

## 2019-03-20 PROCEDURE — 3077F PR MOST RECENT SYSTOLIC BLOOD PRESSURE >= 140 MM HG: ICD-10-PCS | Mod: CPTII,S$GLB,, | Performed by: FAMILY MEDICINE

## 2019-03-20 PROCEDURE — 80061 LIPID PANEL: CPT

## 2019-03-20 PROCEDURE — 80053 COMPREHEN METABOLIC PANEL: CPT

## 2019-03-20 PROCEDURE — 85025 COMPLETE CBC W/AUTO DIFF WBC: CPT

## 2019-03-20 PROCEDURE — 96372 PR INJECTION,THERAP/PROPH/DIAG2ST, IM OR SUBCUT: ICD-10-PCS | Mod: S$GLB,,, | Performed by: FAMILY MEDICINE

## 2019-03-20 PROCEDURE — 90471 IMMUNIZATION ADMIN: CPT | Mod: 59,S$GLB,, | Performed by: FAMILY MEDICINE

## 2019-03-20 PROCEDURE — 99386 PREV VISIT NEW AGE 40-64: CPT | Mod: 25,S$GLB,, | Performed by: FAMILY MEDICINE

## 2019-03-20 PROCEDURE — 99999 PR PBB SHADOW E&M-EST. PATIENT-LVL IV: ICD-10-PCS | Mod: PBBFAC,,, | Performed by: FAMILY MEDICINE

## 2019-03-20 PROCEDURE — 96372 THER/PROPH/DIAG INJ SC/IM: CPT | Mod: S$GLB,,, | Performed by: FAMILY MEDICINE

## 2019-03-20 PROCEDURE — 3079F PR MOST RECENT DIASTOLIC BLOOD PRESSURE 80-89 MM HG: ICD-10-PCS | Mod: CPTII,S$GLB,, | Performed by: FAMILY MEDICINE

## 2019-03-20 RX ORDER — CYANOCOBALAMIN 1000 UG/ML
1000 INJECTION, SOLUTION INTRAMUSCULAR; SUBCUTANEOUS
Status: COMPLETED | OUTPATIENT
Start: 2019-03-20 | End: 2019-03-20

## 2019-03-20 RX ORDER — PROPRANOLOL HYDROCHLORIDE 40 MG/1
40 TABLET ORAL 2 TIMES DAILY
Qty: 60 TABLET | Refills: 1 | Status: SHIPPED | OUTPATIENT
Start: 2019-03-20 | End: 2019-05-15 | Stop reason: SDUPTHER

## 2019-03-20 RX ADMIN — CYANOCOBALAMIN 1000 MCG: 1000 INJECTION, SOLUTION INTRAMUSCULAR; SUBCUTANEOUS at 10:03

## 2019-03-20 NOTE — PROGRESS NOTES
After obtaining consent, and per orders of Dr. Beard, injection of B12 Lot [id number] 8/20 given in the RD by BELL QUINN. Patient tolerated well and band aid applied. Patient instructed to remain in clinic for 15 minutes afterwards, and to report any adverse reaction to me immediately.    After obtaining consent, and per orders of Dr. Beadr, injection of tdap Lot [id number] Exp 12/15/20 given in the LD by BELL QUINN. Patient tolerated well and band aid applied. Patient instructed to remain in clinic for 15 minutes afterwards, and to report any adverse reaction to me immediately.

## 2019-03-20 NOTE — LETTER
March 20, 2019      Danette Alvares MD  [address] [address]           [facility] - Family Medicine/ Internal Medicine  [address] [facility] [address]  Phone: [phone]  Fax: [phone]          Patient: Carolin Lind   MR Number: [id number]   YOB: 1965   Date of Visit: 3/20/2019       Dear Dr. Danette Alvares:    Thank you for referring Carolin Lind to me for evaluation. Attached you will find relevant portions of my assessment and plan of care.    If you have questions, please do not hesitate to call me. I look forward to following Carolin Lind along with you.    Sincerely,    Luis Antonio Beard MD    Enclosure  CC:  No Recipients    If you would like to receive this communication electronically, please contact [email or web address] or [phone] to request more information on [employer] access.    For providers and/or their staff who would like to refer a patient to [facility], please contact us through our one-stop-shop provider referral line, [facility], at [phone].    If you feel you have received this communication in error or would no longer like to receive these types of communications, please e-mail [email or web address]

## 2019-03-20 NOTE — PATIENT INSTRUCTIONS
Prevention Guidelines, Women Ages 50 to 64  Screening tests and vaccines are an important part of managing your health. Health counseling is essential, too. Below are guidelines for these, for women ages 50 to 64. Talk with your healthcare provider to make sure youre up to date on what you need.  Screening Who needs it How often   Type 2 diabetes or prediabetes All adults beginning at age 45 and adults without symptoms at any age who are overweight or obese and have 1 or more additional risk factors for diabetes. At  least every 3 years   Alcohol misuse All women in this age group At routine exams   Blood pressure All women in this age group Every 2 years if your blood pressure is less than 120/80 mm Hg; yearly if your systolic blood pressure is 120 to 139 mm Hg, or your diastolic blood pressure reading is 80 to 89 mm Hg   Breast cancer All women in this age group Yearly mammogram and clinical breast exam1   Cervical cancer All women in this age group, except women who have had a complete hysterectomy Pap test every 3 years or Pap test with human papillomavirus (HPV) test every 5 years   Chlamydia Women at increased risk for infection At routine exams   Colorectal cancer All women in this age group Flexible sigmoidoscopy every 5 years, or colonoscopy every 10 years, or double-contrast barium enema every 5 years; yearly fecal occult blood test or fecal immunochemical test; or a stool DNA test as often as your health care provider advises; talk with your health care provider about which tests are best for you   Depression All women in this age group At routine exams   Gonorrhea Sexually active women at increased risk for infection At routine exams   Hepatitis C Anyone at increased risk; 1 time for those born between 1945 and 1965 At routine exams   High cholesterol or triglycerides All women in this age group who are at risk for coronary artery disease At least every 5 years   HIV All women At routine exams   Lung  cancer Adults age 55 to 80 who have smoked Yearly screening in smokers with 30 pack-year history of smoking or who quit within 15 years   Obesity All women in this age group At routine exams   Osteoporosis Women who are postmenopausal Ask your healthcare provider   Syphilis Women at increased risk for infection - talk with your healthcare provider At routine exams   Tuberculosis Women at increased risk for infection - talk with your healthcare provider Ask your healthcare provider   Vision All women in this age group Ask your healthcare provider   Vaccine Who needs it How often   Chickenpox (varicella) All women in this age group who have no record of this infection or vaccine 2 doses; the second dose should be given at least 4 weeks after the first dose   Hepatitis A Women at increased risk for infection - talk with your healthcare provider 2 doses given at least 6 months apart   Hepatitis B Women at increased risk for infection - talk with your healthcare provider 3 doses over 6 months; second dose should be given 1 month after the first dose; the third dose should be given at least 2 months after the second dose and at least 4 months after the first dose   Haemophilus influenzaeType B (HIB) Women at increased risk for infection - talk with your healthcare provider 1 to 3 doses   Influenza (flu) All women in this age group Once a year   Measles, mumps, rubella (MMR) Women in this age group through their late 50s who have no record of these infections or vaccines 1 dose   Meningococcal Women at increased risk for infection - talk with your healthcare provider 1 or more doses   Pneumococcal conjugate vaccine (PCV13) and pneumococcal polysaccharide vaccine (PPSV23) Women at increased risk for infection - talk with your healthcare provider PCV13: 1 dose ages 19 to 65 (protects against 13 types of pneumococcal bacteria)  PPSV23: 1 to 2 doses through age 64, or 1 dose at 65 or older (protects against 23 types of  pneumococcal bacteria)   Tetanus/diphtheria/pertussis (Td/Tdap) booster All women in this age group Td every 10 years, or a one-time dose of Tdap instead of a Td booster after age 18, then Td every 10 years   Zoster All women ages 60 and older 1 dose   Counseling Who needs it How often   BRCA gene mutation testing for breast and ovarian cancer susceptibility Women with increased risk for having gene mutation When your risk is known   Breast cancer and chemoprevention Women at high risk for breast cancer When your risk is known   Diet and exercise Women who are overweight or obese When diagnosed, and then at routine exams   Sexually transmitted infection prevention Women at increased risk for infection - talk with your healthcare provider At routine exams   Use of daily aspirin Women ages 55 and up in this age group who are at risk for cardiovascular health problems such as stroke When your risk is known   Use of tobacco and the health effects it can cause All women in this age group Every exam   1American [employer]  Date Last Reviewed: 1/26/2016  © 2262-2185 [employer]. [address] [place] [address], [place] [address]. All rights reserved. This information is not intended as a substitute for professional medical care. Always follow your healthcare professional's instructions.

## 2019-03-20 NOTE — PROGRESS NOTES
"[facility]  Clinic Note      Subjective:       Patient ID: Carolin Lind is a 54 y.o. female.    Chief Complaint: Establish Care    New patient is here today to establish care and for annual visit.  She is seen regularly by Dr. Alvares for routine women's care.  She is up to date with her mammogram and cervical cancer screening.  She is a previous [employer] , who then became a .  In 2017 she sustained left ankle injury, with complicated fracture requiring surgical fixation.  She notes the recovery from that injury has been difficult, and as a result she has not been able to continue with her typical exercise routine, including daily running and strength training.  She notes this has caused increased anxiety, and eventually increased blood pressure.  She has history of chronic headaches, which have been somewhat worse recently as well, but unclear if this is related to her blood pressures.  She is not checking BP at home just yet.  She maintains a healthy lifestyle and diet.  No chest pain or dyspnea.  Otherwise at baseline health, no recent illness or fever.      Review of Systems   Constitutional: Negative for fatigue and fever.   HENT: Negative for congestion, rhinorrhea and sore throat.    Respiratory: Negative for cough and shortness of breath.    Cardiovascular: Negative for chest pain and palpitations.   Gastrointestinal: Negative for abdominal pain, diarrhea, nausea and vomiting.   Genitourinary: Negative for dysuria, hematuria and menstrual problem.   Musculoskeletal: Positive for arthralgias and joint swelling.   Skin: Negative for rash and wound.   Neurological: Positive for headaches. Negative for dizziness, syncope and light-headedness.   Psychiatric/Behavioral: Negative for dysphoric mood and sleep disturbance. The patient is nervous/anxious.        Objective:      BP (!) 150/82   Pulse 82   Temp 97.1 °F (36.2 °C)   Ht 5' 9" (1.753 m)   Wt 65.9 kg (145 " lb 6.3 oz)   LMP 03/08/2019 (Exact Date)   SpO2 98%   BMI 21.47 kg/m²   Physical Exam   Constitutional: She is oriented to person, place, and time. She appears well-developed and well-nourished. No distress.   HENT:   Head: Normocephalic and atraumatic.   Right Ear: Tympanic membrane and ear canal normal. Tympanic membrane is not erythematous and not retracted. No middle ear effusion.   Left Ear: Tympanic membrane and ear canal normal. Tympanic membrane is not erythematous and not retracted.  No middle ear effusion.   Nose: Nose normal. No mucosal edema or rhinorrhea.   Mouth/Throat: Oropharynx is clear and moist and mucous membranes are normal. No posterior oropharyngeal edema or posterior oropharyngeal erythema.   Eyes: Conjunctivae are normal. Pupils are equal, round, and reactive to light.   Neck: Normal range of motion. No thyromegaly present.   Cardiovascular: Normal rate and regular rhythm.   No murmur heard.  Pulmonary/Chest: Effort normal and breath sounds normal. No respiratory distress. She has no wheezes. She has no rales.   Abdominal: Soft. Bowel sounds are normal. She exhibits no distension. There is no tenderness.   Musculoskeletal: Normal range of motion. She exhibits tenderness. She exhibits no edema.   Swelling and tenderness along left ankle   Lymphadenopathy:     She has no cervical adenopathy.   Neurological: She is alert and oriented to person, place, and time. No cranial nerve deficit or sensory deficit. She exhibits normal muscle tone.   Skin: Skin is warm and dry. No rash noted.   Psychiatric: She has a normal mood and affect.   Vitals reviewed.      Assessment:       1. Annual physical exam    2. Screening for lipid disorders    3. Screening for diabetes mellitus    4. Screen for colon cancer    5. Need for diphtheria-tetanus-pertussis (Tdap) vaccine    6. Benign hypertension    7. Mitral valve prolapse    8. B12 deficiency        Plan:     1. Annual physical exam  2. Screening for lipid  disorders  3. Screening for diabetes mellitus  4. Screen for colon cancer  5. Need for diphtheria-tetanus-pertussis (Tdap) vaccine  - routine health maintenance counseling provided, health history reviewed, preventive health testing recommended for age reviewed and tests ordered below   - - Comprehensive metabolic panel; Future  - - Lipid panel; Future  - - Hemoglobin A1c; Future  - - Case request GI: COLONOSCOPY  - adult immunization counseling provided  - - (In Office Administered) Tdap Vaccine    6. Benign hypertension  - diagnosis, prognosis, and potential complications reviewed  - additional evaluation measures reviewed, will check labs  - - CBC auto differential; Future  - - TSH; Future  - diet and exercise lifestyle modifications reviewed, patient is already adhering to these recommendations so have counseled there may not be any additional benefit to be gained  - thus we have reviewed treatment options, patient with chronic headaches and notes prior treatment   - - propranolol (INDERAL) 40 MG tablet; Take 1 tablet (40 mg total) by mouth 2 (two) times daily.  Dispense: 60 tablet; Refill: 1  - goal BP reviewed, have recommended to monitor blood pressures at home but try not to check more than once per day  - questions answered, warning signs reviewed, patient is comfortable with this plan and was encouraged to call the office for any concerns or worsening of condition    7. Mitral valve prolapse  - history of mitral valve prolapse, no murmur on exam today, will check echocardiogram to evaluate extent of disease  - - Transthoracic echo (TTE) complete (Cupid Only); Future    8. B12 deficiency  - chronic B12 deficiency, IM dosing today  - - cyanocobalamin injection 1,000 mcg     - Follow-up in about 4 weeks (around 4/17/2019) for follow-up blood pressure.     Luis Antonio Beard MD  3/20/2019

## 2019-03-21 ENCOUNTER — TELEPHONE (OUTPATIENT)
Dept: INTERNAL MEDICINE | Facility: CLINIC | Age: 54
End: 2019-03-21

## 2019-03-21 NOTE — TELEPHONE ENCOUNTER
Please call the patient to let her know I have reviewed her lab results, and everything looks great.  Her blood counts are normal and do not show any anemia.  Her electrolytes, blood sugar screening, and kidney/liver/thyroid function are all normal as well.  Her total cholesterol level is a bit elevated, but this is mostly because her good cholesterol is very strong.  No additional new medication would be recommended at this time, but continued healthy diet choices will help keep these numbers down.  We can plan to recheck in the future.  Thanks.

## 2019-03-21 NOTE — PROGRESS NOTES
HPI: 53 very active female status post ORIF left trimalleolar ankle fracture with syndesmosis fixation by  on 7/13/17.  She did not want the syndesmosis screws removed last year.  They went on to break.  She  has some pain in the ankle, sort of globally with some numbness in multiple distributions.  Pain is 4/10 at its worst with activity, relieved by rest largely, but not entirely.  She's been on gabapentin which got rid of the pain but gave her undesirable side effects.  She underwent PT after the injury and repeated recently with incomplete improvement. She stated that she has lateral ankle pain along the incision but feels deeper. She reports that she can feel like a cold or super hot intense pain in this area. Pain is intermittent. She also has a hypersensitivity to the medial ankle near the incision. She has a tightness and increased swelling with activity. She reports that this is effecting her lifestlye.     ROS:  Patient denies constitutional symptoms, cardiac symptoms, respiratory symptoms, GI symptoms.  The remainder of the musculoskeletal ROS is included in the HPI.    PE:    AA&O x 4.  NAD  HEENT:  NCAT, sclera nonicteric  Lungs:  Respirations are equal and unlabored.  CV:  2+ bilateral upper and lower extremity pulses.  Skin:  Intact throughout.    MS:  Incisions well healed.  ROM neutral to 25PF.  Mild LTS decrease plantar and dorsal.  No shininess to skin.  Mild TTP globally but not increased with proximal compression.    Rads:  Healed trimalleolar fractures.  Syndesmosis screws broken, the proximal one at both junctions and the distal at the fibula.  Similar to previous exams.     A/P:  Discussed with  her current situation and symptoms. At this time she will have her hardware removed. It has been fully explained to the patient that all of the hardware may not be able to be removed specifically the syndesmosis screws. Discussed and she fully understands that this may not relieve  her pain. Patient would like to move forward with the procedure. 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 She will contact our office with this information for coordination

## 2019-03-21 NOTE — TELEPHONE ENCOUNTER
"Informed pt Dr. Beard stated," Her blood counts are normal and do not show any anemia.  Her electrolytes, blood sugar screening, and kidney/liver/thyroid function are all normal as well.  Her total cholesterol level is a bit elevated, but this is mostly because her good cholesterol is very strong.  No additional new medication would be recommended at this time, but continued healthy diet choices will help keep these numbers down.  We can plan to recheck in the future. Pt understood and had no further questions.  "

## 2019-03-25 ENCOUNTER — HOSPITAL ENCOUNTER (OUTPATIENT)
Dept: CARDIOLOGY | Facility: CLINIC | Age: 54
Discharge: HOME | End: 2019-03-25
Attending: FAMILY MEDICINE
Payer: COMMERCIAL

## 2019-03-25 VITALS
DIASTOLIC BLOOD PRESSURE: 82 MMHG | BODY MASS INDEX: 21.48 KG/M2 | WEIGHT: 145 LBS | HEART RATE: 68 BPM | SYSTOLIC BLOOD PRESSURE: 150 MMHG | HEIGHT: 69 IN

## 2019-03-25 DIAGNOSIS — I34.1 MITRAL VALVE PROLAPSE: ICD-10-CM

## 2019-03-25 DIAGNOSIS — Z00.00 ANNUAL PHYSICAL EXAM: ICD-10-CM

## 2019-03-25 LAB
ASCENDING AORTA: 2.98 CM
AV INDEX (PROSTH): 0.83
AV MEAN GRADIENT: 2.97 MMHG
AV PEAK GRADIENT: 4.58 MMHG
AV VALVE AREA: 2.65 CM2
AV VELOCITY RATIO: 1.04
BSA FOR ECHO PROCEDURE: 1.79 M2
CV ECHO LV RWT: 0.35 CM
DOP CALC AO PEAK VEL: 1.07 M/S
DOP CALC AO VTI: 29.11 CM
DOP CALC LVOT AREA: 3.2 CM2
DOP CALC LVOT DIAMETER: 2.02 CM
DOP CALC LVOT PEAK VEL: 1.11 M/S
DOP CALC LVOT STROKE VOLUME: 77.26 CM3
DOP CALCLVOT PEAK VEL VTI: 24.12 CM
E WAVE DECELERATION TIME: 196.02 MSEC
E/A RATIO: 1.14
E/E' RATIO: 9.87
ECHO LV POSTERIOR WALL: 0.69 CM (ref 0.6–1.1)
FRACTIONAL SHORTENING: 37 % (ref 28–44)
INTERVENTRICULAR SEPTUM: 0.72 CM (ref 0.6–1.1)
IVRT: 0.08 MSEC
LA MAJOR: 4.48 CM
LA MINOR: 4.87 CM
LA WIDTH: 4.05 CM
LEFT ATRIUM SIZE: 3.27 CM
LEFT ATRIUM VOLUME INDEX: 29.2 ML/M2
LEFT ATRIUM VOLUME: 52.53 CM3
LEFT INTERNAL DIMENSION IN SYSTOLE: 2.5 CM (ref 2.1–4)
LEFT VENTRICLE DIASTOLIC VOLUME INDEX: 37.74 ML/M2
LEFT VENTRICLE DIASTOLIC VOLUME: 68 ML
LEFT VENTRICLE MASS INDEX: 43 G/M2
LEFT VENTRICLE SYSTOLIC VOLUME INDEX: 12.4 ML/M2
LEFT VENTRICLE SYSTOLIC VOLUME: 22.41 ML
LEFT VENTRICULAR INTERNAL DIMENSION IN DIASTOLE: 3.95 CM (ref 3.5–6)
LEFT VENTRICULAR MASS: 77.44 G
LV LATERAL E/E' RATIO: 9.25
LV SEPTAL E/E' RATIO: 10.57
MV PEAK A VEL: 0.65 M/S
MV PEAK E VEL: 0.74 M/S
PISA TR MAX VEL: 2.34 M/S
PULM VEIN S/D RATIO: 1.26
PV PEAK D VEL: 0.38 M/S
PV PEAK S VEL: 0.48 M/S
RA MAJOR: 4.63 CM
RA PRESSURE: 3 MMHG
RA WIDTH: 3.63 CM
RIGHT VENTRICULAR END-DIASTOLIC DIMENSION: 3.2 CM
RV TISSUE DOPPLER FREE WALL SYSTOLIC VELOCITY 1 (APICAL 4 CHAMBER VIEW): 11.2 M/S
SINUS: 3.14 CM
STJ: 2.67 CM
TDI LATERAL: 0.08
TDI SEPTAL: 0.07
TDI: 0.08
TR MAX PG: 21.9 MMHG
TRICUSPID ANNULAR PLANE SYSTOLIC EXCURSION: 2.25 CM
TV REST PULMONARY ARTERY PRESSURE: 25 MMHG

## 2019-03-25 PROCEDURE — 93306 TRANSTHORACIC ECHO (TTE) COMPLETE (CUPID ONLY): ICD-10-PCS | Mod: S$GLB,,, | Performed by: INTERNAL MEDICINE

## 2019-03-25 PROCEDURE — 93306 TTE W/DOPPLER COMPLETE: CPT | Mod: S$GLB,,, | Performed by: INTERNAL MEDICINE

## 2019-03-28 ENCOUNTER — TELEPHONE (OUTPATIENT)
Dept: INTERNAL MEDICINE | Facility: CLINIC | Age: 54
End: 2019-03-28

## 2019-03-28 NOTE — TELEPHONE ENCOUNTER
"Informed pt Dr. Beard stated,"Please let the patient know her echocardiogram report has been reviewed and everything is completely normal.  There is no mention of any mitral valve prolapse, or any other abnormality of her heart valves, so nothing to worry about. Pt understood and had no further questions.  "

## 2019-03-28 NOTE — TELEPHONE ENCOUNTER
Please let the patient know her echocardiogram report has been reviewed and everything is completely normal.  There is no mention of any mitral valve prolapse, or any other abnormality of her heart valves, so nothing to worry about.  Thanks.

## 2019-04-03 ENCOUNTER — TELEPHONE (OUTPATIENT)
Dept: ORTHOPEDICS | Facility: CLINIC | Age: 54
End: 2019-04-03

## 2019-04-03 NOTE — TELEPHONE ENCOUNTER
Spoke with pt.  Advised that I will discuss possible sx dates with Dr Winchester and call pt back to schedule a pre op visit with Ana Laura Greer PA-C to sign the surgery consents.  Pt verbalized understanding

## 2019-04-03 NOTE — TELEPHONE ENCOUNTER
----- Message from Nicki Combs MA sent at 4/3/2019  9:54 AM CDT -----  Contact: self      ----- Message -----  From: Tara Weir  Sent: 4/3/2019   9:36 AM  To: [facility] Staff    Pt was told to call back with date availability for surgery 4-10 to 4-30 is the pt availability.    Please call pt back if you have any questions @[phone].

## 2019-04-04 ENCOUNTER — PATIENT OUTREACH (OUTPATIENT)
Dept: ADMINISTRATIVE | Facility: HOSPITAL | Age: 54
End: 2019-04-04

## 2019-04-04 DIAGNOSIS — Z12.12 SCREENING FOR COLORECTAL CANCER: Primary | ICD-10-CM

## 2019-04-04 DIAGNOSIS — Z12.11 SCREENING FOR COLORECTAL CANCER: Primary | ICD-10-CM

## 2019-04-04 NOTE — PROGRESS NOTES
[facility] is committed to your overall health.  To help you get the most out of each of your visits, we will review your information to make sure you are up to date on all of your recommended tests and/or procedures.       Your PCP  Luis Antonio Beard MD   found that you may be due for:       Health Maintenance Due   Topic Date Due    Colonoscopy  02/22/2016             If you have had any of the above done at another facility, please bring the records or information with you so that your record at [facility] will be complete.  If you would like to schedule any of these, please contact me.     If you are currently taking medication, please bring it with you to your appointment for review.     Also, if you have any type of Advanced Directives, please bring them with you to your office visit so we may scan them into your chart.       Thank you for Choosing [facility] for your healthcare needs.        Additional Information  If you have questions, you can email [email or web address] or call [phone]  to talk to our [facility] staff. Remember, [facility] is NOT to be used for urgent needs. For medical emergencies, dial 911.

## 2019-04-05 RX ORDER — MELOXICAM 15 MG/1
TABLET ORAL
Qty: 30 TABLET | Refills: 0 | Status: SHIPPED | OUTPATIENT
Start: 2019-04-05 | End: 2019-05-05 | Stop reason: SDUPTHER

## 2019-04-09 ENCOUNTER — OFFICE VISIT (OUTPATIENT)
Dept: ORTHOPEDICS | Facility: CLINIC | Age: 54
End: 2019-04-09
Payer: COMMERCIAL

## 2019-04-09 VITALS
TEMPERATURE: 98 F | HEART RATE: 64 BPM | RESPIRATION RATE: 18 BRPM | SYSTOLIC BLOOD PRESSURE: 113 MMHG | BODY MASS INDEX: 21.48 KG/M2 | DIASTOLIC BLOOD PRESSURE: 71 MMHG | HEIGHT: 69 IN | WEIGHT: 145 LBS

## 2019-04-09 DIAGNOSIS — Z96.9 RETAINED ORTHOPEDIC HARDWARE: ICD-10-CM

## 2019-04-09 DIAGNOSIS — S82.852D CLOSED TRIMALLEOLAR FRACTURE OF LEFT ANKLE WITH ROUTINE HEALING, SUBSEQUENT ENCOUNTER: Primary | ICD-10-CM

## 2019-04-09 PROCEDURE — 99499 NO LOS: ICD-10-PCS | Mod: S$GLB,,, | Performed by: PHYSICIAN ASSISTANT

## 2019-04-09 PROCEDURE — 99499 UNLISTED E&M SERVICE: CPT | Mod: S$GLB,,, | Performed by: PHYSICIAN ASSISTANT

## 2019-04-09 PROCEDURE — 99999 PR PBB SHADOW E&M-EST. PATIENT-LVL V: ICD-10-PCS | Mod: PBBFAC,,, | Performed by: PHYSICIAN ASSISTANT

## 2019-04-09 PROCEDURE — 99999 PR PBB SHADOW E&M-EST. PATIENT-LVL V: CPT | Mod: PBBFAC,,, | Performed by: PHYSICIAN ASSISTANT

## 2019-04-09 RX ORDER — SODIUM CHLORIDE 9 MG/ML
INJECTION, SOLUTION INTRAVENOUS CONTINUOUS
Status: CANCELLED | OUTPATIENT
Start: 2019-04-09

## 2019-04-09 RX ORDER — MUPIROCIN 20 MG/G
OINTMENT TOPICAL
Status: CANCELLED | OUTPATIENT
Start: 2019-04-09

## 2019-04-09 NOTE — H&P (VIEW-ONLY)
Subjective:      Patient ID: Carolin Lind is a 54 y.o. female.    Chief Complaint: Pre-op Exam (left ankle)      53 very active female with PMHx migraine, anxiety/ depression, HTN, MVP of status post ORIF left trimalleolar ankle fracture with syndesmosis fixation by  on 7/13/17. Patient has had some ankle pain and plan is for removal of hardware.     Past Medical History:   Diagnosis Date    Abnormal Pap smear of cervix 01/30/2017    Atypical pap with + HPV    Anxiety     Depression     Heart murmur     History of migraine headaches     Menarche     Age of onset 13    Mitral valve prolapse     PONV (postoperative nausea and vomiting)      Past Surgical History:   Procedure Laterality Date    APPLICATION-EXTERNAL FIXATION DEVICE Left 7/6/2017    Per[facility] at [facility] OR [address] [place] [address]    AUGMENTATION OF BREAST      BREAST SURGERY      EXTERNAL FIXATOR APPLICATION      FIXATION-SYNDESMOSIS-ANKLE Left 7/13/2017    [facility]    OPEN REDUCTION INTERNAL FIXATION-ANKLE Left 7/13/2017    [facility]    REDUCTION-CLOSED Left 7/4/2017    [facility]    REMOVAL OF EXTERNAL FIXATION DEVICE Left 7/13/2017    Performed by [facility] MD at [facility] OR [address] [place] [address]     Social History     Occupational History    Not on file   Tobacco Use    Smoking status: Never Smoker    Smokeless tobacco: Never Used   Substance and Sexual Activity    Alcohol use: No    Drug use: No    Sexual activity: Yes     Partners: Male     Birth control/protection: None      ROS  Current Outpatient Medications on File Prior to Visit   Medication Sig Dispense Refill    FLUoxetine (PROZAC) 20 MG capsule Take 3 capsules (60 mg total) by mouth once daily. 90 capsule 5    gabapentin (NEURONTIN) 100 MG capsule Take 1 capsule (100 mg total) by mouth 2 (two) times daily. 60 capsule 11    gabapentin (NEURONTIN)  "300 MG capsule TAKE 1 CAPSULE(300 MG) BY MOUTH THREE TIMES DAILY 90 capsule 0    meloxicam (MOBIC) 15 MG tablet TAKE 1 TABLET(15 MG) BY MOUTH EVERY DAY 30 tablet 0    propranolol (INDERAL) 40 MG tablet Take 1 tablet (40 mg total) by mouth 2 (two) times daily. 60 tablet 1    sumatriptan succinate (SUMAVEL DOSEPRO) 6 mg/0.5 mL NfIj Inject 6 mg into the skin every 2 (two) hours as needed. 1 Needle-Free Injector Subcutaneous Every 2 hours 6 Device 3    sumatriptan succinate (ZEMBRACE SYMTOUCH) 3 mg/0.5 mL PnIj Inject 3 mg into the skin every 2 (two) hours as needed. 12 Syringe 3    venlafaxine (EFFEXOR-XR) 75 MG 24 hr capsule TAKE 1 CAPSULE(75 MG) BY MOUTH EVERY DAY 30 capsule 0    [DISCONTINUED] clonazePAM (KLONOPIN) 1 MG tablet TK 1 T PO QHS  5    aspirin 325 MG tablet Take 325 mg by mouth once daily.      butalbital-acetaminop-caf-cod -29-30 mg Cap TK 2 CS PO BID PRN  2    butalbital-acetaminophen-caffeine -40 mg (FIORICET, ESGIC) -40 mg per tablet Take 1 tablet by mouth every 4 (four) hours as needed for Pain.       No current facility-administered medications on file prior to visit.      Review of patient's allergies indicates:  No Known Allergies      Objective:      Vitals:    04/09/19 0848   BP: 113/71   Pulse: 64   Resp: 18   Temp: 98.2 °F (36.8 °C)   TempSrc: Oral   Weight: 65.8 kg (145 lb)   Height: 5' 9" (1.753 m)     Ortho Exam     Gen: WD, WN in NAD   HEENT: NC/AT   Heart: RR   Resp: unlabored breathing   Skin: intact, no lesions pertinent to the surgery site    Assessment:       1. Closed trimalleolar fracture of left ankle with routine healing, subsequent encounter    2. Retained orthopedic hardware          Plan:       Surgical intervention per .       "

## 2019-04-09 NOTE — PROGRESS NOTES
HPI: 53 very active female status post ORIF left trimalleolar ankle fracture with syndesmosis fixation by  on 7/13/17.  She did not want the syndesmosis screws removed last year.  They went on to break.  She  has some pain in the ankle, sort of globally with some numbness in multiple distributions.  Pain is 4/10 at its worst with activity, relieved by rest largely, but not entirely.  She's been on gabapentin which got rid of the pain but gave her undesirable side effects.  She underwent PT after the injury and repeated recently with incomplete improvement. She stated that she has lateral ankle pain along the incision but feels deeper. She reports that she can feel like a cold or super hot intense pain in this area. Pain is intermittent. She also has a hypersensitivity to the medial ankle near the incision. She has a tightness and increased swelling with activity. She reports that this is effecting her lifestlye.   I discussed this case with  and the plan is for removal of the hardware. Patient understands that due to the fractured hardware he may not be able to removal all pieces. She also understands that she may not get complete relief from this because her pain is multifactorial.      ROS:  Patient denies constitutional symptoms, cardiac symptoms, respiratory symptoms, GI symptoms.  The remainder of the musculoskeletal ROS is included in the HPI.    PE:    AA&O x 4.  NAD  HEENT:  NCAT, sclera nonicteric  Lungs:  Respirations are equal and unlabored.  CV:  2+ bilateral upper and lower extremity pulses.  Skin:  Intact throughout.    MS:  Incisions well healed.  ROM neutral to 25PF.  Mild LTS decrease plantar and dorsal.  No shininess to skin.  Mild TTP globally but not increased with proximal compression.    Rads:  None done today,.     A: s/p ORIF left ankle, retained orthopedic hardware     A/P:   Discussed treatment options with patient. Operative vs non-operative treatment discussed with  patient. Plan is for removal of hardware. Again patient understands that due to the fractured hardware he may not be able to removal all pieces. She also understands that she may not get complete relief from this because her pain is multifactorial.    Plan is for removal on 04/16/2019.      - consents signed,    - lab,previously , results in chart  - not taking blood thinners       Pre, missael, and post operative procedure and expectations were discussed.  Questions were answered. The patient has been educated and is ready to proceed with surgery.  Approximately 30 minutes was spent discussing surgical outcomes, plans, procedures, pre, missael, and post operative expectations and care. The risks, benefits and alternatives to surgery were discussed with the patient at great length.  These include bleeding, infection, vessel/nerve damage, pain, numbness, tingling, complex regional pain syndrome, hardware/surgical failure, need for further surgery, malunion, nonunion, DVT, PE, arthritis and death. He also understands that the risks of surgery may be greater for some patients due to health or lifestyle issues, such as a current condition or a history of heart disease, obesity, clotting disorders, recurrent infections, smoking, sedentary lifestyle, or noncompliance with medications, therapy, or followup. The degree of the increased risk is hard to estimate with any degree of precision.  Patient states an understanding and wishes to proceed with surgery.   All questions were answered.  No guarantees were implied or stated.  Informed consent was obtained  The patient will contact us if the have any questions, concerns, and changes in their medical condition prior to surgery.

## 2019-04-09 NOTE — H&P
Subjective:      Patient ID: Carolin Lind is a 54 y.o. female.    Chief Complaint: Pre-op Exam (left ankle)      53 very active female with PMHx migraine, anxiety/ depression, HTN, MVP of status post ORIF left trimalleolar ankle fracture with syndesmosis fixation by  on 7/13/17. Patient has had some ankle pain and plan is for removal of hardware.     Past Medical History:   Diagnosis Date    Abnormal Pap smear of cervix 01/30/2017    Atypical pap with + HPV    Anxiety     Depression     Heart murmur     History of migraine headaches     Menarche     Age of onset 13    Mitral valve prolapse     PONV (postoperative nausea and vomiting)      Past Surgical History:   Procedure Laterality Date    APPLICATION-EXTERNAL FIXATION DEVICE Left 7/6/2017    Performed by Jair Winchester [facility] at [facility] OR [address] [place] [address]    AUGMENTATION OF BREAST      BREAST SURGERY      EXTERNAL FIXATOR APPLICATION      FIXATION-SYNDESMOSIS-ANKLE Left 7/13/2017    [facility]    OPEN REDUCTION INTERNAL FIXATION-ANKLE Left 7/13/2017    [facility]    REDUCTION-CLOSED Left 7/4/2017    Performed by [facility] Sur[facility] at [facility]    REMOVAL OF EXTERNAL FIXATION DEVICE Left 7/13/2017    Performed by Jair Winchester MD at [facility] OR [address] [place] [address]     Social History     Occupational History    Not on file   Tobacco Use    Smoking status: Never Smoker    Smokeless tobacco: Never Used   Substance and Sexual Activity    Alcohol use: No    Drug use: No    Sexual activity: Yes     Partners: Male     Birth control/protection: None      ROS  Current Outpatient Medications on File Prior to Visit   Medication Sig Dispense Refill    FLUoxetine (PROZAC) 20 MG capsule Take 3 capsules (60 mg total) by mouth once daily. 90 capsule 5    gabapentin (NEURONTIN) 100 MG capsule Take 1 capsule (100 mg total) by mouth 2 (two) times daily. 60 capsule 11    gabapentin (NEURONTIN)  "300 MG capsule TAKE 1 CAPSULE(300 MG) BY MOUTH THREE TIMES DAILY 90 capsule 0    meloxicam (MOBIC) 15 MG tablet TAKE 1 TABLET(15 MG) BY MOUTH EVERY DAY 30 tablet 0    propranolol (INDERAL) 40 MG tablet Take 1 tablet (40 mg total) by mouth 2 (two) times daily. 60 tablet 1    sumatriptan succinate (SUMAVEL DOSEPRO) 6 mg/0.5 mL NfIj Inject 6 mg into the skin every 2 (two) hours as needed. 1 Needle-Free Injector Subcutaneous Every 2 hours 6 Device 3    sumatriptan succinate (ZEMBRACE SYMTOUCH) 3 mg/0.5 mL PnIj Inject 3 mg into the skin every 2 (two) hours as needed. 12 Syringe 3    venlafaxine (EFFEXOR-XR) 75 MG 24 hr capsule TAKE 1 CAPSULE(75 MG) BY MOUTH EVERY DAY 30 capsule 0    [DISCONTINUED] clonazePAM (KLONOPIN) 1 MG tablet TK 1 T PO QHS  5    aspirin 325 MG tablet Take 325 mg by mouth once daily.      butalbital-acetaminop-caf-cod -65-30 mg Cap TK 2 CS PO BID PRN  2    butalbital-acetaminophen-caffeine -40 mg (FIORICET, ESGIC) -40 mg per tablet Take 1 tablet by mouth every 4 (four) hours as needed for Pain.       No current facility-administered medications on file prior to visit.      Review of patient's allergies indicates:  No Known Allergies      Objective:      Vitals:    04/09/19 0848   BP: 113/71   Pulse: 64   Resp: 18   Temp: 98.2 °F (36.8 °C)   TempSrc: Oral   Weight: 65.8 kg (145 lb)   Height: 5' 9" (1.753 m)     Ortho Exam     Gen: WD, WN in NAD   HEENT: NC/AT   Heart: RR   Resp: unlabored breathing   Skin: intact, no lesions pertinent to the surgery site    Assessment:       1. Closed trimalleolar fracture of left ankle with routine healing, subsequent encounter    2. Retained orthopedic hardware          Plan:       Surgical intervention per .       "

## 2019-04-13 DIAGNOSIS — F33.1 MODERATE EPISODE OF RECURRENT MAJOR DEPRESSIVE DISORDER: ICD-10-CM

## 2019-04-13 DIAGNOSIS — F41.1 GENERALIZED ANXIETY DISORDER: ICD-10-CM

## 2019-04-13 RX ORDER — VENLAFAXINE HYDROCHLORIDE 75 MG/1
CAPSULE, EXTENDED RELEASE ORAL
Qty: 30 CAPSULE | Refills: 0 | Status: SHIPPED | OUTPATIENT
Start: 2019-04-13 | End: 2019-05-09 | Stop reason: SDUPTHER

## 2019-04-15 ENCOUNTER — ANESTHESIA EVENT (OUTPATIENT)
Dept: SURGERY | Facility: HOSPITAL | Age: 54
End: 2019-04-15
Payer: COMMERCIAL

## 2019-04-15 ENCOUNTER — TELEPHONE (OUTPATIENT)
Dept: ORTHOPEDICS | Facility: CLINIC | Age: 54
End: 2019-04-15

## 2019-04-15 RX ORDER — GABAPENTIN 300 MG/1
CAPSULE ORAL
Qty: 90 CAPSULE | Refills: 0 | Status: SHIPPED | OUTPATIENT
Start: 2019-04-15 | End: 2019-05-15 | Stop reason: SDUPTHER

## 2019-04-15 NOTE — PRE-PROCEDURE INSTRUCTIONS
Preop instructions: NPO solids/ milk products after midnight and clears up to 2 hours before arrival (clear liquids are: water, apple juice, Gatorade & Jell-O, black coffee/no milk, cream or creamer), shower instructions, directions, leave all valuables at home, medication instructions for PM prior & am of procedure explained. Patient stated an understanding.     Patient states has had PONV with anesthesia in the past, better with prophylaxis.     Patient does not know arrival time. Explained that this information comes from the surgeons office and if they have not heard from them by 3pm, to call office. Patient stated an understanding.

## 2019-04-15 NOTE — ANESTHESIA PREPROCEDURE EVALUATION
04/15/2019  Carolin Lind is a 54 y.o., female.    Active Ambulatory Problems     Diagnosis Date Noted    Migraine without aura, without mention of intractable migraine without mention of status migrainosus 07/31/2012    Closed left trimalleolar fracture     Generalized anxiety disorder 04/02/2018    Recurrent major depressive disorder 04/02/2018    Major depressive disorder, recurrent episode, moderate 08/23/2018    Benign hypertension 03/20/2019    B12 deficiency 03/20/2019    Retained orthopedic hardware 04/09/2019     Resolved Ambulatory Problems     Diagnosis Date Noted    No Resolved Ambulatory Problems     Past Medical History:   Diagnosis Date    Abnormal Pap smear of cervix 01/30/2017    Anxiety     Depression     Heart murmur     History of migraine headaches     Menarche     Mitral valve prolapse     PONV (postoperative nausea and vomiting)          Anesthesia Evaluation    I have reviewed the Patient Summary Reports.    I have reviewed the Nursing Notes.      Review of Systems  Anesthesia Hx:  Hx of Anesthetic complications (PONV)  Personal Hx of Anesthesia complications, Post-Operative Nausea/Vomiting, in the past, but not with recent anesthetics / prophylaxis   Cardiovascular:   Exercise tolerance: good Denies Hypertension.   Denies Angina.        Pulmonary:   Denies COPD.  Denies Asthma.  Denies Shortness of breath.  Denies Sleep Apnea.    Renal/:   Denies Chronic Renal Disease.     Hepatic/GI:   Denies GERD. Denies Liver Disease.    Neurological:   Denies CVA. Denies Seizures. Pre-existing sensory deficit from mid plantar surface of left foot to the toes   Endocrine:   Denies Diabetes. Denies Hypothyroidism.        Physical Exam  General:  Well nourished    Airway/Jaw/Neck:  Airway Findings: Mallampati: II TM Distance: Normal, at least 6 cm  Jaw/Neck Findings:  Neck ROM:  Normal ROM       Chest/Lungs:  Chest/Lungs Findings: Normal Respiratory Rate     Heart/Vascular:  Heart Findings: Rate: Normal        Mental Status:  Mental Status Findings:  Cooperative, Alert and Oriented         Anesthesia Plan  Type of Anesthesia, risks & benefits discussed:  Anesthesia Type:  general  Patient's Preference: GA  Intra-op Monitoring Plan: standard ASA monitors  Intra-op Monitoring Plan Comments:   Post Op Pain Control Plan: multimodal analgesia, IV/PO Opiods PRN and per primary service following discharge from PACU  Post Op Pain Control Plan Comments:   Induction:   IV  Beta Blocker:  Patient is not currently on a Beta-Blocker (No further documentation required).       Informed Consent: Patient understands risks and agrees with Anesthesia plan.  Questions answered. Anesthesia consent signed with patient.  ASA Score: 2     Day of Surgery Review of History & Physical:    H&P update referred to the surgeon.     Anesthesia Plan Notes: The patient's PMH was reviewed and PE was performed  Plan for GA        Ready For Surgery From Anesthesia Perspective.

## 2019-04-15 NOTE — TELEPHONE ENCOUNTER
Spoke with pt.  Advised nothing to eat after midnight.   Clear liquids until 330 am.  Arrival time 530 am  Pt verbalized understanding

## 2019-04-16 ENCOUNTER — ANESTHESIA (OUTPATIENT)
Dept: SURGERY | Facility: HOSPITAL | Age: 54
End: 2019-04-16
Payer: COMMERCIAL

## 2019-04-16 ENCOUNTER — HOSPITAL ENCOUNTER (OUTPATIENT)
Facility: HOSPITAL | Age: 54
Discharge: HOME | End: 2019-04-16
Attending: ORTHOPAEDIC SURGERY | Admitting: ORTHOPAEDIC SURGERY
Payer: COMMERCIAL

## 2019-04-16 ENCOUNTER — TELEPHONE (OUTPATIENT)
Dept: ORTHOPEDICS | Facility: CLINIC | Age: 54
End: 2019-04-16

## 2019-04-16 VITALS
WEIGHT: 142 LBS | HEART RATE: 62 BPM | TEMPERATURE: 98 F | SYSTOLIC BLOOD PRESSURE: 123 MMHG | RESPIRATION RATE: 16 BRPM | HEIGHT: 69 IN | OXYGEN SATURATION: 98 % | BODY MASS INDEX: 21.03 KG/M2 | DIASTOLIC BLOOD PRESSURE: 72 MMHG

## 2019-04-16 DIAGNOSIS — S82.852D CLOSED TRIMALLEOLAR FRACTURE OF LEFT ANKLE WITH ROUTINE HEALING, SUBSEQUENT ENCOUNTER: ICD-10-CM

## 2019-04-16 DIAGNOSIS — Z96.9 RETAINED ORTHOPEDIC HARDWARE: ICD-10-CM

## 2019-04-16 PROCEDURE — 25000003 PHARM REV CODE 250: Performed by: STUDENT IN AN ORGANIZED HEALTH CARE EDUCATION/TRAINING PROGRAM

## 2019-04-16 PROCEDURE — 64447 NJX AA&/STRD FEMORAL NRV IMG: CPT | Performed by: ANESTHESIOLOGY

## 2019-04-16 PROCEDURE — 63600175 PHARM REV CODE 636 W HCPCS: Performed by: NURSE ANESTHETIST, CERTIFIED REGISTERED

## 2019-04-16 PROCEDURE — 63600175 PHARM REV CODE 636 W HCPCS: Performed by: STUDENT IN AN ORGANIZED HEALTH CARE EDUCATION/TRAINING PROGRAM

## 2019-04-16 PROCEDURE — 71000045 HC DOSC ROUTINE RECOVERY EA ADD'L HR: Performed by: ORTHOPAEDIC SURGERY

## 2019-04-16 PROCEDURE — 15738 PR MUSCLE-SKIN FLAP,LEG: ICD-10-PCS | Mod: ,,, | Performed by: ORTHOPAEDIC SURGERY

## 2019-04-16 PROCEDURE — 63600175 PHARM REV CODE 636 W HCPCS: Performed by: PHYSICIAN ASSISTANT

## 2019-04-16 PROCEDURE — D9220A PRA ANESTHESIA: Mod: ANES,,, | Performed by: ANESTHESIOLOGY

## 2019-04-16 PROCEDURE — 15738 MUSCLE-SKIN GRAFT LEG: CPT | Mod: ,,, | Performed by: ORTHOPAEDIC SURGERY

## 2019-04-16 PROCEDURE — D9220A PRA ANESTHESIA: Mod: CRNA,,, | Performed by: NURSE ANESTHETIST, CERTIFIED REGISTERED

## 2019-04-16 PROCEDURE — 36000707: Performed by: ORTHOPAEDIC SURGERY

## 2019-04-16 PROCEDURE — 27800903 OPTIME MED/SURG SUP & DEVICES OTHER IMPLANTS: Performed by: ORTHOPAEDIC SURGERY

## 2019-04-16 PROCEDURE — 25000003 PHARM REV CODE 250: Performed by: ANESTHESIOLOGY

## 2019-04-16 PROCEDURE — D9220A PRA ANESTHESIA: ICD-10-PCS | Mod: ANES,,, | Performed by: ANESTHESIOLOGY

## 2019-04-16 PROCEDURE — 76942 POPLITEAL SCIATIC SINGLE INJECTION BLOCK: ICD-10-PCS | Mod: 26,,, | Performed by: ANESTHESIOLOGY

## 2019-04-16 PROCEDURE — 27201423 OPTIME MED/SURG SUP & DEVICES STERILE SUPPLY: Performed by: ORTHOPAEDIC SURGERY

## 2019-04-16 PROCEDURE — 20680 PR REMOVAL DEEP IMPLANT: ICD-10-PCS | Mod: 51,,, | Performed by: ORTHOPAEDIC SURGERY

## 2019-04-16 PROCEDURE — 25000003 PHARM REV CODE 250: Performed by: PHYSICIAN ASSISTANT

## 2019-04-16 PROCEDURE — 37000008 HC ANESTHESIA 1ST 15 MINUTES: Performed by: ORTHOPAEDIC SURGERY

## 2019-04-16 PROCEDURE — 71000016 HC POSTOP RECOV ADDL HR: Performed by: ORTHOPAEDIC SURGERY

## 2019-04-16 PROCEDURE — 36000706: Performed by: ORTHOPAEDIC SURGERY

## 2019-04-16 PROCEDURE — D9220A PRA ANESTHESIA: ICD-10-PCS | Mod: CRNA,,, | Performed by: NURSE ANESTHETIST, CERTIFIED REGISTERED

## 2019-04-16 PROCEDURE — 76942 ECHO GUIDE FOR BIOPSY: CPT | Performed by: ANESTHESIOLOGY

## 2019-04-16 PROCEDURE — 37000009 HC ANESTHESIA EA ADD 15 MINS: Performed by: ORTHOPAEDIC SURGERY

## 2019-04-16 PROCEDURE — 64447 ADDUCTOR CANAL SINGLE INJECTION BLOCK: ICD-10-PCS | Mod: 59,LT,, | Performed by: ANESTHESIOLOGY

## 2019-04-16 PROCEDURE — 25000003 PHARM REV CODE 250: Performed by: ORTHOPAEDIC SURGERY

## 2019-04-16 PROCEDURE — 71000015 HC POSTOP RECOV 1ST HR: Performed by: ORTHOPAEDIC SURGERY

## 2019-04-16 PROCEDURE — 64445 NJX AA&/STRD SCIATIC NRV IMG: CPT | Mod: 59,LT,, | Performed by: ANESTHESIOLOGY

## 2019-04-16 PROCEDURE — 20680 REMOVAL OF IMPLANT DEEP: CPT | Mod: 51,,, | Performed by: ORTHOPAEDIC SURGERY

## 2019-04-16 PROCEDURE — 64445 POPLITEAL SCIATIC SINGLE INJECTION BLOCK: ICD-10-PCS | Mod: 59,LT,, | Performed by: ANESTHESIOLOGY

## 2019-04-16 PROCEDURE — 71000044 HC DOSC ROUTINE RECOVERY FIRST HOUR: Performed by: ORTHOPAEDIC SURGERY

## 2019-04-16 DEVICE — BONE CHIP CANC 1.7-10MM 15ML: Type: IMPLANTABLE DEVICE | Site: ANKLE | Status: FUNCTIONAL

## 2019-04-16 RX ORDER — OXYCODONE HYDROCHLORIDE 5 MG/1
5 TABLET ORAL ONCE
Status: COMPLETED | OUTPATIENT
Start: 2019-04-16 | End: 2019-04-16

## 2019-04-16 RX ORDER — MUPIROCIN 20 MG/G
OINTMENT TOPICAL
Status: DISCONTINUED | OUTPATIENT
Start: 2019-04-16 | End: 2019-04-16 | Stop reason: HOSPADM

## 2019-04-16 RX ORDER — HYDROCODONE BITARTRATE AND ACETAMINOPHEN 5; 325 MG/1; MG/1
1 TABLET ORAL EVERY 6 HOURS PRN
Qty: 30 TABLET | Refills: 0 | Status: SHIPPED | OUTPATIENT
Start: 2019-04-16 | End: 2019-04-25 | Stop reason: SDUPTHER

## 2019-04-16 RX ORDER — CEFAZOLIN SODIUM 1 G/3ML
2 INJECTION, POWDER, FOR SOLUTION INTRAMUSCULAR; INTRAVENOUS
Status: COMPLETED | OUTPATIENT
Start: 2019-04-16 | End: 2019-04-16

## 2019-04-16 RX ORDER — PROPOFOL 10 MG/ML
VIAL (ML) INTRAVENOUS
Status: DISCONTINUED | OUTPATIENT
Start: 2019-04-16 | End: 2019-04-16

## 2019-04-16 RX ORDER — ONDANSETRON 2 MG/ML
4 INJECTION INTRAMUSCULAR; INTRAVENOUS EVERY 12 HOURS PRN
Status: DISCONTINUED | OUTPATIENT
Start: 2019-04-16 | End: 2019-04-16 | Stop reason: HOSPADM

## 2019-04-16 RX ORDER — MIDAZOLAM HYDROCHLORIDE 1 MG/ML
0.5 INJECTION INTRAMUSCULAR; INTRAVENOUS
Status: DISCONTINUED | OUTPATIENT
Start: 2019-04-16 | End: 2019-04-16 | Stop reason: HOSPADM

## 2019-04-16 RX ORDER — FENTANYL CITRATE 50 UG/ML
25 INJECTION, SOLUTION INTRAMUSCULAR; INTRAVENOUS EVERY 5 MIN PRN
Status: DISCONTINUED | OUTPATIENT
Start: 2019-04-16 | End: 2019-04-16 | Stop reason: HOSPADM

## 2019-04-16 RX ORDER — FENTANYL CITRATE 50 UG/ML
INJECTION, SOLUTION INTRAMUSCULAR; INTRAVENOUS
Status: DISCONTINUED | OUTPATIENT
Start: 2019-04-16 | End: 2019-04-16

## 2019-04-16 RX ORDER — BACITRACIN ZINC 500 UNIT/G
OINTMENT (GRAM) TOPICAL
Status: DISCONTINUED | OUTPATIENT
Start: 2019-04-16 | End: 2019-04-16 | Stop reason: HOSPADM

## 2019-04-16 RX ORDER — OXYCODONE HYDROCHLORIDE 5 MG/1
TABLET ORAL
Status: DISCONTINUED
Start: 2019-04-16 | End: 2019-04-16 | Stop reason: HOSPADM

## 2019-04-16 RX ORDER — PROPOFOL 10 MG/ML
VIAL (ML) INTRAVENOUS CONTINUOUS PRN
Status: DISCONTINUED | OUTPATIENT
Start: 2019-04-16 | End: 2019-04-16

## 2019-04-16 RX ORDER — HYDROCODONE BITARTRATE AND ACETAMINOPHEN 5; 325 MG/1; MG/1
1 TABLET ORAL EVERY 4 HOURS PRN
Status: DISCONTINUED | OUTPATIENT
Start: 2019-04-16 | End: 2019-04-16 | Stop reason: HOSPADM

## 2019-04-16 RX ORDER — SODIUM CHLORIDE 0.9 % (FLUSH) 0.9 %
10 SYRINGE (ML) INJECTION
Status: DISCONTINUED | OUTPATIENT
Start: 2019-04-16 | End: 2019-04-16 | Stop reason: HOSPADM

## 2019-04-16 RX ORDER — SODIUM CHLORIDE 9 MG/ML
INJECTION, SOLUTION INTRAVENOUS CONTINUOUS
Status: DISCONTINUED | OUTPATIENT
Start: 2019-04-16 | End: 2019-04-16 | Stop reason: HOSPADM

## 2019-04-16 RX ORDER — OXYCODONE HYDROCHLORIDE 5 MG/1
10 TABLET ORAL ONCE
Status: COMPLETED | OUTPATIENT
Start: 2019-04-16 | End: 2019-04-16

## 2019-04-16 RX ORDER — POLYETHYLENE GLYCOL 3350 17 G/17G
17 POWDER, FOR SOLUTION ORAL DAILY
Status: DISCONTINUED | OUTPATIENT
Start: 2019-04-16 | End: 2019-04-16 | Stop reason: HOSPADM

## 2019-04-16 RX ADMIN — OXYCODONE HYDROCHLORIDE 5 MG: 5 TABLET ORAL at 01:04

## 2019-04-16 RX ADMIN — CEFAZOLIN 2 G: 330 INJECTION, POWDER, FOR SOLUTION INTRAMUSCULAR; INTRAVENOUS at 07:04

## 2019-04-16 RX ADMIN — SODIUM CHLORIDE 1000 ML: 0.9 INJECTION, SOLUTION INTRAVENOUS at 06:04

## 2019-04-16 RX ADMIN — PROPOFOL 30 MG: 10 INJECTION, EMULSION INTRAVENOUS at 07:04

## 2019-04-16 RX ADMIN — MIDAZOLAM HYDROCHLORIDE 4 MG: 1 INJECTION, SOLUTION INTRAMUSCULAR; INTRAVENOUS at 07:04

## 2019-04-16 RX ADMIN — FENTANYL CITRATE 25 MCG: 50 INJECTION, SOLUTION INTRAMUSCULAR; INTRAVENOUS at 09:04

## 2019-04-16 RX ADMIN — HYDROCODONE BITARTRATE AND ACETAMINOPHEN 1 TABLET: 5; 325 TABLET ORAL at 11:04

## 2019-04-16 RX ADMIN — MUPIROCIN: 20 OINTMENT TOPICAL at 06:04

## 2019-04-16 RX ADMIN — OXYCODONE HYDROCHLORIDE 10 MG: 5 TABLET ORAL at 02:04

## 2019-04-16 RX ADMIN — MEPIVACAINE HYDROCHLORIDE 30 ML: 15 INJECTION, SOLUTION EPIDURAL; INFILTRATION at 07:04

## 2019-04-16 RX ADMIN — MEPIVACAINE HYDROCHLORIDE 10 ML: 15 INJECTION, SOLUTION EPIDURAL; INFILTRATION at 07:04

## 2019-04-16 RX ADMIN — PROPOFOL 150 MCG/KG/MIN: 10 INJECTION, EMULSION INTRAVENOUS at 07:04

## 2019-04-16 NOTE — ANESTHESIA PROCEDURE NOTES
Popliteal Sciatic Single Injection Block    Patient location during procedure: pre-op   Block not for primary anesthetic.  Reason for block: at surgeon's request and post-op pain management   Post-op Pain Location: Left ankle pain  Start time: 4/16/2019 7:05 AM  Timeout: 4/16/2019 7:00 AM   End time: 4/16/2019 7:25 AM  Staffing  Anesthesiologist: Dawn Paris MD  Resident/CRNA: Frederick Jain MD  Performed: resident/CRNA   Preanesthetic Checklist  Completed: patient identified, site marked, surgical consent, pre-op evaluation, timeout performed, IV checked, risks and benefits discussed and monitors and equipment checked  Peripheral Block  Patient position: supine  Prep: ChloraPrep  Patient monitoring: heart rate, cardiac monitor, continuous pulse ox, continuous capnometry and frequent blood pressure checks  Block type: popliteal  Laterality: left  Injection technique: single shot  Needle  Needle type: Stimuplex   Needle gauge: 21 G  Needle length: 4 in  Needle localization: anatomical landmarks and ultrasound guidance   -ultrasound image captured on disc.  Assessment  Injection assessment: negative aspiration, negative parasthesia and local visualized surrounding nerve  Paresthesia pain: none  Heart rate change: no  Slow fractionated injection: yes  Additional Notes  VSS.  [facility] RN monitoring vitals throughout procedure.  Patient tolerated procedure well.

## 2019-04-16 NOTE — PROGRESS NOTES
"Pt developed severe pain while dressing to be discharged.  Dr Dolan assessed pt and oral pain meds were given.  Pt now comfortable with tolerable pain level of "4" and ready to go home.  Waiting for family to return to unit prior to discharge.   "

## 2019-04-16 NOTE — ANESTHESIA POSTPROCEDURE EVALUATION
Anesthesia Post Evaluation    Patient: Carolin Lind    Procedure(s) Performed: Procedure(s) (LRB):  REMOVAL, HARDWARE, ANKLE (Left)  FLAP GRAFT - fasciocutaneous (Left)    Final Anesthesia Type: general  Patient location during evaluation: PACU  Patient participation: Yes- Able to Participate  Level of consciousness: awake and alert  Post-procedure vital signs: reviewed and stable  Pain management: adequate  Airway patency: patent  PONV status at discharge: No PONV  Anesthetic complications: no      Cardiovascular status: blood pressure returned to baseline  Respiratory status: unassisted, spontaneous ventilation and room air  Hydration status: euvolemic  Follow-up not needed.          Vitals Value Taken Time   /59 4/16/2019 12:57 PM   Temp 37.1 °C (98.7 °F) 4/16/2019 12:30 PM   Pulse 59 4/16/2019 12:57 PM   Resp 20 4/16/2019 12:57 PM   SpO2 98 % 4/16/2019 12:57 PM         No case tracking events are documented in the log.      Pain/Fabiola Score: Pain Rating Prior to Med Admin: 6 (4/16/2019 11:51 AM)  Pain Rating Post Med Admin: 3 (4/16/2019 12:28 PM)  Fabiola Score: 10 (4/16/2019 12:57 PM)

## 2019-04-16 NOTE — BRIEF OP NOTE
BRIEF OP NOTE    Preop Dx: Left trimalleolar ankle fracture and syndesmosis injury status post ORIF with broken syndesmosis screws and painful hardware    Postop Dx: Left trimalleolar ankle fracture and syndesmosis injury status post ORIF with broken syndesmosis screws and painful hardware    Procedure: 1.  Removal of hardware, deep, left ankle    2.  Fasciocutaneous advancement flaps left ankle wound 20cm    Surgeon: Jair Winchester M.D.    Asst:  Yaw More M.D    Anesthesia: MAC plus regional    EBL:  20cc    IVF:  1000cc crystalloid    Specimens: None    Findings: Stable syndesmosis.  All hwr removed    Dispo:  To PACU extubatedstable     Dict#  [id number]

## 2019-04-16 NOTE — TELEPHONE ENCOUNTER
----- Message from Sara Angel sent at 4/16/2019 12:03 PM CDT -----  Contact: Lorrie Funes is needing to schedule 2week post op appt,  Pt can be reached @[phone]

## 2019-04-16 NOTE — TELEPHONE ENCOUNTER
Left voice mail for pt to return my call.   Scheduled 2 wk post op appointment   Mailed appointment slip to pt's home address

## 2019-04-16 NOTE — ANESTHESIA PROCEDURE NOTES
Adductor Canal Single Injection Block    Patient location during procedure: pre-op   Block not for primary anesthetic.  Reason for block: at surgeon's request and post-op pain management   Post-op Pain Location: Left ankle pain  Start time: 4/16/2019 7:20 AM  Timeout: 4/16/2019 7:00 AM   End time: 4/16/2019 7:25 AM  Staffing  Anesthesiologist: Dawn Paris MD  Resident/CRNA: Frederick Jain MD  Performed: resident/CRNA   Preanesthetic Checklist  Completed: patient identified, site marked, surgical consent, pre-op evaluation, timeout performed, IV checked, risks and benefits discussed and monitors and equipment checked  Peripheral Block  Patient position: supine  Prep: ChloraPrep  Patient monitoring: heart rate, cardiac monitor, continuous pulse ox, continuous capnometry and frequent blood pressure checks  Block type: adductor canal  Laterality: left  Injection technique: single shot  Needle  Needle type: Stimuplex   Needle gauge: 21 G  Needle length: 4 in  Needle localization: anatomical landmarks and ultrasound guidance   -ultrasound image captured on disc.  Assessment  Injection assessment: negative aspiration, negative parasthesia and local visualized surrounding nerve  Paresthesia pain: none  Heart rate change: no  Slow fractionated injection: yes  Additional Notes  VSS.  [facility] RN monitoring vitals throughout procedure.  Patient tolerated procedure well.

## 2019-04-16 NOTE — INTERVAL H&P NOTE
No change in H&P.  To OR for removal of hardware left ankle plates and screws.  I explained that this may not get rid of all of her pain.  She has TTP at her tibialis anterior.  I will attempt to remove the broken syndesmosis screws in the fibula and between.  The risks, benefits and alternatives to surgery were discussed with the patient at great length.  These include bleeding, infection, vessel/nerve damage, pain, numbness, tingling, complex regional pain syndrome, hardware/surgical failure, need for further surgery, re-fracture through screw hole, DVT, PE, arthritis and death.  Patient states an understanding and wishes to proceed with surgery.   All questions were answered.  No guarantees were implied or stated.  Informed consent was obtained.          Active Hospital Problems    Diagnosis  POA    Retained orthopedic hardware [Z96.9]  Not Applicable      Resolved Hospital Problems   No resolved problems to display.

## 2019-04-16 NOTE — TRANSFER OF CARE
"Anesthesia Transfer of Care Note    Patient: Carolin Lind    Procedure(s) Performed: Procedure(s) (LRB):  REMOVAL, HARDWARE, ANKLE (Left)  FLAP GRAFT - fasciocutaneous (Left)    Patient location: [facility]    Anesthesia Type: regional and general    Transport from OR: Transported from OR on room air with adequate spontaneous ventilation    Post pain: adequate analgesia    Post assessment: no apparent anesthetic complications and tolerated procedure well    Post vital signs: stable    Level of consciousness: awake    Nausea/Vomiting: no nausea/vomiting    Complications: none    Transfer of care protocol was followed      Last vitals:   Visit Vitals  /70 (BP Location: Left arm, Patient Position: Lying)   Pulse (!) 54   Temp 36.7 °C (98 °F)   Resp 14   Ht 5' 9" (1.753 m)   Wt 64.4 kg (142 lb)   LMP 03/18/2019   SpO2 100%   Breastfeeding? No   BMI 20.97 kg/m²     "

## 2019-04-16 NOTE — DISCHARGE INSTRUCTIONS
charge Instructions: After Your Surgery  Youve just had surgery. During surgery, you were given medicine called anesthesia to keep you relaxed and free of pain. After surgery, you may have some pain or nausea. This is common. Here are some tips for feeling better and getting well after surgery.     Stay on schedule with your medicine.   Going home  Your healthcare provider will show you how to take care of yourself when you go home. He or she will also answer your questions. Have an adult family member or friend drive you home. For the first 24 hours after your surgery:  · Do not drive or use heavy equipment.  · Do not make important decisions or sign legal papers.  · Do not drink alcohol.  · Have someone stay with you, if needed. He or she can watch for problems and help keep you safe.  Be sure to go to all follow-up visits with your healthcare provider. And rest after your surgery for as long as your healthcare provider tells you to.  Coping with pain  If you have pain after surgery, pain medicine will help you feel better. Take it as told, before pain becomes severe. Also, ask your healthcare provider or pharmacist about other ways to control pain. This might be with heat, ice, or relaxation. And follow any other instructions your surgeon or nurse gives you.  Tips for taking pain medicine  To get the best relief possible, remember these points:  · Pain medicines can upset your stomach. Taking them with a little food may help.  · Most pain relievers taken by mouth need at least 20 to 30 minutes to start to work.  · Taking medicine on a schedule can help you remember to take it. Try to time your medicine so that you can take it before starting an activity. This might be before you get dressed, go for a walk, or sit down for dinner.  · Constipation is a common side effect of pain medicines. Call your healthcare provider before taking any medicines such as laxatives or stool softeners to help ease constipation. Also  ask if you should skip any foods. Drinking lots of fluids and eating foods such as fruits and vegetables that are high in fiber can also help. Remember, do not take laxatives unless your surgeon has prescribed them.  · Drinking alcohol and taking pain medicine can cause dizziness and slow your breathing. It can even be deadly. Do not drink alcohol while taking pain medicine.  · Pain medicine can make you react more slowly to things. Do not drive or run machinery while taking pain medicine.  Your healthcare provider may tell you to take acetaminophen to help ease your pain. Ask him or her how much you are supposed to take each day. Acetaminophen or other pain relievers may interact with your prescription medicines or other over-the-counter (OTC) medicines. Some prescription medicines have acetaminophen and other ingredients. Using both prescription and OTC acetaminophen for pain can cause you to overdose. Read the labels on your OTC medicines with care. This will help you to clearly know the list of ingredients, how much to take, and any warnings. It may also help you not take too much acetaminophen. If you have questions or do not understand the information, ask your pharmacist or healthcare provider to explain it to you before you take the OTC medicine.  Managing nausea  Some people have an upset stomach after surgery. This is often because of anesthesia, pain, or pain medicine, or the stress of surgery. These tips will help you handle nausea and eat healthy foods as you get better. If you were on a special food plan before surgery, ask your healthcare provider if you should follow it while you get better. These tips may help:  · Do not push yourself to eat. Your body will tell you when to eat and how much.  · Start off with clear liquids and soup. They are easier to digest.  · Next try semi-solid foods, such as mashed potatoes, applesauce, and gelatin, as you feel ready.  · Slowly move to solid foods. Dont eat  fatty, rich, or spicy foods at first.  · Do not force yourself to have 3 large meals a day. Instead eat smaller amounts more often.  · Take pain medicines with a small amount of solid food, such as crackers or toast, to avoid nausea.     Call your surgeon if  · You still have pain an hour after taking medicine. The medicine may not be strong enough.  · You feel too sleepy, dizzy, or groggy. The medicine may be too strong.  · You have side effects like nausea, vomiting, or skin changes, such as rash, itching, or hives.       If you have obstructive sleep apnea  You were given anesthesia medicine during surgery to keep you comfortable and free of pain. After surgery, you may have more apnea spells because of this medicine and other medicines you were given. The spells may last longer than usual.   At home:  · Keep using the continuous positive airway pressure (CPAP) device when you sleep. Unless your healthcare provider tells you not to, use it when you sleep, day or night. CPAP is a common device used to treat obstructive sleep apnea.  · Talk with your provider before taking any pain medicine, muscle relaxants, or sedatives. Your provider will tell you about the possible dangers of taking these medicines.  Date Last Reviewed: 12/1/2016  © 6838-9780 [employer]. [address] [place] [address]. All rights reserved. This information is not intended as a substitute for professional medical care. Always follow your healthcare professional's instructions.      Discharge instructions:    BATHING:     Keep the operation site dry for __________________________________.   Sponge bathe only.   You may shower __________________________________.    DRESSING:   Do not remove bandage.   Change your bandage ______________________________.   Reinforce your bandage if it becomes soiled with bloody fluid.   Remove your bandage _________________________. If there are skin tapes over the  incision, leave them in  place and wash over them. They should be removed in 7-10 days.  If they have not come off by themselves at that time, these can be removed easily in your  shower or bath.    ACTIVITY LEVEL:  If you have received sedation or an anesthetic, you may feel sleepy for several hours. Rest until you are more  awake. Gradually resume your normal activities.  SPECIAL RESTRICTIONS: ________________________________________________________________    DIET:  At home, continue with liquids, and if there is no nausea, you may eat a soft diet. Gradually resume your  normal diet.    MEDICATIONS:  You will receive instructions for any pain and/or antibiotic prescriptions. Pain medication should be taken only  if needed and as directed. Antibiotics should be taken as directed until the entire prescription is completed.  ADDITIONAL INFORMATION: ____________________________________________________________    WHEN TO CALL THE DOCTOR:   For any obvious bleeding (some dried blood over the incision is normal).   Redness or swelling around the incision.   Fever over 101ºF (38.4ºC).   Drainage (pus) from the wound.   Persistent pain not relieved by the pain medication.    RETURN APPOINTMENT: _______________________________________________________________    FOR EMERGENCIES:  If any unusual problems or difficulties occur, contact Dr. Winchester or the resident at  [phone] (page ) or at the Clinic office, _____________________

## 2019-04-17 NOTE — OP NOTE
DATE OF PROCEDURE:  04/16/2019    PREOPERATIVE DIAGNOSES:  Left trimalleolar ankle fracture and syndesmosis   injury, status post open reduction and internal fixation with broken syndesmosis   screws and painful hardware.    POSTOPERATIVE DIAGNOSES:  1.  Left trimalleolar ankle fracture and syndesmosis injury, status post open   reduction and internal fixation with broken syndesmosis screws and painful   hardware.  2.  Immobile scar tissue, left ankle.    PROCEDURES PERFORMED:  1.  Removal of hardware, deep left ankle.  2.  Fasciocutaneous advancement flaps, left ankle, 20 cm.    SURGEON:  Jair Winchester M.D.    ASSISTANT:  Yaw More.    ANESTHESIA:  Monitored anesthesia care plus regional.    ESTIMATED BLOOD LOSS:  20 mL.    IV FLUIDS:  1000 mL of crystalloid.    SPECIMENS:  None.    TOURNIQUET TIME:  90 minutes at 300 mmHg.    INDICATIONS FOR PROCEDURE:  The patient is a 54-year-old female who is now about   just under two years status post open reduction and internal fixation of her   left trimalleolar ankle fracture with syndesmosis fixation.  The patient did not   want syndesmosis screw removal early on, but subsequently has had pain around   the hardware and now has broken the syndesmosis screws.  We discussed all of her   options at length and she really wants to get the hardware out to see if this   is part of the reason for her pain.  The risks, benefits, and alternatives of   surgery were discussed at length with the patient prior to going to the   Operating Room including the fact that she may still have persistent pain after   this.  Informed consent was obtained.    PROCEDURE IN DETAIL:  The patient was identified in the preoperative holding   area and the site was marked.  Regional analgesia was performed.  The patient   was wheeled in the Operating Room and placed on the operating table in a supine   position.  Monitored anesthesia care was induced and preoperative antibiotics   were administered.   Left lower extremity was placed in a nonsterile tourniquet   and prepped and draped in a sterile fashion.  A timeout was undertaken to   confirm the patient, site, side, surgery, surgeon, and administration of   preoperative antibiotics.  All agreed and we proceeded.    The leg was exsanguinated and the tourniquet was raised.  I went through her   entire roughly 20 cm lateral incision where she had a long lateral plate.  I   dissected the skin and the first layer of subcutaneous tissue.  At this point,   she had just a sheath of scar with no mobility and those tissues were overlying   the peroneals.  I knew at this point in time I was going to have to mobilize   tissue in order to be able to close tension free.  I dissected down to the very   deep fascia and then I  the subcutaneous fascia from this about 2 to 3   cm anteriorly and posteriorly creating full thickness flaps going in both   directions and then at the apices of the injury until I had a very mobile soft   tissue envelope on the outside.  I then incised through the fascia of her   peroneal tendons and then took this directly with the plate distally, ensuring   to keep the superficial peroneal nerve anterior.  I dissected all the way around   the plate and I removed all the screws and then incised the soft tissue around   the plate entirely and then removed the plate.  At this point, I went after the   broken screws.    Two syndesmosis screws, the most distal, was broken right at the junction of the   fibula and I used a combination of a drill followed by a trephine and I was   able to remove this in its entirety.  The next screw had an intercalary   fragment.  I used a drill and then I used a trephine to get into the intercalary   fragment and removed this and then painstakingly got into the tibia to remove   the remainder of the hardware from the tibia with a trephine.    At this point, I turned my attention anterolateral, made an incision  through a   small anterolateral incision, dissected down to the level of the tibia, took the   first screw out, which we had a visible head with a little bit of bone around   it that I removed with a curette.  This came out in its entirety.  The distal   screw was buried under bone.  I had to drill a small hole to its head and I was   able to access the screw and removed this also in its entirety.    I worked my way around to the medial side.  She has a single medial malleolar   screw.  I went through her distal portion of her incision and used a #15 blade   to get through the soft tissues to the tip of the medial malleolus and then was   able to remove this screw in its entirety.  I had copiously irrigated all of her   wounds and then I let the tourniquet down.  Hemostasis was obtained with   electrocautery.  I took cancellous bone graft and filled the holes where I had   to trephine to remove the hardware at the syndesmosis screw site.  She did still   have good anterior and posterior cortices around these holes, although they   were fairly large.  I then stressed the syndesmosis under fluoroscopy and the   syndesmosis was stable.  I again copiously irrigated with normal saline solution   and then closed the deep fascia with 0 Vicryl suture directly overlying the   plate and the peroneals.  I then closed the subcutaneous fascia also that I had   mobilized with 0 Vicryl suture, the subcuticular tissue with inverted 3-0 Vicryl   suture, and the skin with running 3-0 nylon suture and the anterior lateral   incision I closed with inverted 3-0 Vicryl subcuticular sutures and a 3-0 nylon   suture in the skin.  Medial wound was closed also with 3-0 Vicryl suture and 3-0   nylon suture.  Sterile dressings were applied throughout.    All instrument and sponge counts were reported correct at the end of the case.    There were no complications.  The patient was awakened and taken to the Recovery   Room in a stable  condition.    PLAN FOR THE PATIENT:  At this point, I will protect her weightbearing a little   bit for a few weeks and then let her to begin full weightbearing after two weeks   but told her to avoid torque or any high impact activities for about eight   weeks.      KAYLA  dd: 04/16/2019 10:52:48 (CDT)  td: 04/17/2019 01:45:28 (CDT)  Doc ID   #[id number]  Job ID #[id number]    CC:

## 2019-04-25 ENCOUNTER — PATIENT MESSAGE (OUTPATIENT)
Dept: ORTHOPEDICS | Facility: CLINIC | Age: 54
End: 2019-04-25

## 2019-04-25 DIAGNOSIS — Z98.890 POST-OPERATIVE STATE: Primary | ICD-10-CM

## 2019-04-25 RX ORDER — HYDROCODONE BITARTRATE AND ACETAMINOPHEN 5; 325 MG/1; MG/1
1 TABLET ORAL EVERY 6 HOURS PRN
Qty: 42 TABLET | Refills: 0 | Status: SHIPPED | OUTPATIENT
Start: 2019-04-25 | End: 2019-04-26 | Stop reason: SDUPTHER

## 2019-04-26 ENCOUNTER — PATIENT MESSAGE (OUTPATIENT)
Dept: ORTHOPEDICS | Facility: CLINIC | Age: 54
End: 2019-04-26

## 2019-04-26 DIAGNOSIS — Z98.890 POST-OPERATIVE STATE: ICD-10-CM

## 2019-04-26 RX ORDER — HYDROCODONE BITARTRATE AND ACETAMINOPHEN 5; 325 MG/1; MG/1
1 TABLET ORAL EVERY 6 HOURS PRN
Qty: 42 TABLET | Refills: 0 | Status: SHIPPED | OUTPATIENT
Start: 2019-04-26 | End: 2019-05-15 | Stop reason: SDUPTHER

## 2019-04-30 ENCOUNTER — OFFICE VISIT (OUTPATIENT)
Dept: ORTHOPEDICS | Facility: CLINIC | Age: 54
End: 2019-04-30
Payer: COMMERCIAL

## 2019-04-30 VITALS
SYSTOLIC BLOOD PRESSURE: 126 MMHG | DIASTOLIC BLOOD PRESSURE: 79 MMHG | HEART RATE: 76 BPM | TEMPERATURE: 97 F | RESPIRATION RATE: 18 BRPM

## 2019-04-30 DIAGNOSIS — Z09 S/P ORTHOPEDIC SURGERY, FOLLOW-UP EXAM: Primary | ICD-10-CM

## 2019-04-30 PROCEDURE — 99024 POSTOP FOLLOW-UP VISIT: CPT | Mod: S$GLB,,, | Performed by: PHYSICIAN ASSISTANT

## 2019-04-30 PROCEDURE — 99999 PR PBB SHADOW E&M-EST. PATIENT-LVL IV: CPT | Mod: PBBFAC,,, | Performed by: PHYSICIAN ASSISTANT

## 2019-04-30 PROCEDURE — 99999 PR PBB SHADOW E&M-EST. PATIENT-LVL IV: ICD-10-PCS | Mod: PBBFAC,,, | Performed by: PHYSICIAN ASSISTANT

## 2019-04-30 PROCEDURE — 99024 PR POST-OP FOLLOW-UP VISIT: ICD-10-PCS | Mod: S$GLB,,, | Performed by: PHYSICIAN ASSISTANT

## 2019-04-30 NOTE — PROGRESS NOTES
Ms. Lind is here today for a post-operative visit after a   1.  Removal of hardware, deep left ankle.  2.  Fasciocutaneous advancement flaps, left ankle, 20 cm  by Dr. Winchester on 04/16/2019.    Interval History:    she reports that she is doing great.  Pain is controlld.  she is  taking pain medication.    Patient stated that she is feeling much much better. She stated that she feels like she has her life back.   she denies fever, chills, and sweats since the time of the surgery.     Physical exam:  Dressing taken down.  Incision is clean, dry and intact.  Sutures removed without difficulty.      RADS: none done today    Assessment:  Post-op visit ( 2weeks)    Plan:  Current care, treatment plan, precautions, activity level/ modifications, limitations, rehabilitation exercises and proposed future treatment were discussed with the patient. We discussed the need to monitor for changes in symptoms and condition and report them to the physician.  Discussed importance of compliance with all appointments and follow up examinations.   - The patient was advised to keep the incision clean and dry for the next 24 hours after which she may wash the area with antibacterial soap in the shower. Will not submerge until the incision is completely healed  -Patient was advised to monitor wound closely and multiple times daily for any problems. Call clinic immediately or report to ED for immediate medical attention for any complications including reopening of wound, drainage, purulence, redness, streaking, odor, pain out of proportion, fever, chills, etc.   - may slowly advance to weight bearing as tolerated and range of motion as tolerated.   - avoid any high impact activities  - pain medication: no refill needed,    Pain medication refill policy provided to patient for review.   - Patient is to return to clinic in 4 weeks  At time x-ray of her ankle is needed     If there are any questions prior to scheduled follow up, the patient  was instructed to contact the office

## 2019-05-06 RX ORDER — MELOXICAM 15 MG/1
TABLET ORAL
Qty: 30 TABLET | Refills: 0 | Status: SHIPPED | OUTPATIENT
Start: 2019-05-06 | End: 2019-06-09 | Stop reason: SDUPTHER

## 2019-05-09 DIAGNOSIS — F33.1 MODERATE EPISODE OF RECURRENT MAJOR DEPRESSIVE DISORDER: ICD-10-CM

## 2019-05-09 DIAGNOSIS — F41.1 GENERALIZED ANXIETY DISORDER: ICD-10-CM

## 2019-05-09 RX ORDER — VENLAFAXINE HYDROCHLORIDE 75 MG/1
CAPSULE, EXTENDED RELEASE ORAL
Qty: 30 CAPSULE | Refills: 0 | Status: SHIPPED | OUTPATIENT
Start: 2019-05-09 | End: 2019-06-09 | Stop reason: SDUPTHER

## 2019-05-15 ENCOUNTER — PATIENT MESSAGE (OUTPATIENT)
Dept: ORTHOPEDICS | Facility: CLINIC | Age: 54
End: 2019-05-15

## 2019-05-15 DIAGNOSIS — I10 BENIGN HYPERTENSION: ICD-10-CM

## 2019-05-15 DIAGNOSIS — Z98.890 POST-OPERATIVE STATE: ICD-10-CM

## 2019-05-15 RX ORDER — PROPRANOLOL HYDROCHLORIDE 40 MG/1
TABLET ORAL
Qty: 180 TABLET | Refills: 1 | Status: SHIPPED | OUTPATIENT
Start: 2019-05-15 | End: 2019-11-16 | Stop reason: SDUPTHER

## 2019-05-15 RX ORDER — GABAPENTIN 300 MG/1
CAPSULE ORAL
Qty: 90 CAPSULE | Refills: 0 | Status: SHIPPED | OUTPATIENT
Start: 2019-05-15 | End: 2019-06-13 | Stop reason: SDUPTHER

## 2019-05-15 RX ORDER — HYDROCODONE BITARTRATE AND ACETAMINOPHEN 5; 325 MG/1; MG/1
1 TABLET ORAL EVERY 6 HOURS PRN
Qty: 42 TABLET | Refills: 0 | Status: SHIPPED | OUTPATIENT
Start: 2019-05-15 | End: 2019-09-06

## 2019-05-28 ENCOUNTER — PATIENT MESSAGE (OUTPATIENT)
Dept: ORTHOPEDICS | Facility: CLINIC | Age: 54
End: 2019-05-28

## 2019-05-28 ENCOUNTER — HOSPITAL ENCOUNTER (OUTPATIENT)
Dept: RADIOLOGY | Facility: HOSPITAL | Age: 54
Discharge: HOME | End: 2019-05-28
Attending: PHYSICIAN ASSISTANT
Payer: COMMERCIAL

## 2019-05-28 ENCOUNTER — OFFICE VISIT (OUTPATIENT)
Dept: ORTHOPEDICS | Facility: CLINIC | Age: 54
End: 2019-05-28
Payer: COMMERCIAL

## 2019-05-28 VITALS — BODY MASS INDEX: 21.03 KG/M2 | WEIGHT: 142 LBS | HEIGHT: 69 IN

## 2019-05-28 DIAGNOSIS — S82.852D CLOSED TRIMALLEOLAR FRACTURE OF LEFT ANKLE WITH ROUTINE HEALING, SUBSEQUENT ENCOUNTER: ICD-10-CM

## 2019-05-28 DIAGNOSIS — S82.852D CLOSED TRIMALLEOLAR FRACTURE OF LEFT ANKLE WITH ROUTINE HEALING, SUBSEQUENT ENCOUNTER: Primary | ICD-10-CM

## 2019-05-28 PROCEDURE — 73610 XR ANKLE COMPLETE 3 VIEW LEFT: ICD-10-PCS | Mod: 26,LT,, | Performed by: RADIOLOGY

## 2019-05-28 PROCEDURE — 99999 PR PBB SHADOW E&M-EST. PATIENT-LVL III: ICD-10-PCS | Mod: PBBFAC,,, | Performed by: PHYSICIAN ASSISTANT

## 2019-05-28 PROCEDURE — 73610 X-RAY EXAM OF ANKLE: CPT | Mod: 26,LT,, | Performed by: RADIOLOGY

## 2019-05-28 PROCEDURE — 99024 POSTOP FOLLOW-UP VISIT: CPT | Mod: S$GLB,,, | Performed by: PHYSICIAN ASSISTANT

## 2019-05-28 PROCEDURE — 99024 PR POST-OP FOLLOW-UP VISIT: ICD-10-PCS | Mod: S$GLB,,, | Performed by: PHYSICIAN ASSISTANT

## 2019-05-28 PROCEDURE — 73610 X-RAY EXAM OF ANKLE: CPT | Mod: TC,LT

## 2019-05-28 PROCEDURE — 99999 PR PBB SHADOW E&M-EST. PATIENT-LVL III: CPT | Mod: PBBFAC,,, | Performed by: PHYSICIAN ASSISTANT

## 2019-05-28 NOTE — PROGRESS NOTES
Ms. Lind is here today for a post-operative visit after a   1.  Removal of hardware, deep left ankle.  2.  Fasciocutaneous advancement flaps, left ankle, 20 cm  by Dr. Winchester on 04/16/2019.    Interval History:    she reports that she is doing great.  Pain is controlld.  she is  taking pain medication is weaning.    Patient stated that she is still feeling much much better, but she has noticed that the process is slower than she thought.   she denies fever, chills, and sweats since the time of the surgery.     Physical exam:  Arrived to clinic on a knee scooter . Incision well healed stitch abscess to distal incision, no signs of infection, full range of motion      RADS: The metallic fixating devices as been removed from the distal tibia and fibula.  Transverse fracture line noted in the distal fibula as seen on the lateral view.  The position and alignment is satisfactory.    Assessment:  Post-op visit ( 6weeks)    Plan:  Current care, treatment plan, precautions, activity level/ modifications, limitations, rehabilitation exercises and proposed future treatment were discussed with the patient. We discussed the need to monitor for changes in symptoms and condition and report them to the physician.  Discussed importance of compliance with all appointments and follow up examinations.   -  she may wash the area with antibacterial soap in the shower. Will not submerge until the incision is completely healed  -Patient was advised to monitor wound closely and multiple times daily for any problems. Call clinic immediately or report to ED for immediate medical attention for any complications including reopening of wound, drainage, purulence, redness, streaking, odor, pain out of proportion, fever, chills, etc.   - may slowly advance to weight bearing as tolerated and range of motion as tolerated.   - avoid any high impact activities over next few months than may slowly start  - pain medication: no refill needed,    Pain  medication refill policy provided to patient for review.   - Patient is to return to clinic is any increase in pain  At time x-ray of her ankle is needed     If there are any questions prior to scheduled follow up, the patient was instructed to contact the office

## 2019-06-04 DIAGNOSIS — F33.1 MODERATE EPISODE OF RECURRENT MAJOR DEPRESSIVE DISORDER: ICD-10-CM

## 2019-06-04 DIAGNOSIS — F41.1 GENERALIZED ANXIETY DISORDER: ICD-10-CM

## 2019-06-04 RX ORDER — FLUOXETINE HYDROCHLORIDE 20 MG/1
CAPSULE ORAL
Qty: 90 CAPSULE | Refills: 0 | Status: SHIPPED | OUTPATIENT
Start: 2019-06-04 | End: 2019-07-04 | Stop reason: SDUPTHER

## 2019-06-09 DIAGNOSIS — F33.1 MODERATE EPISODE OF RECURRENT MAJOR DEPRESSIVE DISORDER: ICD-10-CM

## 2019-06-09 DIAGNOSIS — F41.1 GENERALIZED ANXIETY DISORDER: ICD-10-CM

## 2019-06-09 RX ORDER — VENLAFAXINE HYDROCHLORIDE 75 MG/1
CAPSULE, EXTENDED RELEASE ORAL
Qty: 30 CAPSULE | Refills: 0 | Status: SHIPPED | OUTPATIENT
Start: 2019-06-09 | End: 2019-07-09 | Stop reason: SDUPTHER

## 2019-06-09 RX ORDER — MELOXICAM 15 MG/1
TABLET ORAL
Qty: 30 TABLET | Refills: 0 | Status: SHIPPED | OUTPATIENT
Start: 2019-06-09 | End: 2019-09-06 | Stop reason: ALTCHOICE

## 2019-06-11 ENCOUNTER — PATIENT MESSAGE (OUTPATIENT)
Dept: ORTHOPEDICS | Facility: CLINIC | Age: 54
End: 2019-06-11

## 2019-06-12 ENCOUNTER — OFFICE VISIT (OUTPATIENT)
Dept: ORTHOPEDICS | Facility: CLINIC | Age: 54
End: 2019-06-12
Payer: COMMERCIAL

## 2019-06-12 VITALS
TEMPERATURE: 98 F | WEIGHT: 140.56 LBS | HEIGHT: 69 IN | SYSTOLIC BLOOD PRESSURE: 132 MMHG | HEART RATE: 63 BPM | RESPIRATION RATE: 18 BRPM | DIASTOLIC BLOOD PRESSURE: 75 MMHG | BODY MASS INDEX: 20.82 KG/M2

## 2019-06-12 DIAGNOSIS — Z51.89 VISIT FOR WOUND CHECK: Primary | ICD-10-CM

## 2019-06-12 PROCEDURE — 99999 PR PBB SHADOW E&M-EST. PATIENT-LVL III: ICD-10-PCS | Mod: PBBFAC,,, | Performed by: PHYSICIAN ASSISTANT

## 2019-06-12 PROCEDURE — 99024 POSTOP FOLLOW-UP VISIT: CPT | Mod: S$GLB,,, | Performed by: PHYSICIAN ASSISTANT

## 2019-06-12 PROCEDURE — 99999 PR PBB SHADOW E&M-EST. PATIENT-LVL III: CPT | Mod: PBBFAC,,, | Performed by: PHYSICIAN ASSISTANT

## 2019-06-12 PROCEDURE — 99024 PR POST-OP FOLLOW-UP VISIT: ICD-10-PCS | Mod: S$GLB,,, | Performed by: PHYSICIAN ASSISTANT

## 2019-06-12 RX ORDER — SULFAMETHOXAZOLE AND TRIMETHOPRIM 800; 160 MG/1; MG/1
1 TABLET ORAL 2 TIMES DAILY
Qty: 14 TABLET | Refills: 0 | Status: SHIPPED | OUTPATIENT
Start: 2019-06-12 | End: 2019-06-19

## 2019-06-13 ENCOUNTER — PATIENT MESSAGE (OUTPATIENT)
Dept: ADMINISTRATIVE | Facility: OTHER | Age: 54
End: 2019-06-13

## 2019-06-14 RX ORDER — GABAPENTIN 300 MG/1
CAPSULE ORAL
Qty: 90 CAPSULE | Refills: 0 | Status: SHIPPED | OUTPATIENT
Start: 2019-06-14 | End: 2019-11-14

## 2019-06-14 NOTE — PROGRESS NOTES
Ms. Lind is here today for a post-operative visit after a   1.  Removal of hardware, deep left ankle.  2.  Fasciocutaneous advancement flaps, left ankle, 20 cm  by Dr. Winchester on 04/16/2019.    Interval History:    she reports that she is getting what seems like suture abscess along her incision. She reports they will scab then weep. Deneid fever chills increased warmth.      Physical exam:  Walked in to clinic unassisted.  Incision  stitch abscess to distal along incision line, medial incision has some erythema no drainage    RADS: none done today    Assessment:  Post-op visit / wound check    Plan:  Current care, treatment plan, precautions, activity level/ modifications, limitations, rehabilitation exercises and proposed future treatment were discussed with the patient. We discussed the need to monitor for changes in symptoms and condition and report them to the physician.  Discussed importance of compliance with all appointments and follow up examinations.   -  she may wash the area with antibacterial soap in the shower. Will not submerge until the incision is completely healed  -Patient was advised to monitor wound closely and multiple times daily for any problems. Call clinic immediately or report to ED for immediate medical attention for any complications including reopening of wound, drainage, purulence, redness, streaking, odor, pain out of proportion, fever, chills, etc.   - may slowly advance to weight bearing as tolerated and range of motion as tolerated.   - avoid any high impact activities over next few months than may slowly start  - pain medication: no refill needed,    Pain medication refill policy provided to patient for review.   - prophylactic Bactrim given   - Patient is to return to clinic is any increase in pain or changes in wound  At time x-ray of her ankle is needed     If there are any questions prior to scheduled follow up, the patient was instructed to contact the office

## 2019-07-04 DIAGNOSIS — F33.1 MODERATE EPISODE OF RECURRENT MAJOR DEPRESSIVE DISORDER: ICD-10-CM

## 2019-07-04 DIAGNOSIS — F41.1 GENERALIZED ANXIETY DISORDER: ICD-10-CM

## 2019-07-04 RX ORDER — FLUOXETINE HYDROCHLORIDE 20 MG/1
CAPSULE ORAL
Qty: 90 CAPSULE | Refills: 0 | Status: SHIPPED | OUTPATIENT
Start: 2019-07-04 | End: 2019-08-27 | Stop reason: SDUPTHER

## 2019-07-09 DIAGNOSIS — F41.1 GENERALIZED ANXIETY DISORDER: ICD-10-CM

## 2019-07-09 DIAGNOSIS — F33.1 MODERATE EPISODE OF RECURRENT MAJOR DEPRESSIVE DISORDER: ICD-10-CM

## 2019-07-09 RX ORDER — VENLAFAXINE HYDROCHLORIDE 75 MG/1
CAPSULE, EXTENDED RELEASE ORAL
Qty: 30 CAPSULE | Refills: 0 | Status: SHIPPED | OUTPATIENT
Start: 2019-07-09 | End: 2019-08-09 | Stop reason: SDUPTHER

## 2019-07-16 RX ORDER — MELOXICAM 15 MG/1
TABLET ORAL
Qty: 30 TABLET | Refills: 0 | OUTPATIENT
Start: 2019-07-16

## 2019-07-16 RX ORDER — GABAPENTIN 300 MG/1
CAPSULE ORAL
Qty: 90 CAPSULE | Refills: 0 | OUTPATIENT
Start: 2019-07-16

## 2019-07-18 ENCOUNTER — PATIENT MESSAGE (OUTPATIENT)
Dept: ADMINISTRATIVE | Facility: HOSPITAL | Age: 54
End: 2019-07-18

## 2019-07-26 ENCOUNTER — PATIENT MESSAGE (OUTPATIENT)
Dept: PSYCHIATRY | Facility: CLINIC | Age: 54
End: 2019-07-26

## 2019-07-31 ENCOUNTER — TELEPHONE (OUTPATIENT)
Dept: ORTHOPEDICS | Facility: CLINIC | Age: 54
End: 2019-07-31

## 2019-07-31 DIAGNOSIS — S82.852D CLOSED TRIMALLEOLAR FRACTURE OF LEFT ANKLE WITH ROUTINE HEALING, SUBSEQUENT ENCOUNTER: Primary | ICD-10-CM

## 2019-07-31 NOTE — TELEPHONE ENCOUNTER
Spoke with pt.  Scheduled her to see Dr Winchester 8/7 at 915 am with xrays at 845 am   Pt verbalized understanding

## 2019-08-03 ENCOUNTER — PATIENT MESSAGE (OUTPATIENT)
Dept: PSYCHIATRY | Facility: CLINIC | Age: 54
End: 2019-08-03

## 2019-08-07 ENCOUNTER — OFFICE VISIT (OUTPATIENT)
Dept: ORTHOPEDICS | Facility: CLINIC | Age: 54
End: 2019-08-07
Payer: COMMERCIAL

## 2019-08-07 ENCOUNTER — HOSPITAL ENCOUNTER (OUTPATIENT)
Dept: RADIOLOGY | Facility: HOSPITAL | Age: 54
Discharge: HOME | End: 2019-08-07
Attending: ORTHOPAEDIC SURGERY
Payer: COMMERCIAL

## 2019-08-07 DIAGNOSIS — M25.672 ANKLE STIFFNESS, LEFT: ICD-10-CM

## 2019-08-07 DIAGNOSIS — S82.852D CLOSED TRIMALLEOLAR FRACTURE OF LEFT ANKLE WITH ROUTINE HEALING, SUBSEQUENT ENCOUNTER: ICD-10-CM

## 2019-08-07 DIAGNOSIS — S82.852D CLOSED TRIMALLEOLAR FRACTURE OF LEFT ANKLE WITH ROUTINE HEALING, SUBSEQUENT ENCOUNTER: Primary | ICD-10-CM

## 2019-08-07 PROCEDURE — 99999 PR PBB SHADOW E&M-EST. PATIENT-LVL II: CPT | Mod: PBBFAC,,, | Performed by: ORTHOPAEDIC SURGERY

## 2019-08-07 PROCEDURE — 73610 XR ANKLE COMPLETE 3 VIEW LEFT: ICD-10-PCS | Mod: 26,LT,, | Performed by: RADIOLOGY

## 2019-08-07 PROCEDURE — 99214 PR OFFICE/OUTPT VISIT, EST, LEVL IV, 30-39 MIN: ICD-10-PCS | Mod: S$GLB,,, | Performed by: ORTHOPAEDIC SURGERY

## 2019-08-07 PROCEDURE — 73610 X-RAY EXAM OF ANKLE: CPT | Mod: TC,LT

## 2019-08-07 PROCEDURE — 99214 OFFICE O/P EST MOD 30 MIN: CPT | Mod: S$GLB,,, | Performed by: ORTHOPAEDIC SURGERY

## 2019-08-07 PROCEDURE — 99999 PR PBB SHADOW E&M-EST. PATIENT-LVL II: ICD-10-PCS | Mod: PBBFAC,,, | Performed by: ORTHOPAEDIC SURGERY

## 2019-08-07 PROCEDURE — 73610 X-RAY EXAM OF ANKLE: CPT | Mod: 26,LT,, | Performed by: RADIOLOGY

## 2019-08-09 DIAGNOSIS — F41.1 GENERALIZED ANXIETY DISORDER: ICD-10-CM

## 2019-08-09 DIAGNOSIS — F33.1 MODERATE EPISODE OF RECURRENT MAJOR DEPRESSIVE DISORDER: ICD-10-CM

## 2019-08-11 NOTE — PROGRESS NOTES
HPI: 53 very active female status post ORIF left trimalleolar ankle fracture with syndesmosis fixation by me on 7/13/17.  She did not want the syndesmosis screws removed last year.  They went on to break.  I removed her syndesmosis screws and mobilized the surrounding soft tissue on 4/16/19.  She's had significant improvement of her pain and increased function in the left ankle since the hwr removal, but is still not up to her pre-injury level of function.  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      ROS:  Patient denies constitutional symptoms, cardiac symptoms, respiratory symptoms, GI symptoms.  The remainder of the musculoskeletal ROS is included in the HPI.    PE:    AA&O x 4.  NAD  HEENT:  NCAT, sclera nonicteric  Lungs:  Respirations are equal and unlabored.  CV:  2+ bilateral upper and lower extremity pulses.  Skin:  Intact throughout.    MS:  Incisions well healed.  ROM 5DF to 25PF.  Mild numbness mixed with some hyperasthesia about the lateral incision.      Rads:  Healed trimalleolar fractures.  All hwr out.  Some bony growth in prior area of syndesmosis screws.      A/P:  She is now 4 months post hwr removal and is doing much better than before, but still not up to where she wants to be functionally.  I explained that she may never get back to that level and there would likely be residual stiffness from the injury, but I do think she can improve her function more.  I will send her back to PT to work on strength, flexibility and stabilization exercises.

## 2019-08-13 RX ORDER — VENLAFAXINE HYDROCHLORIDE 75 MG/1
CAPSULE, EXTENDED RELEASE ORAL
Qty: 30 CAPSULE | Refills: 0 | Status: SHIPPED | OUTPATIENT
Start: 2019-08-13 | End: 2019-09-09 | Stop reason: SDUPTHER

## 2019-08-15 ENCOUNTER — PATIENT MESSAGE (OUTPATIENT)
Dept: ORTHOPEDICS | Facility: CLINIC | Age: 54
End: 2019-08-15

## 2019-08-27 DIAGNOSIS — F33.1 MODERATE EPISODE OF RECURRENT MAJOR DEPRESSIVE DISORDER: ICD-10-CM

## 2019-08-27 DIAGNOSIS — F41.1 GENERALIZED ANXIETY DISORDER: ICD-10-CM

## 2019-08-27 RX ORDER — FLUOXETINE HYDROCHLORIDE 20 MG/1
CAPSULE ORAL
Qty: 90 CAPSULE | Refills: 0 | Status: SHIPPED | OUTPATIENT
Start: 2019-08-27 | End: 2019-09-16 | Stop reason: SDUPTHER

## 2019-09-03 ENCOUNTER — PATIENT MESSAGE (OUTPATIENT)
Dept: SLEEP MEDICINE | Facility: CLINIC | Age: 54
End: 2019-09-03

## 2019-09-03 ENCOUNTER — PATIENT MESSAGE (OUTPATIENT)
Dept: ORTHOPEDICS | Facility: CLINIC | Age: 54
End: 2019-09-03

## 2019-09-05 ENCOUNTER — PATIENT MESSAGE (OUTPATIENT)
Dept: ORTHOPEDICS | Facility: CLINIC | Age: 54
End: 2019-09-05

## 2019-09-05 ENCOUNTER — PATIENT OUTREACH (OUTPATIENT)
Dept: ADMINISTRATIVE | Facility: OTHER | Age: 54
End: 2019-09-05

## 2019-09-05 ENCOUNTER — TELEPHONE (OUTPATIENT)
Dept: SPINE | Facility: CLINIC | Age: 54
End: 2019-09-05

## 2019-09-05 DIAGNOSIS — M54.5 LOW BACK PAIN, UNSPECIFIED BACK PAIN LATERALITY, UNSPECIFIED CHRONICITY, WITH SCIATICA PRESENCE UNSPECIFIED: Primary | ICD-10-CM

## 2019-09-06 ENCOUNTER — OFFICE VISIT (OUTPATIENT)
Dept: SPINE | Facility: CLINIC | Age: 54
End: 2019-09-06
Payer: COMMERCIAL

## 2019-09-06 VITALS
WEIGHT: 140 LBS | SYSTOLIC BLOOD PRESSURE: 123 MMHG | DIASTOLIC BLOOD PRESSURE: 63 MMHG | HEART RATE: 68 BPM | HEIGHT: 69 IN | BODY MASS INDEX: 20.73 KG/M2

## 2019-09-06 DIAGNOSIS — G89.29 CHRONIC BILATERAL LOW BACK PAIN WITHOUT SCIATICA: Primary | ICD-10-CM

## 2019-09-06 DIAGNOSIS — M54.50 CHRONIC BILATERAL LOW BACK PAIN WITHOUT SCIATICA: Primary | ICD-10-CM

## 2019-09-06 PROCEDURE — 3008F BODY MASS INDEX DOCD: CPT | Mod: CPTII,S$GLB,, | Performed by: PHYSICIAN ASSISTANT

## 2019-09-06 PROCEDURE — 99999 PR PBB SHADOW E&M-EST. PATIENT-LVL III: CPT | Mod: PBBFAC,,, | Performed by: PHYSICIAN ASSISTANT

## 2019-09-06 PROCEDURE — 99204 PR OFFICE/OUTPT VISIT, NEW, LEVL IV, 45-59 MIN: ICD-10-PCS | Mod: S$GLB,,, | Performed by: PHYSICIAN ASSISTANT

## 2019-09-06 PROCEDURE — 3074F SYST BP LT 130 MM HG: CPT | Mod: CPTII,S$GLB,, | Performed by: PHYSICIAN ASSISTANT

## 2019-09-06 PROCEDURE — 3008F PR BODY MASS INDEX (BMI) DOCUMENTED: ICD-10-PCS | Mod: CPTII,S$GLB,, | Performed by: PHYSICIAN ASSISTANT

## 2019-09-06 PROCEDURE — 3078F PR MOST RECENT DIASTOLIC BLOOD PRESSURE < 80 MM HG: ICD-10-PCS | Mod: CPTII,S$GLB,, | Performed by: PHYSICIAN ASSISTANT

## 2019-09-06 PROCEDURE — 3074F PR MOST RECENT SYSTOLIC BLOOD PRESSURE < 130 MM HG: ICD-10-PCS | Mod: CPTII,S$GLB,, | Performed by: PHYSICIAN ASSISTANT

## 2019-09-06 PROCEDURE — 3078F DIAST BP <80 MM HG: CPT | Mod: CPTII,S$GLB,, | Performed by: PHYSICIAN ASSISTANT

## 2019-09-06 PROCEDURE — 99204 OFFICE O/P NEW MOD 45 MIN: CPT | Mod: S$GLB,,, | Performed by: PHYSICIAN ASSISTANT

## 2019-09-06 PROCEDURE — 99999 PR PBB SHADOW E&M-EST. PATIENT-LVL III: ICD-10-PCS | Mod: PBBFAC,,, | Performed by: PHYSICIAN ASSISTANT

## 2019-09-06 RX ORDER — METHYLPREDNISOLONE 4 MG/1
TABLET ORAL
Qty: 1 PACKAGE | Refills: 0 | Status: SHIPPED | OUTPATIENT
Start: 2019-09-06 | End: 2019-12-17

## 2019-09-06 RX ORDER — DICLOFENAC SODIUM 75 MG/1
75 TABLET, DELAYED RELEASE ORAL 2 TIMES DAILY PRN
Qty: 60 TABLET | Refills: 2 | Status: SHIPPED | OUTPATIENT
Start: 2019-09-06 | End: 2019-12-17

## 2019-09-06 NOTE — PROGRESS NOTES
Subjective:     Patient ID:  Carolin Lind is a 54 y.o. female.    [facility]    Chief Complaint: Low back pain    HPI    Carolin Lind is a 54 y.o. female who presents with the above CC.  Patient ankle had left ankle trauma 2 years ago.  Has had low back pain on and off over the years that has gotten worse recently in the last few months.  Pain across the low back rated 9/10 that is constant and worse with standing and walking and better with laying down.  No leg pain or paresthesias.    Patient has not had PT for her low back.  No ESIs.  No spine surgery.  Patient is currently taking gabapentin and mobic and aleve.    Patient denies any recent accidents or trauma, no saddle anesthesias, and no bowel or bladder incontinence.      Review of Systems:    Review of Systems   Constitutional: Negative for chills, diaphoresis, fever, malaise/fatigue and weight loss.   HENT: Negative for congestion, ear discharge, ear pain, hearing loss, nosebleeds, sinus pain, sore throat and tinnitus.    Eyes: Negative for blurred vision, double vision, photophobia, pain, discharge and redness.   Respiratory: Negative for cough, hemoptysis, sputum production, shortness of breath, wheezing and stridor.    Cardiovascular: Positive for leg swelling. Negative for chest pain, palpitations, orthopnea and PND.   Gastrointestinal: Negative for abdominal pain, blood in stool, constipation, diarrhea, heartburn, melena, nausea and vomiting.   Genitourinary: Negative for dysuria, flank pain, frequency, hematuria and urgency.   Musculoskeletal: Positive for back pain and joint pain. Negative for falls, myalgias and neck pain.   Skin: Negative for itching and rash.   Neurological: Negative for dizziness, tingling, tremors, sensory change, speech change, seizures, loss of consciousness, weakness and headaches.   Endo/Heme/Allergies: Negative for environmental allergies and polydipsia. Does not bruise/bleed easily.   Psychiatric/Behavioral: Positive  for depression. Negative for hallucinations, memory loss and substance abuse. The patient is not nervous/anxious and does not have insomnia.          Past Medical History:   Diagnosis Date    Abnormal Pap smear of cervix 01/30/2017    Atypical pap with + HPV    Anxiety     Depression     Heart murmur     History of migraine headaches     Menarche     Age of onset 13    Mitral valve prolapse     PONV (postoperative nausea and vomiting)      Past Surgical History:   Procedure Laterality Date    ANKLE HARDWARE REMOVAL Left 4/16/2019    Procedure: REMOVAL, HARDWARE, ANKLE;  Surgeon: Jair Winchester MD;  Location: [address];  Service: Orthopedics;  Laterality: Left;    AUGMENTATION OF BREAST      BREAST SURGERY      EXTERNAL FIXATOR APPLICATION      FLAP GRAFT SURGERY Left 4/16/2019    Procedure: FLAP GRAFT - fasciocutaneous;  Surgeon: Jair Winchester MD;  Location: [facility] OR [address] [place] [address];  Service: Orthopedics;  Laterality: Left;     Current Outpatient Medications on File Prior to Visit   Medication Sig Dispense Refill    butalbital-acetaminophen-caffeine -40 mg (FIORICET, ESGIC) -40 mg per tablet Take 1 tablet by mouth every 4 (four) hours as needed for Pain.      gabapentin (NEURONTIN) 300 MG capsule TAKE 1 CAPSULE(300 MG) BY MOUTH THREE TIMES DAILY 90 capsule 0    propranolol (INDERAL) 40 MG tablet TAKE 1 TABLET(40 MG) BY MOUTH TWICE DAILY 180 tablet 1    sumatriptan succinate (SUMAVEL DOSEPRO) 6 mg/0.5 mL NfIj Inject 6 mg into the skin every 2 (two) hours as needed. 1 Needle-Free Injector Subcutaneous Every 2 hours 6 Device 3     No current facility-administered medications on file prior to visit.      Review of patient's allergies indicates:  No Known Allergies  Social History     Socioeconomic History    Marital status: Legally      Spouse name: Not on file    Number of children: Not on file    Years of education: Not on file    Highest education level: Not on file  "  Occupational History    Not on file   Social Needs    Financial resource strain: Not on file    Food insecurity:     Worry: Not on file     Inability: Not on file    Transportation needs:     Medical: Not on file     Non-medical: Not on file   Tobacco Use    Smoking status: Never Smoker    Smokeless tobacco: Never Used   Substance and Sexual Activity    Alcohol use: No    Drug use: No    Sexual activity: Yes     Partners: Male     Birth control/protection: None   Lifestyle    Physical activity:     Days per week: Not on file     Minutes per session: Not on file    Stress: Not on file   Relationships    Social connections:     Talks on phone: Not on file     Gets together: Not on file     Attends Bahai service: Not on file     Active member of club or organization: Not on file     Attends meetings of clubs or organizations: Not on file     Relationship status: Not on file   Other Topics Concern    Not on file   Social History Narrative    Not on file     Family History   Problem Relation Age of Onset    Cancer Mother     Migraines Daughter     Migraines Maternal Aunt     Breast cancer Maternal Aunt 50    Colon cancer Neg Hx     Ovarian cancer Neg Hx        Objective:      Vitals:    09/06/19 0752   BP: 123/63   Pulse: 68   Weight: 63.5 kg (140 lb)   Height: 5' 9" (1.753 m)   PainSc:   5   PainLoc: Back         Physical Exam:    General:  Carolin Lind is well-developed, well-nourished, appears stated age, in no acute distress, alert and oriented to person, place, and time.    Pulmonary/Chest:  Respiratory effort normal  Abdominal: Exhibits no distension  Psychiatric:  Normal mood and affect.  Behavior is normal.  Judgement and thought content normal    Musculoskeletal:    Patient arises from a sitting to standing position without difficulty.  Patient walks to the door without evidence of limp, pain, or abnormality of gait.     Lumbar ROM:   Pain in lumbar flexion, extension, right lateral " bending, and left lateral bending.    Lumbar Spine Inspection:  Normal with no surgical scars and no visible rashes.    Lumbar Spine Palpation:  No tenderness to low back palpation.    SI Joint Palpation:  Moderate tenderness to bilateral SI Joint palpation.    Straight Leg Raise:  Negative right and left SLR.    Neurological: Alert and oriented to person, place, and time    Muscle strength against resistance:     Right Left   Hip flexion  5 / 5 5 / 5   Hip extension 5 / 5 5 / 5   Hip abduction 5 / 5 5 / 5   Hip adduction  5 / 5 5 / 5   Knee extension  5 / 5 5 / 5   Knee flexion 5 / 5 5 / 5   Dorsiflexion  5 / 5 Cannot assess   EHL  5 / 5 Cannot assess   Plantar flexion  5 / 5 Cannot assess   Inversion of the feet 5 / 5 Cannot assess   Eversion of the feet  5 / 5 Cannot assess     Reflexes:     Right Left   Patellar 2+ 2+   Achilles 2+ 2+     Clonus:  Negative bilaterally    On gross examination of the bilateral upper extremities, patient has full painfree ROM with no signs of clubbing, cyanosis, edema, or weakness.     No imaging to review      Assessment:          1. Chronic bilateral low back pain without sciatica            Plan:          Orders Placed This Encounter    Ambulatory Referral to Physical/Occupational Therapy    methylPREDNISolone (MEDROL DOSEPACK) 4 mg tablet    diclofenac (VOLTAREN) 75 MG EC tablet       Low back pain    -[facility]  -Medrol pack now with food  -Diclofenac PRN with food once the pack is done  -Stop Mobic and all OTC NSAIDs  -She already saw Dr. Jain and a lumbar MRI was ordered  -She is following up with him after the MRI.  Consider PT and ESIs    Follow-Up:  Follow up if symptoms worsen or fail to improve. If there are any questions prior to this, the patient was instructed to contact the office.       Jazmine Harrington, [facility], PA-C  Neurosurgery  [facility]  [facility]    Addendum:  09/26/19    External PT  ordered.    LAUREN Dubon, PA-C  Neurosurgery  [facility]  [facility]

## 2019-09-06 NOTE — LETTER
September 6, 2019      Ana Laura Greer PA-C  [address]  [facility] [address]           [address]  [address], [address]  [facility] [address]  Phone: [phone]  Fax: [phone]          Patient: Carolin Lind   MR Number: [id number]   YOB: 1965   Date of Visit: 9/6/2019       Dear Ana Laura Greer:    Thank you for referring Carolin Lind to me for evaluation. Attached you will find relevant portions of my assessment and plan of care.    If you have questions, please do not hesitate to call me. I look forward to following Carolin Lind along with you.    Sincerely,    Jazmine Harrington PA-C    Enclosure  CC:  No Recipients    If you would like to receive this communication electronically, please contact [email or web address] or [phone] to request more information on [facility] [employer] access.    For providers and/or their staff who would like to refer a patient to [facility], please contact us through our one-stop-shop provider referral line, [facility], at [phone].    If you feel you have received this communication in error or would no longer like to receive these types of communications, please e-mail [email or web address]

## 2019-09-09 DIAGNOSIS — F33.1 MODERATE EPISODE OF RECURRENT MAJOR DEPRESSIVE DISORDER: ICD-10-CM

## 2019-09-09 DIAGNOSIS — F41.1 GENERALIZED ANXIETY DISORDER: ICD-10-CM

## 2019-09-09 RX ORDER — VENLAFAXINE HYDROCHLORIDE 75 MG/1
CAPSULE, EXTENDED RELEASE ORAL
Qty: 30 CAPSULE | Refills: 0 | Status: SHIPPED | OUTPATIENT
Start: 2019-09-09 | End: 2019-09-16 | Stop reason: SDUPTHER

## 2019-09-10 ENCOUNTER — HOSPITAL ENCOUNTER (OUTPATIENT)
Dept: RADIOLOGY | Facility: HOSPITAL | Age: 54
Discharge: HOME | End: 2019-09-10
Attending: PHYSICIAN ASSISTANT
Payer: COMMERCIAL

## 2019-09-10 DIAGNOSIS — M54.5 LOW BACK PAIN, UNSPECIFIED BACK PAIN LATERALITY, UNSPECIFIED CHRONICITY, WITH SCIATICA PRESENCE UNSPECIFIED: ICD-10-CM

## 2019-09-10 PROCEDURE — 72120 XR LUMBAR SPINE AP AND LAT WITH FLEX/EXT: ICD-10-PCS | Mod: 26,,, | Performed by: RADIOLOGY

## 2019-09-10 PROCEDURE — 72100 X-RAY EXAM L-S SPINE 2/3 VWS: CPT | Mod: TC

## 2019-09-10 PROCEDURE — 72100 XR LUMBAR SPINE AP AND LAT WITH FLEX/EXT: ICD-10-PCS | Mod: 26,,, | Performed by: RADIOLOGY

## 2019-09-10 PROCEDURE — 72100 X-RAY EXAM L-S SPINE 2/3 VWS: CPT | Mod: 26,,, | Performed by: RADIOLOGY

## 2019-09-10 PROCEDURE — 72120 X-RAY BEND ONLY L-S SPINE: CPT | Mod: 26,,, | Performed by: RADIOLOGY

## 2019-09-16 ENCOUNTER — OFFICE VISIT (OUTPATIENT)
Dept: PSYCHIATRY | Facility: CLINIC | Age: 54
End: 2019-09-16
Payer: COMMERCIAL

## 2019-09-16 VITALS
BODY MASS INDEX: 20.8 KG/M2 | HEART RATE: 67 BPM | SYSTOLIC BLOOD PRESSURE: 125 MMHG | WEIGHT: 140.44 LBS | HEIGHT: 69 IN | DIASTOLIC BLOOD PRESSURE: 62 MMHG

## 2019-09-16 DIAGNOSIS — F41.1 GENERALIZED ANXIETY DISORDER: ICD-10-CM

## 2019-09-16 DIAGNOSIS — F33.1 MODERATE EPISODE OF RECURRENT MAJOR DEPRESSIVE DISORDER: ICD-10-CM

## 2019-09-16 PROCEDURE — 99213 PR OFFICE/OUTPT VISIT, EST, LEVL III, 20-29 MIN: ICD-10-PCS | Mod: S$GLB,,, | Performed by: NURSE PRACTITIONER

## 2019-09-16 PROCEDURE — 3078F PR MOST RECENT DIASTOLIC BLOOD PRESSURE < 80 MM HG: ICD-10-PCS | Mod: CPTII,S$GLB,, | Performed by: NURSE PRACTITIONER

## 2019-09-16 PROCEDURE — 99999 PR PBB SHADOW E&M-EST. PATIENT-LVL III: ICD-10-PCS | Mod: PBBFAC,,, | Performed by: NURSE PRACTITIONER

## 2019-09-16 PROCEDURE — 3078F DIAST BP <80 MM HG: CPT | Mod: CPTII,S$GLB,, | Performed by: NURSE PRACTITIONER

## 2019-09-16 PROCEDURE — 99999 PR PBB SHADOW E&M-EST. PATIENT-LVL III: CPT | Mod: PBBFAC,,, | Performed by: NURSE PRACTITIONER

## 2019-09-16 PROCEDURE — 90833 PR PSYCHOTHERAPY W/PATIENT W/E&M, 30 MIN (ADD ON): ICD-10-PCS | Mod: S$GLB,,, | Performed by: NURSE PRACTITIONER

## 2019-09-16 PROCEDURE — 99213 OFFICE O/P EST LOW 20 MIN: CPT | Mod: S$GLB,,, | Performed by: NURSE PRACTITIONER

## 2019-09-16 PROCEDURE — 3074F PR MOST RECENT SYSTOLIC BLOOD PRESSURE < 130 MM HG: ICD-10-PCS | Mod: CPTII,S$GLB,, | Performed by: NURSE PRACTITIONER

## 2019-09-16 PROCEDURE — 3008F PR BODY MASS INDEX (BMI) DOCUMENTED: ICD-10-PCS | Mod: CPTII,S$GLB,, | Performed by: NURSE PRACTITIONER

## 2019-09-16 PROCEDURE — 3074F SYST BP LT 130 MM HG: CPT | Mod: CPTII,S$GLB,, | Performed by: NURSE PRACTITIONER

## 2019-09-16 PROCEDURE — 3008F BODY MASS INDEX DOCD: CPT | Mod: CPTII,S$GLB,, | Performed by: NURSE PRACTITIONER

## 2019-09-16 PROCEDURE — 90833 PSYTX W PT W E/M 30 MIN: CPT | Mod: S$GLB,,, | Performed by: NURSE PRACTITIONER

## 2019-09-16 RX ORDER — VENLAFAXINE HYDROCHLORIDE 75 MG/1
CAPSULE, EXTENDED RELEASE ORAL
Qty: 30 CAPSULE | Refills: 5 | Status: SHIPPED | OUTPATIENT
Start: 2019-09-16 | End: 2019-12-17

## 2019-09-16 RX ORDER — FLUOXETINE HYDROCHLORIDE 20 MG/1
CAPSULE ORAL
Qty: 90 CAPSULE | Refills: 5 | Status: SHIPPED | OUTPATIENT
Start: 2019-09-16 | End: 2020-03-19 | Stop reason: DRUGHIGH

## 2019-09-16 NOTE — PROGRESS NOTES
Outpatient Psychiatry Follow-Up Visit (MD/NP)    9/16/2019    Clinical Status of Patient:  Outpatient (Ambulatory)    Chief Complaint:  Carolin Lind is a 54 y.o. female who presents today for follow-up of depression and anxiety.  Met with patient.      Last visit was: 9/18/18. Chart and  reviewed.      Interval History and Content of Current Session:  Current Psychiatric Medications/changes  ·  Increase to Prozac 60 mg po daily  · Continue Effexor XR 75 mg po daily    Pt reports that she is coping better with medical issues regarding ankle injury. Functioning well at work; Owner of [employer].  Pt wanted to discuss tapering off some of her medication.  Pt states that she feels that medication will not fix her problems and needs to continue focus on adaptive coping.  Denies SI/HI/AVH.  Denies current side effects to medications. No symptoms of serotonin syndrome.  Denies hx of depression prior to injury.  Recommended slow taper of Prozac and continue Effexor until next visit.      Decrease Prozac by 20 mg (1 tablet) every 4 weeks for total of 12 weeks.  Notify for any rebound symptoms of depression.      Psychotherapy:  · Target symptoms: depression, adjustment, work stress  · Why chosen therapy is appropriate versus another modality: relevant to diagnosis  · Outcome monitoring methods: self-report  · Therapeutic intervention type: insight oriented psychotherapy  · Topics discussed/themes: work stress, stress related to medical comorbidities, difficulty managing affect in interpersonal relationships, building skills sets for symptom management, symptom recognition  · The patient's response to the intervention is accepting. The patient's progress toward treatment goals is fair.   · Duration of intervention: 19 minutes.    Review of Systems   · PSYCHIATRIC: Pertinant items are noted in the narrative.  · CONSTITUTIONAL: No weight gain or loss.   · MUSCULOSKELETAL: No pain or stiffness of the  "joints.  · NEUROLOGIC: No weakness, sensory changes, seizures, confusion, memory loss, tremor or other abnormal movements.  · ENDOCRINE: No polydipsia or polyuria.  · INTEGUMENTARY: No rashes or lacerations.  · EYES: No exophthalmos, jaundice or blindness.  · ENT: No dizziness, tinnitus or hearing loss.  · RESPIRATORY: No shortness of breath.  · CARDIOVASCULAR: No tachycardia or chest pain.  · GASTROINTESTINAL: No nausea, vomiting, pain, constipation or diarrhea.  · GENITOURINARY: No frequency, dysuria or sexual dysfunction.  · HEMATOLOGIC/LYMPHATIC: No excessive bleeding, prolonged or excessive bleeding after dental extraction/injury.  · ALLERGIC/IMMUNOLOGIC: No allergic response to materials, foods or animals at this time.    Past Medical, Family and Social History: The patient's past medical, family and social history have been reviewed and updated as appropriate within the electronic medical record - see encounter notes.    Compliance: yes    Side effects: None    Risk Parameters:  Patient reports no suicidal ideation  Patient reports no homicidal ideation  Patient reports no self-injurious behavior  Patient reports no violent behavior    Exam (detailed: at least 9 elements; comprehensive: all 15 elements)   Constitutional  Vitals:  Most recent vital signs, dated greater than 90 days prior to this appointment, were reviewed.   Vitals:    09/16/19 1348   BP: 125/62   Pulse: 67   Weight: 63.7 kg (140 lb 6.9 oz)   Height: 5' 9" (1.753 m)        General:  unremarkable, age appropriate     Musculoskeletal  Muscle Strength/Tone:  no tremor, no tic   Gait & Station:  non-ataxic     Psychiatric  Speech:  no latency; no press   Mood & Affect:  steady  congruent and appropriate, guarded   Thought Process:  normal and logical   Associations:  intact   Thought Content:  normal, no suicidality, no homicidality, delusions, or paranoia   Insight:  intact   Judgement: behavior is adequate to circumstances   Orientation:  " grossly intact   Memory: intact for content of interview   Language: grossly intact   Attention Span & Concentration:  able to focus   Fund of Knowledge:  intact and appropriate to age and level of education     Assessment and Diagnosis   Status/Progress: Based on the examination today, the patient's problem(s) is/are improved and adequately but not ideally controlled.  New problems have not been presented today.   Co-morbidities and Lack of compliance are not complicating management of the primary condition.  There are no active rule-out diagnoses for this patient at this time.     General Impression:       ICD-10-CM ICD-9-CM   1. Moderate episode of recurrent major depressive disorder F33.1 296.32   2. Generalized anxiety disorder F41.1 300.02       Intervention/Counseling/Treatment Plan   · Medication Management: Continue current medications. The risks and benefits of medication were discussed with the patient.   · Decrease Prozac by 20 mg (1 tablet) every 4 weeks for total of 12 weeks then discontinue.  Notify for any rebound symptoms of depression.    · Continue Effexor XR 75 mg po daily  · Recommend individual psychotherapy    Return to Clinic: 1 year     Risks, benefits, side effects and alternative treatments discussed with patient. Patient agrees with the current plan as documented.  Encouraged Patient to keep future appointments.  Take medications as prescribed and abstain from substance abuse.  Pt to present to ED for thoughts to harm herself or others

## 2019-09-19 ENCOUNTER — TELEPHONE (OUTPATIENT)
Dept: ORTHOPEDICS | Facility: CLINIC | Age: 54
End: 2019-09-19

## 2019-09-19 NOTE — TELEPHONE ENCOUNTER
----- Message from Aylin Petty MA sent at 9/18/2019  5:55 PM CDT -----  Contact: patient       ----- Message -----  From: Leigh Francois  Sent: 9/18/2019   2:58 PM  To: [facility]AY Staff    Please call pt at [phone]    Patient is requesting the MD office to fax her handicap letter to [phone] (personal fax#)    Thank you

## 2019-09-23 ENCOUNTER — PATIENT MESSAGE (OUTPATIENT)
Dept: SPINE | Facility: CLINIC | Age: 54
End: 2019-09-23

## 2019-09-23 ENCOUNTER — PATIENT MESSAGE (OUTPATIENT)
Dept: ORTHOPEDICS | Facility: CLINIC | Age: 54
End: 2019-09-23

## 2019-09-25 ENCOUNTER — PATIENT MESSAGE (OUTPATIENT)
Dept: SPINE | Facility: CLINIC | Age: 54
End: 2019-09-25

## 2019-09-25 ENCOUNTER — PATIENT OUTREACH (OUTPATIENT)
Dept: ADMINISTRATIVE | Facility: OTHER | Age: 54
End: 2019-09-25

## 2019-09-26 ENCOUNTER — PATIENT MESSAGE (OUTPATIENT)
Dept: SPINE | Facility: CLINIC | Age: 54
End: 2019-09-26

## 2019-09-26 NOTE — TELEPHONE ENCOUNTER
Please send her external therapy orders.    Please call her and tell her to bring her MRI on a CD to her clinic appointment tomorrow.    LAUREN Dubon, PA-C  Neurosurgery  [facility] Sp[facility]  [facility]

## 2019-09-27 ENCOUNTER — PATIENT MESSAGE (OUTPATIENT)
Dept: SPINE | Facility: CLINIC | Age: 54
End: 2019-09-27

## 2019-09-27 ENCOUNTER — OFFICE VISIT (OUTPATIENT)
Dept: SPINE | Facility: CLINIC | Age: 54
End: 2019-09-27
Payer: COMMERCIAL

## 2019-09-27 VITALS
HEIGHT: 69 IN | SYSTOLIC BLOOD PRESSURE: 132 MMHG | HEART RATE: 72 BPM | DIASTOLIC BLOOD PRESSURE: 66 MMHG | BODY MASS INDEX: 20.85 KG/M2 | WEIGHT: 140.75 LBS

## 2019-09-27 DIAGNOSIS — M54.42 CHRONIC BILATERAL LOW BACK PAIN WITH LEFT-SIDED SCIATICA: Primary | ICD-10-CM

## 2019-09-27 DIAGNOSIS — G89.29 CHRONIC BILATERAL LOW BACK PAIN WITH LEFT-SIDED SCIATICA: Primary | ICD-10-CM

## 2019-09-27 DIAGNOSIS — M54.16 LUMBAR RADICULOPATHY: ICD-10-CM

## 2019-09-27 DIAGNOSIS — M51.36 DDD (DEGENERATIVE DISC DISEASE), LUMBAR: ICD-10-CM

## 2019-09-27 DIAGNOSIS — M47.26 OTHER SPONDYLOSIS WITH RADICULOPATHY, LUMBAR REGION: ICD-10-CM

## 2019-09-27 DIAGNOSIS — M51.26 HNP (HERNIATED NUCLEUS PULPOSUS), LUMBAR: ICD-10-CM

## 2019-09-27 PROCEDURE — 3075F SYST BP GE 130 - 139MM HG: CPT | Mod: CPTII,S$GLB,, | Performed by: PHYSICIAN ASSISTANT

## 2019-09-27 PROCEDURE — 99999 PR PBB SHADOW E&M-EST. PATIENT-LVL IV: CPT | Mod: PBBFAC,,, | Performed by: PHYSICIAN ASSISTANT

## 2019-09-27 PROCEDURE — 3008F BODY MASS INDEX DOCD: CPT | Mod: CPTII,S$GLB,, | Performed by: PHYSICIAN ASSISTANT

## 2019-09-27 PROCEDURE — 3008F PR BODY MASS INDEX (BMI) DOCUMENTED: ICD-10-PCS | Mod: CPTII,S$GLB,, | Performed by: PHYSICIAN ASSISTANT

## 2019-09-27 PROCEDURE — 99999 PR PBB SHADOW E&M-EST. PATIENT-LVL IV: ICD-10-PCS | Mod: PBBFAC,,, | Performed by: PHYSICIAN ASSISTANT

## 2019-09-27 PROCEDURE — 3075F PR MOST RECENT SYSTOLIC BLOOD PRESS GE 130-139MM HG: ICD-10-PCS | Mod: CPTII,S$GLB,, | Performed by: PHYSICIAN ASSISTANT

## 2019-09-27 PROCEDURE — 99214 PR OFFICE/OUTPT VISIT, EST, LEVL IV, 30-39 MIN: ICD-10-PCS | Mod: S$GLB,,, | Performed by: PHYSICIAN ASSISTANT

## 2019-09-27 PROCEDURE — 3078F DIAST BP <80 MM HG: CPT | Mod: CPTII,S$GLB,, | Performed by: PHYSICIAN ASSISTANT

## 2019-09-27 PROCEDURE — 3078F PR MOST RECENT DIASTOLIC BLOOD PRESSURE < 80 MM HG: ICD-10-PCS | Mod: CPTII,S$GLB,, | Performed by: PHYSICIAN ASSISTANT

## 2019-09-27 PROCEDURE — 99214 OFFICE O/P EST MOD 30 MIN: CPT | Mod: S$GLB,,, | Performed by: PHYSICIAN ASSISTANT

## 2019-09-27 RX ORDER — MELOXICAM 15 MG/1
TABLET ORAL
COMMUNITY
Start: 2019-09-25 | End: 2020-07-28

## 2019-09-27 RX ORDER — PROMETHAZINE HYDROCHLORIDE 25 MG/1
TABLET ORAL
Refills: 5 | COMMUNITY
Start: 2019-08-16

## 2019-09-27 RX ORDER — BUTALBITAL, ACETAMINOPHEN, CAFFEINE AND CODEINE PHOSPHATE 50; 325; 40; 30 MG/1; MG/1; MG/1; MG/1
CAPSULE ORAL
Refills: 5 | COMMUNITY
Start: 2019-09-16 | End: 2020-07-28

## 2019-09-27 RX ORDER — CLONAZEPAM 1 MG/1
TABLET ORAL
Refills: 5 | COMMUNITY
Start: 2019-08-13 | End: 2020-07-28

## 2019-09-27 NOTE — PROGRESS NOTES
"Subjective:     Patient ID:  Carolin Lind is a 54 y.o. female.    [facility]    Chief Complaint: Low back pain and left leg pain    HPI    Carolin Lind is a 54 y.o. female who presents for MRI follow up.  The medrol pack did not help.  The diclofenac is not helping.  She is also taking Mobic.  And Gabapentin 900mg at night.    She continues to have constant low back pain and pain in the anterior and posterior leg from the knee to the ankle.  No right leg pain or paresthesias.    She had an MARLENI yesterday with Dr. Jain.  No relief yet.    No infection in the last year.    Patient denies any recent accidents or trauma, no saddle anesthesias, and no bowel or bladder incontinence.      Review of Systems:  Constitution: Negative for chills, fever, night sweats and weight loss.   Musculoskeletal: Negative for falls.   Gastrointestinal: Negative for bowel incontinence, nausea and vomiting.   Genitourinary: Negative for bladder incontinence.   Neurological: Negative for disturbances in coordination and loss of balance.      Objective:      Vitals:    09/27/19 1003   BP: 132/66   Pulse: 72   Weight: 63.9 kg (140 lb 12.2 oz)   Height: 5' 9" (1.753 m)   PainSc:   6   PainLoc: Back         Physical Exam:    General:  Carolin Lind is well-developed, well-nourished, appears stated age, in no acute distress, alert and oriented to person, place, and time.    Patient sits comfortably in the exam room and answers questions appropriately. Grossly patient is able to move bilateral lower extremities without difficulty.     MRI/XRAY Interpretation:     Lumbar spine ap/lateral/flexion/extension xrays were personally reviewed today.  No fractures.  No movement on flexion and extension.  Multilevel degenerative changes throughout the spine more so at L3-4.    Lumbar spine MRI was personally reviewed today on a CD and returned to the patient.  Modic changes at the endplate of L3-4.  Disc bulging at L3-4, L4-5 and L5-S1.  Left L4-5 " foraminal HNP.    Assessment:          1. Chronic bilateral low back pain with left-sided sciatica    2. DDD (degenerative disc disease), lumbar    3. Other spondylosis with radiculopathy, lumbar region    4. Lumbar radiculopathy    5. HNP (herniated nucleus pulposus), lumbar            Plan:          Orders Placed This Encounter    Ambulatory Referral to Physical/Occupational Therapy     Multilevel DDD and spondylosis.  L3-4 severe modic endplate changes.  Left L4-5 foraminal HNP    -Start PT outside of [facility]  -ROBINA diclofenac  -Continue gabapentin  -FU with Dr. Jain from her recent ESIs  -FU in three months      Follow-Up:  Follow up in about 3 months (around 12/27/2019). If there are any questions prior to this, the patient was instructed to contact the office.       LAUREN Dubon, PA-C  Neurosurgery  [facility] Sp[facility] Center  [facility]

## 2019-10-01 ENCOUNTER — OFFICE VISIT (OUTPATIENT)
Dept: INTERNAL MEDICINE | Facility: CLINIC | Age: 54
End: 2019-10-01
Payer: COMMERCIAL

## 2019-10-01 VITALS
SYSTOLIC BLOOD PRESSURE: 124 MMHG | DIASTOLIC BLOOD PRESSURE: 80 MMHG | WEIGHT: 139 LBS | HEIGHT: 69 IN | OXYGEN SATURATION: 98 % | BODY MASS INDEX: 20.59 KG/M2 | HEART RATE: 68 BPM

## 2019-10-01 DIAGNOSIS — R03.0 ELEVATED BLOOD PRESSURE READING WITHOUT DIAGNOSIS OF HYPERTENSION: ICD-10-CM

## 2019-10-01 DIAGNOSIS — G89.29 CHRONIC LEFT-SIDED LOW BACK PAIN WITH LEFT-SIDED SCIATICA: Primary | ICD-10-CM

## 2019-10-01 DIAGNOSIS — M54.42 CHRONIC LEFT-SIDED LOW BACK PAIN WITH LEFT-SIDED SCIATICA: Primary | ICD-10-CM

## 2019-10-01 DIAGNOSIS — R10.9 FLANK PAIN: ICD-10-CM

## 2019-10-01 DIAGNOSIS — R10.13 EPIGASTRIC PAIN: ICD-10-CM

## 2019-10-01 LAB
BASOPHILS # BLD AUTO: 0.05 K/UL (ref 0–0.2)
BASOPHILS NFR BLD: 0.6 % (ref 0–1.9)
DIFFERENTIAL METHOD: ABNORMAL
EOSINOPHIL # BLD AUTO: 0.2 K/UL (ref 0–0.5)
EOSINOPHIL NFR BLD: 2.5 % (ref 0–8)
ERYTHROCYTE [DISTWIDTH] IN BLOOD BY AUTOMATED COUNT: 12.9 % (ref 11.5–14.5)
HCT VFR BLD AUTO: 42.5 % (ref 37–48.5)
HGB BLD-MCNC: 14.2 G/DL (ref 12–16)
IMM GRANULOCYTES # BLD AUTO: 0.02 K/UL (ref 0–0.04)
IMM GRANULOCYTES NFR BLD AUTO: 0.2 % (ref 0–0.5)
LYMPHOCYTES # BLD AUTO: 1.5 K/UL (ref 1–4.8)
LYMPHOCYTES NFR BLD: 17.4 % (ref 18–48)
MCH RBC QN AUTO: 30.7 PG (ref 27–31)
MCHC RBC AUTO-ENTMCNC: 33.4 G/DL (ref 32–36)
MCV RBC AUTO: 92 FL (ref 82–98)
MONOCYTES # BLD AUTO: 0.6 K/UL (ref 0.3–1)
MONOCYTES NFR BLD: 7.3 % (ref 4–15)
NEUTROPHILS # BLD AUTO: 6.3 K/UL (ref 1.8–7.7)
NEUTROPHILS NFR BLD: 72 % (ref 38–73)
NRBC BLD-RTO: 0 /100 WBC
PLATELET # BLD AUTO: 325 K/UL (ref 150–350)
PMV BLD AUTO: 10.5 FL (ref 9.2–12.9)
RBC # BLD AUTO: 4.62 M/UL (ref 4–5.4)
WBC # BLD AUTO: 8.72 K/UL (ref 3.9–12.7)

## 2019-10-01 PROCEDURE — 3079F DIAST BP 80-89 MM HG: CPT | Mod: CPTII,S$GLB,, | Performed by: FAMILY MEDICINE

## 2019-10-01 PROCEDURE — 85025 COMPLETE CBC W/AUTO DIFF WBC: CPT

## 2019-10-01 PROCEDURE — 36415 COLL VENOUS BLD VENIPUNCTURE: CPT

## 2019-10-01 PROCEDURE — 99213 PR OFFICE/OUTPT VISIT, EST, LEVL III, 20-29 MIN: ICD-10-PCS | Mod: S$GLB,,, | Performed by: FAMILY MEDICINE

## 2019-10-01 PROCEDURE — 3008F PR BODY MASS INDEX (BMI) DOCUMENTED: ICD-10-PCS | Mod: CPTII,S$GLB,, | Performed by: FAMILY MEDICINE

## 2019-10-01 PROCEDURE — 3074F SYST BP LT 130 MM HG: CPT | Mod: CPTII,S$GLB,, | Performed by: FAMILY MEDICINE

## 2019-10-01 PROCEDURE — 3008F BODY MASS INDEX DOCD: CPT | Mod: CPTII,S$GLB,, | Performed by: FAMILY MEDICINE

## 2019-10-01 PROCEDURE — 82150 ASSAY OF AMYLASE: CPT

## 2019-10-01 PROCEDURE — 3074F PR MOST RECENT SYSTOLIC BLOOD PRESSURE < 130 MM HG: ICD-10-PCS | Mod: CPTII,S$GLB,, | Performed by: FAMILY MEDICINE

## 2019-10-01 PROCEDURE — 99999 PR PBB SHADOW E&M-EST. PATIENT-LVL IV: CPT | Mod: PBBFAC,,, | Performed by: FAMILY MEDICINE

## 2019-10-01 PROCEDURE — 80053 COMPREHEN METABOLIC PANEL: CPT

## 2019-10-01 PROCEDURE — 99213 OFFICE O/P EST LOW 20 MIN: CPT | Mod: S$GLB,,, | Performed by: FAMILY MEDICINE

## 2019-10-01 PROCEDURE — 83690 ASSAY OF LIPASE: CPT

## 2019-10-01 PROCEDURE — 3079F PR MOST RECENT DIASTOLIC BLOOD PRESSURE 80-89 MM HG: ICD-10-PCS | Mod: CPTII,S$GLB,, | Performed by: FAMILY MEDICINE

## 2019-10-01 PROCEDURE — 99999 PR PBB SHADOW E&M-EST. PATIENT-LVL IV: ICD-10-PCS | Mod: PBBFAC,,, | Performed by: FAMILY MEDICINE

## 2019-10-01 RX ORDER — HYDROCODONE BITARTRATE AND ACETAMINOPHEN 5; 325 MG/1; MG/1
TABLET ORAL
Refills: 0 | COMMUNITY
Start: 2019-09-26 | End: 2019-12-17

## 2019-10-01 NOTE — PROGRESS NOTES
[facility]  Formerly [facility]/ Internal Medicine  Clinic Note      Subjective:       Patient ID: Carolin Lind is a 54 y.o. female.    Chief Complaint: Follow-up and Hypertension    Patient is here today for follow-up visit.  Since her last visit here, she has developed severe lower back pain with radiation down her left leg.  She describes associated numbness, tingling, and motor weakness in her left foot.  She has been evaluated by Spine Surgery and Pain Management, thus far her workup has included an EMG and MRI which is showing nerve compression in the lumbar spine.  She has had an epidural steroid injection with Pain Management, Dr. Jain, and notes no relief.  At her last visit with Spine Surgery, she was recommended to continue gabapentin and start a trial of physical therapy.  She is in severe pain, is unable to find a position of comfort in the chair, and notes she just wants to lay in bed to relieve the pain.  She has had a course of oral steroid as well as high doses of oral NSAID, and notes she has started to develop pain in the left upper quadrant and left flank, especially after taking these NSAID doses.  She expresses a desire to no longer take any of these medications, as they have not been helping.  She has not had any emesis, melena, or rectal bleeding.  She expresses concern that her specialists have given some indication that maybe her findings are mild, and that she should not have this much pain, which she expresses has made her concerned about an alternate/additional explanation for her pain.  She notes chronic anxiety, which has worsened considerably due to the pain.  She is followed by Psychiatry.    Review of Systems   Constitutional: Negative for fatigue and fever.   HENT: Negative for rhinorrhea and sore throat.    Respiratory: Negative for cough and shortness of breath.    Cardiovascular: Negative for chest pain and palpitations.   Gastrointestinal: Positive  "for abdominal pain. Negative for anal bleeding, blood in stool, diarrhea, nausea and vomiting.   Genitourinary: Positive for flank pain. Negative for dysuria, hematuria and menstrual problem.   Musculoskeletal: Positive for arthralgias, back pain and myalgias.   Skin: Negative for rash and wound.   Neurological: Positive for weakness and numbness. Negative for dizziness, syncope and headaches.   Psychiatric/Behavioral: Positive for sleep disturbance. Negative for dysphoric mood. The patient is nervous/anxious.        Objective:      /80 (BP Location: Left arm, Patient Position: Sitting, BP Method: Medium (Manual))   Pulse 68   Ht 5' 9" (1.753 m)   Wt 63 kg (139 lb)   LMP 09/07/2019   SpO2 98%   BMI 20.53 kg/m²   Physical Exam   Constitutional: She is oriented to person, place, and time. She appears well-developed and well-nourished. No distress.   HENT:   Head: Normocephalic and atraumatic.   Neck: Normal range of motion. No thyromegaly present.   Cardiovascular: Normal rate and regular rhythm.   No murmur heard.  Pulmonary/Chest: Effort normal and breath sounds normal. No respiratory distress. She has no wheezes. She has no rales.   Abdominal: Soft. Bowel sounds are normal. She exhibits no distension. There is no tenderness.   Musculoskeletal: She exhibits no edema.        Lumbar back: She exhibits decreased range of motion, tenderness, bony tenderness, pain and spasm. She exhibits no deformity.   Diffuse lower back pain, worse on left side, worse in paraspinal and SI joint regions   Neurological: She is alert and oriented to person, place, and time. No cranial nerve deficit or sensory deficit. She exhibits abnormal muscle tone (mildly reduced dorsiflexion of left foot).   Skin: Skin is warm and dry. No rash noted.   Psychiatric: Her mood appears anxious.   Vitals reviewed.      Assessment:       1. Chronic left-sided low back pain with left-sided sciatica    2. Epigastric pain    3. Flank pain    4. " Elevated blood pressure reading without diagnosis of hypertension        Plan:     1. Chronic left-sided low back pain with left-sided sciatica  2. Epigastric pain  3. Flank pain  - history and exam findings reviewed, recent notes and results reviewed, patient in severe pain, support and empathy provided  - we discussed today that if her EMG and MRI show nerve compression, that alone is likely a sufficient explanation for her pain, and if she has a significant disk herniation then a steroid injection and/or medications may not be effective in comparison to more definitive surgical therapy, patient expressed understanding  - she expresses pain in the LUQ and higher up in the left flank, given her high doses of NSAID medications over the last several weeks, it is reasonable to check labs and an ultrasound of her kidneys  - - CBC auto differential  - - Comprehensive metabolic panel  - - Amylase  - - Lipase  - - US Retroperitoneal Complete; Future  - will also refer to GI as patient notes she will need a repeat colonoscopy, have also recommended that she discuss possible EGD with them if she continues to have significant LUQ pain  - - Ambulatory referral to Gastroenterology  - supportive care measures and treatment options reviewed, have recommended for the patient to contact her spine surgeon and/or pain management specialist for additional guidance as her current treatment has been ineffective, and especially given onset of left foot motor weakness, patient expressed understanding  - questions answered, warning signs reviewed, patient is comfortable with this plan and was encouraged to call the office for any concerns or worsening of condition     4. Elevated blood pressure reading without diagnosis of hypertension  - history of elevated blood pressures in the office, normal reading today, no new medication at this time and will continue to monitor  - diet and exercise lifestyle modifications reviewed and patient is  already following these very closely     - Follow up in 2-4 weeks, for follow-up back pain.     Luis Antonio Beard MD  10/1/2019          Medication List with Changes/Refills   Current Medications    BUTALBITAL-ACETAMINOP-CAF-COD -38-30 MG CAP    TK ONE C PO  QID  PRN    BUTALBITAL-ACETAMINOPHEN-CAFFEINE -40 MG (FIORICET, ESGIC) -40 MG PER TABLET    Take 1 tablet by mouth every 4 (four) hours as needed for Pain.    CLONAZEPAM (KLONOPIN) 1 MG TABLET    TK 1 T PO QHS    DICLOFENAC (VOLTAREN) 75 MG EC TABLET    Take 1 tablet (75 mg total) by mouth 2 (two) times daily as needed.    FLUOXETINE 20 MG CAPSULE    TAKE 3 CAPSULES(60 MG) BY MOUTH EVERY DAY    GABAPENTIN (NEURONTIN) 300 MG CAPSULE    TAKE 1 CAPSULE(300 MG) BY MOUTH THREE TIMES DAILY    HYDROCODONE-ACETAMINOPHEN (NORCO) 5-325 MG PER TABLET    TAKE 1 TABLET BY MOUTH EVERY 6 TO 8 HOURS AS NEEDED FOR POST PROCEDURE  PAIN. DO NOT TAKE WITH MUSCLE RELAXANTS    MELOXICAM (MOBIC) 15 MG TABLET        METHYLPREDNISOLONE (MEDROL DOSEPACK) 4 MG TABLET    Take as directed    PROMETHAZINE (PHENERGAN) 25 MG TABLET    TK 1 T PO  Q 12 H PRN    PROPRANOLOL (INDERAL) 40 MG TABLET    TAKE 1 TABLET(40 MG) BY MOUTH TWICE DAILY    SUMATRIPTAN SUCCINATE (SUMAVEL DOSEPRO) 6 MG/0.5 ML NFIJ    Inject 6 mg into the skin every 2 (two) hours as needed. 1 Needle-Free Injector Subcutaneous Every 2 hours    VENLAFAXINE (EFFEXOR-XR) 75 MG 24 HR CAPSULE    TAKE 1 CAPSULE(75 MG) BY MOUTH EVERY DAY

## 2019-10-02 ENCOUNTER — PATIENT MESSAGE (OUTPATIENT)
Dept: INTERNAL MEDICINE | Facility: CLINIC | Age: 54
End: 2019-10-02

## 2019-10-02 ENCOUNTER — TELEPHONE (OUTPATIENT)
Dept: INTERNAL MEDICINE | Facility: CLINIC | Age: 54
End: 2019-10-02

## 2019-10-02 LAB
ALBUMIN SERPL BCP-MCNC: 4.1 G/DL (ref 3.5–5.2)
ALP SERPL-CCNC: 88 U/L (ref 55–135)
ALT SERPL W/O P-5'-P-CCNC: 38 U/L (ref 10–44)
AMYLASE SERPL-CCNC: 59 U/L (ref 20–110)
ANION GAP SERPL CALC-SCNC: 8 MMOL/L (ref 8–16)
AST SERPL-CCNC: 25 U/L (ref 10–40)
BILIRUB SERPL-MCNC: 0.3 MG/DL (ref 0.1–1)
BUN SERPL-MCNC: 25 MG/DL (ref 6–20)
CALCIUM SERPL-MCNC: 9.4 MG/DL (ref 8.7–10.5)
CHLORIDE SERPL-SCNC: 102 MMOL/L (ref 95–110)
CO2 SERPL-SCNC: 27 MMOL/L (ref 23–29)
CREAT SERPL-MCNC: 0.8 MG/DL (ref 0.5–1.4)
EST. GFR  (AFRICAN AMERICAN): >60 ML/MIN/1.73 M^2
EST. GFR  (NON AFRICAN AMERICAN): >60 ML/MIN/1.73 M^2
GLUCOSE SERPL-MCNC: 91 MG/DL (ref 70–110)
LIPASE SERPL-CCNC: 38 U/L (ref 4–60)
POTASSIUM SERPL-SCNC: 4.3 MMOL/L (ref 3.5–5.1)
PROT SERPL-MCNC: 7.2 G/DL (ref 6–8.4)
SODIUM SERPL-SCNC: 137 MMOL/L (ref 136–145)

## 2019-10-02 NOTE — TELEPHONE ENCOUNTER
"[employer] message sent.  Please let the patient know their results, thank you:    " Hi Ms. Lind,    I have reviewed your recent lab results, and things are looking great.  There is nothing worrisome at all!  Your blood counts are all within acceptable limits and do not show any anemia.  Your electrolytes, blood sugar, and liver function are all normal.  Your BUN level is elevated, which typically is caused by being mildly dehydrated, and your kidney function is normal.  Let's see what the ultrasound shows, but for now there is nothing to worry about in these results.  Please contact the office if you have any additional questions or concerns.    Luis Antonio Beard MD      "

## 2019-10-07 ENCOUNTER — PATIENT MESSAGE (OUTPATIENT)
Dept: ORTHOPEDICS | Facility: CLINIC | Age: 54
End: 2019-10-07

## 2019-10-17 ENCOUNTER — PATIENT MESSAGE (OUTPATIENT)
Dept: NEUROSURGERY | Facility: CLINIC | Age: 54
End: 2019-10-17

## 2019-10-17 ENCOUNTER — TELEPHONE (OUTPATIENT)
Dept: NEUROSURGERY | Facility: CLINIC | Age: 54
End: 2019-10-17

## 2019-10-17 NOTE — TELEPHONE ENCOUNTER
Received message from Dr. Winchester requesting this pt be seen by Dr. Reyes for a surgical consultation. Pt scheduled on Monday 10/21 at 9:30AM at the [facility] [place]. Called pt, notified her of scheduled appt date, time, and location. Pt v/u, states she is very happy to be getting in with Dr. Reyes and that she will be at this appt Monday. Appt letter mailed.

## 2019-10-18 ENCOUNTER — PATIENT OUTREACH (OUTPATIENT)
Dept: ADMINISTRATIVE | Facility: OTHER | Age: 54
End: 2019-10-18

## 2019-10-21 ENCOUNTER — OFFICE VISIT (OUTPATIENT)
Dept: SPINE | Facility: CLINIC | Age: 54
End: 2019-10-21
Payer: COMMERCIAL

## 2019-10-21 VITALS — TEMPERATURE: 99 F | DIASTOLIC BLOOD PRESSURE: 69 MMHG | HEART RATE: 68 BPM | SYSTOLIC BLOOD PRESSURE: 135 MMHG

## 2019-10-21 DIAGNOSIS — M47.816 FACET ARTHRITIS OF LUMBAR REGION: ICD-10-CM

## 2019-10-21 DIAGNOSIS — G58.9 MONONEUROPATHY: Primary | ICD-10-CM

## 2019-10-21 PROCEDURE — 99999 PR PBB SHADOW E&M-EST. PATIENT-LVL III: ICD-10-PCS | Mod: PBBFAC,,, | Performed by: NEUROLOGICAL SURGERY

## 2019-10-21 PROCEDURE — 99214 OFFICE O/P EST MOD 30 MIN: CPT | Mod: S$GLB,,, | Performed by: NEUROLOGICAL SURGERY

## 2019-10-21 PROCEDURE — 3075F SYST BP GE 130 - 139MM HG: CPT | Mod: CPTII,S$GLB,, | Performed by: NEUROLOGICAL SURGERY

## 2019-10-21 PROCEDURE — 3078F DIAST BP <80 MM HG: CPT | Mod: CPTII,S$GLB,, | Performed by: NEUROLOGICAL SURGERY

## 2019-10-21 PROCEDURE — 99999 PR PBB SHADOW E&M-EST. PATIENT-LVL III: CPT | Mod: PBBFAC,,, | Performed by: NEUROLOGICAL SURGERY

## 2019-10-21 PROCEDURE — 3078F PR MOST RECENT DIASTOLIC BLOOD PRESSURE < 80 MM HG: ICD-10-PCS | Mod: CPTII,S$GLB,, | Performed by: NEUROLOGICAL SURGERY

## 2019-10-21 PROCEDURE — 3075F PR MOST RECENT SYSTOLIC BLOOD PRESS GE 130-139MM HG: ICD-10-PCS | Mod: CPTII,S$GLB,, | Performed by: NEUROLOGICAL SURGERY

## 2019-10-21 PROCEDURE — 99214 PR OFFICE/OUTPT VISIT, EST, LEVL IV, 30-39 MIN: ICD-10-PCS | Mod: S$GLB,,, | Performed by: NEUROLOGICAL SURGERY

## 2019-10-21 RX ORDER — METHOCARBAMOL 500 MG/1
TABLET, FILM COATED ORAL
Refills: 1 | COMMUNITY
Start: 2019-10-11 | End: 2019-12-17

## 2019-10-21 NOTE — LETTER
October 21, 2019      Jair Winchester MD  [address]  [facility] [address]           [address]  [address], [address]  [facility] [address]  Phone: [phone]  Fax: [phone]          Patient: Carolin Lind   MR Number: [id number]   YOB: 1965   Date of Visit: 10/21/2019       Dear Dr. Jair Winchester:    Thank you for referring Carolin Lind to me for evaluation. Attached you will find relevant portions of my assessment and plan of care.    If you have questions, please do not hesitate to call me. I look forward to following Carolin Lind along with you.    Sincerely,    Jair Reyes MD    Enclosure  CC:  No Recipients    If you would like to receive this communication electronically, please contact [email or web address] or [phone] to request more information on [employer] Link access.    For providers and/or their staff who would like to refer a patient to [facility], please contact us through our one-stop-shop provider referral line, [facility], [place], at [phone].    If you feel you have received this communication in error or would no longer like to receive these types of communications, please e-mail [email or web address]

## 2019-10-21 NOTE — PROGRESS NOTES
Dear Dr. Winchester,     Thank you for referring this patient to my clinic. The full details of my evaluation will also be forthcoming to you by letter.    CHIEF COMPLAINT:  Consult for low back pain    I, Stef Gonzalez, attest that this documentation has been prepared under the direction and in the presence of Jair Reyes MD.    HPI:  Carolin Lind is a 54 y.o.  female, who is referred to me by Dr. Winchester for evaluation of low back pain. Pt reports constant LLE pain radiating down her posterior thigh to her entire lower leg. She also notes numbness in the plantar left foot distally. Pt's pain in the LLE began 8 months to 1 year ago. Pt describes the LLE pain as burning in nature worst in her lateral foot and calf as well as the low back. She notes a trauma to her leg and she was often trying to recover following several surgeries. Pt notes constant low back pain as well. The pain is worse when sitting or driving a car. She often changes positions to alleviate her pain.  Pt works as a furniture cosigner. She is often working on her feet. She feels that her pain is debilitating and diminishing her quality of life and ability to complete her activities of daily living. She missed work 4 days last week due to pain. Pt received an MARLENI with Dr. Jain two weeks ago at L4/5 with no significant relief. Pt has tried gabapentin and states that it caused forgetfulness and slurred speech. Pt denies any hands problems, neck pain, or shooting arm pain. She notes some imbalance secondary to her left foot numbness. She denies any b/b dysfunction. Pt has received an EMG of her lower extremities at an outside facility. Pt was previously an extremely active person exercising regularly and hopes to get back to that.       Review of patient's allergies indicates:  No Known Allergies    Past Medical History:   Diagnosis Date    Abnormal Pap smear of cervix 01/30/2017    Atypical pap with + HPV    Anxiety     Depression     Heart  murmur     History of migraine headaches     Menarche     Age of onset 13    Mitral valve prolapse     PONV (postoperative nausea and vomiting)      Past Surgical History:   Procedure Laterality Date    ANKLE HARDWARE REMOVAL Left 4/16/2019    Procedure: REMOVAL, HARDWARE, ANKLE;  Surgeon: Jair Winchester MD;  Location: [address];  Service: Orthopedics;  Laterality: Left;    AUGMENTATION OF BREAST      BREAST SURGERY      EPIDURAL STEROID INJECTION INTO LUMBAR SPINE Left 09/26/2019    EXTERNAL FIXATOR APPLICATION      FLAP GRAFT SURGERY Left 4/16/2019    Procedure: FLAP GRAFT - fasciocutaneous;  Surgeon: Jair Winchester MD;  Location: [address];  Service: Orthopedics;  Laterality: Left;     Family History   Problem Relation Age of Onset    Cancer Mother     Migraines Daughter     Migraines Maternal Aunt     Breast cancer Maternal Aunt 50    Colon cancer Neg Hx     Ovarian cancer Neg Hx      Social History     Tobacco Use    Smoking status: Never Smoker    Smokeless tobacco: Never Used   Substance Use Topics    Alcohol use: No    Drug use: No        Review of Systems   Constitutional: Negative.    HENT: Negative.    Eyes: Negative.    Respiratory: Negative.    Cardiovascular: Negative.    Gastrointestinal: Negative.    Endocrine: Negative.    Genitourinary: Negative.    Musculoskeletal: Positive for back pain (low back) and myalgias (LLE). Negative for gait problem and neck pain.   Skin: Negative.    Allergic/Immunologic: Negative.    Neurological: Positive for numbness (plantar left foot). Negative for weakness, light-headedness and headaches.   Hematological: Negative.    Psychiatric/Behavioral: Negative.        OBJECTIVE:   Vital Signs:  Temp: 99 °F (37.2 °C) (10/21/19 0958)  Pulse: 68 (10/21/19 0958)  BP: 135/69 (10/21/19 0958)    Physical Exam:    Vital signs: All nursing notes and vital signs reviewed -- afebrile, vital signs stable.  Constitutional: Patient sitting  comfortably in chair. Appears well developed and well nourished.  Skin: Exposed areas are intact without abnormal markings, rashes or other lesions.  HEENT: Normocephalic. Normal conjunctivae.  Cardiovascular: Normal rate and regular rhythm.  Respiratory: Chest wall rises and falls symmetrically, without signs of respiratory distress.  Abdomen: Soft and non-tender.  Extremities: Warm and without edema. Calves supple, non-tender. Mild loss of bulk of the left quad.  Psych/Behavior: Normal affect.    Neurological:    Mental status: Alert and oriented. Conversational and appropriate.       Cranial Nerves: Grossly intact.     Motor:    Upper:  Deltoids Triceps Biceps WE WF     R 5/5 5/5 5/5 5/5 5/5 5/5    L 5/5 5/5 5/5 5/5 5/5 5/5      Lower:  HF KE KF DF PF EHL    R 5/5 5/5 5/5 5/5 5/5 5/5    L 5/5 4+/5 5/5 4+/5 5/5 4+/5     Sensory: Intact sensation to light touch in all extremities. Romberg negative.    Tinel's: Equivocal, left peroneal nerve.    Reflexes:          DTR: 2+ symmetrically throughout.     Beíntez's: Negative.     Babinski's: Negative.     Clonus: Negative.    Cerebellar: Finger-to-nose and rapid alternating movements normal. Gait stable, fluid.    Spine:    Posture: Head well aligned over pelvis in front and side views.  No focal or global spinal deformity visible on inspection. Shoulders and hips even. No obvious leg length discrepancy. No scapula winging.    Bending: Full ROM with forward, back and lateral bending. No rib prominence with forward bend.    Cervical:      ROM: Full with flexion, extension, lateral rotation and ear-to-shoulder bend.      Midline TTP: Negative.     Spurling's test: Negative.     Lhermitte's: Negative.    Thoracic:     Midline TTP: Negative    Lumbar:     Midline TTP: Positive.     Straight Leg Test: Negative     Crossed Straight Leg Test: Negative     Sciatic notch tenderness: Negative.    Other:     SI joint TTP: Positive.     Greater trochanter TTP: Negative.      Tenderness with external/internal hip rotation: Negative.    Diagnostic Results:  All imaging was independently reviewed by me.    EMG/Nerve conduction study, dated 9/17/2019:  1. Mild to moderate severe chronic left L5 radiculopathy   2. No evidence of acute disease    MRI L-spine, dated 9/12/2019:  1. Facet arthropathy at L3/4   2. Modic changes at L3/4  3. No significant central or neuroforaminal stenosis   4. Schmorls node at L3/4     Flex/Ex X-ray L-spine, dated 9/10/2019:  1. No instability    ASSESSMENT/PLAN:     Carolin Lind has chronic low back pain and debilitating left radicular leg pain with associated parasthesias and foot numbness. On exam she has some lumbar spine TTP and an equivocal Tinel's at the left fibular head. Imaging shows significant DDD and facet arthropathy at L3-4 without instability, which is the likely cause of her back pain. For this I'd recommend L3-4 facet injections by Dr. Jain. I do not see an explanation for her left leg pain at the level of the spine. She has no evidence of significant neural compression at any level. My suspicion is highest for a peripheral neuropathy, but her EMG showed only a chronic left L5 radiculopathy. I recommend a repeat EMG. To manage her pain I have suggested a trial of Lyrica -- she will discuss with her PCP. She may also be a candidate for a spinal cord stimulator and/or the Functional Restoration Program. She'll follow up with me once the new EMG is complete.    The patient understands and agrees with the plan of care. All questions were answered.     1. EMG/Nerve conduction study  2. Follow up with PCP to discuss lyrica  3. Follow up with Pain Management for facet injections at L3/4   4. RTC pending #1      I, Dr. Jair Reyes personally performed the services described in this documentation. All medical record entries made by the scribe, Stef Gonzalez, were at my direction and in my presence.  I have reviewed the chart and agree that the  record reflects my personal performance and is accurate and complete.      Jair Reyes M.D.  Department of Neurosurgery  [facility]      .

## 2019-10-25 ENCOUNTER — PROCEDURE VISIT (OUTPATIENT)
Dept: NEUROLOGY | Facility: CLINIC | Age: 54
End: 2019-10-25
Payer: COMMERCIAL

## 2019-10-25 DIAGNOSIS — M47.816 FACET ARTHRITIS OF LUMBAR REGION: ICD-10-CM

## 2019-10-25 DIAGNOSIS — G58.9 MONONEUROPATHY: ICD-10-CM

## 2019-10-25 PROCEDURE — 95912 PR NERVE CONDUCTION STUDY; 11 -12 STUDIES: ICD-10-PCS | Mod: S$GLB,,, | Performed by: PSYCHIATRY & NEUROLOGY

## 2019-10-25 PROCEDURE — 95886 PR EMG COMPLETE, W/ NERVE CONDUCTION STUDIES, 5+ MUSCLES: ICD-10-PCS | Mod: S$GLB,,, | Performed by: PSYCHIATRY & NEUROLOGY

## 2019-10-25 PROCEDURE — 95886 MUSC TEST DONE W/N TEST COMP: CPT | Mod: S$GLB,,, | Performed by: PSYCHIATRY & NEUROLOGY

## 2019-10-25 PROCEDURE — 95912 NRV CNDJ TEST 11-12 STUDIES: CPT | Mod: S$GLB,,, | Performed by: PSYCHIATRY & NEUROLOGY

## 2019-11-05 ENCOUNTER — PATIENT MESSAGE (OUTPATIENT)
Dept: SPINE | Facility: CLINIC | Age: 54
End: 2019-11-05

## 2019-11-08 ENCOUNTER — TELEPHONE (OUTPATIENT)
Dept: NEUROSURGERY | Facility: CLINIC | Age: 54
End: 2019-11-08

## 2019-11-08 ENCOUNTER — PATIENT MESSAGE (OUTPATIENT)
Dept: NEUROSURGERY | Facility: CLINIC | Age: 54
End: 2019-11-08

## 2019-11-08 DIAGNOSIS — G58.9 MONONEUROPATHY: ICD-10-CM

## 2019-11-08 DIAGNOSIS — M47.816 LUMBAR FACET ARTHROPATHY: Primary | ICD-10-CM

## 2019-11-08 RX ORDER — GABAPENTIN 100 MG/1
CAPSULE ORAL
Qty: 60 CAPSULE | Refills: 0 | Status: SHIPPED | OUTPATIENT
Start: 2019-11-08 | End: 2019-11-14

## 2019-11-08 NOTE — TELEPHONE ENCOUNTER
Pt states she has been to PM w/Dr. Jain and experienced ~15% relief w/the injection.  Provided the number for Neurology ([phone]) so she can F/U about her EMG results. States she will schedule an appt w/her PCP, Dr. Beard to discuss a script for Lyrica.  Will discuss the option of FRP or PT w/Dr. Reyes.   V/U.

## 2019-11-11 ENCOUNTER — PATIENT MESSAGE (OUTPATIENT)
Dept: INTERNAL MEDICINE | Facility: CLINIC | Age: 54
End: 2019-11-11

## 2019-11-12 ENCOUNTER — TELEPHONE (OUTPATIENT)
Dept: INTERNAL MEDICINE | Facility: CLINIC | Age: 54
End: 2019-11-12

## 2019-11-12 NOTE — TELEPHONE ENCOUNTER
A new medication was recommended for the patient by another provider.  Please call the patient to set up an appointment so that we can discuss the medication and send in a Rx.  Thanks.

## 2019-11-13 ENCOUNTER — PATIENT MESSAGE (OUTPATIENT)
Dept: SLEEP MEDICINE | Facility: CLINIC | Age: 54
End: 2019-11-13

## 2019-11-13 NOTE — TELEPHONE ENCOUNTER
Please clarify if the patient has an artificial heart valve.  As long as she does not, she would not need any specific antibiotic prophylaxis prior to dental surgery.  Thanks.

## 2019-11-13 NOTE — TELEPHONE ENCOUNTER
Pt states that she does not have an artificial heart valve and is requesting a letter to send to her dentist stating that she does not need antibiotics.

## 2019-11-14 ENCOUNTER — OFFICE VISIT (OUTPATIENT)
Dept: INTERNAL MEDICINE | Facility: CLINIC | Age: 54
End: 2019-11-14
Payer: COMMERCIAL

## 2019-11-14 VITALS
HEART RATE: 60 BPM | WEIGHT: 131.5 LBS | SYSTOLIC BLOOD PRESSURE: 132 MMHG | TEMPERATURE: 98 F | DIASTOLIC BLOOD PRESSURE: 78 MMHG | OXYGEN SATURATION: 98 % | BODY MASS INDEX: 19.48 KG/M2 | HEIGHT: 69 IN

## 2019-11-14 DIAGNOSIS — M51.36 DDD (DEGENERATIVE DISC DISEASE), LUMBAR: Primary | ICD-10-CM

## 2019-11-14 DIAGNOSIS — M54.16 LUMBAR RADICULOPATHY: ICD-10-CM

## 2019-11-14 PROCEDURE — 3078F PR MOST RECENT DIASTOLIC BLOOD PRESSURE < 80 MM HG: ICD-10-PCS | Mod: CPTII,S$GLB,, | Performed by: FAMILY MEDICINE

## 2019-11-14 PROCEDURE — 99213 PR OFFICE/OUTPT VISIT, EST, LEVL III, 20-29 MIN: ICD-10-PCS | Mod: S$GLB,,, | Performed by: FAMILY MEDICINE

## 2019-11-14 PROCEDURE — 3075F PR MOST RECENT SYSTOLIC BLOOD PRESS GE 130-139MM HG: ICD-10-PCS | Mod: CPTII,S$GLB,, | Performed by: FAMILY MEDICINE

## 2019-11-14 PROCEDURE — 3075F SYST BP GE 130 - 139MM HG: CPT | Mod: CPTII,S$GLB,, | Performed by: FAMILY MEDICINE

## 2019-11-14 PROCEDURE — 3008F PR BODY MASS INDEX (BMI) DOCUMENTED: ICD-10-PCS | Mod: CPTII,S$GLB,, | Performed by: FAMILY MEDICINE

## 2019-11-14 PROCEDURE — 3008F BODY MASS INDEX DOCD: CPT | Mod: CPTII,S$GLB,, | Performed by: FAMILY MEDICINE

## 2019-11-14 PROCEDURE — 3078F DIAST BP <80 MM HG: CPT | Mod: CPTII,S$GLB,, | Performed by: FAMILY MEDICINE

## 2019-11-14 PROCEDURE — 99999 PR PBB SHADOW E&M-EST. PATIENT-LVL III: ICD-10-PCS | Mod: PBBFAC,,, | Performed by: FAMILY MEDICINE

## 2019-11-14 PROCEDURE — 99213 OFFICE O/P EST LOW 20 MIN: CPT | Mod: S$GLB,,, | Performed by: FAMILY MEDICINE

## 2019-11-14 PROCEDURE — 99999 PR PBB SHADOW E&M-EST. PATIENT-LVL III: CPT | Mod: PBBFAC,,, | Performed by: FAMILY MEDICINE

## 2019-11-14 RX ORDER — PREGABALIN 75 MG/1
75 CAPSULE ORAL 2 TIMES DAILY
Qty: 60 CAPSULE | Refills: 1 | Status: SHIPPED | OUTPATIENT
Start: 2019-11-14 | End: 2019-12-17

## 2019-11-14 RX ORDER — GABAPENTIN 300 MG/1
CAPSULE ORAL
Qty: 90 CAPSULE | Refills: 0
Start: 2019-11-14 | End: 2019-12-17

## 2019-11-14 NOTE — TELEPHONE ENCOUNTER
I have written out a letter for her, can you please re-print the letter and let her know we will send it to her dentist or she can pick it up?  Thanks.

## 2019-11-14 NOTE — PATIENT INSTRUCTIONS
Pregabalin capsules  What is this medicine?  PREGABALIN (pre MONICA a amrita) is used to treat nerve pain from diabetes, shingles, spinal cord injury, and fibromyalgia. It is also used to control seizures in epilepsy.  How should I use this medicine?  Take this medicine by mouth with a glass of water. Follow the directions on the prescription label. You can take this medicine with or without food. Take your doses at regular intervals. Do not take your medicine more often than directed. Do not stop taking except on your doctor's advice.  A special MedGuide will be given to you by the pharmacist with each prescription and refill. Be sure to read this information carefully each time.  Talk to your pediatrician regarding the use of this medicine in children. Special care may be needed.  What side effects may I notice from receiving this medicine?  Side effects that you should report to your doctor or health care professional as soon as possible:  · allergic reactions like skin rash, itching or hives, swelling of the face, lips, or tongue  · breathing problems  · changes in vision  · chest pain  · confusion  · jerking or unusual movements of any part of your body  · loss of memory  · muscle pain, tenderness, or weakness  · suicidal thoughts or other mood changes  · swelling of the ankles, feet, hands  · unusual bruising or bleeding  Side effects that usually do not require medical attention (Report these to your doctor or health care professional if they continue or are bothersome.):  · dizziness  · drowsiness  · dry mouth  · headache  · nausea  · tremors  · trouble sleeping  · weight gain  What may interact with this medicine?  · alcohol  · certain medicines for blood pressure like captopril, enalapril, or lisinopril  · certain medicines for diabetes, like pioglitazone or rosiglitazone  · certain medicines for anxiety or sleep  · narcotic medicines for pain  What if I miss a dose?  If you miss a dose, take it as soon as you  can. If it is almost time for your next dose, take only that dose. Do not take double or extra doses.  Where should I keep my medicine?  Keep out of the reach of children. This medicine can be abused. Keep your medicine in a safe place to protect it from theft. Do not share this medicine with anyone. Selling or giving away this medicine is dangerous and against the law.  This medicine may cause accidental overdose and death if it taken by other adults, children, or pets. Mix any unused medicine with a substance like cat litter or coffee grounds. Then throw the medicine away in a sealed container like a sealed bag or a coffee can with a lid. Do not use the medicine after the expiration date.  Store at room temperature between 15 and 30 degrees C (59 and 86 degrees F).  What should I tell my health care provider before I take this medicine?  They need to know if you have any of these conditions:  · bleeding problems  · heart disease, including heart failure  · history of alcohol or drug abuse  · kidney disease  · suicidal thoughts, plans, or attempt; a previous suicide attempt by you or a family member  · an unusual or allergic reaction to pregabalin, gabapentin, other medicines, foods, dyes, or preservatives  · pregnant or trying to get pregnant or trying to conceive with your partner  · breast-feeding  What should I watch for while using this medicine?  Tell your doctor or healthcare professional if your symptoms do not start to get better or if they get worse. Visit your doctor or health care professional for regular checks on your progress. Do not stop taking except on your doctor's advice. You may develop a severe reaction. Your doctor will tell you how much medicine to take.  Wear a medical identification bracelet or chain if you are taking this medicine for seizures, and carry a card that describes your disease and details of your medicine and dosage times.  You may get drowsy or dizzy. Do not drive, use  machinery, or do anything that needs mental alertness until you know how this medicine affects you. Do not stand or sit up quickly, especially if you are an older patient. This reduces the risk of dizzy or fainting spells. Alcohol may interfere with the effect of this medicine. Avoid alcoholic drinks.  If you have a heart condition, like congestive heart failure, and notice that you are retaining water and have swelling in your hands or feet, contact your health care provider immediately.  The use of this medicine may increase the chance of suicidal thoughts or actions. Pay special attention to how you are responding while on this medicine. Any worsening of mood, or thoughts of suicide or dying should be reported to your health care professional right away.  This medicine has caused reduced sperm counts in some men. This may interfere with the ability to father a child. You should talk to your doctor or health care professional if you are concerned about your fertility.  Women who become pregnant while using this medicine for seizures may enroll in the [place] Antiepileptic Drug Pregnancy Registry by calling 1-653.661.2624. This registry collects information about the safety of antiepileptic drug use during pregnancy.  NOTE:This sheet is a summary. It may not cover all possible information. If you have questions about this medicine, talk to your doctor, pharmacist, or health care provider. Copyright© 2017 [employer]

## 2019-11-14 NOTE — TELEPHONE ENCOUNTER
Informed pt that the letter for her dentist can be printed from [facility]. Pt verbalized understanding.

## 2019-11-14 NOTE — PROGRESS NOTES
"[facility]  [facility] Family Medicine/ Internal Medicine  Clinic Note      Subjective:       Patient ID: Carolin Lind is a 54 y.o. female.    Chief Complaint: Medication Questions    Patient is here today for follow-up visit.  She was last seen for severe lower back pain with radiation down her left leg.  Since that visit she has had an evaluation with Spine Surgery Dr. Reyes, who has recommended a trial of Lyrica for further pain relief.  She is currently using high doses of gabapentin (1200 mg) for pain control, but notes she is only using this medication at bedtime due to it causing drowsiness and sedation.  She describes continued numbness, tingling, and motor weakness in her left foot.  Their additional recommendation was to consider a lumbar epidural injection, though she has already had this treatment with Pain Management, Dr. Jain, and had noted no relief.  She continues to be in severe pain, particularly during the day as she has been unable to find a good option for non-drowsy pain relief.    Review of Systems   Constitutional: Negative for fatigue and fever.   HENT: Negative for rhinorrhea and sore throat.    Respiratory: Negative for cough and shortness of breath.    Cardiovascular: Negative for chest pain and palpitations.   Musculoskeletal: Positive for arthralgias, back pain and myalgias.   Skin: Negative for rash and wound.   Neurological: Positive for weakness and numbness. Negative for dizziness, syncope and headaches.   Psychiatric/Behavioral: Positive for sleep disturbance. Negative for dysphoric mood. The patient is nervous/anxious.        Objective:      /78 (BP Location: Left arm, Patient Position: Sitting, BP Method: Large (Manual))   Pulse 60   Temp 97.6 °F (36.4 °C) (Oral)   Ht 5' 9" (1.753 m)   Wt 59.6 kg (131 lb 8.1 oz)   LMP 11/07/2019   SpO2 98%   BMI 19.42 kg/m²   Physical Exam   Constitutional: She is oriented to person, place, and time. She appears " well-developed and well-nourished. No distress.   HENT:   Head: Normocephalic and atraumatic.   Neck: Normal range of motion. No thyromegaly present.   Cardiovascular: Normal rate and regular rhythm.   No murmur heard.  Pulmonary/Chest: Effort normal and breath sounds normal. No respiratory distress. She has no wheezes. She has no rales.   Abdominal: Soft. Bowel sounds are normal. She exhibits no distension. There is no tenderness.   Musculoskeletal: She exhibits no edema.        Lumbar back: She exhibits decreased range of motion, tenderness, bony tenderness, pain and spasm. She exhibits no deformity.   Diffuse lower back pain, worse on left side, worse in paraspinal and SI joint regions   Neurological: She is alert and oriented to person, place, and time. No cranial nerve deficit or sensory deficit. She exhibits abnormal muscle tone (mildly reduced dorsiflexion of left foot).   Skin: Skin is warm and dry. No rash noted.   Psychiatric: Her mood appears anxious.   Vitals reviewed.      Assessment:       1. DDD (degenerative disc disease), lumbar    2. Lumbar radiculopathy        Plan:     1. DDD (degenerative disc disease), lumbar  2. Lumbar radiculopathy  - history and exam findings reviewed, recent note from Spine Surgery reviewed, including recommendation to start Lyrica  - treatment options reviewed, will plan to taper down gabapentin and have advised of potential increased drowsiness/sedation with this combination of medications, dosing instructions and potential side effects reviewed, handout provided   - - pregabalin (LYRICA) 75 MG capsule; Take 1 capsule (75 mg total) by mouth 2 (two) times daily.  Dispense: 60 capsule; Refill: 1  - - gabapentin (NEURONTIN) 300 MG capsule; Take 3 capsules (900 mg total) by mouth every evening for 4 days, THEN 2 capsules (600 mg total) every evening for 4 days, THEN 1 capsule (300 mg total) every evening for 4 days.  Dispense: 90 capsule; Refill: 0  - questions answered,  warning signs reviewed, patient is comfortable with this plan and was encouraged to call the office for any concerns or worsening of condition    - Follow up in about 4 weeks (around 12/12/2019) for follow-up medications.     Luis Antonio Beard MD  11/14/2019          Medication List with Changes/Refills   New Medications    PREGABALIN (LYRICA) 75 MG CAPSULE    Take 1 capsule (75 mg total) by mouth 2 (two) times daily.   Current Medications    BUTALBITAL-ACETAMINOP-CAF-COD -71-30 MG CAP    TK ONE C PO  QID  PRN    BUTALBITAL-ACETAMINOPHEN-CAFFEINE -40 MG (FIORICET, ESGIC) -40 MG PER TABLET    Take 1 tablet by mouth every 4 (four) hours as needed for Pain.    CLONAZEPAM (KLONOPIN) 1 MG TABLET    TK 1 T PO QHS    DICLOFENAC (VOLTAREN) 75 MG EC TABLET    Take 1 tablet (75 mg total) by mouth 2 (two) times daily as needed.    FLUOXETINE 20 MG CAPSULE    TAKE 3 CAPSULES(60 MG) BY MOUTH EVERY DAY    HYDROCODONE-ACETAMINOPHEN (NORCO) 5-325 MG PER TABLET    TAKE 1 TABLET BY MOUTH EVERY 6 TO 8 HOURS AS NEEDED FOR POST PROCEDURE  PAIN. DO NOT TAKE WITH MUSCLE RELAXANTS    MELOXICAM (MOBIC) 15 MG TABLET        METHOCARBAMOL (ROBAXIN) 500 MG TAB    TK 1 T PO BID PRF MSP    METHYLPREDNISOLONE (MEDROL DOSEPACK) 4 MG TABLET    Take as directed    PROMETHAZINE (PHENERGAN) 25 MG TABLET    TK 1 T PO  Q 12 H PRN    PROPRANOLOL (INDERAL) 40 MG TABLET    TAKE 1 TABLET(40 MG) BY MOUTH TWICE DAILY    SUMATRIPTAN SUCCINATE (SUMAVEL DOSEPRO) 6 MG/0.5 ML NFIJ    Inject 6 mg into the skin every 2 (two) hours as needed. 1 Needle-Free Injector Subcutaneous Every 2 hours    VENLAFAXINE (EFFEXOR-XR) 75 MG 24 HR CAPSULE    TAKE 1 CAPSULE(75 MG) BY MOUTH EVERY DAY   Changed and/or Refilled Medications    Modified Medication Previous Medication    GABAPENTIN (NEURONTIN) 300 MG CAPSULE gabapentin (NEURONTIN) 300 MG capsule       Take 3 capsules (900 mg total) by mouth every evening for 4 days, THEN 2 capsules (600 mg total) every  evening for 4 days, THEN 1 capsule (300 mg total) every evening for 4 days.    TAKE 1 CAPSULE(300 MG) BY MOUTH THREE TIMES DAILY   Discontinued Medications    GABAPENTIN (NEURONTIN) 100 MG CAPSULE    TAKE 1 CAPSULE(100 MG) BY MOUTH TWICE DAILY

## 2019-11-16 DIAGNOSIS — I10 BENIGN HYPERTENSION: ICD-10-CM

## 2019-11-18 ENCOUNTER — TELEPHONE (OUTPATIENT)
Dept: NEUROSURGERY | Facility: CLINIC | Age: 54
End: 2019-11-18

## 2019-11-18 RX ORDER — PROPRANOLOL HYDROCHLORIDE 40 MG/1
TABLET ORAL
Qty: 180 TABLET | Refills: 3 | Status: SHIPPED | OUTPATIENT
Start: 2019-11-18 | End: 2020-08-31 | Stop reason: SDUPTHER

## 2019-11-18 NOTE — TELEPHONE ENCOUNTER
Reviewed EMG results with Dr. Reyes, he states there are no concerning findings from a spine perspective, though there are some peripheral nerve findings implicated by the study. He states he will review the findings in greater detail at her next appt in January. Called pt and relayed this information to her, she v/u, appreciative of the call. Denies further needs at this time.       ----- Message from Emelia King sent at 11/18/2019  1:49 PM CST -----  Contact: Self  Pt would like a call back with the results of her EMG @ [phone]

## 2019-12-02 ENCOUNTER — PATIENT OUTREACH (OUTPATIENT)
Dept: ADMINISTRATIVE | Facility: HOSPITAL | Age: 54
End: 2019-12-02

## 2019-12-10 ENCOUNTER — PATIENT MESSAGE (OUTPATIENT)
Dept: ORTHOPEDICS | Facility: CLINIC | Age: 54
End: 2019-12-10

## 2019-12-16 ENCOUNTER — PATIENT MESSAGE (OUTPATIENT)
Dept: INTERNAL MEDICINE | Facility: CLINIC | Age: 54
End: 2019-12-16

## 2019-12-16 RX ORDER — GABAPENTIN 100 MG/1
CAPSULE ORAL
Qty: 60 CAPSULE | Refills: 0 | OUTPATIENT
Start: 2019-12-16

## 2019-12-17 ENCOUNTER — OFFICE VISIT (OUTPATIENT)
Dept: INTERNAL MEDICINE | Facility: CLINIC | Age: 54
End: 2019-12-17
Payer: COMMERCIAL

## 2019-12-17 VITALS
BODY MASS INDEX: 19.59 KG/M2 | HEART RATE: 65 BPM | OXYGEN SATURATION: 95 % | SYSTOLIC BLOOD PRESSURE: 130 MMHG | DIASTOLIC BLOOD PRESSURE: 76 MMHG | WEIGHT: 132.25 LBS | TEMPERATURE: 98 F | HEIGHT: 69 IN

## 2019-12-17 DIAGNOSIS — F33.1 MODERATE EPISODE OF RECURRENT MAJOR DEPRESSIVE DISORDER: ICD-10-CM

## 2019-12-17 DIAGNOSIS — M54.42 CHRONIC LEFT-SIDED LOW BACK PAIN WITH LEFT-SIDED SCIATICA: Primary | ICD-10-CM

## 2019-12-17 DIAGNOSIS — E53.8 B12 DEFICIENCY: ICD-10-CM

## 2019-12-17 DIAGNOSIS — G89.29 CHRONIC LEFT-SIDED LOW BACK PAIN WITH LEFT-SIDED SCIATICA: Primary | ICD-10-CM

## 2019-12-17 PROCEDURE — 90686 FLU VACCINE (QUAD) GREATER THAN OR EQUAL TO 3YO PRESERVATIVE FREE IM: ICD-10-PCS | Mod: S$GLB,,, | Performed by: FAMILY MEDICINE

## 2019-12-17 PROCEDURE — 96372 THER/PROPH/DIAG INJ SC/IM: CPT | Mod: 59,S$GLB,, | Performed by: FAMILY MEDICINE

## 2019-12-17 PROCEDURE — 99999 PR PBB SHADOW E&M-EST. PATIENT-LVL III: ICD-10-PCS | Mod: PBBFAC,,, | Performed by: FAMILY MEDICINE

## 2019-12-17 PROCEDURE — 99213 OFFICE O/P EST LOW 20 MIN: CPT | Mod: 25,S$GLB,, | Performed by: FAMILY MEDICINE

## 2019-12-17 PROCEDURE — 90471 FLU VACCINE (QUAD) GREATER THAN OR EQUAL TO 3YO PRESERVATIVE FREE IM: ICD-10-PCS | Mod: S$GLB,,, | Performed by: FAMILY MEDICINE

## 2019-12-17 PROCEDURE — 90686 IIV4 VACC NO PRSV 0.5 ML IM: CPT | Mod: S$GLB,,, | Performed by: FAMILY MEDICINE

## 2019-12-17 PROCEDURE — 99999 PR PBB SHADOW E&M-EST. PATIENT-LVL III: CPT | Mod: PBBFAC,,, | Performed by: FAMILY MEDICINE

## 2019-12-17 PROCEDURE — 96372 PR INJECTION,THERAP/PROPH/DIAG2ST, IM OR SUBCUT: ICD-10-PCS | Mod: 59,S$GLB,, | Performed by: FAMILY MEDICINE

## 2019-12-17 PROCEDURE — 99213 PR OFFICE/OUTPT VISIT, EST, LEVL III, 20-29 MIN: ICD-10-PCS | Mod: 25,S$GLB,, | Performed by: FAMILY MEDICINE

## 2019-12-17 PROCEDURE — 90471 IMMUNIZATION ADMIN: CPT | Mod: S$GLB,,, | Performed by: FAMILY MEDICINE

## 2019-12-17 RX ORDER — DULOXETIN HYDROCHLORIDE 30 MG/1
30 CAPSULE, DELAYED RELEASE ORAL DAILY
Qty: 30 CAPSULE | Refills: 1 | Status: SHIPPED | OUTPATIENT
Start: 2019-12-17 | End: 2020-02-07

## 2019-12-17 RX ORDER — CYANOCOBALAMIN 1000 UG/ML
1000 INJECTION, SOLUTION INTRAMUSCULAR; SUBCUTANEOUS ONCE
Status: COMPLETED | OUTPATIENT
Start: 2019-12-17 | End: 2019-12-17

## 2019-12-17 RX ADMIN — CYANOCOBALAMIN 1000 MCG: 1000 INJECTION, SOLUTION INTRAMUSCULAR; SUBCUTANEOUS at 12:12

## 2019-12-17 NOTE — Clinical Note
Jony Hirsch, this is a mutual patient who has been struggling with uncontrolled lower back pain.  She is currently on venlafaxine and we discussed today that a trial of Cymbalta may be more helpful for treating her chronic pain.  She would like to proceed with this change, and dosing instructions were reviewed with the patient.  Please let me know if you have any concerns about this transition, thanks! Luis Antonio Beard MD

## 2019-12-17 NOTE — PROGRESS NOTES
After obtaining consent, and per orders of Dr. Beard, injection of fluarix Lot [id number] 6/30/20 given in the RD by BELL QUINN. Patient tolerated well and band aid applied. Patient instructed to remain in clinic for 15 minutes afterwards, and to report any adverse reaction to me immediately.    After obtaining consent, and per orders of Dr. Beard, injection of B12 Lot [id number] 2/21 given in the [facility] by BELL QUINN. Patient tolerated well and band aid applied. Patient instructed to remain in clinic for 15 minutes afterwards, and to report any adverse reaction to me immediately.

## 2019-12-17 NOTE — PROGRESS NOTES
[facility]  [facility] Family Medicine/ Internal Medicine  Clinic Note      Subjective:       Patient ID: Carolin Lind is a 54 y.o. female.    Chief Complaint: 4 week follow up medication    Patient returns for follow-up visit today to discuss chronic, severe, lower back pain with radiation down her left leg.  She has history of recorded lumbar disk herniations with evidence of radiculopathy based on recent MRI and EEG studies.  She had an initial evaluation with Spine Surgery Dr. Reyes, who had recommended a trial of Lyrica for additional pain relief, and at her last visit we discussed tapering off of the high doses of gabapentin (1200 mg) and starting the Lyrica.  She notes she has done so, and despite experiencing some increased headache activity with lower doses of gabapentin, she has now stopped taking this medication altogether.  She notes her pain has been stable after this change, but remains uncontrolled as she describes the Lyrica has not been helpful, and if anything had just caused some blurred vision when taken during the day.  She has stopped taking this medication as well, given no benefit.  She has been cared for by Pain Management specialist Dr. Jain, and has had two epidural injections without any benefit as well.  Ultimately they recommended evaluation by Spine Surgery Dr. Casey Rojo for second opinion, and does have a visit with them on 12/27/19.  She reports continued severe lower back pain, interfering with her daily function, along with numbness, tingling, and motor weakness in her left foot.  She is not interested in starting any narcotic therapy for pain control.  She is followed by Psychiatry who has recently recommended transitioning from fluoxetine to venlafaxine, notes mood is stable aside from concern due to her persistent, uncontrolled pain.    Review of Systems   Constitutional: Negative for fatigue and fever.   HENT: Negative for rhinorrhea and sore throat.   "  Respiratory: Negative for cough and shortness of breath.    Cardiovascular: Negative for chest pain and palpitations.   Musculoskeletal: Positive for arthralgias, back pain and myalgias.   Skin: Negative for rash and wound.   Neurological: Positive for weakness and numbness. Negative for dizziness, syncope and headaches.   Psychiatric/Behavioral: Positive for sleep disturbance. Negative for dysphoric mood. The patient is nervous/anxious.        Objective:      /76 (BP Location: Right arm, Patient Position: Sitting, BP Method: Medium (Manual))   Pulse 65   Temp 97.8 °F (36.6 °C)   Ht 5' 9" (1.753 m)   Wt 60 kg (132 lb 4.4 oz)   LMP 10/07/2019   SpO2 95%   BMI 19.53 kg/m²   Physical Exam   Constitutional: She is oriented to person, place, and time. She appears well-developed and well-nourished. No distress.   HENT:   Head: Normocephalic and atraumatic.   Neck: Normal range of motion. No thyromegaly present.   Cardiovascular: Normal rate and regular rhythm.   No murmur heard.  Pulmonary/Chest: Effort normal and breath sounds normal. No respiratory distress. She has no wheezes. She has no rales.   Abdominal: Soft. Bowel sounds are normal. She exhibits no distension. There is no tenderness.   Musculoskeletal: She exhibits no edema.        Lumbar back: She exhibits decreased range of motion, tenderness, bony tenderness, pain and spasm. She exhibits no deformity.   Diffuse lower back pain, worse on left side, worse in paraspinal and SI joint regions   Neurological: She is alert and oriented to person, place, and time. No cranial nerve deficit or sensory deficit. She exhibits abnormal muscle tone (mildly reduced dorsiflexion of left foot).   Skin: Skin is warm and dry. No rash noted.   Psychiatric: Her mood appears anxious.   Vitals reviewed.      Assessment:       1. Chronic left-sided low back pain with left-sided sciatica    2. Moderate episode of recurrent major depressive disorder    3. B12 deficiency  "       Plan:     1. Chronic left-sided low back pain with left-sided sciatica  2. Moderate episode of recurrent major depressive disorder  - history and exam findings reviewed, imaging results reviewed, available specialist notes reviewed, with continued severe lower back pain and evidence of radiculopathy/myelopathy, support and empathy provided  - additional evaluation options reviewed, will await further treatment recommendations from second opinion with Spine Surgery   - supportive care measures reviewed  - treatment options reviewed, have recommended to stop Lyrica due to no benefit and poor side effects, have recommended to not restart gabapentin for the same considerations, patient is not interested in starting any narcotic pain medications to help manage pain, and have advised this is a very reasonable and recommended approach if they can be avoided, and in review of her medication list we discussed potentially transitioning away from venlafaxine to Cymbalta for its chronic pain relief properties, and will confer with Nurse Practitioner Antoinette to touch base  - - basic pharmacology of Cymbalta reviewed, patient comfortable with this change, will plan for direct conversion from venlafaxine and dosing instructions and potential side effects reviewed, handout provided   - - - DULoxetine (CYMBALTA) 30 MG capsule; Take 1 capsule (30 mg total) by mouth once daily.  Dispense: 30 capsule; Refill: 1  - questions answered, warning signs reviewed, patient is comfortable with this plan and was encouraged to call the office for any concerns or worsening of condition     3. B12 deficiency  - repeat vitamin B12 injection provided today, patient has tolerated well in the past and with good benefit reported  - - cyanocobalamin injection 1,000 mcg    - Follow up in about 4 weeks (around 1/14/2020) for follow-up medication.     Luis Antonio Beard MD  12/17/2019          Medication List with Changes/Refills   New Medications     DULOXETINE (CYMBALTA) 30 MG CAPSULE    Take 1 capsule (30 mg total) by mouth once daily.   Current Medications    BUTALBITAL-ACETAMINOP-CAF-COD -93-30 MG CAP    TK ONE C PO  QID  PRN    BUTALBITAL-ACETAMINOPHEN-CAFFEINE -40 MG (FIORICET, ESGIC) -40 MG PER TABLET    Take 1 tablet by mouth every 4 (four) hours as needed for Pain.    CLONAZEPAM (KLONOPIN) 1 MG TABLET    as needed.     FLUOXETINE 20 MG CAPSULE    TAKE 3 CAPSULES(60 MG) BY MOUTH EVERY DAY    MELOXICAM (MOBIC) 15 MG TABLET    as needed.     PROMETHAZINE (PHENERGAN) 25 MG TABLET    TK 1 T PO  Q 12 H PRN    PROPRANOLOL (INDERAL) 40 MG TABLET    TAKE 1 TABLET(40 MG) BY MOUTH TWICE DAILY    SUMATRIPTAN SUCCINATE (SUMAVEL DOSEPRO) 6 MG/0.5 ML NFIJ    Inject 6 mg into the skin every 2 (two) hours as needed. 1 Needle-Free Injector Subcutaneous Every 2 hours   Discontinued Medications    DICLOFENAC (VOLTAREN) 75 MG EC TABLET    Take 1 tablet (75 mg total) by mouth 2 (two) times daily as needed.    GABAPENTIN (NEURONTIN) 300 MG CAPSULE    Take 3 capsules (900 mg total) by mouth every evening for 4 days, THEN 2 capsules (600 mg total) every evening for 4 days, THEN 1 capsule (300 mg total) every evening for 4 days.    HYDROCODONE-ACETAMINOPHEN (NORCO) 5-325 MG PER TABLET    TAKE 1 TABLET BY MOUTH EVERY 6 TO 8 HOURS AS NEEDED FOR POST PROCEDURE  PAIN. DO NOT TAKE WITH MUSCLE RELAXANTS    METHOCARBAMOL (ROBAXIN) 500 MG TAB    TK 1 T PO BID PRF MSP    METHYLPREDNISOLONE (MEDROL DOSEPACK) 4 MG TABLET    Take as directed    PREGABALIN (LYRICA) 75 MG CAPSULE    Take 1 capsule (75 mg total) by mouth 2 (two) times daily.    VENLAFAXINE (EFFEXOR-XR) 75 MG 24 HR CAPSULE    TAKE 1 CAPSULE(75 MG) BY MOUTH EVERY DAY

## 2019-12-17 NOTE — PATIENT INSTRUCTIONS
Duloxetine delayed-release capsules  What is this medicine?  DULOXETINE (doo LOX e teen) is used to treat depression, anxiety, and different types of chronic pain.  How should I use this medicine?  Take this medicine by mouth with a glass of water. Follow the directions on the prescription label. Do not cut, crush or chew this medicine. You can take this medicine with or without food. Take your medicine at regular intervals. Do not take your medicine more often than directed. Do not stop taking this medicine suddenly except upon the advice of your doctor. Stopping this medicine too quickly may cause serious side effects or your condition may worsen.  A special MedGuide will be given to you by the pharmacist with each prescription and refill. Be sure to read this information carefully each time.  Talk to your pediatrician regarding the use of this medicine in children. While this drug may be prescribed for children as young as 7 years of age for selected conditions, precautions do apply.  What side effects may I notice from receiving this medicine?  Side effects that you should report to your doctor or health care professional as soon as possible:  · allergic reactions like skin rash, itching or hives, swelling of the face, lips, or tongue  · changes in blood pressure  · confusion  · dark urine  · dizziness  · fast talking and excited feelings or actions that are out of control  · fast, irregular heartbeat  · fever  · general ill feeling or flu-like symptoms  · hallucination, loss of contact with reality  · light-colored stools  · loss of balance or coordination  · redness, blistering, peeling or loosening of the skin, including inside the mouth  · right upper belly pain  · seizures  · suicidal thoughts or other mood changes  · trouble concentrating  · trouble passing urine or change in the amount of urine  · unusual bleeding or bruising  · unusually weak or tired  · yellowing of the eyes or skin  Side effects that  usually do not require medical attention (report to your doctor or health care professional if they continue or are bothersome):  · blurred vision  · change in appetite  · change in sex drive or performance  · headache  · increased sweating  · nausea  What may interact with this medicine?  Do not take this medicine with any of the following medications:  · certain diet drugs like dexfenfluramine, fenfluramine  · desvenlafaxine  · linezolid  · MAOIs like Azilect, Carbex, Eldepryl, Marplan, Nardil, and Parnate  · methylene blue (intravenous)  · milnacipran  · thioridazine  · venlafaxine  This medicine may also interact with the following medications:  · alcohol  · aspirin and aspirin-like medicines  · certain antibiotics like ciprofloxacin and enoxacin  · certain medicines for blood pressure, heart disease, irregular heart beat  · certain medicines for depression, anxiety, or psychotic disturbances  · certain medicines for migraine headache like almotriptan, eletriptan, frovatriptan, naratriptan, rizatriptan, sumatriptan, zolmitriptan  · certain medicines that treat or prevent blood clots like warfarin, enoxaparin, and dalteparin  · cimetidine  · fentanyl  · lithium  · NSAIDS, medicines for pain and inflammation, like ibuprofen or naproxen  · phentermine  · procarbazine  · sibutramine  · Della's wort  · theophylline  · tramadol  · tryptophan  What if I miss a dose?  If you miss a dose, take it as soon as you can. If it is almost time for your next dose, take only that dose. Do not take double or extra doses.  Where should I keep my medicine?  Keep out of the reach of children.  Store at room temperature between 20 and 25 degrees C (68 to 77 degrees F). Throw away any unused medicine after the expiration date.  What should I tell my health care provider before I take this medicine?  They need to know if you have any of these conditions:  · bipolar disorder or a family history of bipolar  disorder  · glaucoma  · kidney disease  · liver disease  · suicidal thoughts or a previous suicide attempt  · taken medicines called MAOIs like Carbex, Eldepryl, Marplan, Nardil, and Parnate within 14 days  · an unusual reaction to duloxetine, other medicines, foods, dyes, or preservatives  · pregnant or trying to get pregnant  · breast-feeding  What should I watch for while using this medicine?  Tell your doctor if your symptoms do not get better or if they get worse. Visit your doctor or health care professional for regular checks on your progress. Because it may take several weeks to see the full effects of this medicine, it is important to continue your treatment as prescribed by your doctor.  Patients and their families should watch out for new or worsening thoughts of suicide or depression. Also watch out for sudden changes in feelings such as feeling anxious, agitated, panicky, irritable, hostile, aggressive, impulsive, severely restless, overly excited and hyperactive, or not being able to sleep. If this happens, especially at the beginning of treatment or after a change in dose, call your health care professional.  You may get drowsy or dizzy. Do not drive, use machinery, or do anything that needs mental alertness until you know how this medicine affects you. Do not stand or sit up quickly, especially if you are an older patient. This reduces the risk of dizzy or fainting spells. Alcohol may interfere with the effect of this medicine. Avoid alcoholic drinks.  This medicine can cause an increase in blood pressure. This medicine can also cause a sudden drop in your blood pressure, which may make you feel faint and increase the chance of a fall. These effects are most common when you first start the medicine or when the dose is increased, or during use of other medicines that can cause a sudden drop in blood pressure. Check with your doctor for instructions on monitoring your blood pressure while taking this  medicine.  Your mouth may get dry. Chewing sugarless gum or sucking hard candy, and drinking plenty of water may help. Contact your doctor if the problem does not go away or is severe.  NOTE:This sheet is a summary. It may not cover all possible information. If you have questions about this medicine, talk to your doctor, pharmacist, or health care provider. Copyright© 2017 [employer]

## 2019-12-31 ENCOUNTER — PATIENT OUTREACH (OUTPATIENT)
Dept: ADMINISTRATIVE | Facility: OTHER | Age: 54
End: 2019-12-31

## 2020-01-02 ENCOUNTER — OFFICE VISIT (OUTPATIENT)
Dept: SPINE | Facility: CLINIC | Age: 55
End: 2020-01-02
Payer: COMMERCIAL

## 2020-01-02 VITALS
SYSTOLIC BLOOD PRESSURE: 132 MMHG | HEIGHT: 69 IN | BODY MASS INDEX: 19.59 KG/M2 | DIASTOLIC BLOOD PRESSURE: 77 MMHG | HEART RATE: 67 BPM | WEIGHT: 132.25 LBS

## 2020-01-02 DIAGNOSIS — M51.26 HNP (HERNIATED NUCLEUS PULPOSUS), LUMBAR: ICD-10-CM

## 2020-01-02 DIAGNOSIS — M54.16 LUMBAR RADICULOPATHY: ICD-10-CM

## 2020-01-02 DIAGNOSIS — M47.26 OTHER SPONDYLOSIS WITH RADICULOPATHY, LUMBAR REGION: ICD-10-CM

## 2020-01-02 DIAGNOSIS — G89.29 CHRONIC BILATERAL LOW BACK PAIN WITH LEFT-SIDED SCIATICA: Primary | ICD-10-CM

## 2020-01-02 DIAGNOSIS — M51.36 DDD (DEGENERATIVE DISC DISEASE), LUMBAR: ICD-10-CM

## 2020-01-02 DIAGNOSIS — M54.42 CHRONIC BILATERAL LOW BACK PAIN WITH LEFT-SIDED SCIATICA: Primary | ICD-10-CM

## 2020-01-02 PROCEDURE — 99214 OFFICE O/P EST MOD 30 MIN: CPT | Mod: S$GLB,,, | Performed by: PHYSICIAN ASSISTANT

## 2020-01-02 PROCEDURE — 99214 PR OFFICE/OUTPT VISIT, EST, LEVL IV, 30-39 MIN: ICD-10-PCS | Mod: S$GLB,,, | Performed by: PHYSICIAN ASSISTANT

## 2020-01-02 PROCEDURE — 3075F PR MOST RECENT SYSTOLIC BLOOD PRESS GE 130-139MM HG: ICD-10-PCS | Mod: CPTII,S$GLB,, | Performed by: PHYSICIAN ASSISTANT

## 2020-01-02 PROCEDURE — 99999 PR PBB SHADOW E&M-EST. PATIENT-LVL III: CPT | Mod: PBBFAC,,, | Performed by: PHYSICIAN ASSISTANT

## 2020-01-02 PROCEDURE — 99999 PR PBB SHADOW E&M-EST. PATIENT-LVL III: ICD-10-PCS | Mod: PBBFAC,,, | Performed by: PHYSICIAN ASSISTANT

## 2020-01-02 PROCEDURE — 3008F PR BODY MASS INDEX (BMI) DOCUMENTED: ICD-10-PCS | Mod: CPTII,S$GLB,, | Performed by: PHYSICIAN ASSISTANT

## 2020-01-02 PROCEDURE — 3075F SYST BP GE 130 - 139MM HG: CPT | Mod: CPTII,S$GLB,, | Performed by: PHYSICIAN ASSISTANT

## 2020-01-02 PROCEDURE — 3078F DIAST BP <80 MM HG: CPT | Mod: CPTII,S$GLB,, | Performed by: PHYSICIAN ASSISTANT

## 2020-01-02 PROCEDURE — 3078F PR MOST RECENT DIASTOLIC BLOOD PRESSURE < 80 MM HG: ICD-10-PCS | Mod: CPTII,S$GLB,, | Performed by: PHYSICIAN ASSISTANT

## 2020-01-02 PROCEDURE — 3008F BODY MASS INDEX DOCD: CPT | Mod: CPTII,S$GLB,, | Performed by: PHYSICIAN ASSISTANT

## 2020-01-02 RX ORDER — GABAPENTIN 300 MG/1
CAPSULE ORAL
COMMUNITY
Start: 2019-12-21 | End: 2020-01-16

## 2020-01-02 NOTE — PROGRESS NOTES
"Subjective:     Patient ID:  Carolin Lind is a 54 y.o. female.    Eric    Chief Complaint: Low back and left leg pain    HPI    Carolin Lind is a 54 y.o. female who presents for fu.    Since I saw her last she saw Dr. Reyes.    She had the EMG done.  She tried the lyrica but it gave her blurry vision so she stopped it.  She had not been to PT.  She had the facet injections with Dr. Jain that did not help.    The lumbar ESIs she had originally helped her pain about 20%.    She has pain across the low back with radiation down the left lateral leg to the top of the foot.  No right leg pain.    The back pain is worse than the left leg pain.    Patient denies any recent accidents or trauma, no saddle anesthesias, and no bowel or bladder incontinence.      Review of Systems:  Constitution: Negative for chills, fever, night sweats and weight loss.   Musculoskeletal: Negative for falls.   Gastrointestinal: Negative for bowel incontinence, nausea and vomiting.   Genitourinary: Negative for bladder incontinence.   Neurological: Negative for disturbances in coordination and loss of balance.      Objective:      Vitals:    01/02/20 0906   BP: 132/77   Pulse: 67   Weight: 60 kg (132 lb 4.4 oz)   Height: 5' 9" (1.753 m)   PainSc:   3   PainLoc: Back         Physical Exam:    General:  Carolin Lind is well-developed, well-nourished, appears stated age, in no acute distress, alert and oriented to person, place, and time.    Patient sits comfortably in the exam room and answers questions appropriately. Grossly patient is able to move bilateral lower extremities without difficulty.     MRI/XRAY Interpretation:      Lumbar spine ap/lateral/flexion/extension xrays were personally reviewed today.  No fractures.  No movement on flexion and extension.  Multilevel degenerative changes throughout the spine more so at L3-4.     Lumbar spine MRI was personally reviewed today on a CD and returned to the patient.  Modic changes " at the endplate of L3-4.  Disc bulging at L3-4, L4-5 and L5-S1.  Left L4-5 foraminal HNP.    Assessment:          1. Chronic bilateral low back pain with left-sided sciatica    2. DDD (degenerative disc disease), lumbar    3. Other spondylosis with radiculopathy, lumbar region    4. Lumbar radiculopathy    5. HNP (herniated nucleus pulposus), lumbar            Plan:             Multilevel DDD and spondylosis.  L3-4 severe modic endplate changes.  Left L4-5 foraminal HNP     -  Consider PT  -  Consider fu with Dr. Jain to discuss further interventional pain procedures  - She is asking about increasing the Cymbalta Dr. Beard recently put her on.  She will contact Dr. Beard about that.  -  She is already scheduled to fu with Dr. Reyes soon    Follow-Up:  Follow up if symptoms worsen or fail to improve. If there are any questions prior to this, the patient was instructed to contact the office.       Jazmine Harrington, LAUREN, PA-C  Neurosurgery  [facility]  [facility] [place]

## 2020-01-07 ENCOUNTER — TELEPHONE (OUTPATIENT)
Dept: NEUROSURGERY | Facility: CLINIC | Age: 55
End: 2020-01-07

## 2020-01-15 ENCOUNTER — PATIENT OUTREACH (OUTPATIENT)
Dept: ADMINISTRATIVE | Facility: OTHER | Age: 55
End: 2020-01-15

## 2020-01-16 RX ORDER — GABAPENTIN 300 MG/1
CAPSULE ORAL
Qty: 90 CAPSULE | Refills: 0 | Status: SHIPPED | OUTPATIENT
Start: 2020-01-16 | End: 2020-02-07

## 2020-01-20 ENCOUNTER — OFFICE VISIT (OUTPATIENT)
Dept: SPINE | Facility: CLINIC | Age: 55
End: 2020-01-20
Payer: COMMERCIAL

## 2020-01-20 VITALS
SYSTOLIC BLOOD PRESSURE: 138 MMHG | HEART RATE: 73 BPM | DIASTOLIC BLOOD PRESSURE: 80 MMHG | TEMPERATURE: 99 F | RESPIRATION RATE: 18 BRPM

## 2020-01-20 DIAGNOSIS — M54.50 CHRONIC BILATERAL LOW BACK PAIN WITHOUT SCIATICA: Primary | ICD-10-CM

## 2020-01-20 DIAGNOSIS — G89.29 CHRONIC BILATERAL LOW BACK PAIN WITHOUT SCIATICA: Primary | ICD-10-CM

## 2020-01-20 PROCEDURE — 3079F DIAST BP 80-89 MM HG: CPT | Mod: CPTII,S$GLB,, | Performed by: NEUROLOGICAL SURGERY

## 2020-01-20 PROCEDURE — 99213 PR OFFICE/OUTPT VISIT, EST, LEVL III, 20-29 MIN: ICD-10-PCS | Mod: S$GLB,,, | Performed by: NEUROLOGICAL SURGERY

## 2020-01-20 PROCEDURE — 3079F PR MOST RECENT DIASTOLIC BLOOD PRESSURE 80-89 MM HG: ICD-10-PCS | Mod: CPTII,S$GLB,, | Performed by: NEUROLOGICAL SURGERY

## 2020-01-20 PROCEDURE — 99999 PR PBB SHADOW E&M-EST. PATIENT-LVL III: CPT | Mod: PBBFAC,,, | Performed by: NEUROLOGICAL SURGERY

## 2020-01-20 PROCEDURE — 3075F PR MOST RECENT SYSTOLIC BLOOD PRESS GE 130-139MM HG: ICD-10-PCS | Mod: CPTII,S$GLB,, | Performed by: NEUROLOGICAL SURGERY

## 2020-01-20 PROCEDURE — 99999 PR PBB SHADOW E&M-EST. PATIENT-LVL III: ICD-10-PCS | Mod: PBBFAC,,, | Performed by: NEUROLOGICAL SURGERY

## 2020-01-20 PROCEDURE — 3075F SYST BP GE 130 - 139MM HG: CPT | Mod: CPTII,S$GLB,, | Performed by: NEUROLOGICAL SURGERY

## 2020-01-20 PROCEDURE — 99213 OFFICE O/P EST LOW 20 MIN: CPT | Mod: S$GLB,,, | Performed by: NEUROLOGICAL SURGERY

## 2020-01-20 NOTE — PROGRESS NOTES
CHIEF COMPLAINT:  Follow up after EMG and injections    I, Stef Gonzalez, attest that this documentation has been prepared under the direction and in the presence of Jair Reyes MD.    HPI:  Carolin Lind is a 54 y.o.  female who presents today for follow up evaluation after injections and an EMG. Pt reports primarily low back pain. She states that she needs to change position frequently to alleviate her back pain. Today her pain is worse on the left side but it tends to fluctuate. Pt also reports  numbness in the lateral LLE and the plantar foot.  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 Pt has not participated in physical therapy. Pt was taking Lyrica but it caused blurred vision and she discontinued it. She switched to Cymbalta one month ago.       Review of patient's allergies indicates:  No Known Allergies    Past Medical History:   Diagnosis Date    Abnormal Pap smear of cervix 01/30/2017    Atypical pap with + HPV    Anxiety     Depression     Heart murmur     History of migraine headaches     Menarche     Age of onset 13    Mitral valve prolapse     PONV (postoperative nausea and vomiting)      Past Surgical History:   Procedure Laterality Date    ANKLE HARDWARE REMOVAL Left 4/16/2019    Procedure: REMOVAL, HARDWARE, ANKLE;  Surgeon: Jair Winchester MD;  Location: [facility] OR [address] [place] [address];  Service: Orthopedics;  Laterality: Left;    AUGMENTATION OF BREAST      BREAST SURGERY      EPIDURAL STEROID INJECTION INTO LUMBAR SPINE Left 09/26/2019    EXTERNAL FIXATOR APPLICATION      FLAP GRAFT SURGERY Left 4/16/2019    Procedure: FLAP GRAFT - fasciocutaneous;  Surgeon: Jair Winchester MD;  Location: [facility] OR [address] [place] [address];  Service: Orthopedics;  Laterality: Left;     Family History   Problem Relation Age of Onset    Cancer Mother     Migraines Daughter     Migraines Maternal Aunt     Breast cancer Maternal Aunt 50    Colon cancer Neg Hx     Ovarian cancer Neg Hx      Social  History     Tobacco Use    Smoking status: Never Smoker    Smokeless tobacco: Never Used   Substance Use Topics    Alcohol use: No    Drug use: No        Review of Systems   Constitutional: Negative.    HENT: Negative.    Eyes: Negative.    Respiratory: Negative.    Cardiovascular: Negative.    Gastrointestinal: Negative.    Endocrine: Negative.    Genitourinary: Negative.    Musculoskeletal: Positive for back pain (low back). Negative for gait problem and neck pain.   Skin: Negative.    Allergic/Immunologic: Negative.    Neurological: Positive for numbness (LLE). Negative for weakness, light-headedness and headaches.   Hematological: Negative.    Psychiatric/Behavioral: Negative.        OBJECTIVE:   Vital Signs:  Temp: 98.8 °F (37.1 °C) (01/20/20 1502)  Pulse: 73 (01/20/20 1502)  Resp: 18 (01/20/20 1502)  BP: 138/80 (01/20/20 1502)    Physical Exam:    Vital signs: All nursing notes and vital signs reviewed -- afebrile, vital signs stable.  Constitutional: Patient sitting comfortably in chair. Appears well developed and well nourished.  Skin: Exposed areas are intact without abnormal markings, rashes or other lesions.  HEENT: Normocephalic. Normal conjunctivae.  Cardiovascular: Normal rate and regular rhythm.  Respiratory: Chest wall rises and falls symmetrically, without signs of respiratory distress.  Abdomen: Soft and non-tender.  Extremities: Warm and without edema. Calves supple, non-tender. Mild loss of bulk of the left quad.  Psych/Behavior: Normal affect.    Neurological:    Mental status: Alert and oriented. Conversational and appropriate.       Cranial Nerves: Grossly intact.     Motor:    Upper:  Deltoids Triceps Biceps WE WF     R 5/5 5/5 5/5 5/5 5/5 5/5    L 5/5 5/5 5/5 5/5 5/5 5/5      Lower:  HF KE KF DF PF EHL    R 5/5 5/5 5/5 5/5 5/5 5/5    L 5/5 4+/5 5/5 4+/5 5/5 4+/5     Sensory: Intact sensation to light touch in all extremities. Romberg negative.    Tinel's: Equivocal, left peroneal  nerve.    Reflexes:          DTR: 2+ symmetrically throughout.     Benítez's: Negative.     Babinski's: Negative.     Clonus: Negative.    Cerebellar: Finger-to-nose and rapid alternating movements normal. Gait stable, fluid.    Spine:    Posture: Head well aligned over pelvis in front and side views.  No focal or global spinal deformity visible on inspection. Shoulders and hips even. No obvious leg length discrepancy. No scapula winging.    Bending: Full ROM with forward, back and lateral bending. No rib prominence with forward bend.    Cervical:      ROM: Full with flexion, extension, lateral rotation and ear-to-shoulder bend.      Midline TTP: Negative.     Spurling's test: Negative.     Lhermitte's: Negative.    Thoracic:     Midline TTP: Negative    Lumbar:     Midline TTP: Positive.     Straight Leg Test: Negative     Crossed Straight Leg Test: Negative     Sciatic notch tenderness: Negative.    Other:     SI joint TTP: Positive.     Greater trochanter TTP: Negative.     Tenderness with external/internal hip rotation: Negative.    Diagnostic Results:  All imaging was independently reviewed by me.    MRI L-spine, dated 9/12/2019:  1. No significant stenosis    EMG/Nerve conduction study, dated 10/25/2019:  1. Left peroneal neuropathy  2. Left tarsal tunnel syndrome   3. Right medial tarsal neuropathy  4. Chronic bilateral L5/S1 radiculopathy.     ASSESSMENT/PLAN:     Carolin Lind has myofascial back pain without significant structural spinal abnormality. Her recent MRI shows no significant stenosis and her recent EMG shows a left peroneal neuropathy, which is the cause of her left leg numbness. The numbness is not her primary concern. I recommend PT for her low back pain and follow up with me PRN.    The patient understands and agrees with the plan of care. All questions were answered.     1. Referral to Pain Management  2. RTC PRN      I, Dr. Jair Reyes personally performed the services described in this  documentation. All medical record entries made by the scribe, Stef Gonzalez, were at my direction and in my presence.  I have reviewed the chart and agree that the record reflects my personal performance and is accurate and complete.      Jair Reyes M.D.  Department of Neurosurgery  [facility]      .

## 2020-01-26 ENCOUNTER — PATIENT MESSAGE (OUTPATIENT)
Dept: SPINE | Facility: CLINIC | Age: 55
End: 2020-01-26

## 2020-02-04 ENCOUNTER — CLINICAL SUPPORT (OUTPATIENT)
Dept: REHABILITATION | Facility: HOSPITAL | Age: 55
End: 2020-02-04
Attending: NEUROLOGICAL SURGERY
Payer: COMMERCIAL

## 2020-02-04 DIAGNOSIS — M53.86 DECREASED RANGE OF MOTION OF LUMBAR SPINE: ICD-10-CM

## 2020-02-04 DIAGNOSIS — M62.81 MUSCLE WEAKNESS OF LOWER EXTREMITY: ICD-10-CM

## 2020-02-04 DIAGNOSIS — G89.29 CHRONIC BILATERAL LOW BACK PAIN WITHOUT SCIATICA: ICD-10-CM

## 2020-02-04 DIAGNOSIS — M54.50 CHRONIC BILATERAL LOW BACK PAIN WITHOUT SCIATICA: ICD-10-CM

## 2020-02-04 PROCEDURE — 97161 PT EVAL LOW COMPLEX 20 MIN: CPT | Mod: PO

## 2020-02-04 PROCEDURE — 97110 THERAPEUTIC EXERCISES: CPT | Mod: PO

## 2020-02-04 NOTE — PLAN OF CARE
[facility]  Physical Therapy Initial Evaluation    Name: Carolin Lind  Clinic Number: [id number]    Therapy Diagnosis:   Encounter Diagnoses   Name Primary?    Chronic bilateral low back pain without sciatica     Muscle weakness of lower extremity     Decreased range of motion of lumbar spine      Physician: Jair Reyes MD    Physician Orders: PT Eval and Treat   Medical Diagnosis from Referral: Chronic bilateral low back pain without sciatica  Evaluation Date: 2/4/2020  Authorization Period Expiration: 1/19/21  Plan of Care Expiration: 5/4/20  Visit # / Visits authorized: 1/ 1    Time In: 1000  Time Out: 1105  Total Billable Time: 65 minutes    Precautions: standard, past mitral valve prolapse from birth    Subjective   Date of onset: about 1.5 years ago  History of current condition - Carolin Taveras reports: about 2.5 years ago she broke her L ankle and resulted in 3 different ankle surgeries. Her back started bothering her due to the way she was walking from ankle pain. Epidurals in the past have only given minimal improvements. Last Thursday she got injections in her back that has given some relief. Her back pain is worse on L than R that radiates down to L foot. Her leg pain is lateral and is occasionally shooting and sharp. Pain in glut is burning.      Medical History:   Past Medical History:   Diagnosis Date    Abnormal Pap smear of cervix 01/30/2017    Atypical pap with + HPV    Anxiety     Depression     Heart murmur     History of migraine headaches     Menarche     Age of onset 13    Mitral valve prolapse     PONV (postoperative nausea and vomiting)        Surgical History:   Carolin Lind  has a past surgical history that includes Breast surgery; External fixator application; Augmentation of breast; Ankle hardware removal (Left, 4/16/2019); Flap graft surgery (Left, 4/16/2019); and Epidural steroid injection into lumbar spine (Left, 09/26/2019).    Medications:  "  Carolin has a current medication list which includes the following prescription(s): butalbital-acetaminop-caf-cod, butalbital-acetaminophen-caffeine -40 mg, clonazepam, duloxetine, fluoxetine, gabapentin, meloxicam, promethazine, propranolol, and sumatriptan succinate.    Allergies:   Review of patient's allergies indicates:  No Known Allergies     Imagin/10/19 lumbar x-ray: "Mild DJD.  The disc spaces are narrowed between the L2 and L4 vertebral segments.  Few mm L2/L3 and L3/L4 retrolisthesis.  No fracture or bone destruction identified."   10/10/19 lumbar MRI: "1. L3-L4 herniated nucleus pulposus causing bilateral neural foraminal stenosis.  2. L4-L5: far L lateral/formainal herniated nucleus pulposus causing left nerual foraminal stenosis impressing upon the undersurface of L L4 nerve root in the neural foramen.  3. L5-S1: central herniated nucleus pulposus.  4. Neural forminal stenosis  5. 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  Prior Level of Function: independent with all ADLs, currently driving  Current Level of Function: difficulty with stair ambulation, prolonged sitting, bending over, work duties, prolonged standing, walking long distances    Pain:  Current 5/10, worst 8/10, best 2/10   Location: bilateral back   Description: Aching  Aggravating Factors: Sitting, Standing, Bending, Walking, and Lifting  Easing Factors: lying down, heating pad and rest   Radicular symptoms: pain radiates down lateral aspect of L leg into foot but no numbness and tingling  Pain with cough/sneeze, changes in B&B, sleep disturbance: denies all except sleep is " disturbed due to pain    Pts goals: to get back to working out and to be able to get back into physique competitions    Objective     Observation: pleasant and cooperative, occasionally distracted    Posture: decreased lumbar lordosis, level pelvis, increased trunk extension    Lumbar Range of Motion:    %   Flexion 50 L sided low back pain   Extension 50     Left Side Bending 100 L sided back pain   Right Side Bending 100 L sided tightness   Left rotation   100 L sided back pain   Right Rotation   100    *= pain    Lower Extremity Strength    Right LE  Left LE    Hip flexion: 5/5 Hip flexion: 4-/5   Knee extension: 5/5 Knee extension: 4+/5   Knee flexion: 5/5 Knee flexion: 4+/5   Hip extension:  4+/5 Hip extension: 3+/5   Hip abduction: 4/5 Hip abduction: 3+/5   Hip adduction: 5/5 Hip adduction 3+/5   Ankle dorsiflexion: 5/5 Ankle dorsiflexion: 4-/5   Ankle plantarflexion: 5/5 Ankle plantarflexion: 5/5   Ankle inversion: NT Ankle inversion: 3+/5   Ankle eversion: NT Ankle eversion: 3+/5     Special Tests:  -Bridge Test: positive for L sided pain with double    Neuro Dynamic Testing:    Sciatic nerve:      SLR: B negative    Joint Mobility: hypomobility in lumbar    Palpation: L IT band and PSIS TTP    Sensation: burning sensation to lateral aspect of L LE    Flexibility:    Ely's test: B WNL   Popliteal Angle: B WNL    CMS Impairment/Limitation/Restriction for FOTO lumbar spine Survey    Therapist reviewed FOTO scores for Carolin Lind on 2/4/2020.   FOTO documents entered into [employer] - see Media section.    Limitation Score: 64%     TREATMENT   Treatment Time In: 1049  Treatment Time Out: 1105  Total Treatment time separate from Evaluation: 16 minutes    Carolin Taveras received therapeutic exercises to develop strength, endurance, ROM, flexibility, posture and core stabilization for 16 minutes including:     IT band stretch w strap 3 x 30 sec  Prone on elbows x 3 min  Piriformis stretch 3 x 30 sec    Home Exercises  and Patient Education Provided    Education provided:   - importance of performing HEP to tolerance  - importance of graded progression    Written Home Exercises Provided: yes.  Exercises were reviewed and Carolin Taveras was able to demonstrate them prior to the end of the session.  Carolin Taveras demonstrated good  understanding of the education provided.     See EMR under Patient Instructions for exercises provided 2/4/2020.    Assessment   Carolin is a 54 y.o. female referred to outpatient Physical Therapy with a medical diagnosis of chronic bilateral low back pain without sciatica. Pt with history of L ankle fracture x 3 surgeries presents with reports of low back pain more on L than R that radiates down lateral L leg, LE weakness, postural imbalance, tenderness to L PSIS and IT band, decreased lumbar AROM, and impaired functional mobility.     Pt prognosis is fair to good.  Pt will benefit from skilled outpatient Physical Therapy to address the deficits stated above and in the chart below, provide pt/family education, and to maximize pt's level of independence.     Plan of care discussed with patient: Yes  Pt's spiritual, cultural and educational needs considered and patient is agreeable to the plan of care and goals as stated below:     Anticipated Barriers for therapy: chronicity of condition    Medical Necessity is demonstrated by the following  History  Co-morbidities and personal factors that may impact the plan of care Co-morbidities:   anxiety, difficulty sleeping, level of undertstanding of current condition and 3 prior ankle surgeries    Personal Factors:   lifestyle     high   Examination  Body Structures and Functions, activity limitations and participation restrictions that may impact the plan of care Body Regions:   back  lower extremities  trunk    Body Systems:    gross symmetry  ROM  strength  gross coordinated movement  gait  transfers  transitions  motor control  motor learning    Participation  Restrictions:   ADLs, IADLs, work and domestic duties    Activity limitations:   Learning and applying knowledge  no deficits    General Tasks and Commands  no deficits    Communication  no deficits    Mobility  lifting and carrying objects  walking  driving (bike, car, motorcycle)    Self care  dressing    Domestic Life  shopping  cooking  doing house work (cleaning house, washing dishes, laundry)    Interactions/Relationships  no deficits    Life Areas  employment    Community and Social Life  community life  recreation and leisure         high   Clinical Presentation stable and uncomplicated low   Decision Making/ Complexity Score: low     Medical necessity is demonstrated by the following IMPAIRMENTS/PROBLEM LIST:   1) Increase in pain level limiting function   2) LE weakness   3) Difficulty walking long distances   4) Decreased lumbar AROM   5) Lack of HEP    GOALS: Short Term Goals:  6 weeks  1. Report decreased low back pain  <  / =  5/10 at worst to increase tolerance for prolonged standing.   2. Pt will be able to tolerate multi-directional LE strengthening in order to improve ability to perform household chores.  3. Pt will report 50% improvement in ability to walk long distances since start of care to indicate improved functional mobility.   4. Pt will increase lumbar flexion AROM to 80% to indicate improved lumbar mobility.   5. Pt to tolerate HEP to improve ROM and independence with ADL's.    Long Term Goals: 12 weeks  1. Report decreased low back pain  <  / =  3/10 at worst to increase tolerance for prolonged standing.   2. Pt will be able to perform 2 x 10 multi-directional LE strengthening without fatigue in order to improve ability to perform household chores.  3. Pt will report 80% improvement in ability to walk long distances since start of care to indicate improved functional mobility.   4. Pt will increase lumbar flexion AROM to 100% to indicate improved lumbar mobility.   5. Pt to be  Independent with HEP to improve ROM and independence with ADL's.    Plan   Plan of care Certification: 2/4/2020 to 5/4/20.    Outpatient Physical Therapy 2 times weekly for 12 weeks to include the following interventions: Cervical/Lumbar Traction, Manual Therapy, Moist Heat/ Ice, Neuromuscular Re-ed, Patient Education, Therapeutic Activites and Therapeutic Exercise.     Hazel Plunkett, PT

## 2020-02-06 ENCOUNTER — PATIENT MESSAGE (OUTPATIENT)
Dept: INTERNAL MEDICINE | Facility: CLINIC | Age: 55
End: 2020-02-06

## 2020-02-06 DIAGNOSIS — F33.1 MODERATE EPISODE OF RECURRENT MAJOR DEPRESSIVE DISORDER: ICD-10-CM

## 2020-02-06 DIAGNOSIS — M54.42 CHRONIC LEFT-SIDED LOW BACK PAIN WITH LEFT-SIDED SCIATICA: ICD-10-CM

## 2020-02-06 DIAGNOSIS — G89.29 CHRONIC LEFT-SIDED LOW BACK PAIN WITH LEFT-SIDED SCIATICA: ICD-10-CM

## 2020-02-07 RX ORDER — DULOXETIN HYDROCHLORIDE 60 MG/1
60 CAPSULE, DELAYED RELEASE ORAL DAILY
COMMUNITY
End: 2020-02-10 | Stop reason: SDUPTHER

## 2020-02-07 NOTE — TELEPHONE ENCOUNTER
Please advise that the patient can start taking 2 of the Cymbalta pills once daily, and plan to see us back in clinic to check in on how things are going.  Thanks.

## 2020-02-08 ENCOUNTER — PATIENT MESSAGE (OUTPATIENT)
Dept: INTERNAL MEDICINE | Facility: CLINIC | Age: 55
End: 2020-02-08

## 2020-02-08 DIAGNOSIS — F33.1 MODERATE EPISODE OF RECURRENT MAJOR DEPRESSIVE DISORDER: Primary | ICD-10-CM

## 2020-02-10 RX ORDER — DULOXETIN HYDROCHLORIDE 60 MG/1
60 CAPSULE, DELAYED RELEASE ORAL DAILY
Qty: 90 CAPSULE | Refills: 0 | Status: SHIPPED | OUTPATIENT
Start: 2020-02-10 | End: 2020-05-12

## 2020-02-10 RX ORDER — VENLAFAXINE HYDROCHLORIDE 75 MG/1
75 CAPSULE, EXTENDED RELEASE ORAL DAILY
COMMUNITY
Start: 2020-01-11 | End: 2020-03-20 | Stop reason: SDUPTHER

## 2020-02-10 NOTE — PROGRESS NOTES
Physical Therapy Daily Treatment Note     Name: Carolin Taveras [facility] Number: [id number]    Therapy Diagnosis:   Encounter Diagnoses   Name Primary?    Chronic bilateral low back pain without sciatica     Muscle weakness of lower extremity     Decreased range of motion of lumbar spine      Physician: Jair Reyes MD    Visit Date: 2/11/2020    Physician Orders: PT Eval and Treat   Medical Diagnosis from Referral: Chronic bilateral low back pain without sciatica  Evaluation Date: 2/4/2020  Authorization Period Expiration: 12/31/20  Plan of Care Expiration: 5/4/20  Visit # / Visits authorized: 1/ 20- visit 2    Time In: 1102  Time Out: 1200  Total Billable Time: 58 minutes    Precautions: standard, past mitral valve prolapse from birth     Subjective     Pt reports: she had a good day yesterday with her back pain due to a day off from work. She has not done much work today yet. Plans on going to work after session.   She was compliant with home exercise program.  Response to previous treatment: good  Functional change: none to note    Pain: 4/10  Location: bilateral back      Objective     Carolin Taveras received therapeutic exercises to develop strength, endurance, ROM, flexibility, posture and core stabilization for 58 minutes including:    Upright bike x 8 min  Gastroc stretch on incline 3 x 30 sec  SL QL stretch 3 x 30 sec ea  IT band stretch w strap 3 x 30 sec ea  BKFO w green TB x 20 ea  Bridges w GTB x 20  SL clams x 20 ea  Shuttle squats 3 bands 3 x 10  Standing march x 20 ea  Standing hip abd x 20 ea  Prone on elbows x 3 min  Piriformis stretch 3 x 30 sec  Sidestepping w GTB 40' x 3 laps  Step ups 6 in x 20 ea    Home Exercises Provided and Patient Education Provided     Education provided:   - importance of performing HEP to tolerance  - importance of graded progression    Written Home Exercises Provided: yes.  Exercises were reviewed and Carolin Taveras was able to demonstrate them prior to the end of the  session.  Carolin Taveras demonstrated good  understanding of the education provided.     See EMR under Patient Instructions for exercises provided 2/11/2020.    Assessment     Pt had good tolerance to treatment today with no adverse effects. Post-treatment low back pain rated as 3/10. No exacerbations of symptoms throughout session. Good response to progression of exercise program. B hip weakness noted in standing closed chain position. Appropriate exercise-induced fatigue achieved by end of session.     Carolin Taveras is progressing well towards her goals.   Pt prognosis is fair to good     Pt will continue to benefit from skilled outpatient physical therapy to address the deficits listed in the problem list box on initial evaluation, provide pt/family education and to maximize pt's level of independence in the home and community environment.     Pt's spiritual, cultural and educational needs considered and pt agreeable to plan of care and goals.     Anticipated barriers to physical therapy: chronicity of condition     GOALS: Short Term Goals:  6 weeks  1. Report decreased low back pain  <  / =  5/10 at worst to increase tolerance for prolonged standing.   2. Pt will be able to tolerate multi-directional LE strengthening in order to improve ability to perform household chores.  3. Pt will report 50% improvement in ability to walk long distances since start of care to indicate improved functional mobility.   4. Pt will increase lumbar flexion AROM to 80% to indicate improved lumbar mobility.   5. Pt to tolerate HEP to improve ROM and independence with ADL's.     Long Term Goals: 12 weeks  1. Report decreased low back pain  <  / =  3/10 at worst to increase tolerance for prolonged standing.   2. Pt will be able to perform 2 x 10 multi-directional LE strengthening without fatigue in order to improve ability to perform household chores.  3. Pt will report 80% improvement in ability to walk long distances since start of care  to indicate improved functional mobility.   4. Pt will increase lumbar flexion AROM to 100% to indicate improved lumbar mobility.   5. Pt to be Independent with HEP to improve ROM and independence with ADL's.    Plan     Progress B LE and core strengthening.     Hazel Plunkett, PT

## 2020-02-10 NOTE — TELEPHONE ENCOUNTER
Can you please clarify what her current dose of Cymbalta is?  We started at 30 mg daily, and I was thinking we were going to be increasing to 60 mg daily, but this request is for 60 mg TWICE daily.  Thanks.

## 2020-02-11 ENCOUNTER — CLINICAL SUPPORT (OUTPATIENT)
Dept: REHABILITATION | Facility: HOSPITAL | Age: 55
End: 2020-02-11
Attending: NEUROLOGICAL SURGERY
Payer: COMMERCIAL

## 2020-02-11 DIAGNOSIS — G89.29 CHRONIC BILATERAL LOW BACK PAIN WITHOUT SCIATICA: ICD-10-CM

## 2020-02-11 DIAGNOSIS — M62.81 MUSCLE WEAKNESS OF LOWER EXTREMITY: ICD-10-CM

## 2020-02-11 DIAGNOSIS — M54.50 CHRONIC BILATERAL LOW BACK PAIN WITHOUT SCIATICA: ICD-10-CM

## 2020-02-11 DIAGNOSIS — M53.86 DECREASED RANGE OF MOTION OF LUMBAR SPINE: ICD-10-CM

## 2020-02-11 PROCEDURE — 97110 THERAPEUTIC EXERCISES: CPT | Mod: PO

## 2020-02-14 DIAGNOSIS — M54.42 CHRONIC LEFT-SIDED LOW BACK PAIN WITH LEFT-SIDED SCIATICA: ICD-10-CM

## 2020-02-14 DIAGNOSIS — F33.1 MODERATE EPISODE OF RECURRENT MAJOR DEPRESSIVE DISORDER: ICD-10-CM

## 2020-02-14 DIAGNOSIS — G89.29 CHRONIC LEFT-SIDED LOW BACK PAIN WITH LEFT-SIDED SCIATICA: ICD-10-CM

## 2020-02-14 RX ORDER — DULOXETIN HYDROCHLORIDE 30 MG/1
CAPSULE, DELAYED RELEASE ORAL
Qty: 30 CAPSULE | Refills: 1 | OUTPATIENT
Start: 2020-02-14

## 2020-02-17 RX ORDER — GABAPENTIN 300 MG/1
CAPSULE ORAL
Qty: 90 CAPSULE | Refills: 0 | OUTPATIENT
Start: 2020-02-17

## 2020-03-03 ENCOUNTER — CLINICAL SUPPORT (OUTPATIENT)
Dept: REHABILITATION | Facility: HOSPITAL | Age: 55
End: 2020-03-03
Attending: NEUROLOGICAL SURGERY
Payer: COMMERCIAL

## 2020-03-03 DIAGNOSIS — M53.86 DECREASED RANGE OF MOTION OF LUMBAR SPINE: ICD-10-CM

## 2020-03-03 DIAGNOSIS — M54.50 CHRONIC BILATERAL LOW BACK PAIN WITHOUT SCIATICA: ICD-10-CM

## 2020-03-03 DIAGNOSIS — G89.29 CHRONIC BILATERAL LOW BACK PAIN WITHOUT SCIATICA: ICD-10-CM

## 2020-03-03 DIAGNOSIS — M62.81 MUSCLE WEAKNESS OF LOWER EXTREMITY: ICD-10-CM

## 2020-03-03 PROCEDURE — 97110 THERAPEUTIC EXERCISES: CPT | Mod: PO,CQ

## 2020-03-03 NOTE — PROGRESS NOTES
"  Physical Therapy Daily Treatment Note     Name: Carolin Taveras [facility]  Clinic Number: [id number]    Therapy Diagnosis:   Encounter Diagnoses   Name Primary?    Chronic bilateral low back pain without sciatica     Muscle weakness of lower extremity     Decreased range of motion of lumbar spine      Physician: Jair Reyes MD    Visit Date: 3/3/2020    Physician Orders: PT Eval and Treat   Medical Diagnosis from Referral: Chronic bilateral low back pain without sciatica  Evaluation Date: 2/4/2020  Authorization Period Expiration: 12/31/20  Plan of Care Expiration: 5/4/20  Visit # / Visits authorized: 2 / 20- visit 3    Time In: 11:00 am   Time Out: 12:00 pm   Total Billable Time: 60 minutes    Precautions: standard, past mitral valve prolapse from birth     Subjective     Pt reports: continued back pain. States " it always hurts".   She was compliant with home exercise program.  Response to previous treatment: good  Functional change: none to note    Pain: 4/10  Location: bilateral back      Objective     Carolin Taveras received therapeutic exercises to develop strength, endurance, ROM, flexibility, posture and core stabilization for 60 minutes including:    Upright bike x 8 minutes   Gastroc stretch on incline 3 x 30 seconds  SL QL stretch 3 x 30 sec each   IT band stretch w strap 3 x 30 sec each   BKFO w green TB x 20 each   Bridges w GTB x 20  SL clams x 20 each  Shuttle squats 3 bands 3 x 10  Standing march x 20 each  Standing hip abd x 20 ea  Prone on elbows x 3 minutes  Piriformis stretch 3 x 30 seconds   Sidestepping w GTB 40' x 3 laps  Step ups 6 in x 20 each    Home Exercises Provided and Patient Education Provided     Education provided:   - importance of performing HEP to tolerance  - importance of graded progression    Written Home Exercises Provided: yes.  Exercises were reviewed and Carolin Taveras was able to demonstrate them prior to the end of the session.  Carolin Taveras demonstrated good  understanding of the " education provided.     See EMR under Patient Instructions for exercises provided 2/11/2020.    Assessment     Patient tolerated therapy session well, required cueing for proper form and attention to task throughout session.     Carolin Taveras is progressing well towards her goals.   Pt prognosis is fair to good     Pt will continue to benefit from skilled outpatient physical therapy to address the deficits listed in the problem list box on initial evaluation, provide pt/family education and to maximize pt's level of independence in the home and community environment.     Pt's spiritual, cultural and educational needs considered and pt agreeable to plan of care and goals.     Anticipated barriers to physical therapy: chronicity of condition     GOALS: Short Term Goals:  6 weeks  1. Report decreased low back pain  <  / =  5/10 at worst to increase tolerance for prolonged standing.   2. Pt will be able to tolerate multi-directional LE strengthening in order to improve ability to perform household chores.  3. Pt will report 50% improvement in ability to walk long distances since start of care to indicate improved functional mobility.   4. Pt will increase lumbar flexion AROM to 80% to indicate improved lumbar mobility.   5. Pt to tolerate HEP to improve ROM and independence with ADL's.     Long Term Goals: 12 weeks  1. Report decreased low back pain  <  / =  3/10 at worst to increase tolerance for prolonged standing.   2. Pt will be able to perform 2 x 10 multi-directional LE strengthening without fatigue in order to improve ability to perform household chores.  3. Pt will report 80% improvement in ability to walk long distances since start of care to indicate improved functional mobility.   4. Pt will increase lumbar flexion AROM to 100% to indicate improved lumbar mobility.   5. Pt to be Independent with HEP to improve ROM and independence with ADL's.    Plan     Progress B LE and core strengthening.     Sabra  Jerome, PTA

## 2020-03-04 NOTE — PROGRESS NOTES
"  Physical Therapy Daily Treatment Note     Name: Carolin Taveras [facility]  Clinic Number: [id number]    Therapy Diagnosis:   Encounter Diagnoses   Name Primary?    Chronic bilateral low back pain without sciatica     Muscle weakness of lower extremity     Decreased range of motion of lumbar spine      Physician: Jair Reyes MD    Visit Date: 3/5/2020    Physician Orders: PT Eval and Treat   Medical Diagnosis from Referral: Chronic bilateral low back pain without sciatica  Evaluation Date: 2/4/2020  Last PN: 3/5/20 (visit 4)  Authorization Period Expiration: 12/31/20  Plan of Care Expiration: 5/4/20  Visit # / Visits authorized: 3 / 20- visit 4    Time In: 1008- pt late to session  Time Out: 11:05 am   Total Billable Time: 57 minutes    Precautions: standard, past mitral valve prolapse from birth     Subjective     Pt reports: she feels the weather has made her back "achy". Her foot was swollen last night. Pain is at worst 6-7/10. RFA was 2 weeks ago with no improvements.   She was compliant with home exercise program.  Response to previous treatment: good- soreness   Functional change: none to note    Pain: 7/10  Location: bilateral back      Objective     Taken 3/5/20:  Lumbar flexion AROM: 90%    Carolin Taveras received therapeutic exercises to develop strength, endurance, ROM, flexibility, posture and core stabilization for XXX minutes including:    Bold performed this session   Upright bike x 8 minutes level 3  Gastroc stretch on incline 3 x 30 seconds  SL QL stretch 3 x 30 sec each   IT band stretch w strap 3 x 30 sec each   BKFO w green TB x 20 each   Bridges w GTB x 20  SL clams x 20 each  Shuttle squats 3 bands 3 x 10  Standing march x 20 each  Standing hip abd x 20 ea  Prone on elbows x 3 minutes  Piriformis stretch 3 x 30 seconds   Sidestepping w GTB 40' x 3 laps  Step ups 6 in x 20 each    Home Exercises Provided and Patient Education Provided     Education provided:   - importance of performing HEP to " tolerance  - importance of graded progression    Written Home Exercises Provided: yes.XXX  Exercises were reviewed and Carolin Taveras was able to demonstrate them prior to the end of the session.  Carolin Taveras demonstrated good  understanding of the education provided.     See EMR under Patient Instructions for exercises provided 2/11/2020.    CMS Impairment/Limitation/Restriction for FOTO lumbar spine Survey    Therapist reviewed FOTO scores for Carolin Lind on 3/5/2020.   FOTO documents entered into [employer] - see Media section.    Limitation Score: 64%     Assessment     Patient was re-assessed today with 3/5 STGs being met indicating improvements in lumbar flexion AROM and tolerance for LE strengthening and HEP since start of care. She continues with low back pain, LE and core weakness, and impaired endurance and functional mobility. Pt could benefit from continued physical therapy services to address deficits.     She with poor tolerance for therex today due to increase complaints of low back pain and discomfort. Patient tearful towards end of treatment session.  Post-treatment low back pain rated as 7 / 10. Reported slight improvement following moist heat.     Carolin Taveras is progressing well towards her goals.   Pt prognosis is fair to good     Pt will continue to benefit from skilled outpatient physical therapy to address the deficits listed in the problem list box on initial evaluation, provide pt/family education and to maximize pt's level of independence in the home and community environment.     Pt's spiritual, cultural and educational needs considered and pt agreeable to plan of care and goals.     Anticipated barriers to physical therapy: chronicity of condition     GOALS: Short Term Goals:  6 weeks  1. Report decreased low back pain  <  / =  5/10 at worst to increase tolerance for prolonged standing. - in progress  2. Pt will be able to tolerate multi-directional LE strengthening in order to improve ability  to perform household chores.- met 3/5/20  3. Pt will report 50% improvement in ability to walk long distances since start of care to indicate improved functional mobility.- in progress   4. Pt will increase lumbar flexion AROM to 80% to indicate improved lumbar mobility.- met 3/5/20   5. Pt to tolerate HEP to improve ROM and independence with ADL's.- met 3/5/20     Long Term Goals: 12 weeks- in progress  1. Report decreased low back pain  <  / =  3/10 at worst to increase tolerance for prolonged standing.   2. Pt will be able to perform 2 x 10 multi-directional LE strengthening without fatigue in order to improve ability to perform household chores.  3. Pt will report 80% improvement in ability to walk long distances since start of care to indicate improved functional mobility.   4. Pt will increase lumbar flexion AROM to 100% to indicate improved lumbar mobility.   5. Pt to be Independent with HEP to improve ROM and independence with ADL's.    Plan     Progress B LE and core strengthening. XXX    Hazel Plunkett, PT

## 2020-03-05 ENCOUNTER — CLINICAL SUPPORT (OUTPATIENT)
Dept: REHABILITATION | Facility: HOSPITAL | Age: 55
End: 2020-03-05
Attending: NEUROLOGICAL SURGERY
Payer: COMMERCIAL

## 2020-03-05 ENCOUNTER — DOCUMENTATION ONLY (OUTPATIENT)
Dept: REHABILITATION | Facility: HOSPITAL | Age: 55
End: 2020-03-05

## 2020-03-05 DIAGNOSIS — M53.86 DECREASED RANGE OF MOTION OF LUMBAR SPINE: ICD-10-CM

## 2020-03-05 DIAGNOSIS — M54.50 CHRONIC BILATERAL LOW BACK PAIN WITHOUT SCIATICA: ICD-10-CM

## 2020-03-05 DIAGNOSIS — M62.81 MUSCLE WEAKNESS OF LOWER EXTREMITY: ICD-10-CM

## 2020-03-05 DIAGNOSIS — G89.29 CHRONIC BILATERAL LOW BACK PAIN WITHOUT SCIATICA: ICD-10-CM

## 2020-03-05 PROCEDURE — 97110 THERAPEUTIC EXERCISES: CPT | Mod: PO,CQ

## 2020-03-16 ENCOUNTER — PATIENT MESSAGE (OUTPATIENT)
Dept: REHABILITATION | Facility: HOSPITAL | Age: 55
End: 2020-03-16

## 2020-03-18 ENCOUNTER — PATIENT MESSAGE (OUTPATIENT)
Dept: PSYCHIATRY | Facility: CLINIC | Age: 55
End: 2020-03-18

## 2020-03-19 ENCOUNTER — TELEPHONE (OUTPATIENT)
Dept: PSYCHIATRY | Facility: HOSPITAL | Age: 55
End: 2020-03-19

## 2020-03-19 DIAGNOSIS — F33.1 MODERATE EPISODE OF RECURRENT MAJOR DEPRESSIVE DISORDER: Primary | ICD-10-CM

## 2020-03-19 DIAGNOSIS — F41.1 GENERALIZED ANXIETY DISORDER: ICD-10-CM

## 2020-03-20 ENCOUNTER — TELEPHONE (OUTPATIENT)
Dept: PSYCHIATRY | Facility: CLINIC | Age: 55
End: 2020-03-20

## 2020-03-20 RX ORDER — VENLAFAXINE HYDROCHLORIDE 37.5 MG/1
37.5 CAPSULE, EXTENDED RELEASE ORAL DAILY
Qty: 30 CAPSULE | Refills: 0 | Status: SHIPPED | OUTPATIENT
Start: 2020-03-20 | End: 2020-08-31

## 2020-03-23 ENCOUNTER — DOCUMENTATION ONLY (OUTPATIENT)
Dept: REHABILITATION | Facility: HOSPITAL | Age: 55
End: 2020-03-23

## 2020-03-23 NOTE — PROGRESS NOTES
Left voicemail for patient regarding COVID-19 concerns and postponing therapy at this time. Pt encouraged to contact therapist if questions or concerns exist.

## 2020-03-24 PROBLEM — M53.86 DECREASED RANGE OF MOTION OF LUMBAR SPINE: Status: RESOLVED | Noted: 2020-02-04 | Resolved: 2020-03-24

## 2020-03-24 PROBLEM — G89.29 CHRONIC BILATERAL LOW BACK PAIN WITHOUT SCIATICA: Status: RESOLVED | Noted: 2020-02-04 | Resolved: 2020-03-24

## 2020-03-24 PROBLEM — M54.50 CHRONIC BILATERAL LOW BACK PAIN WITHOUT SCIATICA: Status: RESOLVED | Noted: 2020-02-04 | Resolved: 2020-03-24

## 2020-03-24 PROBLEM — M62.81 MUSCLE WEAKNESS OF LOWER EXTREMITY: Status: RESOLVED | Noted: 2020-02-04 | Resolved: 2020-03-24

## 2020-03-27 ENCOUNTER — PATIENT MESSAGE (OUTPATIENT)
Dept: ORTHOPEDICS | Facility: CLINIC | Age: 55
End: 2020-03-27

## 2020-04-03 ENCOUNTER — TELEPHONE (OUTPATIENT)
Dept: REHABILITATION | Facility: HOSPITAL | Age: 55
End: 2020-04-03

## 2020-04-20 ENCOUNTER — PATIENT MESSAGE (OUTPATIENT)
Dept: ORTHOPEDICS | Facility: CLINIC | Age: 55
End: 2020-04-20

## 2020-05-12 DIAGNOSIS — F33.1 MODERATE EPISODE OF RECURRENT MAJOR DEPRESSIVE DISORDER: ICD-10-CM

## 2020-05-12 RX ORDER — DULOXETIN HYDROCHLORIDE 60 MG/1
CAPSULE, DELAYED RELEASE ORAL
Qty: 90 CAPSULE | Refills: 0 | Status: SHIPPED | OUTPATIENT
Start: 2020-05-12 | End: 2020-08-17 | Stop reason: SDUPTHER

## 2020-07-27 ENCOUNTER — PATIENT OUTREACH (OUTPATIENT)
Dept: ADMINISTRATIVE | Facility: OTHER | Age: 55
End: 2020-07-27

## 2020-07-27 DIAGNOSIS — Z12.11 ENCOUNTER FOR FIT (FECAL IMMUNOCHEMICAL TEST) SCREENING: Primary | ICD-10-CM

## 2020-07-27 DIAGNOSIS — S82.852D CLOSED TRIMALLEOLAR FRACTURE OF LEFT ANKLE WITH ROUTINE HEALING, SUBSEQUENT ENCOUNTER: Primary | ICD-10-CM

## 2020-07-27 NOTE — PROGRESS NOTES
Patient's chart was reviewed for overdue LYNDON topics.  Immunizations reconciled.    Orders placed:[employer]  Tasked appts:n/a  Labs Linked:n/a

## 2020-07-28 ENCOUNTER — HOSPITAL ENCOUNTER (OUTPATIENT)
Dept: RADIOLOGY | Facility: HOSPITAL | Age: 55
Discharge: HOME | End: 2020-07-28
Attending: ORTHOPAEDIC SURGERY
Payer: COMMERCIAL

## 2020-07-28 ENCOUNTER — OFFICE VISIT (OUTPATIENT)
Dept: ORTHOPEDICS | Facility: CLINIC | Age: 55
End: 2020-07-28
Payer: COMMERCIAL

## 2020-07-28 VITALS — BODY MASS INDEX: 19.11 KG/M2 | WEIGHT: 129 LBS | HEIGHT: 69 IN

## 2020-07-28 DIAGNOSIS — S82.852D CLOSED TRIMALLEOLAR FRACTURE OF LEFT ANKLE WITH ROUTINE HEALING, SUBSEQUENT ENCOUNTER: Primary | ICD-10-CM

## 2020-07-28 DIAGNOSIS — S93.492D SPRAIN OF ANTERIOR TALOFIBULAR LIGAMENT OF LEFT ANKLE, SUBSEQUENT ENCOUNTER: ICD-10-CM

## 2020-07-28 DIAGNOSIS — S82.852D CLOSED TRIMALLEOLAR FRACTURE OF LEFT ANKLE WITH ROUTINE HEALING, SUBSEQUENT ENCOUNTER: ICD-10-CM

## 2020-07-28 PROCEDURE — 3008F PR BODY MASS INDEX (BMI) DOCUMENTED: ICD-10-PCS | Mod: CPTII,S$GLB,, | Performed by: ORTHOPAEDIC SURGERY

## 2020-07-28 PROCEDURE — 99213 OFFICE O/P EST LOW 20 MIN: CPT | Mod: S$GLB,,, | Performed by: ORTHOPAEDIC SURGERY

## 2020-07-28 PROCEDURE — 99213 PR OFFICE/OUTPT VISIT, EST, LEVL III, 20-29 MIN: ICD-10-PCS | Mod: S$GLB,,, | Performed by: ORTHOPAEDIC SURGERY

## 2020-07-28 PROCEDURE — 99999 PR PBB SHADOW E&M-EST. PATIENT-LVL III: CPT | Mod: PBBFAC,,, | Performed by: ORTHOPAEDIC SURGERY

## 2020-07-28 PROCEDURE — 73610 XR ANKLE COMPLETE 3 VIEW LEFT: ICD-10-PCS | Mod: 26,LT,, | Performed by: RADIOLOGY

## 2020-07-28 PROCEDURE — 3008F BODY MASS INDEX DOCD: CPT | Mod: CPTII,S$GLB,, | Performed by: ORTHOPAEDIC SURGERY

## 2020-07-28 PROCEDURE — 73610 X-RAY EXAM OF ANKLE: CPT | Mod: 26,LT,, | Performed by: RADIOLOGY

## 2020-07-28 PROCEDURE — 99999 PR PBB SHADOW E&M-EST. PATIENT-LVL III: ICD-10-PCS | Mod: PBBFAC,,, | Performed by: ORTHOPAEDIC SURGERY

## 2020-07-28 PROCEDURE — 73610 X-RAY EXAM OF ANKLE: CPT | Mod: TC,LT

## 2020-07-28 NOTE — PROGRESS NOTES
HPI: 53 very active female status post ORIF left trimalleolar ankle fracture with syndesmosis fixation by me on 7/13/17.  Her syndesmosis screws  I removed her syndesmosis screws and mobilized the surrounding soft tissue on 4/16/19.  She's had significant improvement of her pain and increased function in the left ankle since the hwr removal, but is still not up to her pre-injury level of function.  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      7/28/2020:  Functionally, Ms. Lind is doing fairly well.  She does however have residual pain and stiffness around the ankle.  She also complains of some numbness in the area of the ball of the foot on the plantar surface.  She has never really got back to her pre-injury level of function but makes the best of the situation.  She avoids any high impact activities and continues to exercise within her limitations.    She has an EMG from 10/25/2019 which shows some superficial peroneal nerve changes on the left as well as decreased sensory in the medial and lateral plantar nerves.    ROS:  Patient denies constitutional symptoms, cardiac symptoms, respiratory symptoms, GI symptoms.  The remainder of the musculoskeletal ROS is included in the HPI.    PE:    AA&O x 4.  NAD  HEENT:  NCAT, sclera nonicteric  Lungs:  Respirations are equal and unlabored.  CV:  2+ bilateral upper and lower extremity pulses.  Skin:  Intact throughout.    MS:  Incisions well healed.  ROM 5DF to 25PF.  Still some mild hyperesthesia at the distal portion of the lateral malleolus over the incision.  Mild tenderness to palpation about the ATFL.  Light touch is grossly intact but slightly diminished on the plantar surface of the foot over the metatarsal heads.  Negative anterior drawer.      Rads:  Healed trimalleolar fractures.  All hwr out.  Synostosis between the tibia and the fibula at the level prior syndesmotic fixation.  Very mild arthritic changes of the lateral  malleolus/talus.      A/P:  3 year status post ORIF of left trimalleolar ankle fracture with syndesmotic fixation and 15 months status post removal of hardware.  She is doing well overall but still has some functional limitations.  She has a mild superficial peroneal issue and loss of plantar sensation about the ball of the foot.  She has also seen the neurosurgeons for myofascial low back pain.  She deals with these and has adjusted her exercise regimen accordingly.  She will continue activity as tolerated and avoid high impact activities.

## 2020-08-17 DIAGNOSIS — F33.1 MODERATE EPISODE OF RECURRENT MAJOR DEPRESSIVE DISORDER: ICD-10-CM

## 2020-08-17 NOTE — TELEPHONE ENCOUNTER
8/31/20 scheduled to [facility] care.     She would like a refill of her Cymbalta sent in.

## 2020-08-17 NOTE — TELEPHONE ENCOUNTER
----- Message from Brianna Gordon sent at 8/17/2020  1:11 PM CDT -----  Name of Who is Calling: LAURA FAIR [[id number]]    What is the request in detail:LAURA FAIR [[id number]]Patient is requesting a call back  in regards to becoming a new Patient  Please contact to further discuss and advise      Can the clinic reply by [facility]: yes     [facility]  [phone] (home)

## 2020-08-18 ENCOUNTER — PATIENT MESSAGE (OUTPATIENT)
Dept: INTERNAL MEDICINE | Facility: CLINIC | Age: 55
End: 2020-08-18

## 2020-08-18 RX ORDER — DULOXETIN HYDROCHLORIDE 60 MG/1
CAPSULE, DELAYED RELEASE ORAL
Qty: 30 CAPSULE | Refills: 0 | Status: SHIPPED | OUTPATIENT
Start: 2020-08-18 | End: 2020-08-31 | Stop reason: SDUPTHER

## 2020-08-31 ENCOUNTER — OFFICE VISIT (OUTPATIENT)
Dept: INTERNAL MEDICINE | Facility: CLINIC | Age: 55
End: 2020-08-31
Payer: COMMERCIAL

## 2020-08-31 VITALS — WEIGHT: 128 LBS | BODY MASS INDEX: 18.9 KG/M2

## 2020-08-31 DIAGNOSIS — G89.29 CHRONIC LEFT-SIDED LOW BACK PAIN WITH LEFT-SIDED SCIATICA: ICD-10-CM

## 2020-08-31 DIAGNOSIS — F33.1 MAJOR DEPRESSIVE DISORDER, RECURRENT EPISODE, MODERATE: ICD-10-CM

## 2020-08-31 DIAGNOSIS — F33.1 MODERATE EPISODE OF RECURRENT MAJOR DEPRESSIVE DISORDER: ICD-10-CM

## 2020-08-31 DIAGNOSIS — M51.36 DDD (DEGENERATIVE DISC DISEASE), LUMBAR: ICD-10-CM

## 2020-08-31 DIAGNOSIS — Z11.59 ENCOUNTER FOR HEPATITIS C SCREENING TEST FOR LOW RISK PATIENT: ICD-10-CM

## 2020-08-31 DIAGNOSIS — M54.42 CHRONIC LEFT-SIDED LOW BACK PAIN WITH LEFT-SIDED SCIATICA: ICD-10-CM

## 2020-08-31 DIAGNOSIS — Z13.6 ENCOUNTER FOR LIPID SCREENING FOR CARDIOVASCULAR DISEASE: ICD-10-CM

## 2020-08-31 DIAGNOSIS — I10 BENIGN HYPERTENSION: Primary | ICD-10-CM

## 2020-08-31 DIAGNOSIS — Z13.220 ENCOUNTER FOR LIPID SCREENING FOR CARDIOVASCULAR DISEASE: ICD-10-CM

## 2020-08-31 DIAGNOSIS — Z12.11 SCREEN FOR COLON CANCER: ICD-10-CM

## 2020-08-31 DIAGNOSIS — Z12.31 VISIT FOR SCREENING MAMMOGRAM: ICD-10-CM

## 2020-08-31 DIAGNOSIS — F41.1 GENERALIZED ANXIETY DISORDER: ICD-10-CM

## 2020-08-31 DIAGNOSIS — E53.8 B12 DEFICIENCY: ICD-10-CM

## 2020-08-31 DIAGNOSIS — G43.009 MIGRAINE WITHOUT AURA AND WITHOUT STATUS MIGRAINOSUS, NOT INTRACTABLE: ICD-10-CM

## 2020-08-31 DIAGNOSIS — M54.16 LUMBAR RADICULOPATHY: ICD-10-CM

## 2020-08-31 DIAGNOSIS — Z00.00 ANNUAL PHYSICAL EXAM: ICD-10-CM

## 2020-08-31 DIAGNOSIS — Z11.4 SCREENING FOR HIV (HUMAN IMMUNODEFICIENCY VIRUS): ICD-10-CM

## 2020-08-31 PROBLEM — F33.9 RECURRENT MAJOR DEPRESSIVE DISORDER: Status: RESOLVED | Noted: 2018-04-02 | Resolved: 2020-08-31

## 2020-08-31 PROCEDURE — 3008F PR BODY MASS INDEX (BMI) DOCUMENTED: ICD-10-PCS | Mod: CPTII,,, | Performed by: FAMILY MEDICINE

## 2020-08-31 PROCEDURE — 3008F BODY MASS INDEX DOCD: CPT | Mod: CPTII,,, | Performed by: FAMILY MEDICINE

## 2020-08-31 PROCEDURE — 99213 PR OFFICE/OUTPT VISIT, EST, LEVL III, 20-29 MIN: ICD-10-PCS | Mod: 95,,, | Performed by: FAMILY MEDICINE

## 2020-08-31 PROCEDURE — 99213 OFFICE O/P EST LOW 20 MIN: CPT | Mod: 95,,, | Performed by: FAMILY MEDICINE

## 2020-08-31 RX ORDER — CHOLECALCIFEROL (VITAMIN D3) 25 MCG
10000 TABLET ORAL DAILY
COMMUNITY

## 2020-08-31 RX ORDER — DULOXETIN HYDROCHLORIDE 30 MG/1
90 CAPSULE, DELAYED RELEASE ORAL DAILY
Qty: 270 CAPSULE | Refills: 1 | Status: SHIPPED | OUTPATIENT
Start: 2020-08-31 | End: 2020-09-23 | Stop reason: CLARIF

## 2020-08-31 RX ORDER — UBIDECARENONE 30 MG
300 CAPSULE ORAL DAILY
COMMUNITY
End: 2023-09-05 | Stop reason: ALTCHOICE

## 2020-08-31 RX ORDER — CLONAZEPAM 1 MG/1
TABLET ORAL
COMMUNITY
Start: 2020-08-19 | End: 2023-02-28

## 2020-08-31 RX ORDER — ACETAMINOPHEN AND PHENYLEPHRINE HCL 325; 5 MG/1; MG/1
1 TABLET ORAL DAILY
COMMUNITY

## 2020-08-31 RX ORDER — PROPRANOLOL HYDROCHLORIDE 40 MG/1
40 TABLET ORAL 2 TIMES DAILY
Qty: 180 TABLET | Refills: 1 | Status: SHIPPED | OUTPATIENT
Start: 2020-08-31 | End: 2021-06-16 | Stop reason: SDUPTHER

## 2020-08-31 RX ORDER — GABAPENTIN 300 MG/1
300 CAPSULE ORAL NIGHTLY PRN
COMMUNITY
End: 2021-12-27

## 2020-08-31 RX ORDER — ASCORBIC ACID 1000 MG
120 TABLET ORAL DAILY
COMMUNITY
End: 2023-09-05 | Stop reason: ALTCHOICE

## 2020-08-31 RX ORDER — IBUPROFEN 100 MG/5ML
3000 SUSPENSION, ORAL (FINAL DOSE FORM) ORAL DAILY
COMMUNITY
End: 2023-09-05 | Stop reason: ALTCHOICE

## 2020-08-31 RX ORDER — LANOLIN ALCOHOL/MO/W.PET/CERES
400 CREAM (GRAM) TOPICAL DAILY
COMMUNITY

## 2020-08-31 NOTE — PROGRESS NOTES
Subjective:       Patient ID: Carolin Lind is a 55 y.o. female.    Chief Complaint: Establish Care and Medication follow up Cymbalta    HPI   56 y/o female here to establish care.     She is feeling good in general, she has chronic lower back and ankle pain, she denies f/n/v/d/constipation/cp, she has occasional heart palpitations that occur once a month its fleeting, not associated with activity usually at rest at night, she denies sob/urinary sx. She is sleeping ok, Mood good. She is trying to eat healthy.  She is doing some walking at work but no dedicated exercise.    LEX/MDD: was on Prozac in the past, Effexor in the past, Cymbalta 60 mg daily   Hx of ankle injury w/ multiple repairs in 2017: Cymbalta 60 mg daily  CLBP: started after injury in 2017 when her foot got stuck in a hole in the street, she has tried Gabapentin 300-600 mg for prn use but it causes fatigue so she does not take it much, Cymbalta 60 mg daily  Low B12: taking 3,000-5000 mcg daily  Migraines:diagnosed at age 13, migraines 3 days a month and daily headaches, followed at [facility] Dr. [facility], using Fioricet prn, sumatriptan  Hx of MVP as a teenager, echo 3/2019 normal  Vit d: taking 10,000 IU daily  GYN: cycles regular, pelvic due, mmg due  Colonoscopy done in the past, willing for stool test  Eye exam utd  Dental exam utd  OTC: magnesium, vit c, vit e, ginko, coq10, vit e, tumeric    Review of Systems  see HPI  Objective:      Wt 58.1 kg (128 lb)   LMP 07/07/2020   BMI 18.90 kg/m²   Physical Exam  Constitutional:       General: She is not in acute distress.     Appearance: She is well-developed. She is not diaphoretic.   HENT:      Head: Normocephalic and atraumatic.   Pulmonary:      Effort: No respiratory distress.   Neurological:      Mental Status: She is alert and oriented to person, place, and time.         Assessment:       1. Benign hypertension    2. Generalized anxiety disorder    3. Annual physical exam    4. B12 deficiency     5. Migraine without aura and without status migrainosus, not intractable    6. Major depressive disorder, recurrent episode, moderate    7. Visit for screening mammogram    8. Screen for colon cancer    9. Encounter for lipid screening for cardiovascular disease    10. Encounter for hepatitis C screening test for low risk patient    11. Screening for HIV (human immunodeficiency virus)    12. Moderate episode of recurrent major depressive disorder    13. Chronic left-sided low back pain with left-sided sciatica    14. DDD (degenerative disc disease), lumbar    15. Lumbar radiculopathy        Plan:   Carolin was seen today for establish care and medication follow up cymbalta.    Diagnoses and all orders for this visit:    Benign hypertension  -     CBC auto differential; Future  -     Comprehensive metabolic panel; Future  -     Hemoglobin A1C; Future  -     TSH; Future  -     propranoloL (INDERAL) 40 MG tablet; Take 1 tablet (40 mg total) by mouth 2 (two) times a day.    Generalized anxiety disorder    Annual physical exam  -     CBC auto differential; Future  -     Comprehensive metabolic panel; Future  -     Hemoglobin A1C; Future  -     Lipid Panel; Future  -     TSH; Future  -     Vitamin D; Future  -     Urinalysis; Future  -     Vitamin B12; Future  -     HIV 1/2 Ag/Ab (4th Gen); Future  -     Hepatitis C Antibody; Future    B12 deficiency  -     Vitamin B12; Future    Migraine without aura and without status migrainosus, not intractable    Major depressive disorder, recurrent episode, moderate    Visit for screening mammogram  -     Mammo Digital Screening Bilat w/ Gee; Future    Screen for colon cancer  -     Cologuard Screening (Multitarget Stool DNA); Future  -     Cologuard Screening (Multitarget Stool DNA)    Encounter for lipid screening for cardiovascular disease  -     Lipid Panel; Future    Encounter for hepatitis C screening test for low risk patient  -     Hepatitis C Antibody; Future    Screening  for HIV (human immunodeficiency virus)  -     HIV 1/2 Ag/Ab (4th Gen); Future    Moderate episode of recurrent major depressive disorder  -     DULoxetine (CYMBALTA) 30 MG capsule; Take 3 capsules (90 mg total) by mouth once daily. TAKE 1 CAPSULE(60 MG) BY MOUTH EVERY DAY    Chronic left-sided low back pain with left-sided sciatica    DDD (degenerative disc disease), lumbar    Lumbar radiculopathy    The patient location is: [place]  The chief complaint leading to consultation is: cymbalta follow up    Visit type: audiovisual    Face to Face time with patient: 40 minutes of total time spent on the encounter, which includes face to face time and non-face to face time preparing to see the patient (eg, review of tests), Obtaining and/or reviewing separately obtained history, Documenting clinical information in the electronic or other health record, Independently interpreting results (not separately reported) and communicating results to the patient/family/caregiver, or Care coordination (not separately reported).         Each patient to whom he or she provides medical services by telemedicine is:  (1) informed of the relationship between the physician and patient and the respective role of any other health care provider with respect to management of the patient; and (2) notified that he or she may decline to receive medical services by telemedicine and may withdraw from such care at any time.    Notes:

## 2020-09-09 ENCOUNTER — CLINICAL SUPPORT (OUTPATIENT)
Dept: INTERNAL MEDICINE | Facility: CLINIC | Age: 55
End: 2020-09-09
Payer: COMMERCIAL

## 2020-09-09 ENCOUNTER — LAB VISIT (OUTPATIENT)
Dept: INTERNAL MEDICINE | Facility: CLINIC | Age: 55
End: 2020-09-09
Payer: COMMERCIAL

## 2020-09-09 DIAGNOSIS — Z11.4 SCREENING FOR HIV (HUMAN IMMUNODEFICIENCY VIRUS): ICD-10-CM

## 2020-09-09 DIAGNOSIS — E53.8 B12 DEFICIENCY: ICD-10-CM

## 2020-09-09 DIAGNOSIS — Z00.00 ANNUAL PHYSICAL EXAM: ICD-10-CM

## 2020-09-09 DIAGNOSIS — I10 BENIGN HYPERTENSION: ICD-10-CM

## 2020-09-09 DIAGNOSIS — Z13.220 ENCOUNTER FOR LIPID SCREENING FOR CARDIOVASCULAR DISEASE: ICD-10-CM

## 2020-09-09 DIAGNOSIS — Z11.59 ENCOUNTER FOR HEPATITIS C SCREENING TEST FOR LOW RISK PATIENT: ICD-10-CM

## 2020-09-09 DIAGNOSIS — Z13.6 ENCOUNTER FOR LIPID SCREENING FOR CARDIOVASCULAR DISEASE: ICD-10-CM

## 2020-09-09 LAB
25(OH)D3+25(OH)D2 SERPL-MCNC: 45 NG/ML (ref 30–96)
ALBUMIN SERPL BCP-MCNC: 4 G/DL (ref 3.5–5.2)
ALP SERPL-CCNC: 86 U/L (ref 55–135)
ALT SERPL W/O P-5'-P-CCNC: 26 U/L (ref 10–44)
ANION GAP SERPL CALC-SCNC: 7 MMOL/L (ref 8–16)
AST SERPL-CCNC: 21 U/L (ref 10–40)
BASOPHILS # BLD AUTO: 0.08 K/UL (ref 0–0.2)
BASOPHILS NFR BLD: 1.3 % (ref 0–1.9)
BILIRUB SERPL-MCNC: 0.5 MG/DL (ref 0.1–1)
BUN SERPL-MCNC: 17 MG/DL (ref 6–20)
CALCIUM SERPL-MCNC: 9.1 MG/DL (ref 8.7–10.5)
CHLORIDE SERPL-SCNC: 106 MMOL/L (ref 95–110)
CHOLEST SERPL-MCNC: 197 MG/DL (ref 120–199)
CHOLEST/HDLC SERPL: 3.3 {RATIO} (ref 2–5)
CO2 SERPL-SCNC: 27 MMOL/L (ref 23–29)
CREAT SERPL-MCNC: 0.8 MG/DL (ref 0.5–1.4)
DIFFERENTIAL METHOD: NORMAL
EOSINOPHIL # BLD AUTO: 0.2 K/UL (ref 0–0.5)
EOSINOPHIL NFR BLD: 2.8 % (ref 0–8)
ERYTHROCYTE [DISTWIDTH] IN BLOOD BY AUTOMATED COUNT: 13.6 % (ref 11.5–14.5)
EST. GFR  (AFRICAN AMERICAN): >60 ML/MIN/1.73 M^2
EST. GFR  (NON AFRICAN AMERICAN): >60 ML/MIN/1.73 M^2
ESTIMATED AVG GLUCOSE: 91 MG/DL (ref 68–131)
GLUCOSE SERPL-MCNC: 85 MG/DL (ref 70–110)
HBA1C MFR BLD HPLC: 4.8 % (ref 4–5.6)
HCT VFR BLD AUTO: 42.5 % (ref 37–48.5)
HDLC SERPL-MCNC: 60 MG/DL (ref 40–75)
HDLC SERPL: 30.5 % (ref 20–50)
HGB BLD-MCNC: 14.1 G/DL (ref 12–16)
IMM GRANULOCYTES # BLD AUTO: 0.02 K/UL (ref 0–0.04)
IMM GRANULOCYTES NFR BLD AUTO: 0.3 % (ref 0–0.5)
LDLC SERPL CALC-MCNC: 124.6 MG/DL (ref 63–159)
LYMPHOCYTES # BLD AUTO: 1.3 K/UL (ref 1–4.8)
LYMPHOCYTES NFR BLD: 21.3 % (ref 18–48)
MCH RBC QN AUTO: 30.5 PG (ref 27–31)
MCHC RBC AUTO-ENTMCNC: 33.2 G/DL (ref 32–36)
MCV RBC AUTO: 92 FL (ref 82–98)
MONOCYTES # BLD AUTO: 0.7 K/UL (ref 0.3–1)
MONOCYTES NFR BLD: 10.6 % (ref 4–15)
NEUTROPHILS # BLD AUTO: 3.9 K/UL (ref 1.8–7.7)
NEUTROPHILS NFR BLD: 63.7 % (ref 38–73)
NONHDLC SERPL-MCNC: 137 MG/DL
NRBC BLD-RTO: 0 /100 WBC
PLATELET # BLD AUTO: 217 K/UL (ref 150–350)
PMV BLD AUTO: 11.2 FL (ref 9.2–12.9)
POTASSIUM SERPL-SCNC: 4.5 MMOL/L (ref 3.5–5.1)
PROT SERPL-MCNC: 6.9 G/DL (ref 6–8.4)
RBC # BLD AUTO: 4.62 M/UL (ref 4–5.4)
SODIUM SERPL-SCNC: 140 MMOL/L (ref 136–145)
TRIGL SERPL-MCNC: 62 MG/DL (ref 30–150)
TSH SERPL DL<=0.005 MIU/L-ACNC: 1.42 UIU/ML (ref 0.4–4)
VIT B12 SERPL-MCNC: >2000 PG/ML (ref 210–950)
WBC # BLD AUTO: 6.16 K/UL (ref 3.9–12.7)

## 2020-09-09 PROCEDURE — 36415 COLL VENOUS BLD VENIPUNCTURE: CPT

## 2020-09-09 PROCEDURE — 90686 FLU VACCINE (QUAD) GREATER THAN OR EQUAL TO 3YO PRESERVATIVE FREE IM: ICD-10-PCS | Mod: S$GLB,,, | Performed by: FAMILY MEDICINE

## 2020-09-09 PROCEDURE — 84443 ASSAY THYROID STIM HORMONE: CPT

## 2020-09-09 PROCEDURE — 82607 VITAMIN B-12: CPT

## 2020-09-09 PROCEDURE — 85025 COMPLETE CBC W/AUTO DIFF WBC: CPT

## 2020-09-09 PROCEDURE — 90471 IMMUNIZATION ADMIN: CPT | Mod: S$GLB,,, | Performed by: FAMILY MEDICINE

## 2020-09-09 PROCEDURE — 90471 FLU VACCINE (QUAD) GREATER THAN OR EQUAL TO 3YO PRESERVATIVE FREE IM: ICD-10-PCS | Mod: S$GLB,,, | Performed by: FAMILY MEDICINE

## 2020-09-09 PROCEDURE — 82306 VITAMIN D 25 HYDROXY: CPT

## 2020-09-09 PROCEDURE — 90686 IIV4 VACC NO PRSV 0.5 ML IM: CPT | Mod: S$GLB,,, | Performed by: FAMILY MEDICINE

## 2020-09-09 PROCEDURE — 80061 LIPID PANEL: CPT

## 2020-09-09 PROCEDURE — 86803 HEPATITIS C AB TEST: CPT

## 2020-09-09 PROCEDURE — 86703 HIV-1/HIV-2 1 RESULT ANTBDY: CPT

## 2020-09-09 PROCEDURE — 80053 COMPREHEN METABOLIC PANEL: CPT

## 2020-09-09 PROCEDURE — 83036 HEMOGLOBIN GLYCOSYLATED A1C: CPT

## 2020-09-09 NOTE — PROGRESS NOTES
After obtaining consent, and per orders of Dr. BLISS, injection of Fluarix Lot [id number] Exp 06/30/2021 given in the RD by Sally Guy. Patient tolerated well and band aid applied. Patient instructed to remain in clinic for 15 minutes afterwards, and to report any adverse reaction to me immediately.

## 2020-09-10 LAB
HCV AB SERPL QL IA: NEGATIVE
HIV 1+2 AB+HIV1 P24 AG SERPL QL IA: NEGATIVE

## 2020-09-11 ENCOUNTER — PATIENT MESSAGE (OUTPATIENT)
Dept: INTERNAL MEDICINE | Facility: CLINIC | Age: 55
End: 2020-09-11

## 2020-09-15 ENCOUNTER — PATIENT OUTREACH (OUTPATIENT)
Dept: ADMINISTRATIVE | Facility: HOSPITAL | Age: 55
End: 2020-09-15

## 2020-09-15 DIAGNOSIS — Z12.11 SCREENING FOR COLON CANCER: Primary | ICD-10-CM

## 2020-09-17 DIAGNOSIS — F33.1 MODERATE EPISODE OF RECURRENT MAJOR DEPRESSIVE DISORDER: ICD-10-CM

## 2020-09-17 NOTE — TELEPHONE ENCOUNTER
Please call the patient and verify her Cymbalta dose.  At her most recent visit on August 31st she was prescribed Cymbalta 30 mg tablets with instructions to take 90 mg daily.  It may be that she wants to take one 30 mg and one 60 mg tablet for total of 90 mg daily which would be fine as well.    Please help her set up mmg

## 2020-09-22 ENCOUNTER — PATIENT MESSAGE (OUTPATIENT)
Dept: INTERNAL MEDICINE | Facility: CLINIC | Age: 55
End: 2020-09-22

## 2020-09-22 RX ORDER — DULOXETIN HYDROCHLORIDE 60 MG/1
60 CAPSULE, DELAYED RELEASE ORAL DAILY
Qty: 30 CAPSULE | Refills: 11 | OUTPATIENT
Start: 2020-09-22 | End: 2021-09-22

## 2020-09-23 RX ORDER — DULOXETIN HYDROCHLORIDE 30 MG/1
30 CAPSULE, DELAYED RELEASE ORAL DAILY
COMMUNITY
End: 2020-09-29 | Stop reason: SDUPTHER

## 2020-09-23 RX ORDER — DULOXETIN HYDROCHLORIDE 30 MG/1
30 CAPSULE, DELAYED RELEASE ORAL DAILY
COMMUNITY
End: 2020-09-23

## 2020-09-23 RX ORDER — DULOXETIN HYDROCHLORIDE 60 MG/1
60 CAPSULE, DELAYED RELEASE ORAL DAILY
COMMUNITY
End: 2020-09-29 | Stop reason: SDUPTHER

## 2020-09-24 ENCOUNTER — PATIENT OUTREACH (OUTPATIENT)
Dept: ADMINISTRATIVE | Facility: OTHER | Age: 55
End: 2020-09-24

## 2020-09-24 NOTE — PROGRESS NOTES
Care Everywhere:   Immunization: updated  Health Maintenance: updated  Media Review:   Legacy Review:   Order placed:   Upcoming appts:  cologuard ordered on 9/15/2020   Referral to gastro entered on 10/1/2019

## 2020-09-29 ENCOUNTER — OFFICE VISIT (OUTPATIENT)
Dept: INTERNAL MEDICINE | Facility: CLINIC | Age: 55
End: 2020-09-29
Payer: COMMERCIAL

## 2020-09-29 VITALS — BODY MASS INDEX: 18.9 KG/M2 | TEMPERATURE: 100 F | WEIGHT: 128 LBS

## 2020-09-29 DIAGNOSIS — Z20.822 EXPOSURE TO COVID-19 VIRUS: Primary | ICD-10-CM

## 2020-09-29 DIAGNOSIS — G89.29 CHRONIC LEFT-SIDED LOW BACK PAIN WITH LEFT-SIDED SCIATICA: ICD-10-CM

## 2020-09-29 DIAGNOSIS — R50.9 FEBRILE ILLNESS: ICD-10-CM

## 2020-09-29 DIAGNOSIS — M54.42 CHRONIC LEFT-SIDED LOW BACK PAIN WITH LEFT-SIDED SCIATICA: ICD-10-CM

## 2020-09-29 PROCEDURE — 3008F BODY MASS INDEX DOCD: CPT | Mod: CPTII,,, | Performed by: FAMILY MEDICINE

## 2020-09-29 PROCEDURE — 99213 PR OFFICE/OUTPT VISIT, EST, LEVL III, 20-29 MIN: ICD-10-PCS | Mod: 95,,, | Performed by: FAMILY MEDICINE

## 2020-09-29 PROCEDURE — 3008F PR BODY MASS INDEX (BMI) DOCUMENTED: ICD-10-PCS | Mod: CPTII,,, | Performed by: FAMILY MEDICINE

## 2020-09-29 PROCEDURE — 99213 OFFICE O/P EST LOW 20 MIN: CPT | Mod: 95,,, | Performed by: FAMILY MEDICINE

## 2020-09-29 RX ORDER — DULOXETIN HYDROCHLORIDE 60 MG/1
60 CAPSULE, DELAYED RELEASE ORAL DAILY
Qty: 90 CAPSULE | Refills: 1 | Status: SHIPPED | OUTPATIENT
Start: 2020-09-29 | End: 2021-10-25

## 2020-09-29 RX ORDER — DULOXETIN HYDROCHLORIDE 30 MG/1
30 CAPSULE, DELAYED RELEASE ORAL DAILY
Qty: 90 CAPSULE | Refills: 1 | Status: SHIPPED | OUTPATIENT
Start: 2020-09-29 | End: 2021-04-08 | Stop reason: SDUPTHER

## 2020-09-29 NOTE — LETTER
Please isolate yourself at home.  You may leave home and/or return to work once the following conditions are met:    If you were not hospitalized and are not severely immunocompromised*:   More than 10 days since symptoms first appeared AND   More than 24 hours fever free without medications AND   Symptoms have improved     If you were hospitalized OR are severely immunocompromised*:   More than 20 days since symptoms first appeared   More than 24 hrs hours fever free without medications   Symptoms have improved    If you had no symptoms but tested postivepositive:   More than 10 days since the date of the first positive test (20 days if severely immunocompromised).   If you develop symptoms, then use the guidelines above.     *Definition of severely immunocompromised:  - Current chemotherapy for cancer  - Untreated HIV with CD4 count less than 200  - Combined primary immunodeficiency disorder  - Prednisone more than 20 mg per day for more than 14 days  - Post-transplant patients    Additional instructions:   Separate yourself from other people and animals in your home.   Call ahead before visiting your doctor.   Wear a facemask when around others.   Cover your coughs and sneezes.   Wash your hands often with soap and water; hand  can be used, too.   Avoid sharing personal household items.   Wipe down surfaces used daily.   Monitor your symptoms. Seek prompt medical attention if your illness is worsening (e.g., difficulty breathing).    Before seeking care, call your healthcare provider.   If you have a medical emergency and need to call 911, notify the dispatch personnel that you have, or are being evaluated for COVID-19. If possible, put on a facemask before emergency medical services arrive.      Contact Tracing    As one of the next steps, you will receive a call or text from the [place] Department of Health ([facility]) COVID-19 Tracing Team. See the contact information below so you know  not to ignore the health departments call. It is important that you contact them back immediately so they can help.      Contact Tracer Number:  [phone]  Caller ID for most carriers: [place] Dept Health     What is contact tracing?  · Contact tracing is a process that helps identify everyone who has been in close contact with an infected person. Contact tracers let those people know they may have been exposed and guide them on next steps. Confidentiality is important for everyone; no one will be told who may have exposed them to the virus.  · Your involvement is important. The more we know about where and how this virus is spreading, the better chance we have at stopping it from spreading further.  What does exposure mean?  · Exposure means you have been within 6 feet for more than 15 minutes with a person who has or had COVID-19.  What kind of questions do the contact tracers ask?  · A contact tracer will confirm your basic contact information including name, address, phone number, and next of kin, as well as asking about any symptoms you may have had. Theyll also ask you how you think you may have gotten sick, such as places where you may have been exposed to the virus, and people you were with. Those names will never be shared with anyone outside of that call, and will only be used to help trace and stop the spread of the virus.   I have privacy concerns. How will the state use my information?  · Your privacy about your health is important. All calls are completed using call centers that use the appropriate health privacy protection measures (HIPAA compliance), meaning that your patient information is safe. No one will ever ask you any questions related to immigration status. Your health comes first.   Do I have to participate?  · You do not have to participate, but we strongly encourage you to. Contact tracing can help us catch and control new outbreaks as theyre developing to keep your friends and family  safe.   What if I dont hear from anyone?  · If you dont receive a call within 24 hours, you can call the number above right away to inquire about your status. That line is open from 8:00 am - 8:00 p.m., 7 days a week.  Contact tracing saves lives! Together, we have the power to beat this virus and keep our loved ones and neighbors safe.    For more information see CDC link below.      https://www.cdc.gov/coronavirus/2019-ncov/hcp/guidance-prevent-spread.html#precautions        Sources:  CDC, [place] Department [facility] and [facility]

## 2020-09-29 NOTE — PROGRESS NOTES
Subjective:       Patient ID: Carolin Lind is a 55 y.o. female.    Chief Complaint: Cymbalta follow up, possible covid    HPI  56 y/o female with LEX/MDD, CLBP, hx of Migraines is here to follow up Cymbalta.    She has not been able to start Cymbalta 90 mg, she was only given the 60 mg from her pharmacy.  She was exposed to Covid 7 days ago, her daughter who does not live with her but visited her when she was not feeling good tested positive for Covid, she started feeling bad and was tested positive for Strep throat in  and was prescribed Augmentin last Wednesday, she was not initially tested for covid she went back to  on Friday and her Covid swab was negative. She has been been feeling bad for the past week and it has not really improved, she has sore throat, body aches, pnd, frontal headache, temp running 99.8, she has been taking Tylenol every 6 hours which helps, she has a mild dry cough, she denies n/v/d/constipation/cp/sob/urinary sx. Her appetite is decreased, she noticed today that her sense of smell is decreased. She is sleeping ok.      LEX/MDD: was on Prozac in the past, Effexor in the past, Cymbalta 60 mg daily   Hx of ankle injury w/ multiple repairs in 2017: Cymbalta 60 mg daily  CLBP: started after injury in 2017 when her foot got stuck in a hole in the street, she has tried Gabapentin 300-600 mg for prn use but it causes fatigue so she does not take it much, Cymbalta 60 mg daily  Low B12: taking 3,000-5000 mcg daily  Migraines:diagnosed at age 13, migraines 3 days a month and daily headaches, followed at [facility] Dr. Parker, using Fioricet prn, sumatriptan  Hx of MVP as a teenager, echo 3/2019 normal  Vit d: taking 10,000 IU daily  GYN: cycles regular, pelvic due, mmg due  Colonoscopy done in the past, willing for stool test  Eye exam utd  Dental exam utd  OTC: magnesium, vit c, vit e, ginko, coq10, vit e, tumeric    Review of Systems   HENT: Positive for sore throat. Negative for congestion,  drooling, ear discharge, ear pain and trouble swallowing.    Respiratory: Negative for cough, shortness of breath and stridor.    Gastrointestinal: Negative for abdominal pain, diarrhea and vomiting.   Musculoskeletal: Negative for neck pain.   Neurological: Positive for headaches.       Objective:      Temp 99.8 °F (37.7 °C)   Wt 58.1 kg (128 lb)   LMP 09/20/2020   BMI 18.90 kg/m²   Physical Exam  Constitutional:       General: She is not in acute distress.     Appearance: She is well-developed. She is not diaphoretic.   HENT:      Head: Normocephalic and atraumatic.   Pulmonary:      Effort: No respiratory distress.   Neurological:      Mental Status: She is alert and oriented to person, place, and time.         Assessment:       1. Exposure to Covid-19 Virus    2. Febrile illness    3. Chronic left-sided low back pain with left-sided sciatica        Plan:   Carolin was seen today for cymbalta follow up, possible covid.    Diagnoses and all orders for this visit:    Exposure to Covid-19 Virus  -     COVID-19 Home Symptom Monitoring  - Duration (days): 14    Febrile illness  -     COVID-19 Home Symptom Monitoring  - Duration (days): 14    Chronic left-sided low back pain with left-sided sciatica  -     DULoxetine (CYMBALTA) 60 MG capsule; Take 1 capsule (60 mg total) by mouth once daily. In addition to the 30 mg dose (for a total of 90 mg)  -     DULoxetine (CYMBALTA) 30 MG capsule; Take 1 capsule (30 mg total) by mouth once daily. In addition to the 60 mg(for a total of 90 mg daily) Pt is currently taking 60 mg only    The patient location is: [place]  The chief complaint leading to consultation is: follow up and febrile illness    Visit type: audiovisual    Face to Face time with patient: 15 minutes of total time spent on the encounter, which includes face to face time and non-face to face time preparing to see the patient (eg, review of tests), Obtaining and/or reviewing separately obtained history, Documenting  clinical information in the electronic or other health record, Independently interpreting results (not separately reported) and communicating results to the patient/family/caregiver, or Care coordination (not separately reported).         Each patient to whom he or she provides medical services by telemedicine is:  (1) informed of the relationship between the physician and patient and the respective role of any other health care provider with respect to management of the patient; and (2) notified that he or she may decline to receive medical services by telemedicine and may withdraw from such care at any time.    Notes:  I offered her a COVID swab but she said she has an appointment today to go to in and out urgent care for the rapid test.

## 2020-10-03 ENCOUNTER — NURSE TRIAGE (OUTPATIENT)
Dept: ADMINISTRATIVE | Facility: CLINIC | Age: 55
End: 2020-10-03

## 2020-10-03 NOTE — TELEPHONE ENCOUNTER
"Patient escalated to Covid 19 symptom tracker in response to text message.  Patient stated she was tested on September 25th  and 29th and was negative.  Patient stated she was having body aches and now yesterday she has loss her sense of smell yesterday and dry cough. Patient denies fever.  Patient complaining she is "extremely tired"   Patient stated she has "anxiety" and does have Klonopin to take at night.  Patient stated she gets headaches bu has Imitrex and Fioricet.  Patient stated she was going to go get tested again tomorrow .  Emotional support given.  Questions answered regarding being quarantined. Informed patient she can purchase a pulse oximeter at any pharmacy to monitor O2 sat and HR.  Patient stated she will get daughter to go  one today.  Patient has OOC number if non-emergent needs arise and access to 911 for emergencies  "

## 2020-10-03 NOTE — TELEPHONE ENCOUNTER
Reason for Disposition   [1] COVID-19 infection diagnosed or suspected AND [2] mild symptoms (fever, cough) AND [3] no trouble breathing or other complications   COVID-19 Home Isolation, questions about   COVID-19 Prevention and Healthy Living, questions about    Additional Information   Negative: Severe difficulty breathing (e.g., struggling for each breath, speaks in single words)   Negative: Difficult to awaken or acting confused (e.g., disoriented, slurred speech)   Negative: Bluish (or gray) lips or face now   Negative: Shock suspected (e.g., cold/pale/clammy skin, too weak to stand, low BP, rapid pulse)   Negative: Sounds like a life-threatening emergency to the triager   Negative: [1] COVID-19 suspected (e.g., cough, fever, shortness of breath) AND [2] mild symptoms AND [3] public health department recommends testing   Negative: [1] COVID-19 exposure AND [2] no symptoms   Negative: COVID-19 and Breastfeeding, questions about   Negative: SEVERE or constant chest pain (Exception: mild central chest pain, present only when coughing)   Negative: MODERATE difficulty breathing (e.g., speaks in phrases, SOB even at rest, pulse 100-120)   Negative: Patient sounds very sick or weak to the triager   Negative: MILD difficulty breathing (e.g., minimal/no SOB at rest, SOB with walking, pulse <100)   Negative: Chest pain   Negative: Fever > 103 F (39.4 C)   Negative: [1] Fever > 101 F (38.3 C) AND [2] age > 60   Negative: [1] Fever > 100.0 F (37.8 C) AND [2] bedridden (e.g., nursing home patient, CVA, chronic illness, recovering from surgery)   Negative: HIGH RISK patient (e.g., age > 64 years, diabetes, heart or lung disease, weak immune system)   Negative: Fever present > 3 days (72 hours)   Negative: [1] Fever returns after gone for over 24 hours AND [2] symptoms worse or not improved   Negative: [1] Continuous (nonstop) coughing interferes with work or school AND [2] no improvement using cough  treatment per protocol   Negative: Cough present > 3 weeks    Protocols used: CORONAVIRUS (COVID-19) - DIAGNOSED OR [id number]

## 2020-10-22 ENCOUNTER — OFFICE VISIT (OUTPATIENT)
Dept: OBSTETRICS AND GYNECOLOGY | Facility: CLINIC | Age: 55
End: 2020-10-22
Payer: COMMERCIAL

## 2020-10-22 VITALS
WEIGHT: 131.94 LBS | DIASTOLIC BLOOD PRESSURE: 76 MMHG | HEIGHT: 69 IN | SYSTOLIC BLOOD PRESSURE: 120 MMHG | BODY MASS INDEX: 19.54 KG/M2

## 2020-10-22 DIAGNOSIS — N95.1 MENOPAUSAL SYMPTOMS: ICD-10-CM

## 2020-10-22 DIAGNOSIS — Z01.419 ENCOUNTER FOR GYNECOLOGICAL EXAMINATION WITHOUT ABNORMAL FINDING: Primary | ICD-10-CM

## 2020-10-22 PROCEDURE — 3074F SYST BP LT 130 MM HG: CPT | Mod: CPTII,S$GLB,, | Performed by: OBSTETRICS & GYNECOLOGY

## 2020-10-22 PROCEDURE — 3008F BODY MASS INDEX DOCD: CPT | Mod: CPTII,S$GLB,, | Performed by: OBSTETRICS & GYNECOLOGY

## 2020-10-22 PROCEDURE — 3008F PR BODY MASS INDEX (BMI) DOCUMENTED: ICD-10-PCS | Mod: CPTII,S$GLB,, | Performed by: OBSTETRICS & GYNECOLOGY

## 2020-10-22 PROCEDURE — 99999 PR PBB SHADOW E&M-EST. PATIENT-LVL IV: ICD-10-PCS | Mod: PBBFAC,,, | Performed by: OBSTETRICS & GYNECOLOGY

## 2020-10-22 PROCEDURE — 99999 PR PBB SHADOW E&M-EST. PATIENT-LVL IV: CPT | Mod: PBBFAC,,, | Performed by: OBSTETRICS & GYNECOLOGY

## 2020-10-22 PROCEDURE — 3078F DIAST BP <80 MM HG: CPT | Mod: CPTII,S$GLB,, | Performed by: OBSTETRICS & GYNECOLOGY

## 2020-10-22 PROCEDURE — 3074F PR MOST RECENT SYSTOLIC BLOOD PRESSURE < 130 MM HG: ICD-10-PCS | Mod: CPTII,S$GLB,, | Performed by: OBSTETRICS & GYNECOLOGY

## 2020-10-22 PROCEDURE — 3078F PR MOST RECENT DIASTOLIC BLOOD PRESSURE < 80 MM HG: ICD-10-PCS | Mod: CPTII,S$GLB,, | Performed by: OBSTETRICS & GYNECOLOGY

## 2020-10-22 PROCEDURE — 99396 PREV VISIT EST AGE 40-64: CPT | Mod: S$GLB,,, | Performed by: OBSTETRICS & GYNECOLOGY

## 2020-10-22 PROCEDURE — 99396 PR PREVENTIVE VISIT,EST,40-64: ICD-10-PCS | Mod: S$GLB,,, | Performed by: OBSTETRICS & GYNECOLOGY

## 2020-10-22 RX ORDER — AMOXICILLIN AND CLAVULANATE POTASSIUM 875; 125 MG/1; MG/1
TABLET, FILM COATED ORAL
COMMUNITY
Start: 2020-09-23 | End: 2020-11-16

## 2020-10-22 RX ORDER — BUTALBITAL, ACETAMINOPHEN, CAFFEINE AND CODEINE PHOSPHATE 50; 325; 40; 30 MG/1; MG/1; MG/1; MG/1
CAPSULE ORAL
COMMUNITY
Start: 2020-09-25 | End: 2023-02-28 | Stop reason: SDUPTHER

## 2020-10-22 NOTE — PROGRESS NOTES
CC: Well woman exam    Carolin Lind is a 55 y.o. female  presents for a well woman exam.  LMP: Patient's last menstrual period was 2020 (exact date).     Has decreased sex drive.      Past Medical History:   Diagnosis Date    Abnormal Pap smear of cervix 2017    Atypical pap with + HPV    Anxiety     Depression     Heart murmur     History of migraine headaches     Hypertension     Menarche     Age of onset 13    Mitral valve prolapse     PONV (postoperative nausea and vomiting)      Past Surgical History:   Procedure Laterality Date    ANKLE HARDWARE REMOVAL Left 2019    Procedure: REMOVAL, HARDWARE, ANKLE;  Surgeon: Jair Winchester MD;  Location: [address];  Service: Orthopedics;  Laterality: Left;    AUGMENTATION OF BREAST      BREAST SURGERY      reduction with saline implants    EPIDURAL STEROID INJECTION INTO LUMBAR SPINE Left 2019    EXTERNAL FIXATOR APPLICATION      FLAP GRAFT SURGERY Left 2019    Procedure: FLAP GRAFT - fasciocutaneous;  Surgeon: Jair Winchester MD;  Location: [address];  Service: Orthopedics;  Laterality: Left;     Social History     Socioeconomic History    Marital status: Legally      Spouse name: Not on file    Number of children: 1    Years of education: Not on file    Highest education level: Not on file   Occupational History    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   Social Needs    Financial resource strain: Not on file    Food insecurity     Worry: Not on file     Inability: Not on file    Transportation needs     Medical: Not on file     Non-medical: Not on file   Tobacco Use    Smoking status: Never Smoker    Smokeless tobacco: Never Used   Substance and Sexual Activity    Alcohol use: No    Drug use: No    Sexual activity: Yes     Partners: Male     Birth control/protection: None   Lifestyle    Physical activity     Days per week: Not on file     Minutes per session: Not on file    Stress:  "Not on file   Relationships    Social connections     Talks on phone: Not on file     Gets together: Not on file     Attends [employer] service: Not on file     Active member of club or organization: Not on file     Attends meetings of clubs or organizations: Not on file     Relationship status: Not on file   Other Topics Concern    Not on file   Social History Narrative    22 y/o daughter in town doing college via zoom     Family History   Problem Relation Age of Onset    Cancer Mother         kidney    Migraines Daughter     Migraines Maternal Aunt     Breast cancer Maternal Aunt 50    Cancer Maternal Aunt         breast    Colon cancer Neg Hx     Ovarian cancer Neg Hx     Diabetes Neg Hx     Heart disease Neg Hx      OB History        2    Para   1    Term   1            AB   1    Living           SAB        TAB        Ectopic        Multiple        Live Births                     /76   Ht 5' 9" (1.753 m)   Wt 59.8 kg (131 lb 15.1 oz)   LMP 2020 (Exact Date)   BMI 19.48 kg/m²       ROS:    ROS:  GENERAL: Denies weight gain or weight loss. Feeling well overall.   SKIN: Denies rash or lesions.   HEAD: Denies head injury or headache.   NODES: Denies enlarged lymph nodes.   CHEST: Denies chest pain or shortness of breath.   CARDIOVASCULAR: Denies palpitations or left sided chest pain.   ABDOMEN: No abdominal pain, constipation, diarrhea, nausea, vomiting or rectal bleeding.   URINARY: No frequency, dysuria, hematuria, or burning on urination.  REPRODUCTIVE: See HPI.   BREASTS: The patient performs breast self-examination and denies pain, lumps, or nipple discharge.   HEMATOLOGIC: No easy bruisability or excessive bleeding.   MUSCULOSKELETAL: Denies joint pain or swelling.   NEUROLOGIC: Denies syncope or weakness.   PSYCHIATRIC: Denies depression, anxiety or mood swings.    PHYSICAL EXAM:    APPEARANCE: Well nourished, well developed, in no acute distress.  AFFECT: WNL, alert " and oriented x 3  SKIN: No acne or hirsutism  NECK: Neck symmetric without masses or thyromegaly  NODES: No inguinal, cervical, axillary, or femoral lymph node enlargement  CHEST: Good respiratory effect  ABDOMEN: Soft.  No tenderness or masses.  No hepatosplenomegaly.  No hernias.  BREASTS: Symmetrical, no skin changes or visible lesions.  No palpable masses, nipple discharge bilaterally.  PELVIC: Normal external genitalia without lesions.  Normal hair distribution.  Adequate perineal body, normal urethral meatus.  Vagina moist and well rugated without lesions or discharge.  Cervix pink, without lesions, discharge or tenderness.  No significant cystocele or rectocele.  Bimanual exam shows uterus to be normal size, regular, mobile and nontender.  Adnexa without masses or tenderness.    RECTAL: Rectovaginal exam confirms above with normal sphincter tone, no masses.  EXTREMITIES: No edema.    Diagnosis      ICD-10-CM ICD-9-CM    1. Encounter for gynecological examination without abnormal finding  Z01.419 V72.31    2. Menopausal symptoms  N95.1 627.2        She is not interested in medication at this time  Primrose oil , estroven     Patient was counseled today on A.C.S. Pap guidelines and recommendations for yearly pelvic exams, mammograms and monthly self breast exams; to see her PCP for other health maintenance.     Follow up in about 1 year (around 10/22/2021).

## 2020-10-26 ENCOUNTER — OFFICE VISIT (OUTPATIENT)
Dept: INTERNAL MEDICINE | Facility: CLINIC | Age: 55
End: 2020-10-26
Payer: COMMERCIAL

## 2020-10-26 VITALS — BODY MASS INDEX: 19.21 KG/M2 | WEIGHT: 130.06 LBS

## 2020-10-26 DIAGNOSIS — F41.1 GENERALIZED ANXIETY DISORDER: ICD-10-CM

## 2020-10-26 DIAGNOSIS — Z86.16 HISTORY OF 2019 NOVEL CORONAVIRUS DISEASE (COVID-19): ICD-10-CM

## 2020-10-26 DIAGNOSIS — G89.29 CHRONIC LEFT-SIDED LOW BACK PAIN WITH LEFT-SIDED SCIATICA: Primary | ICD-10-CM

## 2020-10-26 DIAGNOSIS — M54.42 CHRONIC LEFT-SIDED LOW BACK PAIN WITH LEFT-SIDED SCIATICA: Primary | ICD-10-CM

## 2020-10-26 DIAGNOSIS — I10 BENIGN HYPERTENSION: ICD-10-CM

## 2020-10-26 PROCEDURE — 99213 PR OFFICE/OUTPT VISIT, EST, LEVL III, 20-29 MIN: ICD-10-PCS | Mod: 95,,, | Performed by: FAMILY MEDICINE

## 2020-10-26 PROCEDURE — 3008F PR BODY MASS INDEX (BMI) DOCUMENTED: ICD-10-PCS | Mod: CPTII,,, | Performed by: FAMILY MEDICINE

## 2020-10-26 PROCEDURE — 3008F BODY MASS INDEX DOCD: CPT | Mod: CPTII,,, | Performed by: FAMILY MEDICINE

## 2020-10-26 PROCEDURE — 99213 OFFICE O/P EST LOW 20 MIN: CPT | Mod: 95,,, | Performed by: FAMILY MEDICINE

## 2020-10-26 NOTE — PROGRESS NOTES
Subjective:       Patient ID: Carolin Lind is a 55 y.o. female.    Chief Complaint: 4 week Cymbalta follow up    HPI  56 y/o female with LEX/MDD, CLBP, hx of Migraines is here to follow up Cymbalta.     Since last visit she was not feeling well and she had a positive Covid test 3 weeks ago at outside Urgent care, she had mild symptoms that have since resolved, she is back at work and feeling better.  She has been on Cymbalta 90 mg since last visit and does not feel it has helped with her back pain, she still has some increased anxiety in general but feels better overall since starting Cymbalta. She plans to set up an appointment for another ablation for her continued back pain.        LEX/MDD: was on Prozac in the past, Effexor in the past, Cymbalta 90 mg daily   CLBP/hx ankle injury: started after injury in 2017 when her foot got stuck in a hole in the street, she has tried Gabapentin 300-600 mg for prn use but it causes fatigue so she does not take it much, Cymbalta 90 mg daily  Low B12: taking 3,000-5000 mcg daily  Migraines:diagnosed at age 13, migraines 3 days a month and daily headaches, followed at [facility] Dr. Parker, using Fioricet prn, sumatriptan  Hx of MVP as a teenager, echo 3/2019 normal  Vit d: taking 10,000 IU daily  GYN: cycles regular, pelvic utd, mmg scheduled  Colonoscopy done in the past, willing for stool test  Eye exam utd  Dental exam utd  OTC: magnesium, vit c, vit e, ginko, coq10, vit e, tumeric    Review of Systems   Constitutional: Negative for activity change and unexpected weight change.   HENT: Negative for hearing loss, rhinorrhea and trouble swallowing.    Eyes: Negative for discharge and visual disturbance.   Respiratory: Negative for chest tightness and wheezing.    Cardiovascular: Negative for chest pain and palpitations.   Gastrointestinal: Negative for blood in stool, constipation, diarrhea and vomiting.   Endocrine: Negative for polydipsia and polyuria.   Genitourinary:  Negative for difficulty urinating, dysuria, hematuria and menstrual problem.   Musculoskeletal: Negative for arthralgias, joint swelling and neck pain.   Neurological: Negative for weakness and headaches.   Psychiatric/Behavioral: Negative for confusion and dysphoric mood.       Objective:      Wt 59 kg (130 lb 1.1 oz)   LMP 10/21/2020   BMI 19.21 kg/m²   Physical Exam    Assessment:       1. Chronic left-sided low back pain with left-sided sciatica    2. History of 2019 novel coronavirus disease (COVID-19)    3. Benign hypertension    4. Generalized anxiety disorder        Plan:   Carolin was seen today for 4 week cymbalta follow up.    Diagnoses and all orders for this visit:    Chronic left-sided low back pain with left-sided sciatica    History of 2019 novel coronavirus disease (COVID-19)    Benign hypertension    Generalized anxiety disorder    The patient location is: [place]  The chief complaint leading to consultation is: follow up cymbalta    Visit type: audiovisual    Face to Face time with patient: 15 minutes of total time spent on the encounter, which includes face to face time and non-face to face time preparing to see the patient (eg, review of tests), Obtaining and/or reviewing separately obtained history, Documenting clinical information in the electronic or other health record, Independently interpreting results (not separately reported) and communicating results to the patient/family/caregiver, or Care coordination (not separately reported).         Each patient to whom he or she provides medical services by telemedicine is:  (1) informed of the relationship between the physician and patient and the respective role of any other health care provider with respect to management of the patient; and (2) notified that he or she may decline to receive medical services by telemedicine and may withdraw from such care at any time.    Notes:

## 2020-11-02 ENCOUNTER — PATIENT MESSAGE (OUTPATIENT)
Dept: ADMINISTRATIVE | Facility: HOSPITAL | Age: 55
End: 2020-11-02

## 2020-11-02 ENCOUNTER — HOSPITAL ENCOUNTER (OUTPATIENT)
Dept: RADIOLOGY | Facility: HOSPITAL | Age: 55
Discharge: HOME | End: 2020-11-02
Attending: FAMILY MEDICINE
Payer: COMMERCIAL

## 2020-11-02 ENCOUNTER — PATIENT OUTREACH (OUTPATIENT)
Dept: ADMINISTRATIVE | Facility: HOSPITAL | Age: 55
End: 2020-11-02

## 2020-11-02 DIAGNOSIS — Z12.31 VISIT FOR SCREENING MAMMOGRAM: ICD-10-CM

## 2020-11-02 PROCEDURE — 77067 SCR MAMMO BI INCL CAD: CPT | Mod: TC

## 2020-11-02 PROCEDURE — 77067 MAMMO DIGITAL SCREENING BILAT WITH TOMOSYNTHESIS_CAD: ICD-10-PCS | Mod: 26,,, | Performed by: RADIOLOGY

## 2020-11-02 PROCEDURE — 77063 MAMMO DIGITAL SCREENING BILAT WITH TOMOSYNTHESIS_CAD: ICD-10-PCS | Mod: 26,,, | Performed by: RADIOLOGY

## 2020-11-02 PROCEDURE — 77063 BREAST TOMOSYNTHESIS BI: CPT | Mod: 26,,, | Performed by: RADIOLOGY

## 2020-11-02 PROCEDURE — 77067 SCR MAMMO BI INCL CAD: CPT | Mod: 26,,, | Performed by: RADIOLOGY

## 2020-11-16 ENCOUNTER — OFFICE VISIT (OUTPATIENT)
Dept: INTERNAL MEDICINE | Facility: CLINIC | Age: 55
End: 2020-11-16
Payer: COMMERCIAL

## 2020-11-16 VITALS
HEART RATE: 65 BPM | OXYGEN SATURATION: 98 % | WEIGHT: 132.25 LBS | HEIGHT: 69 IN | BODY MASS INDEX: 19.59 KG/M2 | TEMPERATURE: 98 F | SYSTOLIC BLOOD PRESSURE: 110 MMHG | DIASTOLIC BLOOD PRESSURE: 70 MMHG

## 2020-11-16 DIAGNOSIS — R53.83 FATIGUE, UNSPECIFIED TYPE: ICD-10-CM

## 2020-11-16 DIAGNOSIS — M54.50 CHRONIC LOW BACK PAIN WITHOUT SCIATICA, UNSPECIFIED BACK PAIN LATERALITY: ICD-10-CM

## 2020-11-16 DIAGNOSIS — Z00.00 ANNUAL PHYSICAL EXAM: Primary | ICD-10-CM

## 2020-11-16 DIAGNOSIS — Z86.16 HISTORY OF 2019 NOVEL CORONAVIRUS DISEASE (COVID-19): ICD-10-CM

## 2020-11-16 DIAGNOSIS — I10 BENIGN HYPERTENSION: ICD-10-CM

## 2020-11-16 DIAGNOSIS — G89.29 CHRONIC LOW BACK PAIN WITHOUT SCIATICA, UNSPECIFIED BACK PAIN LATERALITY: ICD-10-CM

## 2020-11-16 DIAGNOSIS — F41.1 GENERALIZED ANXIETY DISORDER: ICD-10-CM

## 2020-11-16 DIAGNOSIS — F33.1 MAJOR DEPRESSIVE DISORDER, RECURRENT EPISODE, MODERATE: ICD-10-CM

## 2020-11-16 PROCEDURE — 3078F DIAST BP <80 MM HG: CPT | Mod: CPTII,S$GLB,, | Performed by: FAMILY MEDICINE

## 2020-11-16 PROCEDURE — 99999 PR PBB SHADOW E&M-EST. PATIENT-LVL V: CPT | Mod: PBBFAC,,, | Performed by: FAMILY MEDICINE

## 2020-11-16 PROCEDURE — 3074F PR MOST RECENT SYSTOLIC BLOOD PRESSURE < 130 MM HG: ICD-10-PCS | Mod: CPTII,S$GLB,, | Performed by: FAMILY MEDICINE

## 2020-11-16 PROCEDURE — 3078F PR MOST RECENT DIASTOLIC BLOOD PRESSURE < 80 MM HG: ICD-10-PCS | Mod: CPTII,S$GLB,, | Performed by: FAMILY MEDICINE

## 2020-11-16 PROCEDURE — 99396 PREV VISIT EST AGE 40-64: CPT | Mod: S$GLB,,, | Performed by: FAMILY MEDICINE

## 2020-11-16 PROCEDURE — 3008F PR BODY MASS INDEX (BMI) DOCUMENTED: ICD-10-PCS | Mod: CPTII,S$GLB,, | Performed by: FAMILY MEDICINE

## 2020-11-16 PROCEDURE — 1125F AMNT PAIN NOTED PAIN PRSNT: CPT | Mod: S$GLB,,, | Performed by: FAMILY MEDICINE

## 2020-11-16 PROCEDURE — 3074F SYST BP LT 130 MM HG: CPT | Mod: CPTII,S$GLB,, | Performed by: FAMILY MEDICINE

## 2020-11-16 PROCEDURE — 3008F BODY MASS INDEX DOCD: CPT | Mod: CPTII,S$GLB,, | Performed by: FAMILY MEDICINE

## 2020-11-16 PROCEDURE — 99999 PR PBB SHADOW E&M-EST. PATIENT-LVL V: ICD-10-PCS | Mod: PBBFAC,,, | Performed by: FAMILY MEDICINE

## 2020-11-16 PROCEDURE — 1125F PR PAIN SEVERITY QUANTIFIED, PAIN PRESENT: ICD-10-PCS | Mod: S$GLB,,, | Performed by: FAMILY MEDICINE

## 2020-11-16 PROCEDURE — 99396 PR PREVENTIVE VISIT,EST,40-64: ICD-10-PCS | Mod: S$GLB,,, | Performed by: FAMILY MEDICINE

## 2020-11-16 NOTE — PROGRESS NOTES
"Subjective:       Patient ID: Carolin Lind is a 55 y.o. female.    Chief Complaint: Annual Exam    HPI  54 y/o female with LEX/MDD, CLBP, hx of Migraines is here for annual exam.      Since last visit she feels more tired in general since she had Covid, she had mild symptoms in late September.   She is sleeping well, she wakes up tired, no snoring or witnessed apnea. She denies f/n/v/d/constipation/cp/sob/urinary sx, she has been having hot flashes, her cycles have been irregular, she having them every other month. 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 She is eating healthy but not regularly. Limits to a small coffee in the mornings.  Her chronic lower back pain has been getting worse lately, she feels she is due for an ablation.      History of Covid 9/2020  LEX/MDD: was on Prozac in the past, Effexor in the past, Cymbalta 90 mg daily   CLBP/hx ankle injury: started after injury in 2017 when her foot got stuck in a hole in the street, she has tried Gabapentin 300-600 mg for prn use but it causes fatigue so she does not take it much, Cymbalta 90 mg daily, following with Dr. Jain  Low B12: taking 3,000-5000 mcg daily  Migraines:diagnosed at age 13, migraines 3 days a month and daily headaches, followed at [facility] Dr. Parker, using Fioricet prn, sumatriptan  Hx of MVP as a teenager, echo 3/2019 normal  Vit d: taking 10,000 IU daily  GYN: cycles irregular, pelvic utd, mmg 11/2020  Colonoscopy done in the past, willing for stool test  Eye exam utd  Dental exam utd  OTC: magnesium, vit c, vit e, ginko, coq10, vit e, tumeric    Review of Systems  see HPI  Objective:      /70 (BP Location: Left arm, Patient Position: Sitting, BP Method: Medium (Manual))   Pulse 65   Temp 97.5 °F (36.4 °C)   Ht 5' 9" (1.753 m)   Wt 60 kg (132 lb 4.4 oz)   LMP 10/21/2020   SpO2 98%   BMI 19.53 kg/m²   Physical Exam  Vitals signs and nursing note reviewed.   Constitutional:     "   Appearance: She is well-developed.   HENT:      Head: Normocephalic and atraumatic.      Mouth/Throat:      Pharynx: No oropharyngeal exudate.   Neck:      Musculoskeletal: Normal range of motion and neck supple.      Thyroid: No thyromegaly.   Cardiovascular:      Rate and Rhythm: Normal rate and regular rhythm.      Heart sounds: Normal heart sounds.   Pulmonary:      Effort: Pulmonary effort is normal. No respiratory distress.      Breath sounds: Normal breath sounds.   Abdominal:      General: Abdomen is flat. Bowel sounds are normal. There is no distension.      Palpations: Abdomen is soft. There is no mass.      Tenderness: There is no abdominal tenderness.   Lymphadenopathy:      Cervical: No cervical adenopathy.   Skin:     General: Skin is warm and dry.   Neurological:      Mental Status: She is alert.         Assessment:       1. Annual physical exam    2. History of 2019 novel coronavirus disease (COVID-19)    3. Major depressive disorder, recurrent episode, moderate    4. Generalized anxiety disorder    5. Benign hypertension    6. Chronic low back pain without sciatica, unspecified back pain laterality    7. Fatigue, unspecified type        Plan:   Carolin was seen today for annual exam.    Diagnoses and all orders for this visit:    Annual physical exam    History of 2019 novel coronavirus disease (COVID-19)    Major depressive disorder, recurrent episode, moderate    Generalized anxiety disorder    Benign hypertension    Chronic low back pain without sciatica, unspecified back pain laterality    Fatigue, unspecified type  -     Ambulatory referral/consult to Sleep Disorders; Future

## 2020-11-24 ENCOUNTER — PATIENT OUTREACH (OUTPATIENT)
Dept: ADMINISTRATIVE | Facility: OTHER | Age: 55
End: 2020-11-24

## 2020-11-24 NOTE — PROGRESS NOTES
Health Maintenance Due   Topic Date Due    Colorectal Cancer Screening  02/22/1983    Shingles Vaccine (1 of 2) 02/22/2016     Updates were requested from care everywhere.  Chart was reviewed for overdue [facility]active [facility] Encounters ([facility]) topics (CRS, Breast Cancer Screening, Eye exam)  Health Maintenance has been updated.  LIN[place] immunization registry triggered.  Immunizations were reconciled.

## 2020-11-30 ENCOUNTER — OFFICE VISIT (OUTPATIENT)
Dept: SLEEP MEDICINE | Facility: CLINIC | Age: 55
End: 2020-11-30
Payer: COMMERCIAL

## 2020-11-30 DIAGNOSIS — R53.83 FATIGUE, UNSPECIFIED TYPE: ICD-10-CM

## 2020-11-30 PROCEDURE — 99204 PR OFFICE/OUTPT VISIT, NEW, LEVL IV, 45-59 MIN: ICD-10-PCS | Mod: 95,,, | Performed by: PSYCHIATRY & NEUROLOGY

## 2020-11-30 PROCEDURE — 99204 OFFICE O/P NEW MOD 45 MIN: CPT | Mod: 95,,, | Performed by: PSYCHIATRY & NEUROLOGY

## 2020-11-30 NOTE — PROGRESS NOTES
The patient location is: home  The chief complaint leading to consultation is: sleep disorder  Visit type: audiovisual  Total time spent with patient: 30 min  Each patient to whom he or she provides medical services by telemedicine is:  (1) informed of the relationship between the physician and patient and the respective role of any other health care provider with respect to management of the patient; and (2) notified that he or she may decline to receive medical services by telemedicine and may withdraw from such care at any time.         Carolin Lind is a 55 y.o. female is here to be evaluated for a sleep disorder; referred by Alesha Zapien MD.    The patient reports nonrebreathing sleep,  Also trouble falling and staying sleep. The patient eports  excessive daytime sleepiness, excessive daytime fatigue and interrupted sleep and non refreshing sleep    Carolin Lind is not aware of   snoring,  witnessed breathing pauses and  gasping for air in sleep , sut she sleeps alone. .      The patient feels rested upon awakening. Does not take naps.  Wakes up with frontal headache on awakening - different from her migraine,.    The patient  reports morning headaches and denies  dry mouth on awakening.   Carolin Lind reports occasional  nasal congestion.    She reports anxiety about her mother in [place] - could not travel since lock down.    She suffered  COVID 2 months ago - had a mild case.  I    Carolin Lind  denies symptoms concerning for parasomnia except for occasional somniloquy.  The patient  denies auxiliary symptoms of narcolepsy including sleep onset paralysis, hypnagogic hallucinations, sleep attacks and cataplexy.    Carolin Lind denied symptoms concerning for RLS; nocturnal leg movements have not been noticed.   The patient does not experience sleep related leg cramps.     Reports vivid dreams, she does not believe to be enacted her dreams.    Medications pertinent to sleep  disorders taken  "currently: CBD - tried 3 nights, so far able to sleep with that treatment. Also waking up with less headache  Previous  medications taken  for sleep disorders:  Cloazepam -given by neurologist for sleep. She used it very rarely moslty for anxiety or for flying rather than for sleep.      Sleep studies  No    The patient reports difficulty falling and staying asleep. Reports difficulty settling down for the day. Reports being "hyper" all her life since childhood.          EPWORTH SLEEPINESS SCALE TOTAL SCORE  11/27/2020   Total score 2         EPWORTH SLEEPINESS SCALE 11/27/2020   Sitting and reading 0   Watching TV 1   Sitting, inactive in a public place (e.g. a theatre or a meeting) 0   As a passenger in a car for an hour without a break 0   Lying down to rest in the afternoon when circumstances permit 1   Sitting and talking to someone 0   Sitting quietly after a lunch without alcohol 0   In a car, while stopped for a few minutes in traffic 0   Total score 2       PHQ9 11/27/2020   Little interest or pleasure in doing things: More than half the days   Feeling down, depressed or hopeless: More than half the days   Trouble falling asleep, staying asleep, or sleeping too much: More than half the days   Feeling tired or having little energy: Nearly every day   Poor appetite or overeating: Nearly every day   Feeling bad about yourself- or that you are a failure or have let yourself or family down More than half the days   Trouble concentrating on things, such as reading the newspaper or watching television: More than half the days   Moving or speaking so slowly that other people could have noticed. Or the opposite- being so fidgety or restless that you have been moving around a lot more than usual: More than half the days   Thoughts that you would be better off dead or hurting yourself in some way: Not at all   If you indicated you have experienced any of the aforementioned problems, how difficult have these problems " made it for you to do your work, take care of things at home or get along with other people? Somewhat difficult   Total Score 18     GAD7 11/27/2020   1. Feeling nervous, anxious, or on edge? 3   2. Not being able to stop or control worrying? 2   3. Worrying too much about different things? 2   4. Trouble relaxing? 2   5. Being so restless that it is hard to sit still? 2   6. Becoming easily annoyed or irritable? 2   7. Feeling afraid as if something awful might happen? 2   8. If you checked off any problems, how difficult have these problems made it for you to do your work, take care of things at home, or get along with other people? 1   LEX-7 Score 15       EPWORTH SLEEPINESS SCALE 11/27/2020   Sitting and reading 0   Watching TV 1   Sitting, inactive in a public place (e.g. a theatre or a meeting) 0   As a passenger in a car for an hour without a break 0   Lying down to rest in the afternoon when circumstances permit 1   Sitting and talking to someone 0   Sitting quietly after a lunch without alcohol 0   In a car, while stopped for a few minutes in traffic 0   Total score 2     Sleep Clinic New Patient 11/27/2020   What time do you go to bed on a week day? (Give a range) 11.00pm   What time do you go to bed on a day off? (Give a range) 11-12pm   How long does it take you to fall asleep? (Give a range) 30-45mins   On average, how many times per night do you wake up? 3   How long does it take you to fall back into sleep? (Give a range) 30mins   What time do you wake up to start your day on a week day? (Give a range) 5-5.30am   What time do you wake up to start your day on a day off? (Give a range) 5-5.30   What time do you get out of bed? (Give a range) 5.30-6am   On average, how many hours do you sleep? 5-6 hrs   On average, how many naps do you take per day? Never   Rate your sleep quality from 0 to 5 (0-poor, 5-great). 2   Have you experienced:  N/a   How much weight have you lost or gained (in lbs.) in the last  year? 0   On average, how many times per night do you go to the bathroom?  Rarely   Have you ever had a sleep study/CPAP machine/surgery for sleep apnea? No   Have you ever had a CPAP machine for sleep apnea? No   Have you ever had surgery for sleep apnea? No       Sleep Clinic ROS  11/27/2020   Difficulty breathing through the nose?  Yes   Sore throat or dry mouth in the morning? No   Irregular or very fast heart beat?  No   Shortness of breath?  No   Acid reflux? No   Body aches and pains?  Yes   Morning headaches? Yes   Dizziness? Yes   Mood changes?  Yes   Do you exercise?  Yes   Do you feel like moving your legs a lot?  Yes       DME:         PAST MEDICAL HISTORY:    Active Ambulatory Problems     Diagnosis Date Noted    Migraine without aura, without mention of intractable migraine without mention of status migrainosus 07/31/2012    Closed left trimalleolar fracture     Generalized anxiety disorder 04/02/2018    Major depressive disorder, recurrent episode, moderate 08/23/2018    Benign hypertension 03/20/2019    B12 deficiency 03/20/2019    Retained orthopedic hardware 04/09/2019    Chronic low back pain without sciatica 02/04/2020    History of 2019 novel coronavirus disease (COVID-19) 11/16/2020     Resolved Ambulatory Problems     Diagnosis Date Noted    Recurrent major depressive disorder 04/02/2018    Muscle weakness of lower extremity 02/04/2020    Decreased range of motion of lumbar spine 02/04/2020     Past Medical History:   Diagnosis Date    Abnormal Pap smear of cervix 01/30/2017    Anxiety     Depression     Heart murmur     History of migraine headaches     Hypertension     Menarche     Mitral valve prolapse     PONV (postoperative nausea and vomiting)                 PAST SURGICAL HISTORY:    Past Surgical History:   Procedure Laterality Date    ANKLE HARDWARE REMOVAL Left 4/16/2019    Procedure: REMOVAL, HARDWARE, ANKLE;  Surgeon: Jair Winchester MD;  Location: [facility] OR  2ND FLR;  Service: Orthopedics;  Laterality: Left;    AUGMENTATION OF BREAST      BREAST SURGERY      reduction with saline implants    EPIDURAL STEROID INJECTION INTO LUMBAR SPINE Left 09/26/2019    EXTERNAL FIXATOR APPLICATION      FLAP GRAFT SURGERY Left 4/16/2019    Procedure: FLAP GRAFT - fasciocutaneous;  Surgeon: Jair Winchester MD;  Location: [facility] OR 2ND FLR;  Service: Orthopedics;  Laterality: Left;         FAMILY HISTORY:                Family History   Problem Relation Age of Onset    Cancer Mother         kidney    Migraines Daughter     Migraines Maternal Aunt     Breast cancer Maternal Aunt 50    Cancer Maternal Aunt         breast    Colon cancer Neg Hx     Ovarian cancer Neg Hx     Diabetes Neg Hx     Heart disease Neg Hx        SOCIAL HISTORY:          Tobacco:   Social History     Tobacco Use   Smoking Status Never Smoker   Smokeless Tobacco Never Used       alcohol use:    Social History     Substance and Sexual Activity   Alcohol Use No                   ALLERGIES:  Review of patient's allergies indicates:  No Known Allergies    CURRENT MEDICATIONS:    Current Outpatient Medications   Medication Sig Dispense Refill    ascorbic acid, vitamin C, (VITAMIN C) 1000 MG tablet Take 3,000 mg by mouth once daily.      biotin 10,000 mcg Cap Take 1 capsule by mouth once daily.      butalbitaL-acetaminop-caf-cod -19-30 mg Cap TAKE 1 TABLET BY MOUTH FOUR TIMES DAILY AS NEEDED FOR 30 DAYS      butalbital-acetaminophen-caffeine -40 mg (FIORICET, ESGIC) -40 mg per tablet Take 1 tablet by mouth every 4 (four) hours as needed for Pain.      clonazePAM (KLONOPIN) 1 MG tablet as needed.      co-enzyme Q-10 30 mg capsule Take 300 mg by mouth once daily.      DULoxetine (CYMBALTA) 30 MG capsule Take 1 capsule (30 mg total) by mouth once daily. In addition to the 60 mg(for a total of 90 mg daily) Pt is currently taking 60 mg only 90 capsule 1    DULoxetine (CYMBALTA) 60 MG  capsule Take 1 capsule (60 mg total) by mouth once daily. In addition to the 30 mg dose (for a total of 90 mg) 90 capsule 1    gabapentin (NEURONTIN) 300 MG capsule Take 300 mg by mouth nightly as needed.      ginkgo biloba 40 mg Tab Take 120 mg by mouth once daily.      magnesium oxide (MAG-OX) 400 mg (241.3 mg magnesium) tablet Take 400 mg by mouth once daily.      mv-min/iron/folic/calcium/vitK (WOMEN'S MULTIVITAMIN ORAL) Take 1 tablet by mouth once daily.      promethazine (PHENERGAN) 25 MG tablet TK 1 T PO  Q 12 H PRN  5    propranoloL (INDERAL) 40 MG tablet Take 1 tablet (40 mg total) by mouth 2 (two) times a day. 180 tablet 1    sumatriptan succinate (SUMAVEL DOSEPRO) 6 mg/0.5 mL NfIj Inject 6 mg into the skin every 2 (two) hours as needed. 1 Needle-Free Injector Subcutaneous Every 2 hours 6 Device 3    vitamin D (VITAMIN D3) 1000 units Tab Take 10,000 Units by mouth once daily.      vitamin E 600 UNIT capsule Take 450 Units by mouth once daily.        No current facility-administered medications for this visit.                         PHYSICAL EXAM:  There were no vitals taken for this visit.          ASSESSMENT:    1. Sleep Apnea NEC. The patient symptomatically has excessive daytime sleepiness, excessive daytime fatigue and interrupted sleepThe patient has medical co-morbidities of migraine and depression,  which can be worsened by MADISON. This warrants further investigation for possible obstructive sleep apnea.      2. Insomnia NEC. Multi-factorial -  excess time in bed, poor sleep hygiene, and likely paradoxical insomnia play a role        PLAN:    Diagnostic: Home Sleep Study. The nature of this procedure and its indication was discussed with the patient.       Sleep hygiene brochure was sent through [employer].  Mainly _ avoid blue light sources at night.  Importance of moderate exercise was discussed  OK to continue CBD supplements since they seem beneficial.  OK to add 1-2 mg Melatonin 2-3 hrs before  desired bedtime  Morning walks outdoors were recommended,.     During our discussion today, we talked about the etiology of  MADISON as well as the potential ramifications of untreated sleep apnea, which could include daytime sleepiness, hypertension, heart disease and/or stroke.  We discussed potential treatment options, which could include weight loss, body positioning, continuous positive airway pressure (CPAP), or referral for surgical consideration. Meanwhile, she  is urged to avoid supine sleep, weight gain and alcoholic beverages since all of these can worsen MADISON.     The patient was given open opportunity to ask questions and/or express concerns about treatment plan. Two point patient identifier confirmed.       Precautions: The patient was advised to abstain from driving should he feel sleepy or drowsy.    Follow up: MD after the sleep study has been completed.     31-minute visit. >50% spent counseling patient and coordination of care.

## 2020-11-30 NOTE — LETTER
December 7, 2020      Alesha Zapien MD  [address]  [address]  [place] [address]           [facility]  [address] [facility] [address]  Phone: [phone]  Fax: [phone]          Patient: Carolin Lind   MR Number: [id number]   YOB: 1965   Date of Visit: 11/30/2020       Dear Dr. Alesha Zapien:    Thank you for referring Carolin Lind to me for evaluation. Attached you will find relevant portions of my assessment and plan of care.    If you have questions, please do not hesitate to call me. I look forward to following Carolin Lind along with you.    Sincerely,    Amena Templeton MD    Enclosure  CC:  No Recipients    If you would like to receive this communication electronically, please contact [email or web address] or [phone] to request more information on [facility] access.    For providers and/or their staff who would like to refer a patient to [facility], please contact us through our one-stop-shop provider referral line, [facility], at [phone].    If you feel you have received this communication in error or would no longer like to receive these types of communications, please e-mail [email or web address]

## 2021-01-05 ENCOUNTER — PATIENT MESSAGE (OUTPATIENT)
Dept: ADMINISTRATIVE | Facility: HOSPITAL | Age: 56
End: 2021-01-05

## 2021-01-20 ENCOUNTER — PATIENT MESSAGE (OUTPATIENT)
Dept: INTERNAL MEDICINE | Facility: CLINIC | Age: 56
End: 2021-01-20

## 2021-04-05 ENCOUNTER — PATIENT MESSAGE (OUTPATIENT)
Dept: ADMINISTRATIVE | Facility: HOSPITAL | Age: 56
End: 2021-04-05

## 2021-04-08 ENCOUNTER — PATIENT MESSAGE (OUTPATIENT)
Dept: INTERNAL MEDICINE | Facility: CLINIC | Age: 56
End: 2021-04-08

## 2021-04-08 DIAGNOSIS — M54.42 CHRONIC LEFT-SIDED LOW BACK PAIN WITH LEFT-SIDED SCIATICA: ICD-10-CM

## 2021-04-08 DIAGNOSIS — G89.29 CHRONIC LEFT-SIDED LOW BACK PAIN WITH LEFT-SIDED SCIATICA: ICD-10-CM

## 2021-04-08 RX ORDER — DULOXETIN HYDROCHLORIDE 30 MG/1
30 CAPSULE, DELAYED RELEASE ORAL DAILY
Qty: 90 CAPSULE | Refills: 1 | Status: SHIPPED | OUTPATIENT
Start: 2021-04-08 | End: 2021-10-11 | Stop reason: SDUPTHER

## 2021-04-26 ENCOUNTER — PATIENT MESSAGE (OUTPATIENT)
Dept: RESEARCH | Facility: HOSPITAL | Age: 56
End: 2021-04-26

## 2021-07-07 ENCOUNTER — PATIENT MESSAGE (OUTPATIENT)
Dept: ADMINISTRATIVE | Facility: HOSPITAL | Age: 56
End: 2021-07-07

## 2021-10-11 DIAGNOSIS — M54.42 CHRONIC LEFT-SIDED LOW BACK PAIN WITH LEFT-SIDED SCIATICA: ICD-10-CM

## 2021-10-11 DIAGNOSIS — G89.29 CHRONIC LEFT-SIDED LOW BACK PAIN WITH LEFT-SIDED SCIATICA: ICD-10-CM

## 2021-10-11 RX ORDER — DULOXETIN HYDROCHLORIDE 30 MG/1
30 CAPSULE, DELAYED RELEASE ORAL DAILY
Qty: 90 CAPSULE | Refills: 0 | Status: SHIPPED | OUTPATIENT
Start: 2021-10-11 | End: 2021-10-25 | Stop reason: SDUPTHER

## 2021-10-24 ENCOUNTER — PATIENT MESSAGE (OUTPATIENT)
Dept: INTERNAL MEDICINE | Facility: CLINIC | Age: 56
End: 2021-10-24
Payer: COMMERCIAL

## 2021-10-24 DIAGNOSIS — M54.42 CHRONIC LEFT-SIDED LOW BACK PAIN WITH LEFT-SIDED SCIATICA: ICD-10-CM

## 2021-10-24 DIAGNOSIS — G89.29 CHRONIC LEFT-SIDED LOW BACK PAIN WITH LEFT-SIDED SCIATICA: ICD-10-CM

## 2021-10-25 RX ORDER — DULOXETIN HYDROCHLORIDE 60 MG/1
60 CAPSULE, DELAYED RELEASE ORAL DAILY
Qty: 90 CAPSULE | Refills: 0 | Status: SHIPPED | OUTPATIENT
Start: 2021-10-25 | End: 2021-12-27 | Stop reason: SDUPTHER

## 2021-10-25 RX ORDER — DULOXETIN HYDROCHLORIDE 30 MG/1
30 CAPSULE, DELAYED RELEASE ORAL DAILY
Qty: 90 CAPSULE | Refills: 0 | Status: SHIPPED | OUTPATIENT
Start: 2021-10-25 | End: 2021-12-27 | Stop reason: SDUPTHER

## 2021-10-26 ENCOUNTER — PATIENT MESSAGE (OUTPATIENT)
Dept: INTERNAL MEDICINE | Facility: CLINIC | Age: 56
End: 2021-10-26
Payer: COMMERCIAL

## 2021-12-27 ENCOUNTER — PATIENT MESSAGE (OUTPATIENT)
Dept: PRIMARY CARE CLINIC | Facility: CLINIC | Age: 56
End: 2021-12-27

## 2021-12-27 ENCOUNTER — OFFICE VISIT (OUTPATIENT)
Dept: PRIMARY CARE CLINIC | Facility: CLINIC | Age: 56
End: 2021-12-27
Payer: COMMERCIAL

## 2021-12-27 VITALS
TEMPERATURE: 99 F | SYSTOLIC BLOOD PRESSURE: 122 MMHG | HEART RATE: 74 BPM | HEIGHT: 69 IN | OXYGEN SATURATION: 99 % | BODY MASS INDEX: 19.39 KG/M2 | WEIGHT: 130.94 LBS | DIASTOLIC BLOOD PRESSURE: 64 MMHG

## 2021-12-27 DIAGNOSIS — G89.29 CHRONIC LEFT-SIDED LOW BACK PAIN WITH LEFT-SIDED SCIATICA: ICD-10-CM

## 2021-12-27 DIAGNOSIS — I10 BENIGN HYPERTENSION: ICD-10-CM

## 2021-12-27 DIAGNOSIS — Z12.31 SCREENING MAMMOGRAM FOR BREAST CANCER: ICD-10-CM

## 2021-12-27 DIAGNOSIS — M54.42 CHRONIC LEFT-SIDED LOW BACK PAIN WITH LEFT-SIDED SCIATICA: ICD-10-CM

## 2021-12-27 DIAGNOSIS — R05.9 COUGH: ICD-10-CM

## 2021-12-27 DIAGNOSIS — Z00.00 ANNUAL PHYSICAL EXAM: Primary | ICD-10-CM

## 2021-12-27 LAB
SARS-COV-2 RNA RESP QL NAA+PROBE: NOT DETECTED
SARS-COV-2- CYCLE NUMBER: NORMAL

## 2021-12-27 PROCEDURE — 99999 PR PBB SHADOW E&M-EST. PATIENT-LVL V: CPT | Mod: PBBFAC,,, | Performed by: FAMILY MEDICINE

## 2021-12-27 PROCEDURE — U0003 INFECTIOUS AGENT DETECTION BY NUCLEIC ACID (DNA OR RNA); SEVERE ACUTE RESPIRATORY SYNDROME CORONAVIRUS 2 (SARS-COV-2) (CORONAVIRUS DISEASE [COVID-19]), AMPLIFIED PROBE TECHNIQUE, MAKING USE OF HIGH THROUGHPUT TECHNOLOGIES AS DESCRIBED BY CMS-2020-01-R: HCPCS | Performed by: FAMILY MEDICINE

## 2021-12-27 PROCEDURE — 3074F SYST BP LT 130 MM HG: CPT | Mod: CPTII,S$GLB,, | Performed by: FAMILY MEDICINE

## 2021-12-27 PROCEDURE — 3008F BODY MASS INDEX DOCD: CPT | Mod: CPTII,S$GLB,, | Performed by: FAMILY MEDICINE

## 2021-12-27 PROCEDURE — 99999 PR PBB SHADOW E&M-EST. PATIENT-LVL V: ICD-10-PCS | Mod: PBBFAC,,, | Performed by: FAMILY MEDICINE

## 2021-12-27 PROCEDURE — U0005 INFEC AGEN DETEC AMPLI PROBE: HCPCS | Performed by: FAMILY MEDICINE

## 2021-12-27 PROCEDURE — 99396 PREV VISIT EST AGE 40-64: CPT | Mod: S$GLB,,, | Performed by: FAMILY MEDICINE

## 2021-12-27 PROCEDURE — 3078F DIAST BP <80 MM HG: CPT | Mod: CPTII,S$GLB,, | Performed by: FAMILY MEDICINE

## 2021-12-27 PROCEDURE — 3008F PR BODY MASS INDEX (BMI) DOCUMENTED: ICD-10-PCS | Mod: CPTII,S$GLB,, | Performed by: FAMILY MEDICINE

## 2021-12-27 PROCEDURE — 1160F RVW MEDS BY RX/DR IN RCRD: CPT | Mod: CPTII,S$GLB,, | Performed by: FAMILY MEDICINE

## 2021-12-27 PROCEDURE — 1159F MED LIST DOCD IN RCRD: CPT | Mod: CPTII,S$GLB,, | Performed by: FAMILY MEDICINE

## 2021-12-27 PROCEDURE — 3078F PR MOST RECENT DIASTOLIC BLOOD PRESSURE < 80 MM HG: ICD-10-PCS | Mod: CPTII,S$GLB,, | Performed by: FAMILY MEDICINE

## 2021-12-27 PROCEDURE — 1160F PR REVIEW ALL MEDS BY PRESCRIBER/CLIN PHARMACIST DOCUMENTED: ICD-10-PCS | Mod: CPTII,S$GLB,, | Performed by: FAMILY MEDICINE

## 2021-12-27 PROCEDURE — 3074F PR MOST RECENT SYSTOLIC BLOOD PRESSURE < 130 MM HG: ICD-10-PCS | Mod: CPTII,S$GLB,, | Performed by: FAMILY MEDICINE

## 2021-12-27 PROCEDURE — 1159F PR MEDICATION LIST DOCUMENTED IN MEDICAL RECORD: ICD-10-PCS | Mod: CPTII,S$GLB,, | Performed by: FAMILY MEDICINE

## 2021-12-27 PROCEDURE — 99396 PR PREVENTIVE VISIT,EST,40-64: ICD-10-PCS | Mod: S$GLB,,, | Performed by: FAMILY MEDICINE

## 2021-12-27 RX ORDER — DULOXETIN HYDROCHLORIDE 60 MG/1
60 CAPSULE, DELAYED RELEASE ORAL DAILY
Qty: 90 CAPSULE | Refills: 3 | Status: SHIPPED | OUTPATIENT
Start: 2021-12-27 | End: 2022-12-21

## 2021-12-27 RX ORDER — DULOXETIN HYDROCHLORIDE 30 MG/1
30 CAPSULE, DELAYED RELEASE ORAL DAILY
Qty: 90 CAPSULE | Refills: 3 | Status: SHIPPED | OUTPATIENT
Start: 2021-12-27 | End: 2022-12-21

## 2021-12-27 RX ORDER — PROPRANOLOL HYDROCHLORIDE 40 MG/1
40 TABLET ORAL 2 TIMES DAILY
Qty: 180 TABLET | Refills: 1 | Status: SHIPPED | OUTPATIENT
Start: 2021-12-27 | End: 2022-06-25

## 2021-12-29 ENCOUNTER — PATIENT MESSAGE (OUTPATIENT)
Dept: PRIMARY CARE CLINIC | Facility: CLINIC | Age: 56
End: 2021-12-29
Payer: COMMERCIAL

## 2022-01-25 ENCOUNTER — HOSPITAL ENCOUNTER (OUTPATIENT)
Dept: RADIOLOGY | Facility: HOSPITAL | Age: 57
Discharge: HOME | End: 2022-01-25
Attending: FAMILY MEDICINE
Payer: COMMERCIAL

## 2022-01-25 VITALS — WEIGHT: 130.06 LBS | HEIGHT: 69 IN | BODY MASS INDEX: 19.26 KG/M2

## 2022-01-25 DIAGNOSIS — Z12.31 SCREENING MAMMOGRAM FOR BREAST CANCER: ICD-10-CM

## 2022-01-25 PROCEDURE — 77063 BREAST TOMOSYNTHESIS BI: CPT | Mod: TC

## 2022-01-25 PROCEDURE — 77067 MAMMO DIGITAL SCREENING BILAT WITH TOMO: ICD-10-PCS | Mod: 26,,, | Performed by: RADIOLOGY

## 2022-01-25 PROCEDURE — 77067 SCR MAMMO BI INCL CAD: CPT | Mod: 26,,, | Performed by: RADIOLOGY

## 2022-01-25 PROCEDURE — 77063 MAMMO DIGITAL SCREENING BILAT WITH TOMO: ICD-10-PCS | Mod: 26,,, | Performed by: RADIOLOGY

## 2022-01-25 PROCEDURE — 77063 BREAST TOMOSYNTHESIS BI: CPT | Mod: 26,,, | Performed by: RADIOLOGY

## 2022-01-27 ENCOUNTER — LAB VISIT (OUTPATIENT)
Dept: LAB | Facility: HOSPITAL | Age: 57
End: 2022-01-27
Attending: FAMILY MEDICINE
Payer: COMMERCIAL

## 2022-01-27 DIAGNOSIS — Z13.220 ENCOUNTER FOR LIPID SCREENING FOR CARDIOVASCULAR DISEASE: ICD-10-CM

## 2022-01-27 DIAGNOSIS — Z00.00 ANNUAL PHYSICAL EXAM: ICD-10-CM

## 2022-01-27 DIAGNOSIS — Z13.6 ENCOUNTER FOR LIPID SCREENING FOR CARDIOVASCULAR DISEASE: ICD-10-CM

## 2022-01-27 LAB
ALBUMIN SERPL BCP-MCNC: 3.5 G/DL (ref 3.5–5.2)
ALP SERPL-CCNC: 120 U/L (ref 55–135)
ALT SERPL W/O P-5'-P-CCNC: 19 U/L (ref 10–44)
ANION GAP SERPL CALC-SCNC: 8 MMOL/L (ref 8–16)
AST SERPL-CCNC: 19 U/L (ref 10–40)
BACTERIA #/AREA URNS AUTO: ABNORMAL /HPF
BASOPHILS # BLD AUTO: 0.04 K/UL (ref 0–0.2)
BASOPHILS NFR BLD: 0.7 % (ref 0–1.9)
BILIRUB SERPL-MCNC: 0.3 MG/DL (ref 0.1–1)
BILIRUB UR QL STRIP: ABNORMAL
BUN SERPL-MCNC: 17 MG/DL (ref 6–20)
CALCIUM SERPL-MCNC: 9.7 MG/DL (ref 8.7–10.5)
CHLORIDE SERPL-SCNC: 102 MMOL/L (ref 95–110)
CHOLEST SERPL-MCNC: 198 MG/DL (ref 120–199)
CHOLEST/HDLC SERPL: 3 {RATIO} (ref 2–5)
CLARITY UR REFRACT.AUTO: ABNORMAL
CO2 SERPL-SCNC: 29 MMOL/L (ref 23–29)
COLOR UR AUTO: YELLOW
CREAT SERPL-MCNC: 0.7 MG/DL (ref 0.5–1.4)
DIFFERENTIAL METHOD: NORMAL
EOSINOPHIL # BLD AUTO: 0.1 K/UL (ref 0–0.5)
EOSINOPHIL NFR BLD: 2.3 % (ref 0–8)
ERYTHROCYTE [DISTWIDTH] IN BLOOD BY AUTOMATED COUNT: 13.1 % (ref 11.5–14.5)
EST. GFR  (AFRICAN AMERICAN): >60 ML/MIN/1.73 M^2
EST. GFR  (NON AFRICAN AMERICAN): >60 ML/MIN/1.73 M^2
ESTIMATED AVG GLUCOSE: 94 MG/DL (ref 68–131)
GLUCOSE SERPL-MCNC: 96 MG/DL (ref 70–110)
GLUCOSE UR QL STRIP: NEGATIVE
HBA1C MFR BLD: 4.9 % (ref 4–5.6)
HCT VFR BLD AUTO: 43.3 % (ref 37–48.5)
HDLC SERPL-MCNC: 65 MG/DL (ref 40–75)
HDLC SERPL: 32.8 % (ref 20–50)
HGB BLD-MCNC: 13.9 G/DL (ref 12–16)
HGB UR QL STRIP: ABNORMAL
IMM GRANULOCYTES # BLD AUTO: 0.01 K/UL (ref 0–0.04)
IMM GRANULOCYTES NFR BLD AUTO: 0.2 % (ref 0–0.5)
KETONES UR QL STRIP: NEGATIVE
LDLC SERPL CALC-MCNC: 114.4 MG/DL (ref 63–159)
LEUKOCYTE ESTERASE UR QL STRIP: NEGATIVE
LYMPHOCYTES # BLD AUTO: 1.6 K/UL (ref 1–4.8)
LYMPHOCYTES NFR BLD: 28.3 % (ref 18–48)
MAGNESIUM SERPL-MCNC: 1.9 MG/DL (ref 1.6–2.6)
MCH RBC QN AUTO: 30.4 PG (ref 27–31)
MCHC RBC AUTO-ENTMCNC: 32.1 G/DL (ref 32–36)
MCV RBC AUTO: 95 FL (ref 82–98)
MICROSCOPIC COMMENT: ABNORMAL
MONOCYTES # BLD AUTO: 0.5 K/UL (ref 0.3–1)
MONOCYTES NFR BLD: 8.4 % (ref 4–15)
NEUTROPHILS # BLD AUTO: 3.5 K/UL (ref 1.8–7.7)
NEUTROPHILS NFR BLD: 60.1 % (ref 38–73)
NITRITE UR QL STRIP: NEGATIVE
NONHDLC SERPL-MCNC: 133 MG/DL
NRBC BLD-RTO: 0 /100 WBC
PH UR STRIP: 5 [PH] (ref 5–8)
PLATELET # BLD AUTO: 272 K/UL (ref 150–450)
PMV BLD AUTO: 10 FL (ref 9.2–12.9)
POTASSIUM SERPL-SCNC: 4.6 MMOL/L (ref 3.5–5.1)
PROT SERPL-MCNC: 6.8 G/DL (ref 6–8.4)
PROT UR QL STRIP: NEGATIVE
RBC # BLD AUTO: 4.57 M/UL (ref 4–5.4)
RBC #/AREA URNS AUTO: 42 /HPF (ref 0–4)
SODIUM SERPL-SCNC: 139 MMOL/L (ref 136–145)
SP GR UR STRIP: 1.03 (ref 1–1.03)
SQUAMOUS #/AREA URNS AUTO: 12 /HPF
TRIGL SERPL-MCNC: 93 MG/DL (ref 30–150)
TSH SERPL DL<=0.005 MIU/L-ACNC: 1.41 UIU/ML (ref 0.4–4)
URN SPEC COLLECT METH UR: ABNORMAL
WBC # BLD AUTO: 5.73 K/UL (ref 3.9–12.7)
WBC #/AREA URNS AUTO: 4 /HPF (ref 0–5)

## 2022-01-27 PROCEDURE — 84443 ASSAY THYROID STIM HORMONE: CPT | Performed by: FAMILY MEDICINE

## 2022-01-27 PROCEDURE — 83036 HEMOGLOBIN GLYCOSYLATED A1C: CPT | Performed by: FAMILY MEDICINE

## 2022-01-27 PROCEDURE — 36415 COLL VENOUS BLD VENIPUNCTURE: CPT | Mod: PN | Performed by: FAMILY MEDICINE

## 2022-01-27 PROCEDURE — 81001 URINALYSIS AUTO W/SCOPE: CPT | Performed by: FAMILY MEDICINE

## 2022-01-27 PROCEDURE — 85025 COMPLETE CBC W/AUTO DIFF WBC: CPT | Performed by: FAMILY MEDICINE

## 2022-01-27 PROCEDURE — 80061 LIPID PANEL: CPT | Performed by: FAMILY MEDICINE

## 2022-01-27 PROCEDURE — 83735 ASSAY OF MAGNESIUM: CPT | Performed by: FAMILY MEDICINE

## 2022-01-27 PROCEDURE — 80053 COMPREHEN METABOLIC PANEL: CPT | Performed by: FAMILY MEDICINE

## 2022-02-01 ENCOUNTER — PATIENT MESSAGE (OUTPATIENT)
Dept: PRIMARY CARE CLINIC | Facility: CLINIC | Age: 57
End: 2022-02-01
Payer: COMMERCIAL

## 2022-02-01 DIAGNOSIS — R31.9 HEMATURIA, UNSPECIFIED TYPE: Primary | ICD-10-CM

## 2022-02-02 ENCOUNTER — LAB VISIT (OUTPATIENT)
Dept: LAB | Facility: HOSPITAL | Age: 57
End: 2022-02-02
Attending: FAMILY MEDICINE
Payer: COMMERCIAL

## 2022-02-02 ENCOUNTER — PATIENT MESSAGE (OUTPATIENT)
Dept: PRIMARY CARE CLINIC | Facility: CLINIC | Age: 57
End: 2022-02-02
Payer: COMMERCIAL

## 2022-02-02 DIAGNOSIS — R31.9 HEMATURIA, UNSPECIFIED TYPE: ICD-10-CM

## 2022-02-02 LAB
BILIRUB UR QL STRIP: NEGATIVE
CLARITY UR REFRACT.AUTO: CLEAR
COLOR UR AUTO: YELLOW
GLUCOSE UR QL STRIP: NEGATIVE
HGB UR QL STRIP: NEGATIVE
KETONES UR QL STRIP: NEGATIVE
LEUKOCYTE ESTERASE UR QL STRIP: NEGATIVE
NITRITE UR QL STRIP: NEGATIVE
PH UR STRIP: 5 [PH] (ref 5–8)
PROT UR QL STRIP: NEGATIVE
SP GR UR STRIP: 1.01 (ref 1–1.03)
URN SPEC COLLECT METH UR: NORMAL

## 2022-02-02 PROCEDURE — 81003 URINALYSIS AUTO W/O SCOPE: CPT | Performed by: FAMILY MEDICINE

## 2022-02-02 PROCEDURE — 87086 URINE CULTURE/COLONY COUNT: CPT | Performed by: FAMILY MEDICINE

## 2022-02-02 NOTE — TELEPHONE ENCOUNTER
Please clarify with patient why she is requesting an ultrasound and of what?      Please let her know.  Sometimes because of menopause there can be associated vaginal dryness and atrophy which can cause some red blood cells to show up in the urine.  If your repeat urine is still positive we can get an opinion from a urologist which the next step is usually a cystoscopy which is a procedure with a look inside your bladder.  Please do not stress, this is very common let us repeat the urine as the 1st step.

## 2022-02-04 LAB — BACTERIA UR CULT: NO GROWTH

## 2022-02-07 ENCOUNTER — PATIENT MESSAGE (OUTPATIENT)
Dept: PRIMARY CARE CLINIC | Facility: CLINIC | Age: 57
End: 2022-02-07
Payer: COMMERCIAL

## 2022-02-07 DIAGNOSIS — R31.29 MICROSCOPIC HEMATURIA: Primary | ICD-10-CM

## 2022-02-15 ENCOUNTER — OFFICE VISIT (OUTPATIENT)
Dept: UROLOGY | Facility: CLINIC | Age: 57
End: 2022-02-15
Payer: COMMERCIAL

## 2022-02-15 VITALS
SYSTOLIC BLOOD PRESSURE: 154 MMHG | RESPIRATION RATE: 20 BRPM | HEART RATE: 80 BPM | DIASTOLIC BLOOD PRESSURE: 85 MMHG | WEIGHT: 132 LBS | TEMPERATURE: 98 F | BODY MASS INDEX: 19.49 KG/M2

## 2022-02-15 DIAGNOSIS — Z80.51 FAMILY HISTORY OF RENAL CELL CARCINOMA: ICD-10-CM

## 2022-02-15 DIAGNOSIS — R82.90 ABNORMAL URINALYSIS: Primary | ICD-10-CM

## 2022-02-15 LAB
BILIRUB SERPL-MCNC: NEGATIVE MG/DL
BLOOD URINE, POC: 250
CLARITY, POC UA: CLEAR
COLOR, POC UA: YELLOW
GLUCOSE UR QL STRIP: NORMAL
KETONES UR QL STRIP: NEGATIVE
LEUKOCYTE ESTERASE URINE, POC: NEGATIVE
NITRITE, POC UA: NEGATIVE
PH, POC UA: 6
PROTEIN, POC: ABNORMAL
SPECIFIC GRAVITY, POC UA: 1.02
UROBILINOGEN, POC UA: NORMAL

## 2022-02-15 PROCEDURE — 3008F PR BODY MASS INDEX (BMI) DOCUMENTED: ICD-10-PCS | Mod: CPTII,S$GLB,, | Performed by: NURSE PRACTITIONER

## 2022-02-15 PROCEDURE — 99203 OFFICE O/P NEW LOW 30 MIN: CPT | Mod: S$GLB,,, | Performed by: NURSE PRACTITIONER

## 2022-02-15 PROCEDURE — 3008F BODY MASS INDEX DOCD: CPT | Mod: CPTII,S$GLB,, | Performed by: NURSE PRACTITIONER

## 2022-02-15 PROCEDURE — 81002 POCT URINE DIPSTICK WITHOUT MICROSCOPE: ICD-10-PCS | Mod: S$GLB,,, | Performed by: NURSE PRACTITIONER

## 2022-02-15 PROCEDURE — 3079F DIAST BP 80-89 MM HG: CPT | Mod: CPTII,S$GLB,, | Performed by: NURSE PRACTITIONER

## 2022-02-15 PROCEDURE — 3079F PR MOST RECENT DIASTOLIC BLOOD PRESSURE 80-89 MM HG: ICD-10-PCS | Mod: CPTII,S$GLB,, | Performed by: NURSE PRACTITIONER

## 2022-02-15 PROCEDURE — 1159F PR MEDICATION LIST DOCUMENTED IN MEDICAL RECORD: ICD-10-PCS | Mod: CPTII,S$GLB,, | Performed by: NURSE PRACTITIONER

## 2022-02-15 PROCEDURE — 3077F SYST BP >= 140 MM HG: CPT | Mod: CPTII,S$GLB,, | Performed by: NURSE PRACTITIONER

## 2022-02-15 PROCEDURE — 3044F HG A1C LEVEL LT 7.0%: CPT | Mod: CPTII,S$GLB,, | Performed by: NURSE PRACTITIONER

## 2022-02-15 PROCEDURE — 3077F PR MOST RECENT SYSTOLIC BLOOD PRESSURE >= 140 MM HG: ICD-10-PCS | Mod: CPTII,S$GLB,, | Performed by: NURSE PRACTITIONER

## 2022-02-15 PROCEDURE — 1159F MED LIST DOCD IN RCRD: CPT | Mod: CPTII,S$GLB,, | Performed by: NURSE PRACTITIONER

## 2022-02-15 PROCEDURE — 3044F PR MOST RECENT HEMOGLOBIN A1C LEVEL <7.0%: ICD-10-PCS | Mod: CPTII,S$GLB,, | Performed by: NURSE PRACTITIONER

## 2022-02-15 PROCEDURE — 99203 PR OFFICE/OUTPT VISIT, NEW, LEVL III, 30-44 MIN: ICD-10-PCS | Mod: S$GLB,,, | Performed by: NURSE PRACTITIONER

## 2022-02-15 PROCEDURE — 81002 URINALYSIS NONAUTO W/O SCOPE: CPT | Mod: S$GLB,,, | Performed by: NURSE PRACTITIONER

## 2022-02-15 RX ORDER — PNV NO.95/FERROUS FUM/FOLIC AC 28MG-0.8MG
100 TABLET ORAL DAILY
COMMUNITY
End: 2023-09-05 | Stop reason: ALTCHOICE

## 2022-02-15 RX ORDER — ZINC GLUCONATE 50 MG
50 TABLET ORAL DAILY
COMMUNITY

## 2022-02-15 RX ORDER — ACETAMINOPHEN 325 MG/1
325 TABLET ORAL EVERY 6 HOURS PRN
COMMUNITY
End: 2022-09-12

## 2022-02-15 NOTE — PROGRESS NOTES
Subjective:      Carolin Lind is a 56 y.o. female who was referred by Dr. Zapien for evaluation of her microhematuria.      The patient recently completed a urinalysis with her PCP that demonstrated 42 RBCs/HPF. This was not a clean catch specimen and contained 12 squamous epithelial cells. Repeat UA was negative for RBCs, but micro UA was not completed.    She denies gross hematuria. Denies dysuria, frequency, urgency, flank pain and fever/chills. Denies a history of nephrolithiasis and recurrent UTIs. She reports vaginal dryness with intercourse. Reports feeling very anxious about these results. Mother had kidney cancer years ago- s/p nephrectomy. She is a non smoker. Not on anticoagulants.     Component      Latest Ref Rng & Units 2/2/2022  (clean catch) 1/27/2022  (not a clean catch)   Specimen UA       Urine, Unspecified Urine, Unspecified   Color, UA      Yellow, Straw, Ruth Ann Yellow Yellow   Appearance, UA      Clear Clear Hazy (A)   pH, UA      5.0 - 8.0 5.0 5.0   Specific Gravity, UA      1.005 - 1.030 1.015 1.030   Protein, UA      Negative Negative Negative   Glucose, UA      Negative Negative Negative   Ketones, UA      Negative Negative Negative   Bilirubin (UA)      Negative Negative 1+ (A)   Occult Blood UA      Negative Negative 2+ (A)   NITRITE UA      Negative Negative Negative   Leukocytes, UA      Negative Negative Negative   RBC, UA      0 - 4 /hpf  42 (H)   WBC, UA      0 - 5 /hpf  4   Bacteria, UA      None-Occ /hpf  Moderate (A)   Squam Epithel, UA      /hpf  12   Microscopic Comment        SEE COMMENT     The following portions of the patient's history were reviewed and updated as appropriate: allergies, current medications, past family history, past medical history, past social history, past surgical history and problem list.    Review of Systems  Constitutional: no fever or chills  ENT: no nasal congestion or sore throat  Respiratory: no cough or shortness of breath  Cardiovascular: no  chest pain or palpitations  Gastrointestinal: no nausea or vomiting, tolerating diet  Genitourinary: as per HPI  Hematologic/Lymphatic: no easy bruising or lymphadenopathy  Musculoskeletal: no arthralgias or myalgias  Neurological: no seizures or tremors  Behavioral/Psych: no auditory or visual hallucinations     Objective:   Vital Signs:  Vitals:    02/15/22 1444   BP: (!) 154/85   Pulse: 80   Resp: 20   Temp: 97.9 °F (36.6 °C)       Physical Exam   General: alert and oriented, no acute distress  Head: normocephalic, atraumatic  Neck: supple, normal ROM  Respiratory: Symmetric expansion, non-labored breathing  Cardiovascular: regular rate and rhythm  Abdomen: soft, non tender, non distended  Pelvic: deferred  Skin: normal coloration and turgor, no rashes, no suspicious skin lesions noted  Neuro: alert and oriented x3, no gross deficits  Psych: normal judgment and insight, normal mood/affect, +anxious  No CVA tenderness    Lab Review   Urinalysis demonstrates : RBCs (not a clean catch specimen)  Lab Results   Component Value Date    WBC 5.73 01/27/2022    HGB 13.9 01/27/2022    HCT 43.3 01/27/2022    MCV 95 01/27/2022     01/27/2022     Lab Results   Component Value Date    CREATININE 0.7 01/27/2022    BUN 17 01/27/2022       Imaging   None    Assessment:   Abnormal urinalysis  Family history of renal cell carcinoma  Plan:   Carolin was seen today for hematuria.    Diagnoses and all orders for this visit:    Abnormal urinalysis  -     Urine culture; Standing  -     Urinalysis Microscopic; Standing    Family history of renal cell carcinoma  -     Ambulatory referral/consult to Urology  -     POCT URINE DIPSTICK WITHOUT MICROSCOPE    Plan:  --Again specimen today was not a clean catch/midstream specimen  --Urine culture and micro UA at the lab (clean catch midstream)  --If >3 RBCs/HPF will proceed with hematuria workup

## 2022-02-16 ENCOUNTER — LAB VISIT (OUTPATIENT)
Dept: LAB | Facility: HOSPITAL | Age: 57
End: 2022-02-16
Attending: NURSE PRACTITIONER
Payer: COMMERCIAL

## 2022-02-16 DIAGNOSIS — R82.90 ABNORMAL URINALYSIS: ICD-10-CM

## 2022-02-16 LAB
BACTERIA #/AREA URNS AUTO: ABNORMAL /HPF
MICROSCOPIC COMMENT: ABNORMAL
RBC #/AREA URNS AUTO: 3 /HPF (ref 0–4)
SQUAMOUS #/AREA URNS AUTO: 70 /HPF
WBC #/AREA URNS AUTO: 1 /HPF (ref 0–5)

## 2022-02-16 PROCEDURE — 81001 URINALYSIS AUTO W/SCOPE: CPT | Performed by: NURSE PRACTITIONER

## 2022-02-16 PROCEDURE — 87086 URINE CULTURE/COLONY COUNT: CPT | Performed by: NURSE PRACTITIONER

## 2022-02-17 ENCOUNTER — PATIENT MESSAGE (OUTPATIENT)
Dept: UROLOGY | Facility: CLINIC | Age: 57
End: 2022-02-17
Payer: COMMERCIAL

## 2022-02-17 DIAGNOSIS — R31.29 MICROHEMATURIA: Primary | ICD-10-CM

## 2022-02-17 LAB — BACTERIA UR CULT: NO GROWTH

## 2022-02-21 ENCOUNTER — HOSPITAL ENCOUNTER (OUTPATIENT)
Dept: RADIOLOGY | Facility: OTHER | Age: 57
Discharge: HOME | End: 2022-02-21
Attending: NURSE PRACTITIONER
Payer: COMMERCIAL

## 2022-02-21 DIAGNOSIS — R31.29 MICROHEMATURIA: ICD-10-CM

## 2022-02-21 PROCEDURE — 74178 CT ABD&PLV WO CNTR FLWD CNTR: CPT | Mod: 26,,, | Performed by: RADIOLOGY

## 2022-02-21 PROCEDURE — 25500020 PHARM REV CODE 255: Performed by: NURSE PRACTITIONER

## 2022-02-21 PROCEDURE — 74178 CT ABD&PLV WO CNTR FLWD CNTR: CPT | Mod: TC

## 2022-02-21 PROCEDURE — 74178 CT UROGRAM ABD PELVIS W WO: ICD-10-PCS | Mod: 26,,, | Performed by: RADIOLOGY

## 2022-02-21 RX ADMIN — IOHEXOL 125 ML: 350 INJECTION, SOLUTION INTRAVENOUS at 08:02

## 2022-02-25 ENCOUNTER — PROCEDURE VISIT (OUTPATIENT)
Dept: UROLOGY | Facility: CLINIC | Age: 57
End: 2022-02-25
Payer: COMMERCIAL

## 2022-02-25 VITALS
HEART RATE: 69 BPM | DIASTOLIC BLOOD PRESSURE: 89 MMHG | RESPIRATION RATE: 16 BRPM | HEIGHT: 69 IN | WEIGHT: 130.75 LBS | TEMPERATURE: 98 F | BODY MASS INDEX: 19.37 KG/M2 | SYSTOLIC BLOOD PRESSURE: 145 MMHG

## 2022-02-25 DIAGNOSIS — R31.29 MICROHEMATURIA: ICD-10-CM

## 2022-02-25 PROCEDURE — 52000 CYSTOSCOPY: ICD-10-PCS | Mod: S$GLB,,, | Performed by: UROLOGY

## 2022-02-25 PROCEDURE — 52000 CYSTOURETHROSCOPY: CPT | Mod: S$GLB,,, | Performed by: UROLOGY

## 2022-02-25 RX ORDER — SULFAMETHOXAZOLE AND TRIMETHOPRIM 800; 160 MG/1; MG/1
1 TABLET ORAL ONCE
Status: COMPLETED | OUTPATIENT
Start: 2022-02-25 | End: 2022-02-25

## 2022-02-25 RX ADMIN — SULFAMETHOXAZOLE AND TRIMETHOPRIM 1 TABLET: 800; 160 TABLET ORAL at 08:02

## 2022-02-25 NOTE — PROCEDURES
Cystoscopy    Date/Time: 2/25/2022 8:00 AM  Performed by: Sebastian Hutchinson MD  Authorized by: Ida Quintanilla NP     Consent Done?:  Yes (Written)  Position:  Dorsal lithotomy  Anesthesia:  Lidocaine jelly  Preparation: Patient was prepped and draped in usual sterile fashion      Scope type:  Flexible cystoscope  External exam normal: Yes    Urethra normal: Yes  Bladder neck normal: Bladder neck normal   Bladder normal: Yes (No tumors, lesions, or stones noted.  Normal bilateral UOs.)      Patient tolerance:  Patient tolerated the procedure well with no immediate complications    Imaging  CT Urogram reviewed.  No stones or lesions.  Normal study.    Impression: Negative hematuria workup    Plan:  -- FU PRN

## 2022-05-30 ENCOUNTER — PATIENT MESSAGE (OUTPATIENT)
Dept: ADMINISTRATIVE | Facility: HOSPITAL | Age: 57
End: 2022-05-30
Payer: COMMERCIAL

## 2022-07-15 ENCOUNTER — PATIENT MESSAGE (OUTPATIENT)
Dept: ADMINISTRATIVE | Facility: HOSPITAL | Age: 57
End: 2022-07-15
Payer: COMMERCIAL

## 2022-08-07 ENCOUNTER — PATIENT MESSAGE (OUTPATIENT)
Dept: ORTHOPEDICS | Facility: CLINIC | Age: 57
End: 2022-08-07
Payer: COMMERCIAL

## 2022-08-15 DIAGNOSIS — S82.852D CLOSED TRIMALLEOLAR FRACTURE OF LEFT ANKLE WITH ROUTINE HEALING, SUBSEQUENT ENCOUNTER: Primary | ICD-10-CM

## 2022-08-23 ENCOUNTER — HOSPITAL ENCOUNTER (OUTPATIENT)
Dept: RADIOLOGY | Facility: HOSPITAL | Age: 57
Discharge: HOME | End: 2022-08-23
Attending: ORTHOPAEDIC SURGERY
Payer: COMMERCIAL

## 2022-08-23 DIAGNOSIS — S82.852D CLOSED TRIMALLEOLAR FRACTURE OF LEFT ANKLE WITH ROUTINE HEALING, SUBSEQUENT ENCOUNTER: ICD-10-CM

## 2022-08-23 PROCEDURE — 73610 XR ANKLE COMPLETE 3 VIEW LEFT: ICD-10-PCS | Mod: 26,LT,, | Performed by: RADIOLOGY

## 2022-08-23 PROCEDURE — 73610 X-RAY EXAM OF ANKLE: CPT | Mod: TC,LT

## 2022-08-23 PROCEDURE — 73610 X-RAY EXAM OF ANKLE: CPT | Mod: 26,LT,, | Performed by: RADIOLOGY

## 2022-08-25 ENCOUNTER — OFFICE VISIT (OUTPATIENT)
Dept: ORTHOPEDICS | Facility: CLINIC | Age: 57
End: 2022-08-25
Payer: COMMERCIAL

## 2022-08-25 VITALS — WEIGHT: 128 LBS | BODY MASS INDEX: 18.96 KG/M2 | HEIGHT: 69 IN

## 2022-08-25 DIAGNOSIS — M76.72 PERONEAL TENDONITIS OF LEFT LOWER LEG: ICD-10-CM

## 2022-08-25 DIAGNOSIS — S82.852D CLOSED TRIMALLEOLAR FRACTURE OF LEFT ANKLE WITH ROUTINE HEALING, SUBSEQUENT ENCOUNTER: Primary | ICD-10-CM

## 2022-08-25 PROCEDURE — 1159F PR MEDICATION LIST DOCUMENTED IN MEDICAL RECORD: ICD-10-PCS | Mod: CPTII,S$GLB,, | Performed by: ORTHOPAEDIC SURGERY

## 2022-08-25 PROCEDURE — 3044F PR MOST RECENT HEMOGLOBIN A1C LEVEL <7.0%: ICD-10-PCS | Mod: CPTII,S$GLB,, | Performed by: ORTHOPAEDIC SURGERY

## 2022-08-25 PROCEDURE — 99213 OFFICE O/P EST LOW 20 MIN: CPT | Mod: S$GLB,,, | Performed by: ORTHOPAEDIC SURGERY

## 2022-08-25 PROCEDURE — 99999 PR PBB SHADOW E&M-EST. PATIENT-LVL III: ICD-10-PCS | Mod: PBBFAC,,, | Performed by: ORTHOPAEDIC SURGERY

## 2022-08-25 PROCEDURE — 1160F PR REVIEW ALL MEDS BY PRESCRIBER/CLIN PHARMACIST DOCUMENTED: ICD-10-PCS | Mod: CPTII,S$GLB,, | Performed by: ORTHOPAEDIC SURGERY

## 2022-08-25 PROCEDURE — 1160F RVW MEDS BY RX/DR IN RCRD: CPT | Mod: CPTII,S$GLB,, | Performed by: ORTHOPAEDIC SURGERY

## 2022-08-25 PROCEDURE — 3008F BODY MASS INDEX DOCD: CPT | Mod: CPTII,S$GLB,, | Performed by: ORTHOPAEDIC SURGERY

## 2022-08-25 PROCEDURE — 3008F PR BODY MASS INDEX (BMI) DOCUMENTED: ICD-10-PCS | Mod: CPTII,S$GLB,, | Performed by: ORTHOPAEDIC SURGERY

## 2022-08-25 PROCEDURE — 99999 PR PBB SHADOW E&M-EST. PATIENT-LVL III: CPT | Mod: PBBFAC,,, | Performed by: ORTHOPAEDIC SURGERY

## 2022-08-25 PROCEDURE — 3044F HG A1C LEVEL LT 7.0%: CPT | Mod: CPTII,S$GLB,, | Performed by: ORTHOPAEDIC SURGERY

## 2022-08-25 PROCEDURE — 99213 PR OFFICE/OUTPT VISIT, EST, LEVL III, 20-29 MIN: ICD-10-PCS | Mod: S$GLB,,, | Performed by: ORTHOPAEDIC SURGERY

## 2022-08-25 PROCEDURE — 1159F MED LIST DOCD IN RCRD: CPT | Mod: CPTII,S$GLB,, | Performed by: ORTHOPAEDIC SURGERY

## 2022-08-29 NOTE — PROGRESS NOTES
HPI:     53 very active female status post ORIF left trimalleolar ankle fracture with syndesmosis fixation by me on 7/13/17.  Her syndesmosis screws  I removed her syndesmosis screws and mobilized the surrounding soft tissue on 4/16/19.  She's had significant improvement of her pain and increased function in the left ankle since the hwr removal, but is still not up to her pre-injury level of function.  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       7/28/2020:  Functionally, Ms. Lind is doing fairly well.  She does however have residual pain and stiffness around the ankle.  She also complains of some numbness in the area of the ball of the foot on the plantar surface.  She has never really got back to her pre-injury level of function but makes the best of the situation.  She avoids any high impact activities and continues to exercise within her limitations.    08/24/2022:  I have not seen Ms. Lind for quite some time.  Overall she has been doing quite well.  She does complain of a little bit of stiffness in the left ankle and wanted to make sure that there was no significant arthritis or other issues.  She has been functioning pretty well but not back up to her pre-injury status.  She was a high-level athlete.  She complains of pain on the posterior aspect of the ankle medially and laterally.  No instability.  This is occasional.     Past Medical History:   Diagnosis Date    Abnormal Pap smear of cervix 01/30/2017    Atypical pap with + HPV    Anxiety     Depression     Heart murmur     History of migraine headaches     Hypertension     Menarche     Age of onset 13    Mitral valve prolapse     PONV (postoperative nausea and vomiting)        Current Outpatient Medications on File Prior to Visit   Medication Sig Dispense Refill    acetaminophen (TYLENOL) 325 MG tablet Take 325 mg by mouth every 6 (six) hours as needed for Pain.      ascorbic acid, vitamin C, (VITAMIN C) 1000 MG  tablet Take 3,000 mg by mouth once daily.      biotin 10,000 mcg Cap Take 1 capsule by mouth once daily.      butalbitaL-acetaminop-caf-cod -51-30 mg Cap TAKE 1 TABLET BY MOUTH FOUR TIMES DAILY AS NEEDED FOR 30 DAYS      butalbital-acetaminophen-caffeine -40 mg (FIORICET, ESGIC) -40 mg per tablet Take 1 tablet by mouth every 4 (four) hours as needed for Pain.      clonazePAM (KLONOPIN) 1 MG tablet as needed.      co-enzyme Q-10 30 mg capsule Take 300 mg by mouth once daily.      cyanocobalamin (VITAMIN B-12) 100 MCG tablet Take 100 mcg by mouth once daily.      DULoxetine (CYMBALTA) 30 MG capsule Take 1 capsule (30 mg total) by mouth once daily. In addition to the 60 mg(for a total of 90 mg daily) 90 capsule 3    DULoxetine (CYMBALTA) 60 MG capsule Take 1 capsule (60 mg total) by mouth once daily. Take in addition to the 30 mg capsule for a total of 90 mg daily 90 capsule 3    ginkgo biloba 40 mg Tab Take 120 mg by mouth once daily.      magnesium oxide (MAG-OX) 400 mg (241.3 mg magnesium) tablet Take 400 mg by mouth once daily.      mv-min/iron/folic/calcium/vitK (WOMEN'S MULTIVITAMIN ORAL) Take 1 tablet by mouth once daily.      promethazine (PHENERGAN) 25 MG tablet TK 1 T PO  Q 12 H PRN  5    propranoloL (INDERAL) 40 MG tablet TAKE 1 TABLET(40 MG) BY MOUTH TWICE DAILY 180 tablet 1    sumatriptan succinate (SUMAVEL DOSEPRO) 6 mg/0.5 mL NfIj Inject 6 mg into the skin every 2 (two) hours as needed. 1 Needle-Free Injector Subcutaneous Every 2 hours 6 Device 3    vitamin D (VITAMIN D3) 1000 units Tab Take 10,000 Units by mouth once daily.      vitamin E 600 UNIT capsule Take 450 Units by mouth once daily.       zinc gluconate 50 mg tablet Take 50 mg by mouth once daily.      [DISCONTINUED] FLUoxetine 20 MG capsule TAKE 3 CAPSULES(60 MG) BY MOUTH EVERY DAY 90 capsule 5     No current facility-administered medications on file prior to visit.         ROS:  Patient denies constitutional symptoms, cardiac  symptoms, respiratory symptoms, GI symptoms, Urinary symptoms, skin issues, allergic issues  The remainder of the musculoskeletal ROS is included in the HPI.    PE:    AA&O x 4.  NAD.  Normal mood and affect.  HEENT:  NCAT, sclera nonicteric  Lungs:  Respirations are equal and unlabored.  Abd: Non distended  :  No evidence of incontinence  CV:  2+ bilateral upper and lower extremity pulses.  Skin:  Intact throughout.    MS:  Left lower extremity with well-healed incisions from prior surgeries and hardware removal.  5° dorsiflexion to 35° plantar flexion.  Mild tenderness to palpation about the peroneal tendons and very mild tenderness to palpation about the tibialis posterior tendon.  Able to do single heel rise quite well.  Negative anterior drawer.  No anterior tenderness to palpation.    Rads:  Reviewed and interpreted today by me.  No significant tibiotalar arthritis.  The patient does appear to have a synostosis at the mortise.    A/P:  57-year-old female 5 years status post ORIF of left ankle with syndesmotic fixation and 4 years status post removal of hardware.  Overall I think she is doing very well.  She has occasional pain and we went over stretching and strengthening exercises I encouraged her to go back to doing some of the stability exercises that she has done the past, although she finds them boring/not challenging.  She is avoiding high impact activity.  She will follow up on an as-needed basis.

## 2022-09-12 ENCOUNTER — OFFICE VISIT (OUTPATIENT)
Dept: OBSTETRICS AND GYNECOLOGY | Facility: CLINIC | Age: 57
End: 2022-09-12
Payer: COMMERCIAL

## 2022-09-12 DIAGNOSIS — Z01.419 WELL WOMAN EXAM: Primary | ICD-10-CM

## 2022-09-12 DIAGNOSIS — N95.1 MENOPAUSE SYNDROME: ICD-10-CM

## 2022-09-12 PROCEDURE — 3044F HG A1C LEVEL LT 7.0%: CPT | Mod: CPTII,S$GLB,, | Performed by: STUDENT IN AN ORGANIZED HEALTH CARE EDUCATION/TRAINING PROGRAM

## 2022-09-12 PROCEDURE — 3044F PR MOST RECENT HEMOGLOBIN A1C LEVEL <7.0%: ICD-10-PCS | Mod: CPTII,S$GLB,, | Performed by: STUDENT IN AN ORGANIZED HEALTH CARE EDUCATION/TRAINING PROGRAM

## 2022-09-12 PROCEDURE — 1159F MED LIST DOCD IN RCRD: CPT | Mod: CPTII,S$GLB,, | Performed by: STUDENT IN AN ORGANIZED HEALTH CARE EDUCATION/TRAINING PROGRAM

## 2022-09-12 PROCEDURE — 1159F PR MEDICATION LIST DOCUMENTED IN MEDICAL RECORD: ICD-10-PCS | Mod: CPTII,S$GLB,, | Performed by: STUDENT IN AN ORGANIZED HEALTH CARE EDUCATION/TRAINING PROGRAM

## 2022-09-12 PROCEDURE — 88175 CYTOPATH C/V AUTO FLUID REDO: CPT | Performed by: STUDENT IN AN ORGANIZED HEALTH CARE EDUCATION/TRAINING PROGRAM

## 2022-09-12 PROCEDURE — 87624 HPV HI-RISK TYP POOLED RSLT: CPT | Performed by: STUDENT IN AN ORGANIZED HEALTH CARE EDUCATION/TRAINING PROGRAM

## 2022-09-12 PROCEDURE — 99999 PR PBB SHADOW E&M-EST. PATIENT-LVL III: CPT | Mod: PBBFAC,,, | Performed by: STUDENT IN AN ORGANIZED HEALTH CARE EDUCATION/TRAINING PROGRAM

## 2022-09-12 PROCEDURE — 1160F PR REVIEW ALL MEDS BY PRESCRIBER/CLIN PHARMACIST DOCUMENTED: ICD-10-PCS | Mod: CPTII,S$GLB,, | Performed by: STUDENT IN AN ORGANIZED HEALTH CARE EDUCATION/TRAINING PROGRAM

## 2022-09-12 PROCEDURE — 1160F RVW MEDS BY RX/DR IN RCRD: CPT | Mod: CPTII,S$GLB,, | Performed by: STUDENT IN AN ORGANIZED HEALTH CARE EDUCATION/TRAINING PROGRAM

## 2022-09-12 PROCEDURE — 99999 PR PBB SHADOW E&M-EST. PATIENT-LVL III: ICD-10-PCS | Mod: PBBFAC,,, | Performed by: STUDENT IN AN ORGANIZED HEALTH CARE EDUCATION/TRAINING PROGRAM

## 2022-09-12 PROCEDURE — 99396 PR PREVENTIVE VISIT,EST,40-64: ICD-10-PCS | Mod: 25,S$GLB,, | Performed by: STUDENT IN AN ORGANIZED HEALTH CARE EDUCATION/TRAINING PROGRAM

## 2022-09-12 PROCEDURE — 99396 PREV VISIT EST AGE 40-64: CPT | Mod: 25,S$GLB,, | Performed by: STUDENT IN AN ORGANIZED HEALTH CARE EDUCATION/TRAINING PROGRAM

## 2022-09-12 RX ORDER — CONJUGATED ESTROGENS AND MEDROXYPROGESTERONE ACETATE .625; 2.5 MG/1; MG/1
1 TABLET, SUGAR COATED ORAL DAILY
Qty: 30 TABLET | Refills: 11 | Status: CANCELLED | OUTPATIENT
Start: 2022-09-12 | End: 2023-09-12

## 2022-09-12 RX ORDER — PROGESTERONE 200 MG/1
200 CAPSULE ORAL NIGHTLY
Qty: 360 CAPSULE | Refills: 0 | Status: SHIPPED | OUTPATIENT
Start: 2022-09-12 | End: 2023-03-06 | Stop reason: SDUPTHER

## 2022-09-12 NOTE — PROGRESS NOTES
History & Physical  Gynecology      SUBJECTIVE:     Chief Complaint: Well Woman       History of Present Illness:    Carolin Lind is a 57 y.o.  who presents for annual physical exam. She states that menopause has happened and she feels terrible.  States that she and Dr. Alvares were monitoring her hormone levels for the last few years and she was still getting monthly periods until 6 months ago.   She states that her periods stopped and she felt like everything turned upside down. She reports fatigue that interferes with her daily life. The hot flashes are very bothersome, especially at night. She also reports increased sensitivity and dryness. She does not usually like to take medications but the symptoms are severe. She is ready to try medication at this time.    She has a history of abnormal pap smear. ASCUS, HPV+ in 2017.  Last pap was  and was normal.     She has no family history of breast or ovarian cancer.   Last mammogram was 2022 and was benign.   She has no family history of colon cancer.     She does not and has never used tobacco products.  She  does not drink alcohol.  She does not use recreational or other drugs.       Review of patient's allergies indicates:  No Known Allergies    Past Medical History:   Diagnosis Date    Abnormal Pap smear of cervix 2017    Atypical pap with + HPV    Anxiety     Depression     Heart murmur     History of migraine headaches     Hypertension     Menarche     Age of onset 13    Mitral valve prolapse     PONV (postoperative nausea and vomiting)      Past Surgical History:   Procedure Laterality Date    ANKLE HARDWARE REMOVAL Left 2019    Procedure: REMOVAL, HARDWARE, ANKLE;  Surgeon: Jair Winchester MD;  Location: [facility] OR [address] [place] [address];  Service: Orthopedics;  Laterality: Left;    AUGMENTATION OF BREAST      BREAST SURGERY      reduction with saline implants    EPIDURAL STEROID INJECTION INTO LUMBAR SPINE Left 2019    EXTERNAL FIXATOR  APPLICATION      FLAP GRAFT SURGERY Left 2019    Procedure: FLAP GRAFT - fasciocutaneous;  Surgeon: Jair Winchester MD;  Location: [facility] OR [address] [place] [address];  Service: Orthopedics;  Laterality: Left;     OB History          2    Para   1    Term   1            AB   1    Living             SAB        IAB        Ectopic        Multiple        Live Births                   Family History   Problem Relation Age of Onset    Cancer Mother         kidney    Migraines Daughter     Migraines Maternal Aunt     Breast cancer Maternal Aunt 50    Cancer Maternal Aunt         breast    Cancer Brother         prostate    Colon cancer Neg Hx     Ovarian cancer Neg Hx     Diabetes Neg Hx     Heart disease Neg Hx      Social History     Tobacco Use    Smoking status: Never    Smokeless tobacco: Never   Substance Use Topics    Alcohol use: No    Drug use: No       Current Outpatient Medications   Medication Sig    ascorbic acid, vitamin C, (VITAMIN C) 1000 MG tablet Take 3,000 mg by mouth once daily.    biotin 10,000 mcg Cap Take 1 capsule by mouth once daily.    butalbitaL-acetaminop-caf-cod -83-30 mg Cap TAKE 1 TABLET BY MOUTH FOUR TIMES DAILY AS NEEDED FOR 30 DAYS    butalbital-acetaminophen-caffeine -40 mg (FIORICET, ESGIC) -40 mg per tablet Take 1 tablet by mouth every 4 (four) hours as needed for Pain.    clonazePAM (KLONOPIN) 1 MG tablet as needed.    co-enzyme Q-10 30 mg capsule Take 300 mg by mouth once daily.    cyanocobalamin (VITAMIN B-12) 100 MCG tablet Take 100 mcg by mouth once daily.    DULoxetine (CYMBALTA) 30 MG capsule Take 1 capsule (30 mg total) by mouth once daily. In addition to the 60 mg(for a total of 90 mg daily)    DULoxetine (CYMBALTA) 60 MG capsule Take 1 capsule (60 mg total) by mouth once daily. Take in addition to the 30 mg capsule for a total of 90 mg daily    ginkgo biloba 40 mg Tab Take 120 mg by mouth once daily.    magnesium oxide (MAG-OX) 400 mg (241.3 mg magnesium)  tablet Take 400 mg by mouth once daily.    mv-min/iron/folic/calcium/vitK (WOMEN'S MULTIVITAMIN ORAL) Take 1 tablet by mouth once daily.    promethazine (PHENERGAN) 25 MG tablet TK 1 T PO  Q 12 H PRN    propranoloL (INDERAL) 40 MG tablet TAKE 1 TABLET(40 MG) BY MOUTH TWICE DAILY    sumatriptan succinate (SUMAVEL DOSEPRO) 6 mg/0.5 mL NfIj Inject 6 mg into the skin every 2 (two) hours as needed. 1 Needle-Free Injector Subcutaneous Every 2 hours    vitamin D (VITAMIN D3) 1000 units Tab Take 10,000 Units by mouth once daily.    vitamin E 600 UNIT capsule Take 450 Units by mouth once daily.     zinc gluconate 50 mg tablet Take 50 mg by mouth once daily.    estrogens, conjugated, (PREMARIN) 0.625 MG tablet Take 1 tablet (0.625 mg total) by mouth once daily.    progesterone (PROMETRIUM) 200 MG capsule Take 1 capsule (200 mg total) by mouth nightly.     No current facility-administered medications for this visit.         Review of Systems:  Review of Systems   Constitutional:  Positive for fatigue. Negative for chills and fever.   Respiratory:  Negative for shortness of breath.    Cardiovascular:  Negative for chest pain.   Gastrointestinal:  Negative for abdominal pain, constipation, diarrhea, nausea and vomiting.   Endocrine: Positive for hot flashes.   Genitourinary:  Positive for vaginal dryness. Negative for dysuria, pelvic pain, vaginal bleeding, vaginal discharge and vaginal odor.   Integumentary:  Negative for breast mass, nipple discharge and breast skin changes.   Neurological:  Negative for headaches.   Psychiatric/Behavioral:  Positive for sleep disturbance. Negative for depression.    Breast: Negative for mass, nipple discharge and skin changes     OBJECTIVE:     Physical Exam:  Physical Exam  Vitals reviewed. Exam conducted with a chaperone present.   Constitutional:       General: She is not in acute distress.     Appearance: Normal appearance. She is well-developed. She is not ill-appearing, toxic-appearing  or diaphoretic.   HENT:      Head: Normocephalic and atraumatic.   Eyes:      Conjunctiva/sclera: Conjunctivae normal.   Cardiovascular:      Rate and Rhythm: Normal rate.   Pulmonary:      Effort: Pulmonary effort is normal. No respiratory distress.   Chest:   Breasts:     Right: Normal. No swelling, bleeding, inverted nipple, mass, nipple discharge, skin change or tenderness.      Left: Normal. No swelling, bleeding, inverted nipple, mass, nipple discharge, skin change or tenderness.   Abdominal:      Palpations: Abdomen is soft. There is no mass.      Tenderness: There is no abdominal tenderness. There is no guarding or rebound.   Genitourinary:     General: Normal vulva.      Vagina: Normal. No vaginal discharge or bleeding.      Cervix: No cervical motion tenderness.      Uterus: Not enlarged and not tender.       Adnexa:         Right: No mass, tenderness or fullness.          Left: No mass, tenderness or fullness.        Comments: External genitalia: Normal  Urethral: Nontender, no masses  Urethral meatus: Normal  Bladder: Non-tender  Musculoskeletal:         General: Normal range of motion.      Cervical back: Normal range of motion.   Skin:     General: Skin is warm and dry.   Neurological:      Mental Status: She is alert.   Psychiatric:         Mood and Affect: Mood normal.         Behavior: Behavior normal.         ASSESSMENT:       ICD-10-CM ICD-9-CM    1. Well woman exam  Z01.419 V72.31 HPV High Risk Genotypes, PCR      Liquid-Based Pap Smear, Screening      2. Menopause syndrome  N95.1 627.2              Plan:      WWE   -- Pap and HPV today   -- MMG due in January   -- Due for colon cancer screening. Typically does cologard with PCP   -- DXA at 66      Menopause   -- Amenorrheic for 6 months   -- Symptoms disruptive to daily life   -- A full discussion of the benefit-risk ratio of hormonal replacement therapy was carried out. Improvement in vasomotor and other climacteric symptoms is discussed,  including possible improvements in sleep and mood. Reduction of risk for osteoporosis was explained. We discussed the study data showing increased risk of thrombo-embolic events such as myocardial infarction, stroke with estrogen replacement, and how this might affect her. We also discussed ACOG's recommendation to use hormone replacement therapy for the relief of hot flashes alone and to be on the lowest dose possible for the shortest amount of time. Lifestyle modifications were also discussed. All of her questions about this therapy were answered.   -- She would like to use pills. Rx for estradiol and prometrium sent to pharmacy. Combihnation pill not on formulary.   -- Also discussed vaginal estrogen for dryness. She states that she is not currently sexually active and does not want to start multiple medications at once. Will hold off for now.    RTC for annual or sooner as needed.      Nayana Ralph MD

## 2022-09-15 LAB
FINAL PATHOLOGIC DIAGNOSIS: NORMAL
Lab: NORMAL

## 2022-09-16 ENCOUNTER — TELEPHONE (OUTPATIENT)
Dept: OBSTETRICS AND GYNECOLOGY | Facility: CLINIC | Age: 57
End: 2022-09-16
Payer: COMMERCIAL

## 2022-09-16 ENCOUNTER — PATIENT MESSAGE (OUTPATIENT)
Dept: OBSTETRICS AND GYNECOLOGY | Facility: CLINIC | Age: 57
End: 2022-09-16
Payer: COMMERCIAL

## 2022-09-16 DIAGNOSIS — N89.8 VAGINAL DISCHARGE: Primary | ICD-10-CM

## 2022-09-16 DIAGNOSIS — B37.9 ANTIBIOTIC-INDUCED YEAST INFECTION: ICD-10-CM

## 2022-09-16 DIAGNOSIS — T36.95XA ANTIBIOTIC-INDUCED YEAST INFECTION: ICD-10-CM

## 2022-09-16 LAB
HPV HR 12 DNA SPEC QL NAA+PROBE: NEGATIVE
HPV16 AG SPEC QL: NEGATIVE
HPV18 DNA SPEC QL NAA+PROBE: NEGATIVE

## 2022-09-16 RX ORDER — FLUCONAZOLE 150 MG/1
150 TABLET ORAL ONCE
Qty: 1 TABLET | Refills: 0 | Status: SHIPPED | OUTPATIENT
Start: 2022-09-16 | End: 2022-09-16

## 2022-09-16 RX ORDER — METRONIDAZOLE 500 MG/1
500 TABLET ORAL EVERY 12 HOURS
Qty: 14 TABLET | Refills: 0 | Status: SHIPPED | OUTPATIENT
Start: 2022-09-16 | End: 2022-09-23

## 2022-09-16 NOTE — TELEPHONE ENCOUNTER
"Spoke with pt. Since Sunday having beige/white vaginal discharge, mild itching, burns to urinate and urine "feels hot",feels irritated to wipe. No odor, hematuria, fever. Hasn't tried anything. Concerned because she is going out of town soon. Does not feel as severe as when she has had utis. Will call in flagyl for suspected BV but discussed if sx not improving we need to see her before she goes out of town. No alcohol with flagyl.   "

## 2022-10-27 ENCOUNTER — HOSPITAL ENCOUNTER (EMERGENCY)
Facility: HOSPITAL | Age: 57
Discharge: HOME | End: 2022-10-28
Attending: EMERGENCY MEDICINE
Payer: COMMERCIAL

## 2022-10-27 VITALS
HEART RATE: 62 BPM | TEMPERATURE: 98 F | RESPIRATION RATE: 16 BRPM | SYSTOLIC BLOOD PRESSURE: 163 MMHG | OXYGEN SATURATION: 99 % | DIASTOLIC BLOOD PRESSURE: 83 MMHG

## 2022-10-27 DIAGNOSIS — S83.92XA SPRAIN OF LEFT LOWER LEG, INITIAL ENCOUNTER: ICD-10-CM

## 2022-10-27 DIAGNOSIS — M25.572 LEFT ANKLE PAIN: ICD-10-CM

## 2022-10-27 DIAGNOSIS — S80.12XA CONTUSION OF LEFT LOWER LEG, INITIAL ENCOUNTER: Primary | ICD-10-CM

## 2022-10-27 DIAGNOSIS — M79.605 LEFT LEG PAIN: ICD-10-CM

## 2022-10-27 PROCEDURE — 99284 EMERGENCY DEPT VISIT MOD MDM: CPT

## 2022-10-27 PROCEDURE — 25000003 PHARM REV CODE 250: Performed by: EMERGENCY MEDICINE

## 2022-10-27 PROCEDURE — 99284 EMERGENCY DEPT VISIT MOD MDM: CPT | Mod: ,,, | Performed by: EMERGENCY MEDICINE

## 2022-10-27 PROCEDURE — 99284 PR EMERGENCY DEPT VISIT,LEVEL IV: ICD-10-PCS | Mod: ,,, | Performed by: EMERGENCY MEDICINE

## 2022-10-27 RX ORDER — KETOROLAC TROMETHAMINE 10 MG/1
10 TABLET, FILM COATED ORAL
Status: COMPLETED | OUTPATIENT
Start: 2022-10-27 | End: 2022-10-27

## 2022-10-27 RX ADMIN — KETOROLAC TROMETHAMINE 10 MG: 10 TABLET, FILM COATED ORAL at 10:10

## 2022-10-28 RX ORDER — METHOCARBAMOL 500 MG/1
500 TABLET, FILM COATED ORAL 3 TIMES DAILY PRN
Qty: 15 TABLET | Refills: 0 | Status: SHIPPED | OUTPATIENT
Start: 2022-10-28 | End: 2022-11-02

## 2022-10-28 RX ORDER — NAPROXEN 375 MG/1
375 TABLET ORAL 2 TIMES DAILY WITH MEALS
Qty: 10 TABLET | Refills: 0 | Status: SHIPPED | OUTPATIENT
Start: 2022-10-28 | End: 2022-11-02
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
## 2022-10-28 NOTE — DISCHARGE INSTRUCTIONS
Today, your evaluation for your pain shows findings concerning for bruise and sprain.  There are no fractures or dislocations seen on your x-rays.  We recommend rest, ice, compression and elevation.  You may also use anti-inflammatory medication and muscle relaxants.  Please follow-up with your Orthopedics in 1 week if her symptoms do not improve.    Our goal in the emergency department is to always give you outstanding care and exceptional service. You may receive a survey by mail or e-mail in the next week regarding your experience in our ED. We would greatly appreciate your completing and returning the survey. Your feedback provides us with a way to recognize our staff who give very good care and it helps us learn how to improve when your experience was below our aspiration of excellence.
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
## 2023-02-06 ENCOUNTER — OFFICE VISIT (OUTPATIENT)
Dept: PRIMARY CARE CLINIC | Facility: CLINIC | Age: 58
End: 2023-02-06
Payer: COMMERCIAL

## 2023-02-06 ENCOUNTER — LAB VISIT (OUTPATIENT)
Dept: LAB | Facility: HOSPITAL | Age: 58
End: 2023-02-06
Attending: FAMILY MEDICINE
Payer: COMMERCIAL

## 2023-02-06 VITALS
BODY MASS INDEX: 20.14 KG/M2 | DIASTOLIC BLOOD PRESSURE: 70 MMHG | WEIGHT: 136 LBS | SYSTOLIC BLOOD PRESSURE: 119 MMHG | RESPIRATION RATE: 18 BRPM | HEIGHT: 69 IN | HEART RATE: 64 BPM | OXYGEN SATURATION: 97 % | TEMPERATURE: 99 F

## 2023-02-06 DIAGNOSIS — R30.0 BURNING WITH URINATION: ICD-10-CM

## 2023-02-06 DIAGNOSIS — I10 BENIGN HYPERTENSION: ICD-10-CM

## 2023-02-06 DIAGNOSIS — R31.9 HEMATURIA, UNSPECIFIED TYPE: ICD-10-CM

## 2023-02-06 DIAGNOSIS — M54.42 CHRONIC LEFT-SIDED LOW BACK PAIN WITH LEFT-SIDED SCIATICA: ICD-10-CM

## 2023-02-06 DIAGNOSIS — Z12.11 SCREEN FOR COLON CANCER: ICD-10-CM

## 2023-02-06 DIAGNOSIS — Z13.220 ENCOUNTER FOR LIPID SCREENING FOR CARDIOVASCULAR DISEASE: ICD-10-CM

## 2023-02-06 DIAGNOSIS — Z79.890 CURRENT LONG-TERM USE OF POSTMENOPAUSAL HORMONE REPLACEMENT THERAPY: ICD-10-CM

## 2023-02-06 DIAGNOSIS — R30.0 DYSURIA: ICD-10-CM

## 2023-02-06 DIAGNOSIS — Z13.6 ENCOUNTER FOR LIPID SCREENING FOR CARDIOVASCULAR DISEASE: ICD-10-CM

## 2023-02-06 DIAGNOSIS — Z00.00 ANNUAL PHYSICAL EXAM: Primary | ICD-10-CM

## 2023-02-06 DIAGNOSIS — G89.29 CHRONIC LEFT-SIDED LOW BACK PAIN WITH LEFT-SIDED SCIATICA: ICD-10-CM

## 2023-02-06 DIAGNOSIS — R53.83 FATIGUE, UNSPECIFIED TYPE: ICD-10-CM

## 2023-02-06 DIAGNOSIS — Z12.31 SCREENING MAMMOGRAM FOR BREAST CANCER: ICD-10-CM

## 2023-02-06 LAB
BILIRUB SERPL-MCNC: POSITIVE MG/DL
BLOOD, POC UA: POSITIVE
GLUCOSE UR QL STRIP: NORMAL
KETONES UR QL STRIP: POSITIVE
LEUKOCYTE ESTERASE URINE, POC: NEGATIVE
NITRITE, POC UA: NEGATIVE
PH, POC UA: 5
PROTEIN, POC: ABNORMAL
SPECIFIC GRAVITY, POC UA: 1025
UROBILINOGEN, POC UA: NORMAL

## 2023-02-06 PROCEDURE — 81001 URINALYSIS AUTO W/SCOPE: CPT | Performed by: FAMILY MEDICINE

## 2023-02-06 PROCEDURE — 3074F PR MOST RECENT SYSTOLIC BLOOD PRESSURE < 130 MM HG: ICD-10-PCS | Mod: CPTII,S$GLB,, | Performed by: FAMILY MEDICINE

## 2023-02-06 PROCEDURE — 99999 PR PBB SHADOW E&M-EST. PATIENT-LVL V: CPT | Mod: PBBFAC,,, | Performed by: FAMILY MEDICINE

## 2023-02-06 PROCEDURE — 99396 PR PREVENTIVE VISIT,EST,40-64: ICD-10-PCS | Mod: S$GLB,,, | Performed by: FAMILY MEDICINE

## 2023-02-06 PROCEDURE — 87086 URINE CULTURE/COLONY COUNT: CPT | Performed by: FAMILY MEDICINE

## 2023-02-06 PROCEDURE — 3078F DIAST BP <80 MM HG: CPT | Mod: CPTII,S$GLB,, | Performed by: FAMILY MEDICINE

## 2023-02-06 PROCEDURE — 99999 PR PBB SHADOW E&M-EST. PATIENT-LVL V: ICD-10-PCS | Mod: PBBFAC,,, | Performed by: FAMILY MEDICINE

## 2023-02-06 PROCEDURE — 3008F BODY MASS INDEX DOCD: CPT | Mod: CPTII,S$GLB,, | Performed by: FAMILY MEDICINE

## 2023-02-06 PROCEDURE — 3008F PR BODY MASS INDEX (BMI) DOCUMENTED: ICD-10-PCS | Mod: CPTII,S$GLB,, | Performed by: FAMILY MEDICINE

## 2023-02-06 PROCEDURE — 81003 POCT URINALYSIS: ICD-10-PCS | Mod: QW,S$GLB,, | Performed by: FAMILY MEDICINE

## 2023-02-06 PROCEDURE — 81003 URINALYSIS AUTO W/O SCOPE: CPT | Mod: QW,S$GLB,, | Performed by: FAMILY MEDICINE

## 2023-02-06 PROCEDURE — 1160F RVW MEDS BY RX/DR IN RCRD: CPT | Mod: CPTII,S$GLB,, | Performed by: FAMILY MEDICINE

## 2023-02-06 PROCEDURE — 3074F SYST BP LT 130 MM HG: CPT | Mod: CPTII,S$GLB,, | Performed by: FAMILY MEDICINE

## 2023-02-06 PROCEDURE — 3078F PR MOST RECENT DIASTOLIC BLOOD PRESSURE < 80 MM HG: ICD-10-PCS | Mod: CPTII,S$GLB,, | Performed by: FAMILY MEDICINE

## 2023-02-06 PROCEDURE — 1160F PR REVIEW ALL MEDS BY PRESCRIBER/CLIN PHARMACIST DOCUMENTED: ICD-10-PCS | Mod: CPTII,S$GLB,, | Performed by: FAMILY MEDICINE

## 2023-02-06 PROCEDURE — 1159F MED LIST DOCD IN RCRD: CPT | Mod: CPTII,S$GLB,, | Performed by: FAMILY MEDICINE

## 2023-02-06 PROCEDURE — 99396 PREV VISIT EST AGE 40-64: CPT | Mod: S$GLB,,, | Performed by: FAMILY MEDICINE

## 2023-02-06 PROCEDURE — 1159F PR MEDICATION LIST DOCUMENTED IN MEDICAL RECORD: ICD-10-PCS | Mod: CPTII,S$GLB,, | Performed by: FAMILY MEDICINE

## 2023-02-06 RX ORDER — PROPRANOLOL HYDROCHLORIDE 40 MG/1
40 TABLET ORAL 2 TIMES DAILY
Qty: 180 TABLET | Refills: 3 | Status: SHIPPED | OUTPATIENT
Start: 2023-02-06 | End: 2024-03-05 | Stop reason: SDUPTHER

## 2023-02-06 RX ORDER — DULOXETIN HYDROCHLORIDE 30 MG/1
CAPSULE, DELAYED RELEASE ORAL
Qty: 90 CAPSULE | Refills: 3 | Status: SHIPPED | OUTPATIENT
Start: 2023-02-06 | End: 2024-03-05

## 2023-02-06 RX ORDER — DULOXETIN HYDROCHLORIDE 60 MG/1
60 CAPSULE, DELAYED RELEASE ORAL DAILY
Qty: 90 CAPSULE | Refills: 3 | Status: SHIPPED | OUTPATIENT
Start: 2023-02-06 | End: 2024-03-05 | Stop reason: SDUPTHER

## 2023-02-06 RX ORDER — NITROFURANTOIN 25; 75 MG/1; MG/1
100 CAPSULE ORAL 2 TIMES DAILY
Qty: 14 CAPSULE | Refills: 0 | Status: SHIPPED | OUTPATIENT
Start: 2023-02-06 | End: 2023-02-13

## 2023-02-06 NOTE — PATIENT INSTRUCTIONS
Send message to Dr. Alvares to see if she wants any labs and we can set them up to be done at the same time
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
## 2023-02-07 LAB
BILIRUB UR QL STRIP: NEGATIVE
CLARITY UR REFRACT.AUTO: CLEAR
COLOR UR AUTO: YELLOW
GLUCOSE UR QL STRIP: NEGATIVE
HGB UR QL STRIP: ABNORMAL
KETONES UR QL STRIP: ABNORMAL
LEUKOCYTE ESTERASE UR QL STRIP: NEGATIVE
MICROSCOPIC COMMENT: ABNORMAL
NITRITE UR QL STRIP: NEGATIVE
PH UR STRIP: 6 [PH] (ref 5–8)
PROT UR QL STRIP: ABNORMAL
RBC #/AREA URNS AUTO: 6 /HPF (ref 0–4)
SP GR UR STRIP: >1.03 (ref 1–1.03)
SQUAMOUS #/AREA URNS AUTO: 0 /HPF
UNSPECIFIED CRY UR QL COMP ASSIST: 25
URN SPEC COLLECT METH UR: ABNORMAL
WBC #/AREA URNS AUTO: 3 /HPF (ref 0–5)

## 2023-02-08 LAB — BACTERIA UR CULT: NO GROWTH

## 2023-02-13 ENCOUNTER — PATIENT MESSAGE (OUTPATIENT)
Dept: OBSTETRICS AND GYNECOLOGY | Facility: CLINIC | Age: 58
End: 2023-02-13
Payer: COMMERCIAL

## 2023-02-13 ENCOUNTER — PATIENT MESSAGE (OUTPATIENT)
Dept: PRIMARY CARE CLINIC | Facility: CLINIC | Age: 58
End: 2023-02-13
Payer: COMMERCIAL

## 2023-02-13 ENCOUNTER — TELEPHONE (OUTPATIENT)
Dept: OBSTETRICS AND GYNECOLOGY | Facility: CLINIC | Age: 58
End: 2023-02-13
Payer: COMMERCIAL

## 2023-02-13 NOTE — TELEPHONE ENCOUNTER
Dear Dr. Alvares, Dr. Zapien has ordered a comprehensive blood work for me for next Monday. When she urine tested me last week, there was blood in it.   She also suggested for me to contact you to see if you could also order obgyn related blood work so that in one prick, we could test more, ie: my hormone levels.   I started to take hormones 5 months ago, I was at your office then, but, I saw your colleague, katerina you left for an emergency C section.    Im not getting so much benefit from these meds. Im fatigued, foggy, tired more than I should be, emotional, not bubbly etc.   could you pls order necessary tests as you deem appropriate. Thanks, Carolin Taveras

## 2023-02-14 NOTE — TELEPHONE ENCOUNTER
Hi. I am sorry you are not feeling as well as you would like.   I can check your progesterone and estrogen levels but I do not need these to go up on your doses or change you from premarin to Divigel gel that you apply daily or the estradiol pill. If you would like me to send I can send over or we can discuss more with a virtual visit   Sagar FREIRE

## 2023-02-15 ENCOUNTER — PATIENT MESSAGE (OUTPATIENT)
Dept: OBSTETRICS AND GYNECOLOGY | Facility: CLINIC | Age: 58
End: 2023-02-15
Payer: COMMERCIAL

## 2023-02-20 ENCOUNTER — PATIENT MESSAGE (OUTPATIENT)
Dept: OBSTETRICS AND GYNECOLOGY | Facility: CLINIC | Age: 58
End: 2023-02-20
Payer: COMMERCIAL

## 2023-02-20 ENCOUNTER — LAB VISIT (OUTPATIENT)
Dept: LAB | Facility: HOSPITAL | Age: 58
End: 2023-02-20
Attending: FAMILY MEDICINE
Payer: COMMERCIAL

## 2023-02-20 DIAGNOSIS — Z13.220 ENCOUNTER FOR LIPID SCREENING FOR CARDIOVASCULAR DISEASE: ICD-10-CM

## 2023-02-20 DIAGNOSIS — R53.83 FATIGUE, UNSPECIFIED TYPE: ICD-10-CM

## 2023-02-20 DIAGNOSIS — Z13.6 ENCOUNTER FOR LIPID SCREENING FOR CARDIOVASCULAR DISEASE: ICD-10-CM

## 2023-02-20 DIAGNOSIS — Z00.00 ANNUAL PHYSICAL EXAM: ICD-10-CM

## 2023-02-20 LAB
ALBUMIN SERPL BCP-MCNC: 3.7 G/DL (ref 3.5–5.2)
ALP SERPL-CCNC: 76 U/L (ref 55–135)
ALT SERPL W/O P-5'-P-CCNC: 12 U/L (ref 10–44)
ANION GAP SERPL CALC-SCNC: 9 MMOL/L (ref 8–16)
AST SERPL-CCNC: 17 U/L (ref 10–40)
BASOPHILS # BLD AUTO: 0.06 K/UL (ref 0–0.2)
BASOPHILS NFR BLD: 0.9 % (ref 0–1.9)
BILIRUB SERPL-MCNC: 0.3 MG/DL (ref 0.1–1)
BUN SERPL-MCNC: 12 MG/DL (ref 6–20)
CALCIUM SERPL-MCNC: 9.5 MG/DL (ref 8.7–10.5)
CHLORIDE SERPL-SCNC: 103 MMOL/L (ref 95–110)
CHOLEST SERPL-MCNC: 198 MG/DL (ref 120–199)
CHOLEST/HDLC SERPL: 2.9 {RATIO} (ref 2–5)
CO2 SERPL-SCNC: 28 MMOL/L (ref 23–29)
CREAT SERPL-MCNC: 0.7 MG/DL (ref 0.5–1.4)
DIFFERENTIAL METHOD: NORMAL
EOSINOPHIL # BLD AUTO: 0.1 K/UL (ref 0–0.5)
EOSINOPHIL NFR BLD: 2 % (ref 0–8)
ERYTHROCYTE [DISTWIDTH] IN BLOOD BY AUTOMATED COUNT: 12.9 % (ref 11.5–14.5)
EST. GFR  (NO RACE VARIABLE): >60 ML/MIN/1.73 M^2
ESTIMATED AVG GLUCOSE: 97 MG/DL (ref 68–131)
FERRITIN SERPL-MCNC: 17 NG/ML (ref 20–300)
GLUCOSE SERPL-MCNC: 92 MG/DL (ref 70–110)
HBA1C MFR BLD: 5 % (ref 4–5.6)
HCT VFR BLD AUTO: 42.1 % (ref 37–48.5)
HDLC SERPL-MCNC: 68 MG/DL (ref 40–75)
HDLC SERPL: 34.3 % (ref 20–50)
HGB BLD-MCNC: 13.6 G/DL (ref 12–16)
IMM GRANULOCYTES # BLD AUTO: 0.01 K/UL (ref 0–0.04)
IMM GRANULOCYTES NFR BLD AUTO: 0.1 % (ref 0–0.5)
IRON SERPL-MCNC: 114 UG/DL (ref 30–160)
LDLC SERPL CALC-MCNC: 85.6 MG/DL (ref 63–159)
LYMPHOCYTES # BLD AUTO: 1.5 K/UL (ref 1–4.8)
LYMPHOCYTES NFR BLD: 21.5 % (ref 18–48)
MCH RBC QN AUTO: 30.8 PG (ref 27–31)
MCHC RBC AUTO-ENTMCNC: 32.3 G/DL (ref 32–36)
MCV RBC AUTO: 96 FL (ref 82–98)
MONOCYTES # BLD AUTO: 0.5 K/UL (ref 0.3–1)
MONOCYTES NFR BLD: 7.6 % (ref 4–15)
NEUTROPHILS # BLD AUTO: 4.7 K/UL (ref 1.8–7.7)
NEUTROPHILS NFR BLD: 67.9 % (ref 38–73)
NONHDLC SERPL-MCNC: 130 MG/DL
NRBC BLD-RTO: 0 /100 WBC
PLATELET # BLD AUTO: 238 K/UL (ref 150–450)
PMV BLD AUTO: 10.1 FL (ref 9.2–12.9)
POTASSIUM SERPL-SCNC: 4.2 MMOL/L (ref 3.5–5.1)
PROT SERPL-MCNC: 6.7 G/DL (ref 6–8.4)
RBC # BLD AUTO: 4.41 M/UL (ref 4–5.4)
SATURATED IRON: 25 % (ref 20–50)
SODIUM SERPL-SCNC: 140 MMOL/L (ref 136–145)
TOTAL IRON BINDING CAPACITY: 453 UG/DL (ref 250–450)
TRANSFERRIN SERPL-MCNC: 306 MG/DL (ref 200–375)
TRIGL SERPL-MCNC: 222 MG/DL (ref 30–150)
TSH SERPL DL<=0.005 MIU/L-ACNC: 1.38 UIU/ML (ref 0.4–4)
VIT B12 SERPL-MCNC: 1976 PG/ML (ref 210–950)
WBC # BLD AUTO: 6.87 K/UL (ref 3.9–12.7)

## 2023-02-20 PROCEDURE — 36415 COLL VENOUS BLD VENIPUNCTURE: CPT | Mod: PN | Performed by: FAMILY MEDICINE

## 2023-02-20 PROCEDURE — 83036 HEMOGLOBIN GLYCOSYLATED A1C: CPT | Performed by: FAMILY MEDICINE

## 2023-02-20 PROCEDURE — 84466 ASSAY OF TRANSFERRIN: CPT | Performed by: FAMILY MEDICINE

## 2023-02-20 PROCEDURE — 82728 ASSAY OF FERRITIN: CPT | Performed by: FAMILY MEDICINE

## 2023-02-20 PROCEDURE — 80061 LIPID PANEL: CPT | Performed by: FAMILY MEDICINE

## 2023-02-20 PROCEDURE — 82607 VITAMIN B-12: CPT | Performed by: FAMILY MEDICINE

## 2023-02-20 PROCEDURE — 85025 COMPLETE CBC W/AUTO DIFF WBC: CPT | Performed by: FAMILY MEDICINE

## 2023-02-20 PROCEDURE — 84443 ASSAY THYROID STIM HORMONE: CPT | Performed by: FAMILY MEDICINE

## 2023-02-20 PROCEDURE — 80053 COMPREHEN METABOLIC PANEL: CPT | Performed by: FAMILY MEDICINE

## 2023-02-23 LAB — NONINV COLON CA DNA+OCC BLD SCRN STL QL: NEGATIVE

## 2023-02-23 NOTE — TELEPHONE ENCOUNTER
Pt would like to review labs and imaging. Scheduled virtually to discuss on 02/28/23.    Pt aware  out of office

## 2023-02-28 ENCOUNTER — OFFICE VISIT (OUTPATIENT)
Dept: PRIMARY CARE CLINIC | Facility: CLINIC | Age: 58
End: 2023-02-28
Payer: COMMERCIAL

## 2023-02-28 VITALS — BODY MASS INDEX: 20.08 KG/M2 | WEIGHT: 136 LBS

## 2023-02-28 DIAGNOSIS — F33.1 MAJOR DEPRESSIVE DISORDER, RECURRENT EPISODE, MODERATE: ICD-10-CM

## 2023-02-28 DIAGNOSIS — E78.2 ELEVATED TRIGLYCERIDES WITH HIGH CHOLESTEROL: Primary | ICD-10-CM

## 2023-02-28 DIAGNOSIS — G43.009 MIGRAINE WITHOUT AURA AND WITHOUT STATUS MIGRAINOSUS, NOT INTRACTABLE: ICD-10-CM

## 2023-02-28 DIAGNOSIS — R74.8 ELEVATED VITAMIN B12 LEVEL: ICD-10-CM

## 2023-02-28 DIAGNOSIS — R31.29 MICROSCOPIC HEMATURIA: ICD-10-CM

## 2023-02-28 PROBLEM — E53.8 B12 DEFICIENCY: Status: RESOLVED | Noted: 2019-03-20 | Resolved: 2023-02-28

## 2023-02-28 PROBLEM — I10 BENIGN HYPERTENSION: Status: RESOLVED | Noted: 2019-03-20 | Resolved: 2023-02-28

## 2023-02-28 PROCEDURE — 1160F RVW MEDS BY RX/DR IN RCRD: CPT | Mod: CPTII,95,, | Performed by: FAMILY MEDICINE

## 2023-02-28 PROCEDURE — 99213 PR OFFICE/OUTPT VISIT, EST, LEVL III, 20-29 MIN: ICD-10-PCS | Mod: 95,,, | Performed by: FAMILY MEDICINE

## 2023-02-28 PROCEDURE — 1159F PR MEDICATION LIST DOCUMENTED IN MEDICAL RECORD: ICD-10-PCS | Mod: CPTII,95,, | Performed by: FAMILY MEDICINE

## 2023-02-28 PROCEDURE — 3044F PR MOST RECENT HEMOGLOBIN A1C LEVEL <7.0%: ICD-10-PCS | Mod: CPTII,95,, | Performed by: FAMILY MEDICINE

## 2023-02-28 PROCEDURE — 1160F PR REVIEW ALL MEDS BY PRESCRIBER/CLIN PHARMACIST DOCUMENTED: ICD-10-PCS | Mod: CPTII,95,, | Performed by: FAMILY MEDICINE

## 2023-02-28 PROCEDURE — 3044F HG A1C LEVEL LT 7.0%: CPT | Mod: CPTII,95,, | Performed by: FAMILY MEDICINE

## 2023-02-28 PROCEDURE — 1159F MED LIST DOCD IN RCRD: CPT | Mod: CPTII,95,, | Performed by: FAMILY MEDICINE

## 2023-02-28 PROCEDURE — 3008F PR BODY MASS INDEX (BMI) DOCUMENTED: ICD-10-PCS | Mod: CPTII,95,, | Performed by: FAMILY MEDICINE

## 2023-02-28 PROCEDURE — 99213 OFFICE O/P EST LOW 20 MIN: CPT | Mod: 95,,, | Performed by: FAMILY MEDICINE

## 2023-02-28 PROCEDURE — 3008F BODY MASS INDEX DOCD: CPT | Mod: CPTII,95,, | Performed by: FAMILY MEDICINE
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
## 2023-03-06 ENCOUNTER — PATIENT MESSAGE (OUTPATIENT)
Dept: OBSTETRICS AND GYNECOLOGY | Facility: CLINIC | Age: 58
End: 2023-03-06
Payer: COMMERCIAL

## 2023-03-06 RX ORDER — PROGESTERONE 200 MG/1
200 CAPSULE ORAL NIGHTLY
Qty: 360 CAPSULE | Refills: 0 | Status: SHIPPED | OUTPATIENT
Start: 2023-03-06 | End: 2024-03-14 | Stop reason: SDUPTHER

## 2023-03-29 ENCOUNTER — HOSPITAL ENCOUNTER (OUTPATIENT)
Dept: RADIOLOGY | Facility: HOSPITAL | Age: 58
Discharge: HOME | End: 2023-03-29
Attending: FAMILY MEDICINE
Payer: COMMERCIAL

## 2023-03-29 VITALS — BODY MASS INDEX: 19.55 KG/M2 | WEIGHT: 132 LBS | HEIGHT: 69 IN

## 2023-03-29 DIAGNOSIS — Z12.31 SCREENING MAMMOGRAM FOR BREAST CANCER: ICD-10-CM

## 2023-03-29 PROCEDURE — 77067 MAMMO DIGITAL SCREENING BILAT WITH TOMO: ICD-10-PCS | Mod: 26,,, | Performed by: RADIOLOGY

## 2023-03-29 PROCEDURE — 77067 SCR MAMMO BI INCL CAD: CPT | Mod: 26,,, | Performed by: RADIOLOGY

## 2023-03-29 PROCEDURE — 77063 MAMMO DIGITAL SCREENING BILAT WITH TOMO: ICD-10-PCS | Mod: 26,,, | Performed by: RADIOLOGY

## 2023-03-29 PROCEDURE — 77063 BREAST TOMOSYNTHESIS BI: CPT | Mod: 26,,, | Performed by: RADIOLOGY

## 2023-03-29 PROCEDURE — 77067 SCR MAMMO BI INCL CAD: CPT | Mod: TC,PO

## 2023-04-03 ENCOUNTER — OFFICE VISIT (OUTPATIENT)
Dept: UROLOGY | Facility: CLINIC | Age: 58
End: 2023-04-03
Attending: UROLOGY
Payer: COMMERCIAL

## 2023-04-03 VITALS — HEART RATE: 70 BPM | SYSTOLIC BLOOD PRESSURE: 119 MMHG | DIASTOLIC BLOOD PRESSURE: 56 MMHG

## 2023-04-03 DIAGNOSIS — R31.29 MICROHEMATURIA: Primary | ICD-10-CM

## 2023-04-03 PROCEDURE — 1159F PR MEDICATION LIST DOCUMENTED IN MEDICAL RECORD: ICD-10-PCS | Mod: CPTII,S$GLB,, | Performed by: UROLOGY

## 2023-04-03 PROCEDURE — 1159F MED LIST DOCD IN RCRD: CPT | Mod: CPTII,S$GLB,, | Performed by: UROLOGY

## 2023-04-03 PROCEDURE — 1160F PR REVIEW ALL MEDS BY PRESCRIBER/CLIN PHARMACIST DOCUMENTED: ICD-10-PCS | Mod: CPTII,S$GLB,, | Performed by: UROLOGY

## 2023-04-03 PROCEDURE — 3074F PR MOST RECENT SYSTOLIC BLOOD PRESSURE < 130 MM HG: ICD-10-PCS | Mod: CPTII,S$GLB,, | Performed by: UROLOGY

## 2023-04-03 PROCEDURE — 3044F HG A1C LEVEL LT 7.0%: CPT | Mod: CPTII,S$GLB,, | Performed by: UROLOGY

## 2023-04-03 PROCEDURE — 3044F PR MOST RECENT HEMOGLOBIN A1C LEVEL <7.0%: ICD-10-PCS | Mod: CPTII,S$GLB,, | Performed by: UROLOGY

## 2023-04-03 PROCEDURE — 3078F DIAST BP <80 MM HG: CPT | Mod: CPTII,S$GLB,, | Performed by: UROLOGY

## 2023-04-03 PROCEDURE — 99214 PR OFFICE/OUTPT VISIT, EST, LEVL IV, 30-39 MIN: ICD-10-PCS | Mod: S$GLB,,, | Performed by: UROLOGY

## 2023-04-03 PROCEDURE — 1160F RVW MEDS BY RX/DR IN RCRD: CPT | Mod: CPTII,S$GLB,, | Performed by: UROLOGY

## 2023-04-03 PROCEDURE — 3074F SYST BP LT 130 MM HG: CPT | Mod: CPTII,S$GLB,, | Performed by: UROLOGY

## 2023-04-03 PROCEDURE — 99214 OFFICE O/P EST MOD 30 MIN: CPT | Mod: S$GLB,,, | Performed by: UROLOGY

## 2023-04-03 PROCEDURE — 3078F PR MOST RECENT DIASTOLIC BLOOD PRESSURE < 80 MM HG: ICD-10-PCS | Mod: CPTII,S$GLB,, | Performed by: UROLOGY

## 2023-04-03 NOTE — PROGRESS NOTES
"Subjective:      Carolin Lind is a 58 y.o. female who returns today regarding her hematuria.    Had MH 1 year ago and had negative workup at that time. Still present on annual exam and returns for FU and discussion. Mom had h/o RCC treated w/ nephrectomy. No other c/o today.    The following portions of the patient's history were reviewed and updated as appropriate: allergies, current medications, past family history, past medical history, past social history, past surgical history and problem list.    Review of Systems  A comprehensive multipoint review of systems was negative except as otherwise stated in the HPI.     Objective:   Vitals: BP (!) 119/56   Pulse 70   LMP 07/04/2022 (Approximate) Comment: "8 months ago"    Physical Exam   General: alert and oriented, no acute distress  Respiratory: Symmetric expansion, non-labored breathing  Neuro: no gross deficits  Psych: normal judgment and insight, normal mood/affect, and non-anxious    Lab Review     Lab Results   Component Value Date    WBC 6.87 02/20/2023    HGB 13.6 02/20/2023    HCT 42.1 02/20/2023    MCV 96 02/20/2023     02/20/2023     Lab Results   Component Value Date    CREATININE 0.7 02/20/2023    BUN 12 02/20/2023     Assessment and Plan:   1. Microhematuria  -- Reassured she had full workup last year and repeat isn't recommended for 3-5 years following that eval.  -- FU PRN           "

## 2023-04-13 ENCOUNTER — OFFICE VISIT (OUTPATIENT)
Dept: OBSTETRICS AND GYNECOLOGY | Facility: CLINIC | Age: 58
End: 2023-04-13
Payer: COMMERCIAL

## 2023-04-13 VITALS
BODY MASS INDEX: 20.01 KG/M2 | SYSTOLIC BLOOD PRESSURE: 116 MMHG | HEIGHT: 69 IN | DIASTOLIC BLOOD PRESSURE: 82 MMHG | WEIGHT: 135.13 LBS

## 2023-04-13 DIAGNOSIS — N95.2 VAGINAL ATROPHY: ICD-10-CM

## 2023-04-13 DIAGNOSIS — N95.1 MENOPAUSAL SYMPTOMS: Primary | ICD-10-CM

## 2023-04-13 PROCEDURE — 3074F PR MOST RECENT SYSTOLIC BLOOD PRESSURE < 130 MM HG: ICD-10-PCS | Mod: CPTII,S$GLB,, | Performed by: OBSTETRICS & GYNECOLOGY

## 2023-04-13 PROCEDURE — 3008F PR BODY MASS INDEX (BMI) DOCUMENTED: ICD-10-PCS | Mod: CPTII,S$GLB,, | Performed by: OBSTETRICS & GYNECOLOGY

## 2023-04-13 PROCEDURE — 3079F PR MOST RECENT DIASTOLIC BLOOD PRESSURE 80-89 MM HG: ICD-10-PCS | Mod: CPTII,S$GLB,, | Performed by: OBSTETRICS & GYNECOLOGY

## 2023-04-13 PROCEDURE — 3079F DIAST BP 80-89 MM HG: CPT | Mod: CPTII,S$GLB,, | Performed by: OBSTETRICS & GYNECOLOGY

## 2023-04-13 PROCEDURE — 99999 PR PBB SHADOW E&M-EST. PATIENT-LVL IV: ICD-10-PCS | Mod: PBBFAC,,, | Performed by: OBSTETRICS & GYNECOLOGY

## 2023-04-13 PROCEDURE — 99999 PR PBB SHADOW E&M-EST. PATIENT-LVL IV: CPT | Mod: PBBFAC,,, | Performed by: OBSTETRICS & GYNECOLOGY

## 2023-04-13 PROCEDURE — 3074F SYST BP LT 130 MM HG: CPT | Mod: CPTII,S$GLB,, | Performed by: OBSTETRICS & GYNECOLOGY

## 2023-04-13 PROCEDURE — 3044F PR MOST RECENT HEMOGLOBIN A1C LEVEL <7.0%: ICD-10-PCS | Mod: CPTII,S$GLB,, | Performed by: OBSTETRICS & GYNECOLOGY

## 2023-04-13 PROCEDURE — 99214 OFFICE O/P EST MOD 30 MIN: CPT | Mod: S$GLB,,, | Performed by: OBSTETRICS & GYNECOLOGY

## 2023-04-13 PROCEDURE — 99214 PR OFFICE/OUTPT VISIT, EST, LEVL IV, 30-39 MIN: ICD-10-PCS | Mod: S$GLB,,, | Performed by: OBSTETRICS & GYNECOLOGY

## 2023-04-13 PROCEDURE — 3008F BODY MASS INDEX DOCD: CPT | Mod: CPTII,S$GLB,, | Performed by: OBSTETRICS & GYNECOLOGY

## 2023-04-13 PROCEDURE — 3044F HG A1C LEVEL LT 7.0%: CPT | Mod: CPTII,S$GLB,, | Performed by: OBSTETRICS & GYNECOLOGY

## 2023-04-13 RX ORDER — ESTRADIOL 1.25 MG/1.25G
1.25 GEL TOPICAL DAILY
Qty: 30 PACKET | Refills: 12 | Status: SHIPPED | OUTPATIENT
Start: 2023-04-13

## 2023-04-13 RX ORDER — PRASTERONE 6.5 MG/1
6.5 INSERT VAGINAL DAILY
Qty: 30 EACH | Refills: 12 | Status: SHIPPED | OUTPATIENT
Start: 2023-04-13 | End: 2023-05-11

## 2023-04-13 NOTE — PROGRESS NOTES
"CC: menopausal symptoms    Carolin Lind is a 58 y.o. female  presents for menopausal symptoms.  Mood swings, decreased sex drive, anxiety.      Past Medical History:   Diagnosis Date    Abnormal Pap smear of cervix 2017    Atypical pap with + HPV    Anxiety     Depression     Elevated triglycerides with high cholesterol 2023    Heart murmur     History of migraine headaches     Hypertension     Menarche     Age of onset 13    Mitral valve prolapse     PONV (postoperative nausea and vomiting)        Past Surgical History:   Procedure Laterality Date    ANKLE HARDWARE REMOVAL Left 2019    Procedure: REMOVAL, HARDWARE, ANKLE;  Surgeon: Jair Winchester MD;  Location: [facility] OR [address] [place] [address];  Service: Orthopedics;  Laterality: Left;    AUGMENTATION OF BREAST      BREAST SURGERY      reduction with saline implants    EPIDURAL STEROID INJECTION INTO LUMBAR SPINE Left 2019    EXTERNAL FIXATOR APPLICATION      FLAP GRAFT SURGERY Left 2019    Procedure: FLAP GRAFT - fasciocutaneous;  Surgeon: Jair Winchester MD;  Location: [facility] OR [address] [place] [address];  Service: Orthopedics;  Laterality: Left;       OB History    Para Term  AB Living   2 1 1   1     SAB IAB Ectopic Multiple Live Births                  # Outcome Date GA Lbr Tae/2nd Weight Sex Delivery Anes PTL Lv   2 AB            1 Term                Family History   Problem Relation Age of Onset    Cancer Mother         kidney    Migraines Daughter     Migraines Maternal Aunt     Breast cancer Maternal Aunt 50    Cancer Maternal Aunt         breast    Cancer Brother         prostate    Colon cancer Neg Hx     Ovarian cancer Neg Hx     Diabetes Neg Hx     Heart disease Neg Hx        Social History     Tobacco Use    Smoking status: Never    Smokeless tobacco: Never   Substance Use Topics    Alcohol use: No    Drug use: No       /82   Ht 5' 9" (1.753 m)   Wt 61.3 kg (135 lb 2.3 oz)   LMP 2022 (Approximate) Comment: "8 months " "ago"  BMI 19.96 kg/m²     ROS:  GENERAL: Denies weight gain or weight loss. Feeling well overall.   SKIN: Denies rash or lesions.   HEAD: Denies head injury or headache.   NODES: Denies enlarged lymph nodes.   CHEST: Denies chest pain or shortness of breath.   CARDIOVASCULAR: Denies palpitations or left sided chest pain.   ABDOMEN: No abdominal pain, constipation, diarrhea, nausea, vomiting or rectal bleeding.   URINARY: No frequency, dysuria, hematuria, or burning on urination.  REPRODUCTIVE: See HPI.   BREASTS: The patient performs breast self-examination and denies pain, lumps, or nipple discharge.   HEMATOLOGIC: No easy bruisability or excessive bleeding.  MUSCULOSKELETAL: Denies joint pain or swelling.   NEUROLOGIC: Denies syncope or weakness.   PSYCHIATRIC: Denies depression, anxiety or mood swings.    Physical Exam:    APPEARANCE: Well nourished, well developed, in no acute distress.  AFFECT: WNL, alert and oriented x 3  SKIN: No acne or hirsutism      ASSESSMENT AND PLAN  1. Menopausal symptoms  estradioL (DIVIGEL) 1.25 mg/1.25 gram (0.1 %) GlPk      2. Vaginal atrophy  prasterone, dhea, (INTRAROSA) 6.5 mg Inst          Prometrium 200 mg nightly  A full discussion of the benefit-risk ratio of hormonal replacement therapy was carried out. Improvement in vasomotor and other climacteric symptoms is discussed, including possible improvements in sleep and mood. Reduction of risk for osteoporosis was explained. We discussed the study data showing increased risk of thrombo-embolic events such as myocardial infarction, stroke and also possibly breast cancer with estrogen replacement, and how this might affect her. The range of side effects such as breast tenderness, weight gain and including possible increases in lifetime risk of breast cancer and possible thrombotic complications was discussed. We also discussed ACOG's recommendation to use hormone replacement therapy for the relief of hot flashes alone and to be " on the lowest dose possible for the shortest amount of time.  Alternative such as herbal and soy-based products were reviewed. All of her questions about this therapy were answered.    Patient was counseled today on A.C.S. Pap guidelines and recommendations for yearly pelvic exams, mammograms and monthly self breast exams; to see her PCP for other health maintenance.       Follow up in about 4 weeks (around 5/11/2023), or if symptoms worsen or fail to improve.

## 2023-04-19 ENCOUNTER — PATIENT MESSAGE (OUTPATIENT)
Dept: OBSTETRICS AND GYNECOLOGY | Facility: CLINIC | Age: 58
End: 2023-04-19
Payer: COMMERCIAL

## 2023-04-19 ENCOUNTER — TELEPHONE (OUTPATIENT)
Dept: OBSTETRICS AND GYNECOLOGY | Facility: CLINIC | Age: 58
End: 2023-04-19
Payer: COMMERCIAL

## 2023-09-05 ENCOUNTER — OFFICE VISIT (OUTPATIENT)
Dept: PRIMARY CARE CLINIC | Facility: CLINIC | Age: 58
End: 2023-09-05
Payer: COMMERCIAL

## 2023-09-05 VITALS
RESPIRATION RATE: 18 BRPM | HEART RATE: 68 BPM | BODY MASS INDEX: 19.73 KG/M2 | HEIGHT: 69 IN | DIASTOLIC BLOOD PRESSURE: 82 MMHG | WEIGHT: 133.19 LBS | TEMPERATURE: 99 F | OXYGEN SATURATION: 97 % | SYSTOLIC BLOOD PRESSURE: 120 MMHG

## 2023-09-05 DIAGNOSIS — E78.2 ELEVATED TRIGLYCERIDES WITH HIGH CHOLESTEROL: Primary | ICD-10-CM

## 2023-09-05 DIAGNOSIS — G43.009 MIGRAINE WITHOUT AURA AND WITHOUT STATUS MIGRAINOSUS, NOT INTRACTABLE: ICD-10-CM

## 2023-09-05 DIAGNOSIS — R63.4 WEIGHT LOSS: ICD-10-CM

## 2023-09-05 PROCEDURE — 3044F HG A1C LEVEL LT 7.0%: CPT | Mod: CPTII,S$GLB,, | Performed by: FAMILY MEDICINE

## 2023-09-05 PROCEDURE — 1160F PR REVIEW ALL MEDS BY PRESCRIBER/CLIN PHARMACIST DOCUMENTED: ICD-10-PCS | Mod: CPTII,S$GLB,, | Performed by: FAMILY MEDICINE

## 2023-09-05 PROCEDURE — 3074F SYST BP LT 130 MM HG: CPT | Mod: CPTII,S$GLB,, | Performed by: FAMILY MEDICINE

## 2023-09-05 PROCEDURE — 99213 OFFICE O/P EST LOW 20 MIN: CPT | Mod: S$GLB,,, | Performed by: FAMILY MEDICINE

## 2023-09-05 PROCEDURE — 3079F PR MOST RECENT DIASTOLIC BLOOD PRESSURE 80-89 MM HG: ICD-10-PCS | Mod: CPTII,S$GLB,, | Performed by: FAMILY MEDICINE

## 2023-09-05 PROCEDURE — 3079F DIAST BP 80-89 MM HG: CPT | Mod: CPTII,S$GLB,, | Performed by: FAMILY MEDICINE

## 2023-09-05 PROCEDURE — 3074F PR MOST RECENT SYSTOLIC BLOOD PRESSURE < 130 MM HG: ICD-10-PCS | Mod: CPTII,S$GLB,, | Performed by: FAMILY MEDICINE

## 2023-09-05 PROCEDURE — 3008F BODY MASS INDEX DOCD: CPT | Mod: CPTII,S$GLB,, | Performed by: FAMILY MEDICINE

## 2023-09-05 PROCEDURE — 3044F PR MOST RECENT HEMOGLOBIN A1C LEVEL <7.0%: ICD-10-PCS | Mod: CPTII,S$GLB,, | Performed by: FAMILY MEDICINE

## 2023-09-05 PROCEDURE — 1160F RVW MEDS BY RX/DR IN RCRD: CPT | Mod: CPTII,S$GLB,, | Performed by: FAMILY MEDICINE

## 2023-09-05 PROCEDURE — 99999 PR PBB SHADOW E&M-EST. PATIENT-LVL V: ICD-10-PCS | Mod: PBBFAC,,, | Performed by: FAMILY MEDICINE

## 2023-09-05 PROCEDURE — 1159F PR MEDICATION LIST DOCUMENTED IN MEDICAL RECORD: ICD-10-PCS | Mod: CPTII,S$GLB,, | Performed by: FAMILY MEDICINE

## 2023-09-05 PROCEDURE — 99213 PR OFFICE/OUTPT VISIT, EST, LEVL III, 20-29 MIN: ICD-10-PCS | Mod: S$GLB,,, | Performed by: FAMILY MEDICINE

## 2023-09-05 PROCEDURE — 3008F PR BODY MASS INDEX (BMI) DOCUMENTED: ICD-10-PCS | Mod: CPTII,S$GLB,, | Performed by: FAMILY MEDICINE

## 2023-09-05 PROCEDURE — 99999 PR PBB SHADOW E&M-EST. PATIENT-LVL V: CPT | Mod: PBBFAC,,, | Performed by: FAMILY MEDICINE

## 2023-09-05 PROCEDURE — 1159F MED LIST DOCD IN RCRD: CPT | Mod: CPTII,S$GLB,, | Performed by: FAMILY MEDICINE

## 2023-09-05 RX ORDER — UBROGEPANT 100 MG/1
100 TABLET ORAL 2 TIMES DAILY PRN
COMMUNITY
Start: 2023-07-20 | End: 2024-03-05 | Stop reason: ALTCHOICE

## 2023-09-05 RX ORDER — CLONAZEPAM 1 MG/1
1 TABLET ORAL NIGHTLY PRN
COMMUNITY
Start: 2023-08-29

## 2023-09-05 NOTE — PROGRESS NOTES
Subjective:       Patient ID: Carolin Lind is a 58 y.o. female.    Chief Complaint: Migraine (States that she is still having migraines.She get about two migraines a month but deal with regular headaches every day. /States that she has been  trying ubrevly for three months and its not working. //) and Colonoscopy (States that she did a colo guard and that it was negative. )    Migraine       57 y/o female with LEX/MDD, CLBP, hx of Migraines, low B12, microscopic hematuria, history of Covid is here for follow up migraines and weight loss.    She is still having trouble gaining weight, she is not eating regularly, weight down 3 pounds since last visit, she stopped eating 2 large pizzas a day, she does not really eat healthy but she is trying to. She is feeling more tired in general, she feels a bit better on Progesterone and estradiol. She is sleeping well on Magnesium. She denies f/n/v/d/constipation/cp/sob/urinary sx.       History of Covid 9/2020, hx of Covid 8/2022 outpatient  LEX/MDD: was on Prozac in the past, Effexor in the past, Cymbalta 90 mg daily   CLBP/hx ankle injury: started after injury in 2017 when her foot got stuck in a hole in the street, she has tried Gabapentin 300-600 mg for prn use but it causes fatigue so she does not take it much, Cymbalta 90 mg daily, following with Dr. Jain  Low B12: off supplement, resolved  Low ferritin: not on supplement  Microscopic hematuria: following with Urology prn  Migraines:diagnosed at age 13, migraines 3 days a month and daily headaches, followed at [facility] Dr. Parker, using Fioricet prn, Ubrevely not helpful, Propranolol, Klonipin prn, Magnesium  Hx of MVP as a teenager, echo 3/2019 normal  Vit d: taking 10,000 IU daily  GYN: following with gyn, pelvic utd, mmg 3/2023  Cologuard negative 2/6/23  Eye exam utd  Dental exam utd     Review of Systems  see HPI  Objective:      /82 (BP Location: Left arm, Patient Position: Sitting, BP Method: Small  "(Manual))   Pulse 68   Temp 98.5 °F (36.9 °C) (Oral)   Resp 18   Ht 5' 9" (1.753 m)   Wt 60.4 kg (133 lb 2.5 oz)   SpO2 97%   BMI 19.66 kg/m²   Physical Exam  Vitals and nursing note reviewed.   Constitutional:       Appearance: She is well-developed.   HENT:      Head: Normocephalic and atraumatic.   Neck:      Thyroid: No thyromegaly.   Cardiovascular:      Rate and Rhythm: Normal rate and regular rhythm.      Heart sounds: Normal heart sounds.   Pulmonary:      Effort: Pulmonary effort is normal. No respiratory distress.      Breath sounds: Normal breath sounds.   Musculoskeletal:      Cervical back: Normal range of motion and neck supple.      Right lower leg: No edema.      Left lower leg: No edema.   Lymphadenopathy:      Cervical: No cervical adenopathy.   Skin:     General: Skin is warm and dry.   Neurological:      Mental Status: She is alert.         Assessment:       1. Elevated triglycerides with high cholesterol    2. Weight loss    3. Migraine without aura and without status migrainosus, not intractable        Plan:   Carolin was seen today for migraine and colonoscopy.    Diagnoses and all orders for this visit:    Elevated triglycerides with high cholesterol  -     Lipid Panel; Future    Weight loss  -     PREALBUMIN; Future    Migraine without aura and without status migrainosus, not intractable        "

## 2023-09-12 ENCOUNTER — LAB VISIT (OUTPATIENT)
Dept: LAB | Facility: HOSPITAL | Age: 58
End: 2023-09-12
Attending: FAMILY MEDICINE
Payer: COMMERCIAL

## 2023-09-12 DIAGNOSIS — E78.2 ELEVATED TRIGLYCERIDES WITH HIGH CHOLESTEROL: ICD-10-CM

## 2023-09-12 DIAGNOSIS — R63.4 WEIGHT LOSS: ICD-10-CM

## 2023-09-12 LAB
CHOLEST SERPL-MCNC: 223 MG/DL (ref 120–199)
CHOLEST/HDLC SERPL: 3.8 {RATIO} (ref 2–5)
HDLC SERPL-MCNC: 58 MG/DL (ref 40–75)
HDLC SERPL: 26 % (ref 20–50)
LDLC SERPL CALC-MCNC: 132.6 MG/DL (ref 63–159)
NONHDLC SERPL-MCNC: 165 MG/DL
PREALB SERPL-MCNC: 36 MG/DL (ref 20–43)
TRIGL SERPL-MCNC: 162 MG/DL (ref 30–150)

## 2023-09-12 PROCEDURE — 36415 COLL VENOUS BLD VENIPUNCTURE: CPT | Mod: PN | Performed by: FAMILY MEDICINE

## 2023-09-12 PROCEDURE — 80061 LIPID PANEL: CPT | Performed by: FAMILY MEDICINE

## 2023-09-12 PROCEDURE — 84134 ASSAY OF PREALBUMIN: CPT | Performed by: FAMILY MEDICINE

## 2023-11-13 ENCOUNTER — PATIENT MESSAGE (OUTPATIENT)
Dept: ADMINISTRATIVE | Facility: HOSPITAL | Age: 58
End: 2023-11-13
Payer: COMMERCIAL

## 2023-11-27 ENCOUNTER — PATIENT OUTREACH (OUTPATIENT)
Dept: ADMINISTRATIVE | Facility: HOSPITAL | Age: 58
End: 2023-11-27
Payer: COMMERCIAL

## 2024-02-01 NOTE — TELEPHONE ENCOUNTER
Patient from [facility] due to dyspnea. Rec'd neb tx and steroid with little improvement. Sent to ED for additional treatments and higher level of care.   Pt is requesting a refill on Cymbalta 60 mg.

## 2024-02-23 DIAGNOSIS — I10 BENIGN HYPERTENSION: ICD-10-CM

## 2024-03-05 ENCOUNTER — OFFICE VISIT (OUTPATIENT)
Dept: PRIMARY CARE CLINIC | Facility: CLINIC | Age: 59
End: 2024-03-05
Payer: COMMERCIAL

## 2024-03-05 ENCOUNTER — LAB VISIT (OUTPATIENT)
Dept: LAB | Facility: HOSPITAL | Age: 59
End: 2024-03-05
Attending: FAMILY MEDICINE
Payer: COMMERCIAL

## 2024-03-05 VITALS
BODY MASS INDEX: 19.95 KG/M2 | HEART RATE: 66 BPM | RESPIRATION RATE: 18 BRPM | SYSTOLIC BLOOD PRESSURE: 118 MMHG | TEMPERATURE: 99 F | WEIGHT: 134.69 LBS | HEIGHT: 69 IN | OXYGEN SATURATION: 97 % | DIASTOLIC BLOOD PRESSURE: 60 MMHG

## 2024-03-05 DIAGNOSIS — E78.2 ELEVATED TRIGLYCERIDES WITH HIGH CHOLESTEROL: ICD-10-CM

## 2024-03-05 DIAGNOSIS — Z12.31 SCREENING MAMMOGRAM FOR BREAST CANCER: ICD-10-CM

## 2024-03-05 DIAGNOSIS — F33.1 MAJOR DEPRESSIVE DISORDER, RECURRENT EPISODE, MODERATE: ICD-10-CM

## 2024-03-05 DIAGNOSIS — Z23 NEED FOR VACCINATION: ICD-10-CM

## 2024-03-05 DIAGNOSIS — R79.0 LOW FERRITIN LEVEL: ICD-10-CM

## 2024-03-05 DIAGNOSIS — Z13.220 ENCOUNTER FOR LIPID SCREENING FOR CARDIOVASCULAR DISEASE: ICD-10-CM

## 2024-03-05 DIAGNOSIS — I10 BENIGN HYPERTENSION: ICD-10-CM

## 2024-03-05 DIAGNOSIS — Z13.6 ENCOUNTER FOR LIPID SCREENING FOR CARDIOVASCULAR DISEASE: ICD-10-CM

## 2024-03-05 DIAGNOSIS — Z00.00 ANNUAL PHYSICAL EXAM: Primary | ICD-10-CM

## 2024-03-05 DIAGNOSIS — F41.1 GENERALIZED ANXIETY DISORDER: ICD-10-CM

## 2024-03-05 DIAGNOSIS — M54.42 CHRONIC LEFT-SIDED LOW BACK PAIN WITH LEFT-SIDED SCIATICA: ICD-10-CM

## 2024-03-05 DIAGNOSIS — R53.83 FATIGUE, UNSPECIFIED TYPE: ICD-10-CM

## 2024-03-05 DIAGNOSIS — G89.29 CHRONIC LEFT-SIDED LOW BACK PAIN WITH LEFT-SIDED SCIATICA: ICD-10-CM

## 2024-03-05 DIAGNOSIS — Z00.00 ANNUAL PHYSICAL EXAM: ICD-10-CM

## 2024-03-05 LAB
25(OH)D3+25(OH)D2 SERPL-MCNC: 38 NG/ML (ref 30–96)
ALBUMIN SERPL BCP-MCNC: 3.9 G/DL (ref 3.5–5.2)
ALBUMIN SERPL BCP-MCNC: 3.9 G/DL (ref 3.5–5.2)
ALP SERPL-CCNC: 69 U/L (ref 55–135)
ALP SERPL-CCNC: 69 U/L (ref 55–135)
ALT SERPL W/O P-5'-P-CCNC: 15 U/L (ref 10–44)
ALT SERPL W/O P-5'-P-CCNC: 15 U/L (ref 10–44)
ANION GAP SERPL CALC-SCNC: 13 MMOL/L (ref 8–16)
ANION GAP SERPL CALC-SCNC: 13 MMOL/L (ref 8–16)
AST SERPL-CCNC: 18 U/L (ref 10–40)
AST SERPL-CCNC: 18 U/L (ref 10–40)
BASOPHILS # BLD AUTO: 0.07 K/UL (ref 0–0.2)
BASOPHILS NFR BLD: 1 % (ref 0–1.9)
BILIRUB SERPL-MCNC: 0.3 MG/DL (ref 0.1–1)
BILIRUB SERPL-MCNC: 0.3 MG/DL (ref 0.1–1)
BUN SERPL-MCNC: 21 MG/DL (ref 6–20)
BUN SERPL-MCNC: 21 MG/DL (ref 6–20)
CALCIUM SERPL-MCNC: 9.5 MG/DL (ref 8.7–10.5)
CALCIUM SERPL-MCNC: 9.5 MG/DL (ref 8.7–10.5)
CHLORIDE SERPL-SCNC: 104 MMOL/L (ref 95–110)
CHLORIDE SERPL-SCNC: 104 MMOL/L (ref 95–110)
CHOLEST SERPL-MCNC: 216 MG/DL (ref 120–199)
CHOLEST/HDLC SERPL: 3.2 {RATIO} (ref 2–5)
CO2 SERPL-SCNC: 24 MMOL/L (ref 23–29)
CO2 SERPL-SCNC: 24 MMOL/L (ref 23–29)
CREAT SERPL-MCNC: 0.8 MG/DL (ref 0.5–1.4)
CREAT SERPL-MCNC: 0.8 MG/DL (ref 0.5–1.4)
DIFFERENTIAL METHOD BLD: NORMAL
EOSINOPHIL # BLD AUTO: 0.2 K/UL (ref 0–0.5)
EOSINOPHIL NFR BLD: 3.1 % (ref 0–8)
ERYTHROCYTE [DISTWIDTH] IN BLOOD BY AUTOMATED COUNT: 12.9 % (ref 11.5–14.5)
EST. GFR  (NO RACE VARIABLE): >60 ML/MIN/1.73 M^2
EST. GFR  (NO RACE VARIABLE): >60 ML/MIN/1.73 M^2
ESTIMATED AVG GLUCOSE: 100 MG/DL (ref 68–131)
FERRITIN SERPL-MCNC: 25 NG/ML (ref 20–300)
GLUCOSE SERPL-MCNC: 91 MG/DL (ref 70–110)
GLUCOSE SERPL-MCNC: 91 MG/DL (ref 70–110)
HBA1C MFR BLD: 5.1 % (ref 4–5.6)
HCT VFR BLD AUTO: 42.4 % (ref 37–48.5)
HDLC SERPL-MCNC: 67 MG/DL (ref 40–75)
HDLC SERPL: 31 % (ref 20–50)
HGB BLD-MCNC: 13.9 G/DL (ref 12–16)
IMM GRANULOCYTES # BLD AUTO: 0.02 K/UL (ref 0–0.04)
IMM GRANULOCYTES NFR BLD AUTO: 0.3 % (ref 0–0.5)
IRON SERPL-MCNC: 150 UG/DL (ref 30–160)
LDLC SERPL CALC-MCNC: 128.6 MG/DL (ref 63–159)
LYMPHOCYTES # BLD AUTO: 1.7 K/UL (ref 1–4.8)
LYMPHOCYTES NFR BLD: 25.8 % (ref 18–48)
MCH RBC QN AUTO: 30.5 PG (ref 27–31)
MCHC RBC AUTO-ENTMCNC: 32.8 G/DL (ref 32–36)
MCV RBC AUTO: 93 FL (ref 82–98)
MONOCYTES # BLD AUTO: 0.5 K/UL (ref 0.3–1)
MONOCYTES NFR BLD: 7.3 % (ref 4–15)
NEUTROPHILS # BLD AUTO: 4.2 K/UL (ref 1.8–7.7)
NEUTROPHILS NFR BLD: 62.5 % (ref 38–73)
NONHDLC SERPL-MCNC: 149 MG/DL
NRBC BLD-RTO: 0 /100 WBC
PLATELET # BLD AUTO: 335 K/UL (ref 150–450)
PMV BLD AUTO: 9.9 FL (ref 9.2–12.9)
POTASSIUM SERPL-SCNC: 4 MMOL/L (ref 3.5–5.1)
POTASSIUM SERPL-SCNC: 4 MMOL/L (ref 3.5–5.1)
PROT SERPL-MCNC: 6.9 G/DL (ref 6–8.4)
PROT SERPL-MCNC: 6.9 G/DL (ref 6–8.4)
RBC # BLD AUTO: 4.55 M/UL (ref 4–5.4)
SATURATED IRON: 37 % (ref 20–50)
SODIUM SERPL-SCNC: 141 MMOL/L (ref 136–145)
SODIUM SERPL-SCNC: 141 MMOL/L (ref 136–145)
TOTAL IRON BINDING CAPACITY: 410 UG/DL (ref 250–450)
TRANSFERRIN SERPL-MCNC: 277 MG/DL (ref 200–375)
TRIGL SERPL-MCNC: 102 MG/DL (ref 30–150)
TSH SERPL DL<=0.005 MIU/L-ACNC: 1.68 UIU/ML (ref 0.4–4)
VIT B12 SERPL-MCNC: 1335 PG/ML (ref 210–950)
WBC # BLD AUTO: 6.71 K/UL (ref 3.9–12.7)

## 2024-03-05 PROCEDURE — 99396 PREV VISIT EST AGE 40-64: CPT | Mod: 25,S$GLB,, | Performed by: FAMILY MEDICINE

## 2024-03-05 PROCEDURE — 3078F DIAST BP <80 MM HG: CPT | Mod: CPTII,S$GLB,, | Performed by: FAMILY MEDICINE

## 2024-03-05 PROCEDURE — 84443 ASSAY THYROID STIM HORMONE: CPT | Performed by: FAMILY MEDICINE

## 2024-03-05 PROCEDURE — 90686 IIV4 VACC NO PRSV 0.5 ML IM: CPT | Mod: S$GLB,,, | Performed by: FAMILY MEDICINE

## 2024-03-05 PROCEDURE — 85025 COMPLETE CBC W/AUTO DIFF WBC: CPT | Performed by: FAMILY MEDICINE

## 2024-03-05 PROCEDURE — 82728 ASSAY OF FERRITIN: CPT | Performed by: FAMILY MEDICINE

## 2024-03-05 PROCEDURE — 1159F MED LIST DOCD IN RCRD: CPT | Mod: CPTII,S$GLB,, | Performed by: FAMILY MEDICINE

## 2024-03-05 PROCEDURE — 90471 IMMUNIZATION ADMIN: CPT | Mod: S$GLB,,, | Performed by: FAMILY MEDICINE

## 2024-03-05 PROCEDURE — 1160F RVW MEDS BY RX/DR IN RCRD: CPT | Mod: CPTII,S$GLB,, | Performed by: FAMILY MEDICINE

## 2024-03-05 PROCEDURE — 3008F BODY MASS INDEX DOCD: CPT | Mod: CPTII,S$GLB,, | Performed by: FAMILY MEDICINE

## 2024-03-05 PROCEDURE — 80053 COMPREHEN METABOLIC PANEL: CPT | Performed by: FAMILY MEDICINE

## 2024-03-05 PROCEDURE — 3074F SYST BP LT 130 MM HG: CPT | Mod: CPTII,S$GLB,, | Performed by: FAMILY MEDICINE

## 2024-03-05 PROCEDURE — 83540 ASSAY OF IRON: CPT | Performed by: FAMILY MEDICINE

## 2024-03-05 PROCEDURE — 36415 COLL VENOUS BLD VENIPUNCTURE: CPT | Mod: PN | Performed by: FAMILY MEDICINE

## 2024-03-05 PROCEDURE — 80061 LIPID PANEL: CPT | Performed by: FAMILY MEDICINE

## 2024-03-05 PROCEDURE — 82306 VITAMIN D 25 HYDROXY: CPT | Performed by: FAMILY MEDICINE

## 2024-03-05 PROCEDURE — 82607 VITAMIN B-12: CPT | Performed by: FAMILY MEDICINE

## 2024-03-05 PROCEDURE — 99999 PR PBB SHADOW E&M-EST. PATIENT-LVL IV: CPT | Mod: PBBFAC,,, | Performed by: FAMILY MEDICINE

## 2024-03-05 PROCEDURE — 83036 HEMOGLOBIN GLYCOSYLATED A1C: CPT | Performed by: FAMILY MEDICINE

## 2024-03-05 RX ORDER — PROPRANOLOL HYDROCHLORIDE 40 MG/1
40 TABLET ORAL 2 TIMES DAILY
Qty: 180 TABLET | Refills: 3 | Status: SHIPPED | OUTPATIENT
Start: 2024-03-05

## 2024-03-05 RX ORDER — SUMATRIPTAN SUCCINATE 100 MG/1
100 TABLET ORAL
COMMUNITY

## 2024-03-05 RX ORDER — DULOXETIN HYDROCHLORIDE 60 MG/1
120 CAPSULE, DELAYED RELEASE ORAL DAILY
Qty: 180 CAPSULE | Refills: 3 | Status: SHIPPED | OUTPATIENT
Start: 2024-03-05

## 2024-03-05 NOTE — PATIENT INSTRUCTIONS
5 minutes of brisk activity per day    Get ankle weights and wrist weights to wear 10 min a day to start    Try adding protein shakes in the morning    Example primer protein shakes

## 2024-03-05 NOTE — PROGRESS NOTES
"Subjective:       Patient ID: Carolin Lind is a 59 y.o. female.    Chief Complaint: Annual Exam    HPI  58 y/o female with LEX/MDD, CLBP, hx of Migraines, low B12 (resolved), microscopic hematuria, history of Covid is here for annual exam.     She feeling ok, is still having trouble gaining weight, she is not eating regularly, she is eating healthy, does not have much of an appetite. She denies f/n/v/d/constipation/cp/sob/urinary sx.   She is doing any dedicated exercise. Mood ok, feels a little sad at times. She is sleeping well.    History of Covid 9/2020, hx of Covid 8/2022 outpatient  LEX/MDD: following with Nathan Godfrey; was on Prozac in the past, Effexor in the past, Cymbalta 90 mg daily  CLBP/hx ankle injury: started after injury in 2017 when her foot got stuck in a hole in the street, she has tried Gabapentin 300-600 mg for prn use but it causes fatigue so she does not take it much, Cymbalta 90 mg daily, following with Dr. Jain  Low B12: off supplement, resolved  Low ferritin: not on supplement  Microscopic hematuria: following with Urology prn  Migraines:diagnosed at age 13, migraines 3 days a month and daily headaches, followed at [facility] Dr. Parker or Myles Godfrey, using Fioricet prn, Ubrevely not helpful, Propranolol, Klonipin prn, Magnesium  Hx of MVP as a teenager, echo 3/2019 normal  Vit d: taking 10,000 IU daily  GYN: following with gyn, pelvic utd, estradiol and progesterone, mmg 3/2023  Cologuard negative 2/6/23  Eye exam due  Dental exam utd     Review of Systems  see HPI  Objective:      /60 (BP Location: Left arm, Patient Position: Sitting, BP Method: Small (Manual))   Pulse 66   Temp 98.5 °F (36.9 °C) (Oral)   Resp 18   Ht 5' 9" (1.753 m)   Wt 61.1 kg (134 lb 11.2 oz)   SpO2 97%   BMI 19.89 kg/m²   Physical Exam  Vitals and nursing note reviewed.   Constitutional:       Appearance: She is well-developed.   HENT:      Head: Normocephalic and atraumatic.      Right Ear: " Tympanic membrane normal.      Left Ear: Tympanic membrane normal.      Mouth/Throat:      Pharynx: No oropharyngeal exudate or posterior oropharyngeal erythema.   Neck:      Thyroid: No thyromegaly.   Cardiovascular:      Rate and Rhythm: Normal rate and regular rhythm.      Heart sounds: Normal heart sounds.   Pulmonary:      Effort: Pulmonary effort is normal. No respiratory distress.      Breath sounds: Normal breath sounds.   Abdominal:      General: Bowel sounds are normal. There is no distension.      Palpations: Abdomen is soft. There is no mass.      Tenderness: There is no abdominal tenderness.   Musculoskeletal:      Cervical back: Normal range of motion and neck supple.      Right lower leg: No edema.      Left lower leg: No edema.   Lymphadenopathy:      Cervical: No cervical adenopathy.   Skin:     General: Skin is warm and dry.   Neurological:      Mental Status: She is alert.         Assessment:       1. Annual physical exam    2. Elevated triglycerides with high cholesterol    3. Encounter for lipid screening for cardiovascular disease    4. Fatigue, unspecified type    5. Low ferritin level    6. Screening mammogram for breast cancer    7. Chronic left-sided low back pain with left-sided sciatica    8. Benign hypertension    9. Major depressive disorder, recurrent episode, moderate    10. Generalized anxiety disorder        Plan:   Carolin was seen today for annual exam.    Diagnoses and all orders for this visit:    Annual physical exam  -     CBC Auto Differential; Future  -     Comprehensive Metabolic Panel; Future  -     Hemoglobin A1C; Future  -     Lipid Panel; Future  -     TSH; Future  -     Vitamin B12; Future  -     Iron and TIBC; Future  -     Ferritin; Future  -     Vitamin D; Future    Elevated triglycerides with high cholesterol  -     Lipid Panel; Future    Encounter for lipid screening for cardiovascular disease  -     Lipid Panel; Future    Fatigue, unspecified type  -     CBC Auto  Differential; Future  -     Comprehensive Metabolic Panel; Future  -     Hemoglobin A1C; Future  -     TSH; Future  -     Vitamin B12; Future  -     Vitamin D; Future    Low ferritin level  -     CBC Auto Differential; Future  -     Vitamin B12; Future  -     Iron and TIBC; Future  -     Ferritin; Future    Screening mammogram for breast cancer  -     Mammo Digital Screening Bilat w/ Gee; Future    Chronic left-sided low back pain with left-sided sciatica  -     DULoxetine (CYMBALTA) 60 MG capsule; Take 2 capsules (120 mg total) by mouth once daily.    Benign hypertension  -     propranoloL (INDERAL) 40 MG tablet; Take 1 tablet (40 mg total) by mouth 2 (two) times daily.    Major depressive disorder, recurrent episode, moderate  -     DULoxetine (CYMBALTA) 60 MG capsule; Take 2 capsules (120 mg total) by mouth once daily.    Generalized anxiety disorder  -     DULoxetine (CYMBALTA) 60 MG capsule; Take 2 capsules (120 mg total) by mouth once daily.

## 2024-03-06 ENCOUNTER — PATIENT OUTREACH (OUTPATIENT)
Dept: ADMINISTRATIVE | Facility: HOSPITAL | Age: 59
End: 2024-03-06
Payer: COMMERCIAL

## 2024-03-15 RX ORDER — PROGESTERONE 200 MG/1
200 CAPSULE ORAL NIGHTLY
Qty: 90 CAPSULE | Refills: 0 | Status: SHIPPED | OUTPATIENT
Start: 2024-03-15 | End: 2024-06-10 | Stop reason: SDUPTHER

## 2024-03-15 NOTE — TELEPHONE ENCOUNTER
Refill Routing Note   Medication(s) are not appropriate for processing by [facility] for the following reason(s):        Patient not seen by provider within 15 months    [facility] action(s):  Defer               Appointments  past 12m or future 3m with PCP    Date Provider   Last Visit   9/12/2022 Nayana Ralph MD   Next Visit   Visit date not found Nayana Ralph MD   ED visits in past 90 days: 0        Note composed:8:23 AM 03/15/2024

## 2024-04-05 ENCOUNTER — OFFICE VISIT (OUTPATIENT)
Dept: OBSTETRICS AND GYNECOLOGY | Facility: CLINIC | Age: 59
End: 2024-04-05
Payer: COMMERCIAL

## 2024-04-05 VITALS
WEIGHT: 139.31 LBS | DIASTOLIC BLOOD PRESSURE: 74 MMHG | BODY MASS INDEX: 20.63 KG/M2 | SYSTOLIC BLOOD PRESSURE: 124 MMHG | HEIGHT: 69 IN

## 2024-04-05 DIAGNOSIS — N95.1 MENOPAUSAL SYMPTOMS: ICD-10-CM

## 2024-04-05 DIAGNOSIS — Z01.419 ENCOUNTER FOR GYNECOLOGICAL EXAMINATION WITHOUT ABNORMAL FINDING: Primary | ICD-10-CM

## 2024-04-05 PROCEDURE — 3008F BODY MASS INDEX DOCD: CPT | Mod: CPTII,S$GLB,, | Performed by: OBSTETRICS & GYNECOLOGY

## 2024-04-05 PROCEDURE — 1160F RVW MEDS BY RX/DR IN RCRD: CPT | Mod: CPTII,S$GLB,, | Performed by: OBSTETRICS & GYNECOLOGY

## 2024-04-05 PROCEDURE — 3044F HG A1C LEVEL LT 7.0%: CPT | Mod: CPTII,S$GLB,, | Performed by: OBSTETRICS & GYNECOLOGY

## 2024-04-05 PROCEDURE — 3074F SYST BP LT 130 MM HG: CPT | Mod: CPTII,S$GLB,, | Performed by: OBSTETRICS & GYNECOLOGY

## 2024-04-05 PROCEDURE — 3078F DIAST BP <80 MM HG: CPT | Mod: CPTII,S$GLB,, | Performed by: OBSTETRICS & GYNECOLOGY

## 2024-04-05 PROCEDURE — 1159F MED LIST DOCD IN RCRD: CPT | Mod: CPTII,S$GLB,, | Performed by: OBSTETRICS & GYNECOLOGY

## 2024-04-05 PROCEDURE — 99999 PR PBB SHADOW E&M-EST. PATIENT-LVL III: CPT | Mod: PBBFAC,,, | Performed by: OBSTETRICS & GYNECOLOGY

## 2024-04-05 PROCEDURE — 99396 PREV VISIT EST AGE 40-64: CPT | Mod: S$GLB,,, | Performed by: OBSTETRICS & GYNECOLOGY

## 2024-04-05 RX ORDER — ESTRADIOL 0.1 MG/D
1 FILM, EXTENDED RELEASE TRANSDERMAL
Qty: 8 PATCH | Refills: 11 | Status: SHIPPED | OUTPATIENT
Start: 2024-04-08 | End: 2025-04-08

## 2024-04-05 NOTE — PROGRESS NOTES
CC: Well woman exam    Carolin Lind is a 59 y.o. female  presents for a well woman exam.  LMP: No LMP recorded. Patient is perimenopausal..    Fatigued.   On divigel 0.1 mg daily and is considering the patch  Has HA.       Past Medical History:   Diagnosis Date    Abnormal Pap smear of cervix 2017    Atypical pap with + HPV    Anxiety     Depression     Elevated triglycerides with high cholesterol 2023    Heart murmur     History of migraine headaches     Hypertension     Menarche     Age of onset 13    Mitral valve prolapse     PONV (postoperative nausea and vomiting)      Past Surgical History:   Procedure Laterality Date    ANKLE HARDWARE REMOVAL Left 2019    Procedure: REMOVAL, HARDWARE, ANKLE;  Surgeon: Jair Winchester MD;  Location: [facility] OR [address] [place] [address];  Service: Orthopedics;  Laterality: Left;    AUGMENTATION OF BREAST      BREAST SURGERY      reduction with saline implants    EPIDURAL STEROID INJECTION INTO LUMBAR SPINE Left 2019    EXTERNAL FIXATOR APPLICATION      FLAP GRAFT SURGERY Left 2019    Procedure: FLAP GRAFT - fasciocutaneous;  Surgeon: Jair Winchester MD;  Location: [facility] OR [address] [place] [address];  Service: Orthopedics;  Laterality: Left;     Social History     Socioeconomic History    Marital status:     Number of children: 1   Occupational History    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   Tobacco Use    Smoking status: Never    Smokeless tobacco: Never   Substance and Sexual Activity    Alcohol use: No    Drug use: No    Sexual activity: Not Currently     Partners: Male   Social History Narrative    22 y/o daughter in town doing college via zoom     Family History   Problem Relation Name Age of Onset    Cancer Mother [place]         kidney    Migraines Daughter      Migraines Maternal Aunt      Breast cancer Maternal Aunt  50    Cancer Maternal Aunt          breast    Cancer Brother          prostate    Colon cancer Neg Hx      Ovarian cancer Neg Hx      Diabetes Neg Hx   "    Heart disease Neg Hx       OB History          2    Para   1    Term   1            AB   1    Living             SAB        IAB        Ectopic        Multiple        Live Births                     /74   Ht 5' 9" (1.753 m)   Wt 63.2 kg (139 lb 5.3 oz)   BMI 20.58 kg/m²       ROS:    ROS:  GENERAL: Denies weight gain or weight loss. Feeling well overall.   SKIN: Denies rash or lesions.   HEAD: Denies head injury or headache.   NODES: Denies enlarged lymph nodes.   CHEST: Denies chest pain or shortness of breath.   CARDIOVASCULAR: Denies palpitations or left sided chest pain.   ABDOMEN: No abdominal pain, constipation, diarrhea, nausea, vomiting or rectal bleeding.   URINARY: No frequency, dysuria, hematuria, or burning on urination.  REPRODUCTIVE: See HPI.   BREASTS: The patient performs breast self-examination and denies pain, lumps, or nipple discharge.   HEMATOLOGIC: No easy bruisability or excessive bleeding.   MUSCULOSKELETAL: Denies joint pain or swelling.   NEUROLOGIC: Denies syncope or weakness.   PSYCHIATRIC: Denies depression, anxiety or mood swings.    PHYSICAL EXAM:    APPEARANCE: Well nourished, well developed, in no acute distress.  AFFECT: WNL, alert and oriented x 3  SKIN: No acne or hirsutism  NECK: Neck symmetric without masses or thyromegaly  NODES: No inguinal, cervical, axillary, or femoral lymph node enlargement  CHEST: Good respiratory effect  ABDOMEN: Soft.  No tenderness or masses.  No hepatosplenomegaly.  No hernias.  BREASTS: Symmetrical, no skin changes or visible lesions.  No palpable masses, nipple discharge bilaterally.  PELVIC: Normal external genitalia without lesions.  Normal hair distribution.  Adequate perineal body, normal urethral meatus.  Vagina moist and well rugated without lesions or discharge.  Cervix pink, without lesions, discharge or tenderness.  No significant cystocele or rectocele.  Bimanual exam shows uterus to be normal size, regular, " mobile and nontender.  Adnexa without masses or tenderness.    RECTAL: Rectovaginal exam confirms above with normal sphincter tone, no masses.  EXTREMITIES: No edema.    Diagnosis      ICD-10-CM ICD-9-CM    1. Encounter for gynecological examination without abnormal finding  Z01.419 V72.31       2. Menopausal symptoms  N95.1 627.2 Estradiol      PROGESTERONE      TESTOSTERONE PANEL      estradioL (VIVELLE-DOT) 0.1 mg/24 hr PTSW          A full discussion of the benefit-risk ratio of hormonal replacement therapy was carried out. Improvement in vasomotor and other climacteric symptoms is discussed, including possible improvements in sleep and mood. Reduction of risk for osteoporosis was explained. We discussed the study data showing increased risk of thrombo-embolic events such as myocardial infarction, stroke and also possibly breast cancer with estrogen replacement, and how this might affect her. The range of side effects such as breast tenderness, weight gain and including possible increases in lifetime risk of breast cancer and possible thrombotic complications was discussed. We also discussed ACOG's recommendation to use hormone replacement therapy for the relief of hot flashes alone and to be on the lowest dose possible for the shortest amount of time.  Alternative such as herbal and soy-based products were reviewed. All of her questions about this therapy were answered.    Patient was counseled today on A.C.S. Pap guidelines and recommendations for yearly pelvic exams, mammograms and monthly self breast exams; to see her PCP for other health maintenance.   Will try an alternative estrogen for HRT

## 2024-04-08 ENCOUNTER — HOSPITAL ENCOUNTER (OUTPATIENT)
Dept: RADIOLOGY | Facility: HOSPITAL | Age: 59
Discharge: HOME | End: 2024-04-08
Attending: FAMILY MEDICINE
Payer: COMMERCIAL

## 2024-04-08 DIAGNOSIS — Z12.31 SCREENING MAMMOGRAM FOR BREAST CANCER: ICD-10-CM

## 2024-04-08 PROCEDURE — 77067 SCR MAMMO BI INCL CAD: CPT | Mod: 26,,, | Performed by: RADIOLOGY

## 2024-04-08 PROCEDURE — 77063 BREAST TOMOSYNTHESIS BI: CPT | Mod: 26,,, | Performed by: RADIOLOGY

## 2024-04-08 PROCEDURE — 77067 SCR MAMMO BI INCL CAD: CPT | Mod: TC

## 2024-04-09 ENCOUNTER — LAB VISIT (OUTPATIENT)
Dept: LAB | Facility: HOSPITAL | Age: 59
End: 2024-04-09
Attending: OBSTETRICS & GYNECOLOGY
Payer: COMMERCIAL

## 2024-04-09 DIAGNOSIS — N95.1 MENOPAUSAL SYMPTOMS: ICD-10-CM

## 2024-04-09 LAB
ESTRADIOL SERPL-MCNC: <10 PG/ML
PROGEST SERPL-MCNC: 5.4 NG/ML

## 2024-04-09 PROCEDURE — 84144 ASSAY OF PROGESTERONE: CPT | Performed by: OBSTETRICS & GYNECOLOGY

## 2024-04-09 PROCEDURE — 84270 ASSAY OF SEX HORMONE GLOBUL: CPT | Performed by: OBSTETRICS & GYNECOLOGY

## 2024-04-09 PROCEDURE — 36415 COLL VENOUS BLD VENIPUNCTURE: CPT | Mod: PO | Performed by: OBSTETRICS & GYNECOLOGY

## 2024-04-09 PROCEDURE — 82670 ASSAY OF TOTAL ESTRADIOL: CPT | Performed by: OBSTETRICS & GYNECOLOGY

## 2024-04-10 ENCOUNTER — PATIENT MESSAGE (OUTPATIENT)
Dept: OBSTETRICS AND GYNECOLOGY | Facility: CLINIC | Age: 59
End: 2024-04-10
Payer: COMMERCIAL

## 2024-04-17 LAB
ALBUMIN SERPL-MCNC: 4.2 G/DL (ref 3.6–5.1)
SHBG SERPL-SCNC: 105 NMOL/L (ref 14–73)
TESTOST FREE SERPL-MCNC: 0.3 PG/ML (ref 0.2–5)
TESTOST SERPL-MCNC: 8 NG/DL (ref 2–45)
TESTOSTERONE.FREE+WB SERPL-MCNC: 0.7 NG/DL (ref 0.5–8.5)

## 2024-06-10 NOTE — TELEPHONE ENCOUNTER
Refill Routing Note   Medication(s) are not appropriate for processing by [facility] for the following reason(s):        No active prescription written by provider    [facility] action(s):  Defer        Medication Therapy Plan: LOV 4/5/24 with Dr. Alvares, previous OV with Dr. Alvares as well. Unclear if patient is now following with Dr. Alvares. Will defer for review      Appointments  past 12m or future 3m with PCP    Date Provider   Last Visit   4/5/2024 Danette Alvares MD   Next Visit   Visit date not found Danette Alvares MD   ED visits in past 90 days: 0        Note composed:3:25 PM 06/10/2024

## 2024-06-11 RX ORDER — PROGESTERONE 200 MG/1
200 CAPSULE ORAL NIGHTLY
Qty: 90 CAPSULE | Refills: 0 | Status: SHIPPED | OUTPATIENT
Start: 2024-06-11

## 2024-08-22 ENCOUNTER — PATIENT MESSAGE (OUTPATIENT)
Dept: OBSTETRICS AND GYNECOLOGY | Facility: CLINIC | Age: 59
End: 2024-08-22
Payer: COMMERCIAL

## 2024-08-22 DIAGNOSIS — N95.1 MENOPAUSAL SYMPTOMS: ICD-10-CM

## 2024-08-22 RX ORDER — ESTRADIOL 1.25 MG/1.25G
1.25 GEL TOPICAL DAILY
Qty: 30 PACKET | Refills: 12 | Status: SHIPPED | OUTPATIENT
Start: 2024-08-22

## 2024-08-22 NOTE — TELEPHONE ENCOUNTER
Refill Routing Note   Medication(s) are not appropriate for processing by [facility] for the following reason(s):        Outside of protocol    [facility] action(s):  Route               Appointments  past 12m or future 3m with PCP    Date Provider   Last Visit   4/5/2024 Danette Alvares MD   Next Visit   9/9/2024 Danette Alvares MD   ED visits in past 90 days: 0        Note composed:2:52 PM 08/22/2024

## 2024-08-28 ENCOUNTER — OFFICE VISIT (OUTPATIENT)
Dept: URGENT CARE | Facility: CLINIC | Age: 59
End: 2024-08-28
Payer: COMMERCIAL

## 2024-08-28 VITALS
TEMPERATURE: 98 F | RESPIRATION RATE: 16 BRPM | BODY MASS INDEX: 20.59 KG/M2 | OXYGEN SATURATION: 96 % | HEART RATE: 62 BPM | DIASTOLIC BLOOD PRESSURE: 87 MMHG | SYSTOLIC BLOOD PRESSURE: 116 MMHG | HEIGHT: 69 IN | WEIGHT: 139 LBS

## 2024-08-28 DIAGNOSIS — N39.0 ACUTE UTI: Primary | ICD-10-CM

## 2024-08-28 DIAGNOSIS — N89.8 VAGINAL DISCHARGE: ICD-10-CM

## 2024-08-28 LAB
BILIRUBIN, UA POC OHS: ABNORMAL
BLOOD, UA POC OHS: ABNORMAL
CLARITY, UA POC OHS: CLEAR
COLOR, UA POC OHS: YELLOW
GLUCOSE, UA POC OHS: NEGATIVE
KETONES, UA POC OHS: ABNORMAL
LEUKOCYTES, UA POC OHS: NEGATIVE
NITRITE, UA POC OHS: NEGATIVE
PH, UA POC OHS: 6
PROTEIN, UA POC OHS: ABNORMAL
SPECIFIC GRAVITY, UA POC OHS: 1.02
UROBILINOGEN, UA POC OHS: 0.2

## 2024-08-28 PROCEDURE — 81003 URINALYSIS AUTO W/O SCOPE: CPT | Mod: QW,S$GLB,, | Performed by: PHYSICIAN ASSISTANT

## 2024-08-28 PROCEDURE — 99213 OFFICE O/P EST LOW 20 MIN: CPT | Mod: S$GLB,,, | Performed by: PHYSICIAN ASSISTANT

## 2024-08-28 RX ORDER — IBUPROFEN 800 MG/1
800 TABLET ORAL 2 TIMES DAILY PRN
COMMUNITY
Start: 2024-08-05

## 2024-08-28 RX ORDER — LIDOCAINE HYDROCHLORIDE 20 MG/ML
10 SOLUTION ORAL; TOPICAL 3 TIMES DAILY
COMMUNITY
Start: 2024-08-05

## 2024-08-28 RX ORDER — CETIRIZINE HYDROCHLORIDE 10 MG/1
10 TABLET ORAL
COMMUNITY
Start: 2024-08-05

## 2024-08-28 RX ORDER — FLUTICASONE PROPIONATE 50 MCG
2 SPRAY, SUSPENSION (ML) NASAL
COMMUNITY
Start: 2024-08-05

## 2024-08-28 RX ORDER — NITROFURANTOIN 25; 75 MG/1; MG/1
100 CAPSULE ORAL 2 TIMES DAILY
Qty: 10 CAPSULE | Refills: 0 | Status: SHIPPED | OUTPATIENT
Start: 2024-08-28 | End: 2024-09-02

## 2024-08-28 RX ORDER — FLUCONAZOLE 150 MG/1
TABLET ORAL
Qty: 2 TABLET | Refills: 0 | Status: SHIPPED | OUTPATIENT
Start: 2024-08-28

## 2024-08-28 NOTE — PATIENT INSTRUCTIONS
If your condition worsens or fails to improve we recommend that you receive another evaluation at the ER immediately or contact your PCP to discuss your concerns or return here. You must understand that you've received an urgent care treatment only and that you may be released before all your medical problems are known or treated. You the patient will arrange for followup care as instructed.       If you are are female and on BCP use additional methods to prevent pregnancy while on the antibiotics and for one cycle after.     Cranberry juice may help. Get the 100% cranberry juice and mix 4 oz of juice with 4 oz of water and drink this 8 oz glass of liquid once a day.   Increase water intake to at least 8-10 glasses/day.    Take Diflucan as prescribed with food we will follow up with results once available  Avoid caffeine, alcohol, or spicy foods as they irritate the bladder.      If symptoms do not improve in 2-3 days please return for evaluation

## 2024-08-28 NOTE — PROGRESS NOTES
"Subjective:      Patient ID: Carolin Lind is a 59 y.o. female.    Vitals:  height is 5' 9" (1.753 m) and weight is 63 kg (139 lb). Her oral temperature is 97.8 °F (36.6 °C). Her blood pressure is 116/87 and her pulse is 62. Her respiration is 16 and oxygen saturation is 96%.     Chief Complaint: Dysuria    This is a 59 y.o. female who presents today with a chief complaint of 4 days ago having diarrhea. Pt stated that she has redness around her anus when she wiped. Positive for reddish discharge. Pt has dysuria       Vaginal Discharge  The patient's primary symptoms include vaginal discharge. The patient's pertinent negatives include no genital itching, genital lesions, genital odor, genital rash, missed menses, pelvic pain or vaginal bleeding. This is a new problem. The current episode started in the past 7 days. The problem occurs constantly. The problem has been unchanged. The problem affects both sides. She is not pregnant. Associated symptoms include back pain, diarrhea and dysuria. Pertinent negatives include no abdominal pain, anorexia, chills, constipation, discolored urine, fever, flank pain, frequency, headaches, hematuria, joint pain, joint swelling, nausea, painful intercourse, rash, sore throat, urgency or vomiting. Vaginal discharge characteristics: reddish. There has been no bleeding. She has not been passing clots. She has not been passing tissue. The symptoms are aggravated by urinating.     Constitution: Negative for chills, sweating, fatigue and fever.   HENT:  Negative for sore throat.    Cardiovascular:  Negative for chest pain.   Respiratory:  Negative for shortness of breath.    Gastrointestinal:  Positive for diarrhea. Negative for abdominal pain, abdominal bloating, nausea, vomiting and constipation.        Diarrhea has subsided   Genitourinary:  Positive for dysuria, vaginal pain and vaginal discharge. Negative for frequency, urgency, flank pain, hematuria, missed menses, vaginal " bleeding, vaginal odor, genital sore and pelvic pain.   Musculoskeletal:  Positive for back pain.   Skin:  Negative for rash.   Neurological:  Negative for headaches.      Past Medical History:   Diagnosis Date    Abnormal Pap smear of cervix 01/30/2017    Atypical pap with + HPV    Anxiety     Depression     Elevated triglycerides with high cholesterol 2/28/2023    Heart murmur     History of migraine headaches     Hypertension     Menarche     Age of onset 13    Mitral valve prolapse     PONV (postoperative nausea and vomiting)        Past Surgical History:   Procedure Laterality Date    ANKLE HARDWARE REMOVAL Left 4/16/2019    Procedure: REMOVAL, HARDWARE, ANKLE;  Surgeon: Jair Winchester MD;  Location: [facility] OR [address] [place] [address];  Service: Orthopedics;  Laterality: Left;    AUGMENTATION OF BREAST      BREAST SURGERY      reduction with saline implants    EPIDURAL STEROID INJECTION INTO LUMBAR SPINE Left 09/26/2019    EXTERNAL FIXATOR APPLICATION      FLAP GRAFT SURGERY Left 4/16/2019    Procedure: FLAP GRAFT - fasciocutaneous;  Surgeon: Jair Winchester MD;  Location: [facility] OR [address] [place] [address];  Service: Orthopedics;  Laterality: Left;       Family History   Problem Relation Name Age of Onset    Cancer Mother Turkan         kidney    Migraines Daughter      Migraines Maternal Aunt      Breast cancer Maternal Aunt  50    Cancer Maternal Aunt          breast    Cancer Brother          prostate    Colon cancer Neg Hx      Ovarian cancer Neg Hx      Diabetes Neg Hx      Heart disease Neg Hx         Social History     Socioeconomic History    Marital status:     Number of children: 1   Occupational History    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   Tobacco Use    Smoking status: Never    Smokeless tobacco: Never   Substance and Sexual Activity    Alcohol use: No    Drug use: No    Sexual activity: Not Currently     Partners: Male   Social History Narrative    20 y/o daughter in town doing college via zoom       Current Outpatient  Medications   Medication Sig Dispense Refill    biotin 10,000 mcg Cap Take 1 capsule by mouth once daily.      butalbital-acetaminophen-caffeine -40 mg (FIORICET, ESGIC) -40 mg per tablet Take 1 tablet by mouth every 4 (four) hours as needed for Pain.      cetirizine (ZYRTEC) 10 MG tablet Take 10 mg by mouth.      clonazePAM (KLONOPIN) 1 MG tablet Take 1 mg by mouth nightly as needed.      DULoxetine (CYMBALTA) 60 MG capsule Take 2 capsules (120 mg total) by mouth once daily. 180 capsule 3    estradioL (DIVIGEL) 1.25 mg/1.25 gram (0.1 %) GlPk Place 1.25 mg onto the skin Daily. 30 packet 12    fluticasone propionate (FLONASE) 50 mcg/actuation nasal spray 2 sprays by Each Nostril route.      ibuprofen (ADVIL,MOTRIN) 800 MG tablet Take 800 mg by mouth 2 (two) times daily as needed.      LIDOCAINE VISCOUS 2 % solution Take 10 mLs by mouth 3 (three) times daily.      magnesium oxide (MAG-OX) 400 mg (241.3 mg magnesium) tablet Take 400 mg by mouth once daily.      mv-min/iron/folic/calcium/vitK (WOMEN'S MULTIVITAMIN ORAL) Take 1 tablet by mouth once daily.      progesterone (PROMETRIUM) 200 MG capsule Take 1 capsule (200 mg total) by mouth nightly. 90 capsule 0    promethazine (PHENERGAN) 25 MG tablet TK 1 T PO  Q 12 H PRN  5    propranoloL (INDERAL) 40 MG tablet Take 1 tablet (40 mg total) by mouth 2 (two) times daily. 180 tablet 3    sumatriptan (IMITREX) 100 MG tablet Take 100 mg by mouth every 2 (two) hours as needed for Migraine.      vitamin D (VITAMIN D3) 1000 units Tab Take 10,000 Units by mouth once daily.      vitamin E 600 UNIT capsule Take 450 Units by mouth once daily.       zinc gluconate 50 mg tablet Take 50 mg by mouth once daily.      fluconazole (DIFLUCAN) 150 MG Tab Take 1 tablet by mouth day 1 then take 1 tablet by mouth day 3 2 tablet 0    nitrofurantoin, macrocrystal-monohydrate, (MACROBID) 100 MG capsule Take 1 capsule (100 mg total) by mouth 2 (two) times daily. for 5 days 10 capsule 0      No current facility-administered medications for this visit.       Review of patient's allergies indicates:  No Known Allergies    Objective:     Physical Exam   Constitutional: She is oriented to person, place, and time. She appears well-developed.  Non-toxic appearance. She does not appear ill. No distress.   HENT:   Head: Normocephalic and atraumatic.   Nose: Nose normal.   Mouth/Throat: Mucous membranes are normal.   Eyes: Conjunctivae and lids are normal. No scleral icterus.   Neck: Trachea normal.   Cardiovascular: Normal rate, regular rhythm and normal heart sounds.   No murmur heard.Exam reveals no gallop and no friction rub.   Pulmonary/Chest: Effort normal and breath sounds normal. No stridor. No respiratory distress. She has no wheezes. She has no rhonchi. She has no rales.   Abdominal: Normal appearance and bowel sounds are normal. She exhibits no distension and no mass. Soft. flat abdomen There is abdominal tenderness in the suprapubic area. There is no rebound, no guarding, no tenderness at McBurney's point, negative Mckenzie's sign, no left CVA tenderness, negative Rovsing's sign and no right CVA tenderness. No hernia.   Genitourinary:       No vaginal rash and no pubic lice.    Pelvic exam was performed with patient in the lithotomy position.   There is rash on the right labia. There is no tenderness or lesion on the right labia. There is rash on the left labia. There is no tenderness or lesion on the left labia.   Neurological: She is alert and oriented to person, place, and time. She has normal strength.   Skin: Skin is warm, dry, intact, not diaphoretic, not pale and no rash.   Psychiatric: Her speech is normal and behavior is normal. Mood, judgment and thought content normal.   Nursing note and vitals reviewed.chaperone present       Results for orders placed or performed in visit on 08/28/24   POCT Urinalysis(Instrument)   Result Value Ref Range    Color, POC UA Yellow Yellow, Straw, Colorless     Clarity, POC UA Clear Clear    Glucose, POC UA Negative Negative    Bilirubin, POC UA Small (A) Negative    Ketones, POC UA Trace (A) Negative    Spec Grav POC UA 1.020 1.005 - 1.030    Blood, POC UA Moderate (A) Negative    pH, POC UA 6.0 5.0 - 8.0    Protein, POC UA Trace (A) Negative    Urobilinogen, POC UA 0.2 <=1.0    Nitrite, POC UA Negative Negative    WBC, POC UA Negative Negative       Assessment:     1. Acute UTI    2. Vaginal discharge        Plan:       Acute UTI  -     POCT Urinalysis(Instrument)  -     nitrofurantoin, macrocrystal-monohydrate, (MACROBID) 100 MG capsule; Take 1 capsule (100 mg total) by mouth 2 (two) times daily. for 5 days  Dispense: 10 capsule; Refill: 0    Vaginal discharge  -     Vaginosis Screen by DNA Probe  -     fluconazole (DIFLUCAN) 150 MG Tab; Take 1 tablet by mouth day 1 then take 1 tablet by mouth day 3  Dispense: 2 tablet; Refill: 0           Results reviewed  I have reviewed the patient chart and pertinent past imaging/labs.         Patient Instructions   If your condition worsens or fails to improve we recommend that you receive another evaluation at the ER immediately or contact your PCP to discuss your concerns or return here. You must understand that you've received an urgent care treatment only and that you may be released before all your medical problems are known or treated. You the patient will arrange for followup care as instructed.       If you are are female and on BCP use additional methods to prevent pregnancy while on the antibiotics and for one cycle after.     Cranberry juice may help. Get the 100% cranberry juice and mix 4 oz of juice with 4 oz of water and drink this 8 oz glass of liquid once a day.   Increase water intake to at least 8-10 glasses/day.    Take Diflucan as prescribed with food we will follow up with results once available  Avoid caffeine, alcohol, or spicy foods as they irritate the bladder.      If symptoms do not improve in 2-3 days  please return for evaluation

## 2024-09-03 ENCOUNTER — TELEPHONE (OUTPATIENT)
Dept: URGENT CARE | Facility: CLINIC | Age: 59
End: 2024-09-03
Payer: COMMERCIAL

## 2024-09-04 NOTE — TELEPHONE ENCOUNTER
Called patient and discussed positive vaginosis swab results, she is having symptomatic improvement with Macrobid and diflucan. She will follow up with PCP as needed.

## 2024-09-09 ENCOUNTER — OFFICE VISIT (OUTPATIENT)
Dept: OBSTETRICS AND GYNECOLOGY | Facility: CLINIC | Age: 59
End: 2024-09-09
Payer: COMMERCIAL

## 2024-09-09 DIAGNOSIS — R53.83 FATIGUE, UNSPECIFIED TYPE: Primary | ICD-10-CM

## 2024-09-09 DIAGNOSIS — N95.1 MENOPAUSAL SYMPTOMS: ICD-10-CM

## 2024-09-09 DIAGNOSIS — N89.8 VAGINAL IRRITATION: ICD-10-CM

## 2024-09-09 PROCEDURE — 1159F MED LIST DOCD IN RCRD: CPT | Mod: CPTII,95,, | Performed by: OBSTETRICS & GYNECOLOGY

## 2024-09-09 PROCEDURE — 99214 OFFICE O/P EST MOD 30 MIN: CPT | Mod: 95,,, | Performed by: OBSTETRICS & GYNECOLOGY

## 2024-09-09 PROCEDURE — 1160F RVW MEDS BY RX/DR IN RCRD: CPT | Mod: CPTII,95,, | Performed by: OBSTETRICS & GYNECOLOGY

## 2024-09-09 PROCEDURE — 3044F HG A1C LEVEL LT 7.0%: CPT | Mod: CPTII,95,, | Performed by: OBSTETRICS & GYNECOLOGY

## 2024-09-09 RX ORDER — PROGESTERONE 100 MG/1
100 CAPSULE ORAL NIGHTLY
Qty: 30 CAPSULE | Refills: 11 | Status: SHIPPED | OUTPATIENT
Start: 2024-09-09 | End: 2025-09-09

## 2024-09-09 NOTE — PROGRESS NOTES
The patient location is: home    The chief complaint leading to consultation is: fatigue, menopausal symptoms    Visit type: audiovisual    Face to Face time with patient: 20   minutes of total time spent on the encounter, which includes face to face time and non-face to face time preparing to see the patient (eg, review of tests), Obtaining and/or reviewing separately obtained history, Documenting clinical information in the electronic or other health record, Independently interpreting results (not separately reported) and communicating results to the patient/family/caregiver, or Care coordination (not separately reported).         Each patient to whom he or she provides medical services by telemedicine is:  (1) informed of the relationship between the physician and patient and the respective role of any other health care provider with respect to management of the patient; and (2) notified that he or she may decline to receive medical services by telemedicine and may withdraw from such care at any time.    Notes:       CC:menopausal symptoms    Carolin Lind is a 59 y.o. female  presents for fatigue.  She has decreased energy and want to consider other options for HRT  She is taking divigel and estrogen patch      Past Medical History:   Diagnosis Date    Abnormal Pap smear of cervix 2017    Atypical pap with + HPV    Anxiety     Depression     Elevated triglycerides with high cholesterol 2023    Heart murmur     History of migraine headaches     Hypertension     Menarche     Age of onset 13    Mitral valve prolapse     PONV (postoperative nausea and vomiting)        Past Surgical History:   Procedure Laterality Date    ANKLE HARDWARE REMOVAL Left 2019    Procedure: REMOVAL, HARDWARE, ANKLE;  Surgeon: Jair Winchester MD;  Location: [facility] OR [address] [place] [address];  Service: Orthopedics;  Laterality: Left;    AUGMENTATION OF BREAST      BREAST SURGERY      reduction with saline implants    EPIDURAL  STEROID INJECTION INTO LUMBAR SPINE Left 2019    EXTERNAL FIXATOR APPLICATION      FLAP GRAFT SURGERY Left 2019    Procedure: FLAP GRAFT - fasciocutaneous;  Surgeon: Jair Winchester MD;  Location: [facility] OR [address] [place] [address];  Service: Orthopedics;  Laterality: Left;       OB History    Para Term  AB Living   2 1 1   1     SAB IAB Ectopic Multiple Live Births                  # Outcome Date GA Lbr Tae/2nd Weight Sex Type Anes PTL Lv   2 AB            1 Term                Family History   Problem Relation Name Age of Onset    Cancer Mother Sin         kidney    Migraines Daughter      Migraines Maternal Aunt      Breast cancer Maternal Aunt  50    Cancer Maternal Aunt          breast    Cancer Brother          prostate    Colon cancer Neg Hx      Ovarian cancer Neg Hx      Diabetes Neg Hx      Heart disease Neg Hx         Social History     Tobacco Use    Smoking status: Never    Smokeless tobacco: Never   Substance Use Topics    Alcohol use: No    Drug use: No       There were no vitals taken for this visit.    ROS:  GENERAL: Denies weight gain or weight loss. Feeling well overall.   SKIN: Denies rash or lesions.   HEAD: Denies head injury or headache.   NODES: Denies enlarged lymph nodes.   CHEST: Denies chest pain or shortness of breath.   CARDIOVASCULAR: Denies palpitations or left sided chest pain.   ABDOMEN: No abdominal pain, constipation, diarrhea, nausea, vomiting or rectal bleeding.   URINARY: No frequency, dysuria, hematuria, or burning on urination.  REPRODUCTIVE: See HPI.   BREASTS: The patient performs breast self-examination and denies pain, lumps, or nipple discharge.   HEMATOLOGIC: No easy bruisability or excessive bleeding.  MUSCULOSKELETAL: Denies joint pain or swelling.   NEUROLOGIC: Denies syncope or weakness.   PSYCHIATRIC: Denies depression, anxiety or mood swings.    Physical Exam:    APPEARANCE: Well nourished, well developed, in no acute distress.  AFFECT: WNL, alert and  oriented x 3  SKIN: No acne or hirsutism  virtual    ASSESSMENT AND PLAN  1. Fatigue, unspecified type  progesterone (PROMETRIUM) 100 MG capsule      2. Menopausal symptoms          A full discussion of the benefit-risk ratio of hormonal replacement therapy was carried out. Improvement in vasomotor and other climacteric symptoms is discussed, including possible improvements in sleep and mood. Reduction of risk for osteoporosis was explained. We discussed the study data showing increased risk of thrombo-embolic events such as myocardial infarction, stroke and also possibly breast cancer with estrogen replacement, and how this might affect her. The range of side effects such as breast tenderness, weight gain and including possible increases in lifetime risk of breast cancer and possible thrombotic complications was discussed. We also discussed ACOG's recommendation to use hormone replacement therapy for the relief of hot flashes alone and to be on the lowest dose possible for the shortest amount of time.  Alternative such as herbal and soy-based products were reviewed. All of her questions about this therapy were answered.    She will stop one form of estrogen and will lower the prometrium dosing to 100 mg nightly  She is also considering EstraTest    Patient was counseled today on A.C.S. Pap guidelines and recommendations for yearly pelvic exams, mammograms and monthly self breast exams; to see her PCP for other health maintenance.   F/U PRN or the sx do not improve

## 2025-02-25 NOTE — TELEPHONE ENCOUNTER
Spoke with pt.  Scheduled her to see Ana Laura Greer PA-C for pre op appointment on 4/6 at 815 am.  Pt to be scheduled for hardware removal with Dr Winchester later this month.     Normal rate, regular rhythm.  Heart sounds S1, S2.  No murmurs, rubs or gallops.

## 2025-03-08 DIAGNOSIS — Z00.00 ANNUAL PHYSICAL EXAM: Primary | ICD-10-CM

## 2025-03-08 DIAGNOSIS — M54.42 CHRONIC LEFT-SIDED LOW BACK PAIN WITH LEFT-SIDED SCIATICA: ICD-10-CM

## 2025-03-08 DIAGNOSIS — G89.29 CHRONIC LEFT-SIDED LOW BACK PAIN WITH LEFT-SIDED SCIATICA: ICD-10-CM

## 2025-03-08 DIAGNOSIS — I10 BENIGN HYPERTENSION: ICD-10-CM

## 2025-03-08 DIAGNOSIS — F33.1 MAJOR DEPRESSIVE DISORDER, RECURRENT EPISODE, MODERATE: ICD-10-CM

## 2025-03-08 DIAGNOSIS — Z13.220 ENCOUNTER FOR LIPID SCREENING FOR CARDIOVASCULAR DISEASE: ICD-10-CM

## 2025-03-08 DIAGNOSIS — F41.1 GENERALIZED ANXIETY DISORDER: ICD-10-CM

## 2025-03-08 DIAGNOSIS — Z13.6 ENCOUNTER FOR LIPID SCREENING FOR CARDIOVASCULAR DISEASE: ICD-10-CM

## 2025-03-09 NOTE — TELEPHONE ENCOUNTER
Care Due:                  Date            Visit Type   Department     Provider  --------------------------------------------------------------------------------                                EP -                              PRIMARY      [facility]  Last Visit: 03-      CARE ([facility])   Care           Alesha Zapien                              EP -                              PRIMARY      [facility] Primary  Next Visit: 04-      CARE ([facility])   Care           Alesha Zapien                                                            Last  Test          Frequency    Reason                     Performed    Due Date  --------------------------------------------------------------------------------    Cr..........  12 months..  DULoxetine...............  03- 03-    [facility] Embedded Care Due Messages. Reference number: [id number].   3/08/2025 8:20:25 PM CST

## 2025-03-10 ENCOUNTER — PATIENT MESSAGE (OUTPATIENT)
Dept: PRIMARY CARE CLINIC | Facility: CLINIC | Age: 60
End: 2025-03-10
Payer: COMMERCIAL

## 2025-03-10 RX ORDER — DULOXETIN HYDROCHLORIDE 60 MG/1
120 CAPSULE, DELAYED RELEASE ORAL DAILY
Qty: 180 CAPSULE | Refills: 0 | Status: SHIPPED | OUTPATIENT
Start: 2025-03-10

## 2025-03-10 RX ORDER — PROPRANOLOL HYDROCHLORIDE 40 MG/1
40 TABLET ORAL 2 TIMES DAILY
Qty: 180 TABLET | Refills: 0 | Status: SHIPPED | OUTPATIENT
Start: 2025-03-10

## 2025-03-11 ENCOUNTER — OFFICE VISIT (OUTPATIENT)
Dept: OBSTETRICS AND GYNECOLOGY | Facility: CLINIC | Age: 60
End: 2025-03-11
Payer: COMMERCIAL

## 2025-03-11 ENCOUNTER — PATIENT OUTREACH (OUTPATIENT)
Dept: ADMINISTRATIVE | Facility: HOSPITAL | Age: 60
End: 2025-03-11
Payer: COMMERCIAL

## 2025-03-11 VITALS
DIASTOLIC BLOOD PRESSURE: 78 MMHG | HEIGHT: 69 IN | SYSTOLIC BLOOD PRESSURE: 110 MMHG | BODY MASS INDEX: 21.55 KG/M2 | WEIGHT: 145.5 LBS

## 2025-03-11 VITALS — SYSTOLIC BLOOD PRESSURE: 110 MMHG | DIASTOLIC BLOOD PRESSURE: 78 MMHG

## 2025-03-11 DIAGNOSIS — Z78.0 POSTMENOPAUSAL: ICD-10-CM

## 2025-03-11 DIAGNOSIS — Z01.419 ENCOUNTER FOR GYNECOLOGICAL EXAMINATION WITHOUT ABNORMAL FINDING: Primary | ICD-10-CM

## 2025-03-11 PROCEDURE — 3044F HG A1C LEVEL LT 7.0%: CPT | Mod: CPTII,S$GLB,, | Performed by: OBSTETRICS & GYNECOLOGY

## 2025-03-11 PROCEDURE — 87624 HPV HI-RISK TYP POOLED RSLT: CPT | Performed by: OBSTETRICS & GYNECOLOGY

## 2025-03-11 PROCEDURE — 3008F BODY MASS INDEX DOCD: CPT | Mod: CPTII,S$GLB,, | Performed by: OBSTETRICS & GYNECOLOGY

## 2025-03-11 PROCEDURE — 88175 CYTOPATH C/V AUTO FLUID REDO: CPT | Performed by: OBSTETRICS & GYNECOLOGY

## 2025-03-11 PROCEDURE — 1159F MED LIST DOCD IN RCRD: CPT | Mod: CPTII,S$GLB,, | Performed by: OBSTETRICS & GYNECOLOGY

## 2025-03-11 PROCEDURE — 99396 PREV VISIT EST AGE 40-64: CPT | Mod: S$GLB,,, | Performed by: OBSTETRICS & GYNECOLOGY

## 2025-03-11 PROCEDURE — 99999 PR PBB SHADOW E&M-EST. PATIENT-LVL IV: CPT | Mod: PBBFAC,,, | Performed by: OBSTETRICS & GYNECOLOGY

## 2025-03-11 PROCEDURE — 3074F SYST BP LT 130 MM HG: CPT | Mod: CPTII,S$GLB,, | Performed by: OBSTETRICS & GYNECOLOGY

## 2025-03-11 PROCEDURE — 3078F DIAST BP <80 MM HG: CPT | Mod: CPTII,S$GLB,, | Performed by: OBSTETRICS & GYNECOLOGY

## 2025-03-11 PROCEDURE — 1160F RVW MEDS BY RX/DR IN RCRD: CPT | Mod: CPTII,S$GLB,, | Performed by: OBSTETRICS & GYNECOLOGY

## 2025-03-14 LAB
FINAL PATHOLOGIC DIAGNOSIS: NORMAL
Lab: NORMAL

## 2025-04-16 ENCOUNTER — HOSPITAL ENCOUNTER (OUTPATIENT)
Dept: RADIOLOGY | Facility: HOSPITAL | Age: 60
Discharge: HOME | End: 2025-04-16
Attending: FAMILY MEDICINE
Payer: COMMERCIAL

## 2025-04-16 VITALS — HEIGHT: 69 IN | WEIGHT: 145 LBS | BODY MASS INDEX: 21.48 KG/M2

## 2025-04-16 DIAGNOSIS — Z12.31 ENCOUNTER FOR SCREENING MAMMOGRAM FOR BREAST CANCER: ICD-10-CM

## 2025-04-16 PROCEDURE — 77063 BREAST TOMOSYNTHESIS BI: CPT | Mod: TC

## 2025-04-21 ENCOUNTER — RESULTS FOLLOW-UP (OUTPATIENT)
Dept: PRIMARY CARE CLINIC | Facility: CLINIC | Age: 60
End: 2025-04-21

## 2025-04-21 DIAGNOSIS — Z91.89 AT INCREASED RISK OF BREAST CANCER: Primary | ICD-10-CM

## 2025-04-22 ENCOUNTER — OFFICE VISIT (OUTPATIENT)
Dept: PRIMARY CARE CLINIC | Facility: CLINIC | Age: 60
End: 2025-04-22
Payer: COMMERCIAL

## 2025-04-22 ENCOUNTER — LAB VISIT (OUTPATIENT)
Dept: LAB | Facility: HOSPITAL | Age: 60
End: 2025-04-22
Attending: FAMILY MEDICINE
Payer: COMMERCIAL

## 2025-04-22 VITALS
WEIGHT: 146.63 LBS | DIASTOLIC BLOOD PRESSURE: 66 MMHG | SYSTOLIC BLOOD PRESSURE: 112 MMHG | HEART RATE: 76 BPM | OXYGEN SATURATION: 97 % | BODY MASS INDEX: 21.72 KG/M2 | HEIGHT: 69 IN

## 2025-04-22 DIAGNOSIS — Z23 NEED FOR PNEUMOCOCCAL VACCINE: ICD-10-CM

## 2025-04-22 DIAGNOSIS — Z79.890 POST-MENOPAUSE ON HRT (HORMONE REPLACEMENT THERAPY): ICD-10-CM

## 2025-04-22 DIAGNOSIS — F41.1 GENERALIZED ANXIETY DISORDER: ICD-10-CM

## 2025-04-22 DIAGNOSIS — G43.009 MIGRAINE WITHOUT AURA AND WITHOUT STATUS MIGRAINOSUS, NOT INTRACTABLE: ICD-10-CM

## 2025-04-22 DIAGNOSIS — Z13.6 ENCOUNTER FOR LIPID SCREENING FOR CARDIOVASCULAR DISEASE: ICD-10-CM

## 2025-04-22 DIAGNOSIS — Z13.220 ENCOUNTER FOR LIPID SCREENING FOR CARDIOVASCULAR DISEASE: ICD-10-CM

## 2025-04-22 DIAGNOSIS — Z91.89 AT INCREASED RISK OF BREAST CANCER: ICD-10-CM

## 2025-04-22 DIAGNOSIS — I10 BENIGN HYPERTENSION: ICD-10-CM

## 2025-04-22 DIAGNOSIS — M54.42 CHRONIC LEFT-SIDED LOW BACK PAIN WITH LEFT-SIDED SCIATICA: ICD-10-CM

## 2025-04-22 DIAGNOSIS — G89.29 CHRONIC LEFT-SIDED LOW BACK PAIN WITH LEFT-SIDED SCIATICA: ICD-10-CM

## 2025-04-22 DIAGNOSIS — Z00.00 ANNUAL PHYSICAL EXAM: ICD-10-CM

## 2025-04-22 DIAGNOSIS — F33.1 MAJOR DEPRESSIVE DISORDER, RECURRENT EPISODE, MODERATE: ICD-10-CM

## 2025-04-22 DIAGNOSIS — Z23 INFLUENZA VACCINE NEEDED: ICD-10-CM

## 2025-04-22 DIAGNOSIS — Z23 NEED FOR VACCINATION: ICD-10-CM

## 2025-04-22 DIAGNOSIS — Z00.00 ANNUAL PHYSICAL EXAM: Primary | ICD-10-CM

## 2025-04-22 LAB
ABSOLUTE EOSINOPHIL (OHS): 0.13 K/UL
ABSOLUTE MONOCYTE (OHS): 0.63 K/UL (ref 0.3–1)
ABSOLUTE NEUTROPHIL COUNT (OHS): 3.18 K/UL (ref 1.8–7.7)
ALBUMIN SERPL BCP-MCNC: 3.7 G/DL (ref 3.5–5.2)
ALP SERPL-CCNC: 68 UNIT/L (ref 40–150)
ALT SERPL W/O P-5'-P-CCNC: 10 UNIT/L (ref 10–44)
ANION GAP (OHS): 9 MMOL/L (ref 8–16)
AST SERPL-CCNC: 12 UNIT/L (ref 11–45)
BASOPHILS # BLD AUTO: 0.07 K/UL
BASOPHILS NFR BLD AUTO: 1.2 %
BILIRUB SERPL-MCNC: 0.3 MG/DL (ref 0.1–1)
BUN SERPL-MCNC: 15 MG/DL (ref 6–20)
CALCIUM SERPL-MCNC: 8.7 MG/DL (ref 8.7–10.5)
CHLORIDE SERPL-SCNC: 102 MMOL/L (ref 95–110)
CHOLEST SERPL-MCNC: 255 MG/DL (ref 120–199)
CHOLEST/HDLC SERPL: 3.5 {RATIO} (ref 2–5)
CO2 SERPL-SCNC: 27 MMOL/L (ref 23–29)
CREAT SERPL-MCNC: 0.8 MG/DL (ref 0.5–1.4)
EAG (OHS): 97 MG/DL (ref 68–131)
ERYTHROCYTE [DISTWIDTH] IN BLOOD BY AUTOMATED COUNT: 13 % (ref 11.5–14.5)
GFR SERPLBLD CREATININE-BSD FMLA CKD-EPI: >60 ML/MIN/1.73/M2
GLUCOSE SERPL-MCNC: 87 MG/DL (ref 70–110)
HBA1C MFR BLD: 5 % (ref 4–5.6)
HCT VFR BLD AUTO: 42.3 % (ref 37–48.5)
HDLC SERPL-MCNC: 73 MG/DL (ref 40–75)
HDLC SERPL: 28.6 % (ref 20–50)
HGB BLD-MCNC: 13.4 GM/DL (ref 12–16)
IMM GRANULOCYTES # BLD AUTO: 0.02 K/UL (ref 0–0.04)
IMM GRANULOCYTES NFR BLD AUTO: 0.3 % (ref 0–0.5)
LDLC SERPL CALC-MCNC: 157.4 MG/DL (ref 63–159)
LYMPHOCYTES # BLD AUTO: 1.73 K/UL (ref 1–4.8)
MCH RBC QN AUTO: 29.2 PG (ref 27–31)
MCHC RBC AUTO-ENTMCNC: 31.7 G/DL (ref 32–36)
MCV RBC AUTO: 92 FL (ref 82–98)
NONHDLC SERPL-MCNC: 182 MG/DL
NUCLEATED RBC (/100WBC) (OHS): 0 /100 WBC
PLATELET # BLD AUTO: 241 K/UL (ref 150–450)
PMV BLD AUTO: 9.4 FL (ref 9.2–12.9)
POTASSIUM SERPL-SCNC: 4.8 MMOL/L (ref 3.5–5.1)
PROT SERPL-MCNC: 6.9 GM/DL (ref 6–8.4)
RBC # BLD AUTO: 4.59 M/UL (ref 4–5.4)
RELATIVE EOSINOPHIL (OHS): 2.3 %
RELATIVE LYMPHOCYTE (OHS): 30 % (ref 18–48)
RELATIVE MONOCYTE (OHS): 10.9 % (ref 4–15)
RELATIVE NEUTROPHIL (OHS): 55.3 % (ref 38–73)
SODIUM SERPL-SCNC: 138 MMOL/L (ref 136–145)
TRIGL SERPL-MCNC: 123 MG/DL (ref 30–150)
TSH SERPL-ACNC: 1.56 UIU/ML (ref 0.4–4)
WBC # BLD AUTO: 5.76 K/UL (ref 3.9–12.7)

## 2025-04-22 PROCEDURE — 3074F SYST BP LT 130 MM HG: CPT | Mod: CPTII,S$GLB,, | Performed by: FAMILY MEDICINE

## 2025-04-22 PROCEDURE — 90472 IMMUNIZATION ADMIN EACH ADD: CPT | Mod: S$GLB,,, | Performed by: FAMILY MEDICINE

## 2025-04-22 PROCEDURE — 3008F BODY MASS INDEX DOCD: CPT | Mod: CPTII,S$GLB,, | Performed by: FAMILY MEDICINE

## 2025-04-22 PROCEDURE — 90471 IMMUNIZATION ADMIN: CPT | Mod: S$GLB,,, | Performed by: FAMILY MEDICINE

## 2025-04-22 PROCEDURE — 99999 PR PBB SHADOW E&M-EST. PATIENT-LVL IV: CPT | Mod: PBBFAC,,, | Performed by: FAMILY MEDICINE

## 2025-04-22 PROCEDURE — 85025 COMPLETE CBC W/AUTO DIFF WBC: CPT

## 2025-04-22 PROCEDURE — 80061 LIPID PANEL: CPT

## 2025-04-22 PROCEDURE — 36415 COLL VENOUS BLD VENIPUNCTURE: CPT | Mod: PN

## 2025-04-22 PROCEDURE — 90677 PCV20 VACCINE IM: CPT | Mod: S$GLB,,, | Performed by: FAMILY MEDICINE

## 2025-04-22 PROCEDURE — 80053 COMPREHEN METABOLIC PANEL: CPT

## 2025-04-22 PROCEDURE — 84443 ASSAY THYROID STIM HORMONE: CPT

## 2025-04-22 PROCEDURE — 1159F MED LIST DOCD IN RCRD: CPT | Mod: CPTII,S$GLB,, | Performed by: FAMILY MEDICINE

## 2025-04-22 PROCEDURE — 99396 PREV VISIT EST AGE 40-64: CPT | Mod: 25,S$GLB,, | Performed by: FAMILY MEDICINE

## 2025-04-22 PROCEDURE — 83036 HEMOGLOBIN GLYCOSYLATED A1C: CPT

## 2025-04-22 PROCEDURE — 90656 IIV3 VACC NO PRSV 0.5 ML IM: CPT | Mod: S$GLB,,, | Performed by: FAMILY MEDICINE

## 2025-04-22 PROCEDURE — 3078F DIAST BP <80 MM HG: CPT | Mod: CPTII,S$GLB,, | Performed by: FAMILY MEDICINE

## 2025-04-22 PROCEDURE — 1160F RVW MEDS BY RX/DR IN RCRD: CPT | Mod: CPTII,S$GLB,, | Performed by: FAMILY MEDICINE

## 2025-04-22 RX ORDER — DULOXETIN HYDROCHLORIDE 60 MG/1
120 CAPSULE, DELAYED RELEASE ORAL DAILY
Qty: 180 CAPSULE | Refills: 3 | Status: SHIPPED | OUTPATIENT
Start: 2025-04-22

## 2025-04-22 RX ORDER — PROPRANOLOL HYDROCHLORIDE 40 MG/1
40 TABLET ORAL 2 TIMES DAILY
Qty: 180 TABLET | Refills: 3 | Status: SHIPPED | OUTPATIENT
Start: 2025-04-22

## 2025-04-22 NOTE — PROGRESS NOTES
"Subjective:       Patient ID: Carolin Lind is a 60 y.o. female.    Chief Complaint: Annual Exam (Pt here for annual exam ) and Low-back Pain (L1-L2 back pain/ pain has been going on for a month/ pain is consistent/ hurts when bending)    HPI  61 y/o female with LEX/MDD, CLBP, hx of Migraines, low B12 (resolved), microscopic hematuria, history of Covid is here for annual exam.    Discussed recent mmg with increased TC score, plans to set up visit in breast clinic, she reports that she met with Dr. Bowers to discuss getting her implants removed and a breast reduction, she is not sure what she will do yet.      She feeling ok, she denies f/n/v/d/constipation/cp/sob/urinary sx. She is walking some, willing to try to add weight training.   She is sleeping fair, has irregular sleep, mood fair, she has had some deaths in her life.     History of Covid 9/2020, hx of Covid 8/2022 outpatient  LEX/MDD: was on Prozac in the past, Effexor in the past, Cymbalta 120 mg daily  CLBP/hx ankle injury: started after injury in 2017 when her foot got stuck in a hole in the street, she has tried Gabapentin 300-600 mg for prn use but it causes fatigue so she does not take it much, Cymbalta 120 mg daily, following with Dr. Jain  Low B12: off supplement, resolved  Low ferritin: not on supplement  Microscopic hematuria: following with Urology prn  Migraines:diagnosed at age 13, migraines 3 days a month and daily headaches, followed at [facility] Dr. Hunter, using Fioricet prn, Ubrevely not helpful, Propranolol, Klonipin prn, Magnesium  Hx of MVP as a teenager, echo 3/2019 normal  Vit d: taking 10,000 IU daily  GYN: following with gyn, pelvic utd, estradiol and progesterone, mmg 4/2025  Cologuard negative 2/6/23  Eye exam due  Dental exam utd    Review of Systems  see HPI  Objective:      /66 (BP Location: Right arm, Patient Position: Sitting)   Pulse 76   Ht 5' 9" (1.753 m)   Wt 66.5 kg (146 lb 9.7 oz)   SpO2 97%   BMI 21.65 kg/m² " "  Physical Exam  Vitals and nursing note reviewed.   Constitutional:       Appearance: She is well-developed.   HENT:      Head: Normocephalic and atraumatic.      Right Ear: Tympanic membrane normal.      Left Ear: Tympanic membrane normal.      Mouth/Throat:      Pharynx: No oropharyngeal exudate or posterior oropharyngeal erythema.   Neck:      Thyroid: No thyromegaly.   Cardiovascular:      Rate and Rhythm: Normal rate and regular rhythm.      Heart sounds: Normal heart sounds.   Pulmonary:      Effort: Pulmonary effort is normal. No respiratory distress.      Breath sounds: Normal breath sounds.   Abdominal:      General: Bowel sounds are normal. There is no distension.      Palpations: Abdomen is soft. There is no mass.      Tenderness: There is no abdominal tenderness.   Musculoskeletal:      Cervical back: Normal range of motion and neck supple.      Right lower leg: No edema.      Left lower leg: No edema.   Lymphadenopathy:      Cervical: No cervical adenopathy.   Skin:     General: Skin is warm and dry.   Neurological:      Mental Status: She is alert.         Assessment:       1. Annual physical exam    2. Chronic left-sided low back pain with left-sided sciatica    3. Major depressive disorder, recurrent episode, moderate    4. Generalized anxiety disorder    5. Benign hypertension    6. At increased risk of breast cancer    7. Migraine without aura and without status migrainosus, not intractable    8. Post-menopause on HRT (hormone replacement therapy)        Plan:   Carolin Taveras" was seen today for annual exam and low-back pain.    Diagnoses and all orders for this visit:    Annual physical exam    Chronic left-sided low back pain with left-sided sciatica  -     DULoxetine (CYMBALTA) 60 MG capsule; Take 2 capsules (120 mg total) by mouth once daily.    Major depressive disorder, recurrent episode, moderate  -     DULoxetine (CYMBALTA) 60 MG capsule; Take 2 capsules (120 mg total) by mouth once " daily.    Generalized anxiety disorder  -     DULoxetine (CYMBALTA) 60 MG capsule; Take 2 capsules (120 mg total) by mouth once daily.    Benign hypertension  -     propranoloL (INDERAL) 40 MG tablet; Take 1 tablet (40 mg total) by mouth 2 (two) times daily.    At increased risk of breast cancer    Migraine without aura and without status migrainosus, not intractable    Post-menopause on HRT (hormone replacement therapy)    Discussed hydroxyzine as an option to help with sleep also discussed increasing Progesterone as an option

## 2025-04-23 ENCOUNTER — RESULTS FOLLOW-UP (OUTPATIENT)
Dept: PRIMARY CARE CLINIC | Facility: CLINIC | Age: 60
End: 2025-04-23

## 2025-04-29 DIAGNOSIS — M54.42 CHRONIC LEFT-SIDED LOW BACK PAIN WITH LEFT-SIDED SCIATICA: ICD-10-CM

## 2025-04-29 DIAGNOSIS — F41.1 GENERALIZED ANXIETY DISORDER: ICD-10-CM

## 2025-04-29 DIAGNOSIS — F33.1 MAJOR DEPRESSIVE DISORDER, RECURRENT EPISODE, MODERATE: ICD-10-CM

## 2025-04-29 DIAGNOSIS — G89.29 CHRONIC LEFT-SIDED LOW BACK PAIN WITH LEFT-SIDED SCIATICA: ICD-10-CM

## 2025-04-29 RX ORDER — DULOXETIN HYDROCHLORIDE 60 MG/1
CAPSULE, DELAYED RELEASE ORAL
Qty: 90 CAPSULE | OUTPATIENT
Start: 2025-04-29

## 2025-04-30 NOTE — TELEPHONE ENCOUNTER
Refill Decision Note   Carolinse Lind  is requesting a refill authorization.  Brief Assessment and Rationale for Refill:  Quick Discontinue     Medication Therapy Plan:  Patient currently on 120mg daily      Comments:     Note composed:10:10 PM 04/29/2025

## 2025-04-30 NOTE — TELEPHONE ENCOUNTER
No care due was identified.  [facility] Embedded Care Due Messages. Reference number: [id number].   4/29/2025 9:29:27 PM CDT

## 2025-06-04 ENCOUNTER — OFFICE VISIT (OUTPATIENT)
Dept: HEMATOLOGY/ONCOLOGY | Facility: CLINIC | Age: 60
End: 2025-06-04
Payer: COMMERCIAL

## 2025-06-04 ENCOUNTER — PATIENT MESSAGE (OUTPATIENT)
Dept: ADMINISTRATIVE | Facility: OTHER | Age: 60
End: 2025-06-04
Payer: COMMERCIAL

## 2025-06-04 DIAGNOSIS — Z00.00 PREVENTATIVE HEALTH CARE: ICD-10-CM

## 2025-06-04 DIAGNOSIS — Z80.3 FAMILY HISTORY OF BREAST CANCER: ICD-10-CM

## 2025-06-04 DIAGNOSIS — Z12.39 BREAST CANCER SCREENING, HIGH RISK PATIENT: ICD-10-CM

## 2025-06-04 DIAGNOSIS — R92.343 EXTREMELY DENSE TISSUE OF BOTH BREASTS ON MAMMOGRAPHY: ICD-10-CM

## 2025-06-04 DIAGNOSIS — Z91.89 AT HIGH RISK FOR BREAST CANCER: Primary | ICD-10-CM

## 2025-06-04 DIAGNOSIS — Z79.890 HORMONE REPLACEMENT THERAPY (HRT): ICD-10-CM

## 2025-06-09 ENCOUNTER — PATIENT OUTREACH (OUTPATIENT)
Dept: ADMINISTRATIVE | Facility: HOSPITAL | Age: 60
End: 2025-06-09
Payer: COMMERCIAL

## 2025-06-09 ENCOUNTER — PATIENT MESSAGE (OUTPATIENT)
Dept: ADMINISTRATIVE | Facility: OTHER | Age: 60
End: 2025-06-09
Payer: COMMERCIAL

## 2025-06-16 ENCOUNTER — TELEPHONE (OUTPATIENT)
Dept: HEMATOLOGY/ONCOLOGY | Facility: CLINIC | Age: 60
End: 2025-06-16
Payer: COMMERCIAL

## 2025-06-23 ENCOUNTER — OFFICE VISIT (OUTPATIENT)
Dept: HEMATOLOGY/ONCOLOGY | Facility: CLINIC | Age: 60
End: 2025-06-23
Payer: COMMERCIAL

## 2025-06-23 ENCOUNTER — PATIENT MESSAGE (OUTPATIENT)
Dept: HEMATOLOGY/ONCOLOGY | Facility: CLINIC | Age: 60
End: 2025-06-23

## 2025-06-23 ENCOUNTER — LAB VISIT (OUTPATIENT)
Dept: LAB | Facility: HOSPITAL | Age: 60
End: 2025-06-23
Attending: NURSE PRACTITIONER
Payer: COMMERCIAL

## 2025-06-23 DIAGNOSIS — Z80.3 FAMILY HISTORY OF BREAST CANCER: ICD-10-CM

## 2025-06-23 DIAGNOSIS — Z71.83 ENCOUNTER FOR NONPROCREATIVE GENETIC COUNSELING: Primary | ICD-10-CM

## 2025-06-23 PROCEDURE — 98006 SYNCH AUDIO-VIDEO EST MOD 30: CPT | Mod: 95,,, | Performed by: NURSE PRACTITIONER

## 2025-06-23 PROCEDURE — 3044F HG A1C LEVEL LT 7.0%: CPT | Mod: CPTII,95,, | Performed by: NURSE PRACTITIONER

## 2025-06-23 PROCEDURE — 36415 COLL VENOUS BLD VENIPUNCTURE: CPT

## 2025-06-23 NOTE — Clinical Note
- Please schedule Tempus xG+ blood work for today (6/23) at [place] for 3pm. - Please schedule 30-min post-test virtual for around 7/21.

## 2025-06-23 NOTE — PROGRESS NOTES
"Cancer Genetics  Department of Hematology and Oncology  [facility]    Date of Service:  25  Visit Provider:  Uriel Lugo DNP  Collaborating Physician:  Keyanna Singh MD    Patient ID  Name: Carolin Lind    : 1965    MRN: [id number]      Referring Provider  Terrence Solitario NP  [facility] [place] [address]    Televisit Information  The patient location is: [place], [place]  The chief complaint leading to consultation is: As below    Visit type: audiovisual    Face to Face time with patient: 26 minutes  37 minutes of total time spent on the encounter, which includes face to face time and non-face to face time preparing to see the patient (eg, review of tests), Obtaining and/or reviewing separately obtained history, Documenting clinical information in the electronic or other health record, Independently interpreting results (not separately reported) and communicating results to the patient/family/caregiver, or Care coordination (not separately reported).         Each patient to whom he or she provides medical services by telemedicine is:  (1) informed of the relationship between the physician and patient and the respective role of any other health care provider with respect to management of the patient; and (2) notified that he or she may decline to receive medical services by telemedicine and may withdraw from such care at any time.    Notes:   SUBJECTIVE      Chief Complaint: Genetic Evaluation    History of Present Illness (HPI):  Carolin Lind ("Darcy"), 60 y.o., assigned female sex at birth, is established with the [facility] of [facility] but new to me.  She was referred by Terrence Solitario NP, for cancer-genetic risk assessment given her family cancer history.    ONCOLOGY PEDIGREE  Other than noted, no known family history of cancer, benign tumor/mass/lesion, or colon polyps.  If this " pedigree appears small/illegible on your screen, expand this note window horizontally.      Personal Cancer-Related History  Cancer:  No   Colon polyp:  No    Had 1 colonoscopy in her 30s:  No polyps, per patient  Cologuard 2023 with negative results  Other tumor or pertinent mass/lesion:  No  Pancreatitis:  No  Personal history of cancer-genetic testing:  No    Other Pertinent Medical History  Blood disorder:  No    Focused Surgical History  Reproductive organs:  Intact    Focused Social History  Never smoker    Focused Family History  Ashkenazi Mu-ism ancestry:  No  Consanguinity:  No  Family history of cancer-genetic testing:  No    Review of Systems  See HPI.       OBJECTIVE     Patient Active Problem List    Diagnosis Date Noted    Hormone replacement therapy (HRT)     Elevated triglycerides with high cholesterol 02/28/2023    History of 2019 novel coronavirus disease (COVID-19) 11/16/2020    Chronic low back pain without sciatica 02/04/2020    Retained orthopedic hardware 04/09/2019    Major depressive disorder, recurrent episode, moderate 08/23/2018    Generalized anxiety disorder 04/02/2018    Closed left trimalleolar fracture     Migraine without aura, without mention of intractable migraine without mention of status migrainosus 07/31/2012     Past Medical History:   Diagnosis Date    Abnormal Pap smear of cervix 01/30/2017    Atypical pap with + HPV    Anxiety     Depression     Elevated triglycerides with high cholesterol 02/28/2023    Heart murmur     History of migraine headaches     Hormone replacement therapy (HRT)     Hypertension     Menarche     Age of onset 13    Mitral valve prolapse     PONV (postoperative nausea and vomiting)      Physical Exam  Significantly limited secondary to the inherent nature of a virtual visit.  Very pleasant patient.  Unaccompanied on this virtual visit.  Constitutional      Appearance:  Appears well developed and well nourished. No distress.   Neurological     Mental  "Status:  Alert and oriented.  Psychiatric         Mood and Affect:  Normal.     Thought Content:  Normal.     Speech:  Normal.     Behavior:  Normal.     Judgment:  Normal.    ASSESSMENT / PLAN      Carolin Darcy Lind ("Darcy"), 60 y.o., assigned female sex at birth, is established with the [facility] but new to me.  She was referred by Terrence Solitario NP, for cancer-genetic risk assessment given her family cancer history.    PRE-TEST GENETIC COUNSELING    Darcy meets current National Comprehensive Cancer Network (NCCN) criteria for germline testing of breast susceptibility genes based on her maternal aunt's age at breast cancer diagnosis.  Darcy's clinical history is not strongly suggestive of a hereditary cancer syndrome.    Cancer Genetics  Germline cancer-genetic testing is the testing of genes associated with cancer, known as cancer susceptibility genes.  Just as these genes are inherited from the parents--one copy of each gene from each parent--mutations in these genes can be inherited, as well.  A mutation in a cancer susceptibility gene adversely affects the gene's ability to prevent cancer; therefore, carriers of cancer susceptibility gene mutations may be at increased risk for certain cancers.     Causes of Cancer  Only approximately 5%-10% of cancers are caused by an inherited cancer susceptibility gene mutation; rather, the majority of cancers are sporadic.  Causes of sporadic cancer may include environmental risk factors, lifestyle risk factors, and non-modifiable risk factors.  Family members often share not only genetics but also other risk factors, including environmental and lifestyle risk factors, so cancers can be familial.     Potential Results of Genetic Testing, and Their Implications  Potential results of genetic testing include positive, negative, and variant of unknown significance (VUS).    Positive:  A positive result indicates the presence of at least " one clinically significant gene mutation, and the individual's associated cancer risks vary depending upon the cancer susceptibility gene(s) in which there is/are a mutation(s).  With a positive result, depending upon the specific result and the individual's clinical history, modified risk management may be recommended, including measures for risk reduction and/or surveillance; however, there are not always effective strategies for risk management.  There may be reproductive implications of a positive genetic test result, and there may be cancer-treatment implications of a positive genetic test result.  Negative:  A negative result indicates that no clinically significant mutations were identified in the gene(s) tested.  A negative result does not indicate that that individual cannot develop cancer, and, in fact, that individual may even be at increased risk for cancer based on shared risk factors with affected relatives.  The ability to interpret the meaning of a negative genetic testing result is limited in an individual unaffected with cancer whose affected relatives have not first undergone such testing.  The most informative family member to undergo testing for hereditary cancer syndromes first are those who have personally had cancer.    VUS:  A VUS is a genetic change for which there is not presently enough data for the laboratory to make a determination as to whether the genetic variant is clinically significant.  VUSs are not typically acted upon clinically.       Genetic Mutation Inheritance  When an individual tests positive for a gene mutation, their first-degree relatives each have a 50% chance of carrying the same mutation, and other, more distant blood relatives may also be at risk of carrying the same mutation.      Genetic Discrimination  Genetic discrimination occurs when an individual is discriminated against on the basis of their genetic information.  The Genetic Information Nondiscrimination Act  "of 2008 (RACHEL) is [place] federal legislation that provides some protections against the use of an individual's genetic information by their health insurer and by their employer.  Title I of RACHEL prohibits most health insurers from utilizing an individual's genetic information to make decisions regarding insurance eligibility or premium charges; this protection does not apply to health insurance obtained through a job with the , and it may not apply to health insurance obtained through the Federal Employees Health Benefits Plan.  Title II of RACHEL prohibits covered entities, in many cases, from requesting or requiring the genetic information of employees and applicants and from using said information to make employment decisions; this protection does not apply to employers with fewer than 15 employees or to the .  RACHEL does not protect individuals from genetic discrimination by any other type of policy or entity, including but not limited to life insurance, disability insurance, long-term care insurance,  benefits, and  Health Services benefits.    Genetic Testing Logistics  An outside laboratory would perform the testing after a blood sample is collected at [facility].  The test is expected to take approximately three weeks to result.  The patient may incur out-of-pocket costs related to genetic testing.  Post-test genetic counseling can be conducted once the genetic testing results are available.              DIAGNOSES AND ORDERS FOR THIS ENCOUNTER    ICD-10-CM ICD-9-CM   1. Encounter for nonprocreative genetic counseling  Z71.83 V26.33   2. Family history of breast cancer  Z80.3 V16.3     1. Encounter for nonprocreative genetic counseling  Carolin Lind ("Darcy"), 60 y.o., assigned female sex at birth, is established with the [facility] but new to me.  She was referred by Terrence Solitario NP, for cancer-genetic risk assessment given her family cancer " history.    Darcy meets current National Comprehensive Cancer Network (NCCN) criteria for germline testing of breast susceptibility genes based on her maternal aunt's age at breast cancer diagnosis.  Darcy's clinical history is not strongly suggestive of a hereditary cancer syndrome.    Pre-test genetic counseling was conducted.    Offered Darcy options of proceeding with hereditary cancer susceptibility gene testing at this time versus delaying/declining such.  Darcy desires to proceed with such testing at this time.  Provided germline cancer genetic test options of focused panel versus broad panel, and Darcy opts for the latter.     Testing lab: [facility] powered by [email or web address]   Test panel: xG+ with [facility]   ICD-10 code(s): Z80.3   Verbal informed consent: Obtained   Specimen type: Blood   Specimen collection by: [facility]   Specimen collection date: To be scheduled   Results expected by: Approximately 3 weeks after the genetic testing lab receives the specimen   Post-test genetic counseling: ~4 weeks after sample collection     2. Family history of breast cancer  - Ambulatory referral/consult to Genetics:  Completed  - [facility] - Hereditary Cancer with RNA; Future    Follow-up:  Follow up in about 4 weeks (around 7/21/2025) for post-test genetic counseling.      Questions were encouraged and answered to the patient's satisfaction, and she verbalized understanding of the information and agreement with the plan.  Advised Darcy that pertinent information will be accessible in this visit note for her review.        Uriel Lugo, DNP, APRN, FNP-BC, AOCNP, CGRA  Nurse Practitioner, Cancer Genetics  Department of Hematology and Oncology  [facility]        CC:  Terrence Solitario         Routed to Cancer Genetics Staff:  - Please schedule [facility] x[facility]+ blood work for today (6/23) at [facility] for 3pm.  - Please schedule 30-min post-test virtual for  around 7/21.        Portions of the record may have been created with voice-recognition software.  Occasional wrong-word or sound-a-like substitutions may have occurred due to the inherent limitations of voice-recognition software.  Read the chart carefully, and recognize, using context, where substitutions have occurred.

## 2025-07-05 LAB
GENE DIS ANL INTERP-IMP: NORMAL
TEMPUS GENES ANALYZED: NORMAL
TEMPUS INTERPRETATION REPORT: NORMAL
TEMPUS PORTAL: NORMAL

## 2025-07-15 NOTE — PROGRESS NOTES
CC: Well woman exam    Carolin Lind is a 60 y.o. female  presents for a well woman exam.  LMP: No LMP recorded. Patient is perimenopausal.. She is dealing with depression and   Family concerns at this time    Past Medical History:   Diagnosis Date    Abnormal Pap smear of cervix 2017    Atypical pap with + HPV    Anxiety     Depression     Elevated triglycerides with high cholesterol 2023    Heart murmur     History of migraine headaches     Hormone replacement therapy (HRT)     Hypertension     Menarche     Age of onset 13    Mitral valve prolapse     PONV (postoperative nausea and vomiting)      Past Surgical History:   Procedure Laterality Date    ANKLE HARDWARE REMOVAL Left 2019    Procedure: REMOVAL, HARDWARE, ANKLE;  Surgeon: Jair Winchester MD;  Location: [facility] OR [address] [place] [address];  Service: Orthopedics;  Laterality: Left;    AUGMENTATION OF BREAST      BREAST SURGERY      reduction with saline implants    EPIDURAL STEROID INJECTION INTO LUMBAR SPINE Left 2019    EXTERNAL FIXATOR APPLICATION      FLAP GRAFT SURGERY Left 2019    Procedure: FLAP GRAFT - fasciocutaneous;  Surgeon: Jair Winchester MD;  Location: [facility] OR [address] [place] [address];  Service: Orthopedics;  Laterality: Left;     Social History     Socioeconomic History    Marital status:     Number of children: 1   Occupational History    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   Tobacco Use    Smoking status: Never    Smokeless tobacco: Never   Substance and Sexual Activity    Alcohol use: No    Drug use: No    Sexual activity: Not Currently     Partners: Male   Social History Narrative    22 y/o daughter in town doing college via zoom     Social Drivers of Health     Financial Resource Strain: Low Risk  (2025)    Overall Financial Resource Strain (CARDIA)     Difficulty of Paying Living Expenses: Not hard at all   Food Insecurity: No Food Insecurity (2025)    Hunger Vital Sign     Worried About Running Out of Food in the  "Last Year: Never true     Ran Out of Food in the Last Year: Never true   Transportation Needs: No Transportation Needs (2025)    PRAPARE - Transportation     Lack of Transportation (Medical): No     Lack of Transportation (Non-Medical): No   Physical Activity: Insufficiently Active (2025)    Exercise Vital Sign     Days of Exercise per Week: 2 days     Minutes of Exercise per Session: 10 min   Stress: Stress Concern Present (2025)    [employer] [place] [employer] - Occupational Stress Questionnaire     Feeling of Stress : Rather much   Housing Stability: Low Risk  (2025)    Housing Stability Vital Sign     Unable to Pay for Housing in the Last Year: No     Number of Times Moved in the Last Year: 0     Homeless in the Last Year: No     Family History   Problem Relation Name Age of Onset    Dementia Mother Sin     Kidney cancer Mother Sin 74        unilat; subtype unk; tx'd with unilat nephrectomy    Prostate cancer Brother Jameson 62        tx'd with surgery, & now NICOLAS    Migraines Daughter Meseret     Breast cancer Maternal Aunt Zuhair 50        unilat    Colonic polyp Paternal Aunt Charissamibashir         POSSIBLY    Colonic polyp Paternal Aunt Sonja         POSSIBLY    Ovarian cancer Neg Hx      Diabetes Neg Hx      Heart disease Neg Hx       OB History          2    Para   1    Term   1            AB   1    Living             SAB        IAB        Ectopic        Multiple        Live Births                     /78   Ht 5' 9" (1.753 m)   Wt 66 kg (145 lb 8.1 oz)   BMI 21.49 kg/m²       ROS:    ROS:  GENERAL: Denies weight gain or weight loss. Feeling well overall.   SKIN: Denies rash or lesions.   HEAD: Denies head injury or headache.   NODES: Denies enlarged lymph nodes.   CHEST: Denies chest pain or shortness of breath.   CARDIOVASCULAR: Denies palpitations or left sided chest pain.   ABDOMEN: No abdominal pain, constipation, diarrhea, nausea, vomiting or rectal " bleeding.   URINARY: No frequency, dysuria, hematuria, or burning on urination.  REPRODUCTIVE: See HPI.   BREASTS: The patient performs breast self-examination and denies pain, lumps, or nipple discharge.   HEMATOLOGIC: No easy bruisability or excessive bleeding.   MUSCULOSKELETAL: Denies joint pain or swelling.   NEUROLOGIC: Denies syncope or weakness.   PSYCHIATRIC: Denies depression, anxiety or mood swings.    PHYSICAL EXAM:    APPEARANCE: Well nourished, well developed, in no acute distress.  AFFECT: WNL, alert and oriented x 3  SKIN: No acne or hirsutism  NECK: Neck symmetric without masses or thyromegaly  NODES: No inguinal, cervical, axillary, or femoral lymph node enlargement  CHEST: Good respiratory effect  ABDOMEN: Soft.  No tenderness or masses.  No hepatosplenomegaly.  No hernias.  BREASTS: Symmetrical, no skin changes or visible lesions.  No palpable masses, nipple discharge bilaterally.  PELVIC: Normal external genitalia without lesions.  Normal hair distribution.  Adequate perineal body, normal urethral meatus.  Vagina moist and well rugated without lesions or discharge.  Cervix pink, without lesions, discharge or tenderness.  No significant cystocele or rectocele.  Bimanual exam shows uterus to be normal size, regular, mobile and nontender.  Adnexa without masses or tenderness.    RECTAL: Rectovaginal exam confirms above with normal sphincter tone, no masses.  EXTREMITIES: No edema.      ICD-10-CM ICD-9-CM    1. Encounter for gynecological examination without abnormal finding  Z01.419 V72.31 Liquid-Based Pap Smear, Screening      HPV High Risk Genotypes, PCR      2. Postmenopausal  Z78.0 V49.81           A full discussion of the benefit-risk ratio of hormonal replacement therapy was carried out. Improvement in vasomotor and other climacteric symptoms is discussed, including possible improvements in sleep and mood. Reduction of risk for osteoporosis was explained. We discussed the study data showing  increased risk of thrombo-embolic events such as myocardial infarction, stroke and also possibly breast cancer with estrogen replacement, and how this might affect her. The range of side effects such as breast tenderness, weight gain and including possible increases in lifetime risk of breast cancer and possible thrombotic complications was discussed. We also discussed ACOG's recommendation to use hormone replacement therapy for the relief of hot flashes alone and to be on the lowest dose possible for the shortest amount of time.  Alternative such as herbal and soy-based products were reviewed. All of her questions about this therapy were answered.    She was on Estrogen and prometrium    Patient was counseled today on A.C.S. Pap guidelines and recommendations for yearly pelvic exams, mammograms and monthly self breast exams; to see her PCP for other health maintenance.     Follow up in about 1 year (around 3/11/2026), or if symptoms worsen or fail to improve.

## 2025-07-31 ENCOUNTER — OFFICE VISIT (OUTPATIENT)
Dept: HEMATOLOGY/ONCOLOGY | Facility: CLINIC | Age: 60
End: 2025-07-31
Payer: COMMERCIAL

## 2025-07-31 DIAGNOSIS — Z71.83 ENCOUNTER FOR NONPROCREATIVE GENETIC COUNSELING: Primary | ICD-10-CM

## 2025-07-31 DIAGNOSIS — Z80.3 FAMILY HISTORY OF BREAST CANCER: ICD-10-CM

## 2025-07-31 NOTE — PROGRESS NOTES
"Cancer Genetics  Hereditary and High-Risk Clinic  Department of Hematology and Oncology  [facility]    Date of Service:  25  Visit Provider:  Uriel Lugo DNP  Collaborating Physician:  Keyanna Singh MD    Patient ID  Name: Carolin Lind    : 1965    MRN: [id number]      Televisit Information  The patient location is:  [place], [place]  The chief complaint leading to consultation is: As below    Visit type: audiovisual    Face to Face time with patient: 7 minutes  13 minutes of total time spent on the encounter, which includes face to face time and non-face to face time preparing to see the patient (eg, review of tests), Obtaining and/or reviewing separately obtained history, Documenting clinical information in the electronic or other health record, Independently interpreting results (not separately reported) and communicating results to the patient/family/caregiver, or Care coordination (not separately reported).         Each patient to whom he or she provides medical services by telemedicine is:  (1) informed of the relationship between the physician and patient and the respective role of any other health care provider with respect to management of the patient; and (2) notified that he or she may decline to receive medical services by telemedicine and may withdraw from such care at any time.    Notes:      SUBJECTIVE      Chief Complaint: Results    History of Present Illness (HPI):  Carolin Lind ("Darcy"), 60 y.o., assigned female sex at birth, initially presented on 2025 for cancer-genetic risk assessment (upon referral by Terrence Solitario NP) and subsequently underwent hereditary cancer genetic testing via the [employer] xG+ panel +[employer].  This test revealed no known clinically significant genetic variants or variants of uncertain significance in the genes tested.  She returns today for post-test genetic counseling.    Darcy denies pertinent " interval changes to her personal/family history beyond those noted herein.    ONCOLOGY PEDIGREE  Other than noted, no known family history of cancer, benign tumor/mass/lesion, or colon polyps.  If this pedigree appears small/illegible on your screen, expand this note window horizontally.       Personal Cancer-Related History  Cancer:  No   Colon polyp:  No    Had 1 colonoscopy in her 30s:  No polyps, per patient  Cologuard 2023 with negative results  Other tumor or pertinent mass/lesion:  No  Pancreatitis:  No  Personal history of cancer-genetic testing:  Yes - See results in A/P below     Other Pertinent Medical History  Blood disorder:  No     Focused Surgical History  Reproductive organs:  Intact     Focused Social History  Never smoker     Focused Family History  Ashkenazi Catholic ancestry:  No  Consanguinity:  No  Family history of cancer-genetic testing:  No    Review of Systems  See HPI.       OBJECTIVE     Patient Active Problem List    Diagnosis Date Noted    Hormone replacement therapy (HRT)     Elevated triglycerides with high cholesterol 02/28/2023    History of 2019 novel coronavirus disease (COVID-19) 11/16/2020    Chronic low back pain without sciatica 02/04/2020    Retained orthopedic hardware 04/09/2019    Major depressive disorder, recurrent episode, moderate 08/23/2018    Generalized anxiety disorder 04/02/2018    Closed left trimalleolar fracture     Migraine without aura, without mention of intractable migraine without mention of status migrainosus 07/31/2012     Past Medical History:   Diagnosis Date    Abnormal Pap smear of cervix 01/30/2017    Atypical pap with + HPV    Anxiety     Depression     Elevated triglycerides with high cholesterol 02/28/2023    Heart murmur     History of migraine headaches     Hormone replacement therapy (HRT)     Hypertension     Menarche     Age of onset 13    Mitral valve prolapse     PONV (postoperative nausea and vomiting)       Physical Exam  Significantly  "limited secondary to the inherent nature of a virtual visit.  Very pleasant patient.  Unaccompanied on this virtual visit.  Constitutional      Appearance:  Appears well developed and well nourished. No distress.   Neurological     Mental Status:  Alert and oriented.  Psychiatric         Mood and Affect:  Normal.     Thought Content:  Normal.     Speech:  Normal.     Behavior:  Normal.     Judgment:  Normal.     ASSESSMENT / PLAN      Carolinse Darcy Lind ("Darcy"), 60 y.o., assigned female sex at birth, initially presented on 06/23/2025 for cancer-genetic risk assessment (upon referral by Terrence Solitario NP) and subsequently underwent hereditary cancer genetic testing via the [employer] xG+ panel +[employer].  This test revealed no known clinically significant genetic variants or variants of uncertain significance in the genes tested.  She returns today for post-test genetic counseling.    HEREDITARY CANCER-GENETIC TEST RESULTS  This is a partial copy of the report.  The complete report can be found in the patient's medical record.      No known clinically significant mutations or variants of unknown significance were identified; in other words, your test came back negative (normal).     Your negative results for genes associated with cancers/other conditions in your relatives, with which you have not personally been affected, are uninformative.  In other words, your affected relatives' cancers/other conditions may have been hereditary or may have been sporadic, and without knowing that information, your negative results in the associated genes only tell us that you likely don't have a hereditary predisposition to those cancers/other conditions; however, you can still develop those cancers/other conditions and in fact may even have an increased risk for them based in part on your family history.  If the appropriate, affected relatives are found to carry a pathogenic (harmful) variant that explains their cancers/other " conditions, and you do not carry that variant, your negative results would then be considered a true-negative.    Presently, your genetic testing results reduce--but do not eliminate--the possibility of your developing a hereditary cancer in the future.    CANCER RISKS AND RISK MANAGEMENT    Only a small percentage (approximately 5%-10%) of cancers are hereditary, meaning caused by an inherited gene mutation; rather, most cancers are sporadic.  Environmental factors, lifestyle factors, and even factors beyond our control play a significant role in the development of many cancers.  Even if an individual has negative genetic testing, they can still be at increased risk for certain cancers, as they may independently have risk factors for those cancers and/or may share risk factors with family members affected by cancer.  It is strongly recommended that you ensure that your healthcare providers are aware of and up-to-date as to your personal and family history so that your cancer screenings can be based in part off of this information.      With regard to cancer risk reduction in general, it is recommended to avoid tobacco use; be physically active; maintain a healthy weight; eat a diet rich in fruits, vegetables, and whole grains and low in saturated/trans fat, red meat, and processed meat; limit alcohol consumption (zero is best); protect against sexually transmitted infections; protect against the sun, and avoid tanning beds; and get regular screenings for cancers as recommended by your healthcare team.    The below is general guidance regarding cancer screenings and is not to be considered exhaustive.  If you believe you need to see any of the below specialists, notify your cancer-genetics specialist promptly.     Cancer in general:    Attend an annual visit with a primary care provider (PCP) for history review, physical exam, and age-appropriate testing.   Beyond those cancer screenings listed herein, undergo  screenings/surveillance as recommended by your healthcare providers.      Skin cancer:    Undergo total-body skin examination (TBSE) with a dermatology provider at least annually.    Colorectal cancer (CRC):    Repeat CRC screenings as recommended by your gastroenterology (GI) provider and/or PCP.    With regard to family history, advise your GI provider or cancer-genetics specialist if any of the following applies, as such could impact CRC screening recommendations for you:  (a) Any blood relative of yours has been diagnosed with colorectal cancer; (b) a 1st-degree relative of yours has a colorectal polyp history including any of the following characteristics:  adenoma with high-grade dysplasia, adenoma or sessile serrated polyp/adenoma 1 cm or larger in size, villous or tubulovillous adenoma, traditional serrated adenoma (TSA), sessile serrated lesion/polyp/adenoma with any dysplasia, cumulatively 10 or more polyps.    Breast cancer:    If you are at average risk of breast cancer and age 25 or older, undergo breast cancer risk-reduction counseling at least annually, undergo clinical breast examination (CBE) annually, and practice breast awareness, meaning being familiar with your breasts and promptly reporting any changes/concerns to your healthcare provider; starting at age 40, annual 3-D screening mammogram is recommended (shared decision-making is encouraged based on individuals' values and preferences).    If you are at elevated (i.e., intermediate or increased/high) risk of breast cancer, underwent radiation therapy with exposure to your breast tissue, and/or have heterogeneously or extremely dense breast tissue on your most recent mammogram, be under the care of a breast healthcare specialist for age- and risk-appropriate breast cancer risk-reduction counseling, CBE, and screening imaging, including mammography and potentially other modalities.      Gynecologic cancers:    Attend an annual visit with your  gynecology provider, which may include pelvic exam and/or Pap smear/HPV testing.    Pancreatic cancer:    Should you learn that you have at least two blood relatives (on the same side of the family) who have had pancreatic cancer, you may be a candidate for pancreatic cancer screening, which should be performed under the care of a pancreas specialist -- Notify your cancer-genetics specialist if this family history applies to you.    Lung cancer:    If you have a 20-pack-year smoking history or greater and are at least 50 years of age, shared patient/provider decision-making regarding low-dose CT scan (LDCT) for lung cancer screening is recommended; if you have at least a 20-pack-year smoking history or are unsure, discuss with your PCP whether lung cancer screening is right for you.    REGARDING YOUR RELATIVES    Your relatives also may be at increased risk for certain cancers, depending upon their own personal and family history; therefore, it is important that they, too, ensure that their own healthcare providers are aware of and up-to-date as to their personal and family history so that their medical management, including cancer screenings, can be based in part off of this information.      Additionally, it is possible for your relatives to have hereditary cancer susceptibility gene mutations in addition to and/or other than those identified in you (or if/when you have negative genetic testing).  Of note, offspring cannot inherit/cannot have inherited from you a cancer susceptibility gene mutation that you do not carry; however, it is important to remember that offspring inherit 50% of their genetic DNA from their other biological parent.    Your relatives should speak with a healthcare provider about their cancer risks and whether genetic counseling and potentially testing may be indicated for them.  Anyone interested in pursuing cancer genetic counseling/testing may contact the [facility]  "at [phone] to schedule an appointment or may visit [email or web address] to locate a cancer-genetic specialist near them.    FOLLOW-UP    Please continue to follow up with all healthcare providers as directed.    Moving forward, please update me with any changes to--or new information learned about--your personal or family history and with any relative's genetic testing results.  Barring any such changes, please follow up with me in 3 years.  In the meantime, please don't hesitate to reach out with any questions or concerns, or follow up anytime sooner than planned as you wish.         DIAGNOSES AND ORDERS FOR THIS ENCOUNTER    ICD-10-CM ICD-9-CM   1. Encounter for nonprocreative genetic counseling  Z71.83 V26.33   2. Family history of breast cancer  Z80.3 V16.3     1. Encounter for nonprocreative genetic counseling  - Carolin Darcy Lind ("Darcy"), 60 y.o., assigned female sex at birth, initially presented on 06/23/2025 for cancer-genetic risk assessment (upon referral by Terrence Solitario NP) and subsequently underwent hereditary cancer genetic testing via the [employer] xG+ panel +[employer].  This test revealed no known clinically significant genetic variants or variants of uncertain significance in the genes tested.    - Post-test genetic counseling was conducted.    2. Family history of breast cancer  - Darcy is under the care of ESTELLE Solitario NP.     Follow-up:  Follow up in about 3 years (around 7/31/2028).  Follow up sooner as needed/desired.    Questions were encouraged and answered to the patient's satisfaction, and she verbalized understanding of the information and agreement with the plan.  Advised Darcy that pertinent information will be accessible in this visit note for her review.        Uriel Lugo, DNP, APRN, FNP-BC, AOCNP, CGRA  Nurse Practitioner, Hereditary and High-Risk Clinic  Department of Hematology and Oncology  [facility]        CC:  Terrence Solitario NP "         Routed to Cancer Genetics Staff:  - Please place recall for 07/31/2028.

## 2025-08-13 ENCOUNTER — PATIENT MESSAGE (OUTPATIENT)
Dept: OBSTETRICS AND GYNECOLOGY | Facility: CLINIC | Age: 60
End: 2025-08-13
Payer: COMMERCIAL

## 2025-08-13 DIAGNOSIS — I10 BENIGN HYPERTENSION: ICD-10-CM

## 2025-08-14 RX ORDER — ESTRADIOL 0.1 MG/D
1 FILM, EXTENDED RELEASE TRANSDERMAL
Qty: 8 PATCH | Refills: 11 | Status: SHIPPED | OUTPATIENT
Start: 2025-08-14 | End: 2026-08-14

## 2025-08-14 RX ORDER — PROPRANOLOL HYDROCHLORIDE 40 MG/1
40 TABLET ORAL 2 TIMES DAILY
Qty: 180 TABLET | Refills: 2 | Status: SHIPPED | OUTPATIENT
Start: 2025-08-14

## (undated) DEVICE — SUT ETHILON 3/0 18IN PS-1

## (undated) DEVICE — DRAPE C ARM 42 X 120 10/BX

## (undated) DEVICE — PAD CAST SPECIALIST STRL 6

## (undated) DEVICE — SEE MEDLINE ITEM 152622

## (undated) DEVICE — TRAY MINOR ORTHO

## (undated) DEVICE — DRAPE STERI U-SHAPED 47X51IN

## (undated) DEVICE — TOURNIQUET SB QC SP 24X4IN

## (undated) DEVICE — SEE MEDLINE ITEM 146298

## (undated) DEVICE — SPONGE DERMACEA GAUZE 4X4

## (undated) DEVICE — BANDAGE ACE ELASTIC 6"

## (undated) DEVICE — SEE MEDLINE ITEM 152529

## (undated) DEVICE — CATH SUCTION 10FR

## (undated) DEVICE — SUT VICRYL PLUS 3-0 SH 18IN

## (undated) DEVICE — SEE MEDLINE ITEM 157150

## (undated) DEVICE — BANDAGE KERLIX P/P 2.25IN STER

## (undated) DEVICE — DRAPE C-ARMOR EQUIPMENT COVER

## (undated) DEVICE — DRAPE PLASTIC U 60X72

## (undated) DEVICE — WIRE-K 20MM X 150MM.
Type: IMPLANTABLE DEVICE | Site: ANKLE | Status: NON-FUNCTIONAL
Removed: 2017-07-13

## (undated) DEVICE — DRESSING AQUACEL FOAM 3 X 3

## (undated) DEVICE — APPLICATOR CHLORAPREP ORN 26ML

## (undated) DEVICE — SUT 0 VICRYL / CT-1

## (undated) DEVICE — BIT DRILL 2.0MM D DEPTH 110MM

## (undated) DEVICE — K-WIRE TRCR PT1.25MM D 150MM L
Type: IMPLANTABLE DEVICE | Site: ANKLE | Status: NON-FUNCTIONAL
Removed: 2017-07-13

## (undated) DEVICE — GAUZE SPONGE 4X4 12PLY

## (undated) DEVICE — BIT BRILL 2.5

## (undated) DEVICE — BANDAGE ESMARK 6X12

## (undated) DEVICE — ELECTRODE REM PLYHSV RETURN 9

## (undated) DEVICE — BRUSH SCRUB HIBICLENS 4%

## (undated) DEVICE — BLADE SURG CARBON STEEL #10

## (undated) DEVICE — SPLINT PLASTER FS 5INX45IN

## (undated) DEVICE — Device

## (undated) DEVICE — SUT VICRYL PLUS 0 CT1 18IN

## (undated) DEVICE — SUT 3-0 VICRYL SH CR/8 18

## (undated) DEVICE — 3.5MM DRILL BIT

## (undated) DEVICE — SEE MEDLINE ITEM 146347

## (undated) DEVICE — BIT DRILL CALIB QC 2.5X230MM

## (undated) DEVICE — BRUSH SCRUB STERILE W/O GERMIC

## (undated) DEVICE — DRESSING N ADH OIL EMUL 3X8

## (undated) DEVICE — DRESSING AQUACEL AG 3.5X10IN

## (undated) DEVICE — SHEET DRAPE FAN-FOLDED 3/4

## (undated) DEVICE — SCRUB HIBICLENS 4% CHG 4OZ